# Patient Record
Sex: FEMALE | Race: BLACK OR AFRICAN AMERICAN | NOT HISPANIC OR LATINO | Employment: PART TIME | URBAN - METROPOLITAN AREA
[De-identification: names, ages, dates, MRNs, and addresses within clinical notes are randomized per-mention and may not be internally consistent; named-entity substitution may affect disease eponyms.]

---

## 2017-02-17 ENCOUNTER — OFFICE VISIT (OUTPATIENT)
Dept: INTERNAL MEDICINE | Facility: CLINIC | Age: 62
End: 2017-02-17
Payer: COMMERCIAL

## 2017-02-17 VITALS
TEMPERATURE: 98 F | RESPIRATION RATE: 16 BRPM | WEIGHT: 164.25 LBS | OXYGEN SATURATION: 99 % | DIASTOLIC BLOOD PRESSURE: 74 MMHG | HEIGHT: 66 IN | HEART RATE: 58 BPM | BODY MASS INDEX: 26.4 KG/M2 | SYSTOLIC BLOOD PRESSURE: 102 MMHG

## 2017-02-17 DIAGNOSIS — Z00.00 ROUTINE GENERAL MEDICAL EXAMINATION AT A HEALTH CARE FACILITY: Primary | ICD-10-CM

## 2017-02-17 DIAGNOSIS — Z12.31 OTHER SCREENING MAMMOGRAM: ICD-10-CM

## 2017-02-17 PROCEDURE — 99999 PR PBB SHADOW E&M-NEW PATIENT-LVL III: CPT | Mod: PBBFAC,,, | Performed by: INTERNAL MEDICINE

## 2017-02-17 PROCEDURE — 99204 OFFICE O/P NEW MOD 45 MIN: CPT | Mod: S$GLB,,, | Performed by: INTERNAL MEDICINE

## 2017-02-17 RX ORDER — CLOPIDOGREL BISULFATE 75 MG/1
75 TABLET ORAL DAILY
COMMUNITY
End: 2017-04-28 | Stop reason: ALTCHOICE

## 2017-02-17 RX ORDER — ERGOCALCIFEROL 1.25 MG/1
50000 CAPSULE ORAL
Refills: 3 | COMMUNITY
Start: 2017-01-20 | End: 2017-07-27 | Stop reason: SDUPTHER

## 2017-02-17 RX ORDER — FLUTICASONE PROPIONATE 50 MCG
SPRAY, SUSPENSION (ML) NASAL
Refills: 0 | COMMUNITY
Start: 2016-11-29 | End: 2022-09-29

## 2017-02-17 RX ORDER — CARISOPRODOL 350 MG/1
TABLET ORAL
Refills: 0 | COMMUNITY
Start: 2017-01-31 | End: 2017-02-17

## 2017-02-17 RX ORDER — ROSUVASTATIN CALCIUM 10 MG/1
10 TABLET, COATED ORAL DAILY
COMMUNITY
End: 2017-07-01 | Stop reason: SDUPTHER

## 2017-02-17 RX ORDER — CYCLOBENZAPRINE HCL 10 MG
TABLET ORAL
Refills: 0 | COMMUNITY
Start: 2017-01-01 | End: 2017-02-17

## 2017-02-17 RX ORDER — AMLODIPINE BESYLATE 5 MG/1
5 TABLET ORAL DAILY
COMMUNITY
End: 2017-10-20 | Stop reason: ALTCHOICE

## 2017-02-17 NOTE — MR AVS SNAPSHOT
[facility]  [address] [place] [address] [address]  [place] [address]  Phone: [phone]                  Conchita Rouse   2017 10:40 AM   Office Visit    Description:  Female : 1955   Provider:  Roberto Ron MD   Department:  [facility]           Reason for Visit     Establish Care     Annual Exam           Diagnoses this Visit        Comments    Routine general medical examination at a health care facility    -  Primary            To Do List           Goals (5 Years of Data)     None      Follow-Up and Disposition     Return in about 1 year (around 2018), or if symptoms worsen or fail to improve, for Annual.    Follow-up and Disposition History      [facility] On [facility]     [facility] On Call Nurse Care Line -  Assistance  Registered nurses in the [facility] On Call Center provide clinical advisement, health education, appointment booking, and other advisory services.  Call for this free service at [phone].             Medications           STOP taking these medications     carisoprodol (SOMA) 350 MG tablet TK 1 T PO  Q 8 H PRF MUSCLE SPASMS    cyclobenzaprine (FLEXERIL) 10 MG tablet TAKE 1 TABLET BY MOUTH EVERY 4 HOURS AS NEEDED FOR MUSCLE SPASMS           Verify that the below list of medications is an accurate representation of the medications you are currently taking.  If none reported, the list may be blank. If incorrect, please contact your healthcare provider. Carry this list with you in case of emergency.           Current Medications     amlodipine (NORVASC) 5 MG tablet Take 5 mg by mouth once daily.    clopidogrel (PLAVIX) 75 mg tablet Take 75 mg by mouth once daily.    fluticasone (FLONASE) 50 mcg/actuation nasal spray USE 2 SPRAYS IN EACH NOSTRIL 1 TIME A DAY AS NEEDED    rosuvastatin (CRESTOR) 10 MG tablet Take 10 mg by mouth once daily.    VITAMIN D2 50,000 unit capsule Take 50,000 Units by mouth every 7 days.           Clinical Reference Information  "          Your Vitals Were     BP Pulse Temp Resp    102/74 (BP Location: Left arm, Patient Position: Sitting, BP Method: Manual) 58 97.6 °F (36.4 °C) (Tympanic) 16    Height Weight SpO2 BMI    5' 6" (1.676 m) 74.5 kg (164 lb 3.9 oz) 99% 26.51 kg/m2      Blood Pressure          Most Recent Value    BP  102/74      Allergies as of 2/17/2017     Codeine      Immunizations Administered on Date of Encounter - 2/17/2017     None      Orders Placed During Today's Visit     Future Labs/Procedures Expected by Expires    CBC auto differential  2/17/2017 4/18/2018    Comprehensive metabolic panel  2/17/2017 4/18/2018    Lipid panel  2/17/2017 4/18/2018    Vitamin D  2/17/2017 4/18/2018      [facility] Sign-Up     Activating your [facility] account is as easy as 1-2-3!     1) Visit [email or web address], select Sign Up Now, enter this activation code and your date of birth, then select Next.  [email or web address]  Expires: 4/3/2017 11:53 AM      2) Create a username and password to use when you visit [facility] in the future and select a security question in case you lose your password and select Next.    3) Enter your e-mail address and click Sign Up!    Additional Information  If you have questions, please e-mail [email or web address] or call [phone] to talk to our [facility] staff. Remember, [facility] is NOT to be used for urgent needs. For medical emergencies, dial 911.         Language Assistance Services     ATTENTION: Language assistance services are available, free of charge. Please call [phone].      ATENCIÓN: Si habla español, tiene a wolfe disposición servicios gratuitos de asistencia lingüística. Llame al [phone].     SHIRA Ý: N?u b?n nói Ti?ng Vi?t, có các d?ch v? h? tr? ngôn ng? mi?n phí dành cho b?n. G?i s? [phone].         [facility] complies with applicable Federal civil rights laws and does not discriminate on the basis of race, color, national origin, age, disability, or sex.        "

## 2017-02-17 NOTE — PROGRESS NOTES
"Subjective:      Patient ID: Conchita Rouse is a 62 y.o. female.    Chief Complaint: Establish Care and Annual Exam    HPI  Ms. Rouse who presents to establish primary care due to her previous PCP, Dr. Regalado, retiring.    She reports feeling "good" and endorses being happy.    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 No dizziness or sob or cp.     Past Medical History   Diagnosis Date    Hyperlipidemia     Hypertension     TIA (transient ischemic attack) 2007     She was at "[employer]", where she noted she was feeling slow, took a nap, woke up with a numb/tingling left jaw to arm, which persistned 20 minutes, took 3 baby aspirins, went to ER @ [facility], where facial droop was noted, but completely resolved & head CT revealed old minor abnormality.    Vitamin D deficiency      Past Surgical History   Procedure Laterality Date    Left eye surgery  1955     Due to left eye lid drooping     Social History     Social History    Marital status:      Spouse name: N/A    Number of children: N/A    Years of education: N/A     Occupational History    Not on file.     Social History Main Topics    Smoking status: Never Smoker    Smokeless tobacco: Never Used    Alcohol use 0.0 - 0.6 oz/week     0 - 1 Cans of beer per week      Comment: 1/2 beer 3x/yr    Drug use: No    Sexual activity: Not Currently     Partners: Male     Birth control/ protection: Post-menopausal     Other Topics Concern    Not on file     Social History Narrative    Breakfast: Skips or eggs; 1 cup coffee w/ 1 cream & 3 sugars    Lunch: Skips or turkey or ham sandwich w/ occasional chips; tea, rare soda    Dinner: Chicken wings and french fries; tea or cool aid    Snacks: Chocolates i.e. covered strawberry or Pretzils     Eats out: 2x/wk      Family History   Problem Relation Age of Onset    Heart disease Mother     Diabetes Mother     Heart disease Father     Cancer Sister     " "Cancer Brother        Current Outpatient Prescriptions:     amlodipine (NORVASC) 5 MG tablet, Take 5 mg by mouth once daily., Disp: , Rfl:     clopidogrel (PLAVIX) 75 mg tablet, Take 75 mg by mouth once daily., Disp: , Rfl:     fluticasone (FLONASE) 50 mcg/actuation nasal spray, USE 2 SPRAYS IN EACH NOSTRIL 1 TIME A DAY AS NEEDED, Disp: , Rfl: 0    rosuvastatin (CRESTOR) 10 MG tablet, Take 10 mg by mouth once daily., Disp: , Rfl:     VITAMIN D2 50,000 unit capsule, Take 50,000 Units by mouth every 7 days., Disp: , Rfl: 3    Review of patient's allergies indicates:   Allergen Reactions    Codeine Anaphylaxis     No results found for: WBC, HGB, HCT, PLT, CHOL, TRIG, HDL, LDLDIRECT, ALT, AST, NA, K, CL, CREATININE, BUN, CO2, TSH, PSA, INR, GLUF, HGBA1C, MICROALBUR    Visit Vitals    /74 (BP Location: Left arm, Patient Position: Sitting, BP Method: Manual)    Pulse (!) 58    Temp 97.6 °F (36.4 °C) (Tympanic)    Resp 16    Ht 5' 6" (1.676 m)    Wt 74.5 kg (164 lb 3.9 oz)    SpO2 99%    BMI 26.51 kg/m2     Review of Systems   Constitutional: Negative.   Cardiovascular: Negative.   Respiratory: Negative.   Gastrointestinal: Negative.   Genitourinary: Negative.   Musculoskeletal: Negative.   Derm: Negative.  Allergic/Immunologic: Negative.   Endocrine: Negative.   Hematological: Negative.   Neurological: Negative.   Psychiatric/Behavioral: Negative.     Objective:     Physical Exam  GEN: A&O fully, NAD  PSYC: Normal affect  HEENT: OP: Clear, no LAD, no thyroid masses  CV: RRR, no M/G/R  PULM: CTA bilaterally, no wheezes, rales  GI: S/NT/ND, normal bowel sounds  EXT: No C/C/E  NEURO: CN II-XII intact, except left eye , 5/5 strength globally, no sensory losses, normal tandem gait, normal Romberg, 2+ DTRs globally    No results found for: HGBA1C    Assessment:     1. Routine general medical examination at a health care facility    2. H/o TIA: Stable. No changes in neurological function. Neuro exam " stable.  3. Overweight: Discussed strategies for lifestyle modifications, including systematically increasing water, yogurt, whole fruits, unsalted nuts, non-starchy vegetables, beans, weight logging and physical activity; and avoiding potatoes, red/processed meats, sugar     sweetened beverages, and fast food.   4. HM: Had normal MMG 12/2016: W[facility] per pt ([facility] [place]), last PAP              5/2016: WNL per pt: Dr. Broderick (OB/GYN)    Plan:   Routine general medical examination at a health care facility  -     CBC auto differential; Future; Expected date: 2/17/17  -     Comprehensive metabolic panel; Future; Expected date: 2/17/17  -     Lipid panel; Future; Expected date: 2/17/17  -     Vitamin D; Future; Expected date: 2/17/17    RTC in 12 months for annual or sooner as needed.

## 2017-02-21 ENCOUNTER — LAB VISIT (OUTPATIENT)
Dept: LAB | Facility: HOSPITAL | Age: 62
End: 2017-02-21
Attending: INTERNAL MEDICINE
Payer: COMMERCIAL

## 2017-02-21 DIAGNOSIS — Z00.00 ROUTINE GENERAL MEDICAL EXAMINATION AT A HEALTH CARE FACILITY: ICD-10-CM

## 2017-02-21 DIAGNOSIS — Z11.59 NEED FOR HEPATITIS C SCREENING TEST: ICD-10-CM

## 2017-02-21 LAB
25(OH)D3+25(OH)D2 SERPL-MCNC: 31 NG/ML
ALBUMIN SERPL BCP-MCNC: 3.8 G/DL
ALP SERPL-CCNC: 68 U/L
ALT SERPL W/O P-5'-P-CCNC: 21 U/L
ANION GAP SERPL CALC-SCNC: 7 MMOL/L
ANISOCYTOSIS BLD QL SMEAR: SLIGHT
AST SERPL-CCNC: 19 U/L
BASOPHILS # BLD AUTO: 0 K/UL
BASOPHILS NFR BLD: 0 %
BILIRUB SERPL-MCNC: 0.4 MG/DL
BUN SERPL-MCNC: 15 MG/DL
CALCIUM SERPL-MCNC: 9.5 MG/DL
CHLORIDE SERPL-SCNC: 106 MMOL/L
CHOLEST/HDLC SERPL: 2.7 {RATIO}
CO2 SERPL-SCNC: 28 MMOL/L
CREAT SERPL-MCNC: 0.9 MG/DL
DIFFERENTIAL METHOD: ABNORMAL
EOSINOPHIL # BLD AUTO: 0.1 K/UL
EOSINOPHIL NFR BLD: 1.9 %
ERYTHROCYTE [DISTWIDTH] IN BLOOD BY AUTOMATED COUNT: 14.2 %
EST. GFR  (AFRICAN AMERICAN): >60 ML/MIN/1.73 M^2
EST. GFR  (NON AFRICAN AMERICAN): >60 ML/MIN/1.73 M^2
GLUCOSE SERPL-MCNC: 86 MG/DL
HCT VFR BLD AUTO: 32.7 %
HDL/CHOLESTEROL RATIO: 36.5 %
HDLC SERPL-MCNC: 126 MG/DL
HDLC SERPL-MCNC: 46 MG/DL
HGB BLD-MCNC: 11.1 G/DL
HYPOCHROMIA BLD QL SMEAR: ABNORMAL
LDLC SERPL CALC-MCNC: 67.4 MG/DL
LYMPHOCYTES # BLD AUTO: 1 K/UL
LYMPHOCYTES NFR BLD: 39.2 %
MCH RBC QN AUTO: 34.4 PG
MCHC RBC AUTO-ENTMCNC: 33.9 %
MCV RBC AUTO: 101 FL
MONOCYTES # BLD AUTO: 0.3 K/UL
MONOCYTES NFR BLD: 10.6 %
NEUTROPHILS # BLD AUTO: 1.3 K/UL
NEUTROPHILS NFR BLD: 48.3 %
NONHDLC SERPL-MCNC: 80 MG/DL
OVALOCYTES BLD QL SMEAR: ABNORMAL
PLATELET # BLD AUTO: 40 K/UL
PLATELET BLD QL SMEAR: ABNORMAL
PMV BLD AUTO: 9.8 FL
POIKILOCYTOSIS BLD QL SMEAR: SLIGHT
POTASSIUM SERPL-SCNC: 3.6 MMOL/L
PROT SERPL-MCNC: 7.4 G/DL
RBC # BLD AUTO: 3.23 M/UL
SODIUM SERPL-SCNC: 141 MMOL/L
TRIGL SERPL-MCNC: 63 MG/DL
WBC # BLD AUTO: 2.65 K/UL

## 2017-02-21 PROCEDURE — 86803 HEPATITIS C AB TEST: CPT

## 2017-02-21 PROCEDURE — 80061 LIPID PANEL: CPT

## 2017-02-21 PROCEDURE — 36415 COLL VENOUS BLD VENIPUNCTURE: CPT | Mod: PO

## 2017-02-21 PROCEDURE — 85025 COMPLETE CBC W/AUTO DIFF WBC: CPT

## 2017-02-21 PROCEDURE — 80053 COMPREHEN METABOLIC PANEL: CPT

## 2017-02-21 PROCEDURE — 82306 VITAMIN D 25 HYDROXY: CPT

## 2017-02-22 ENCOUNTER — LAB VISIT (OUTPATIENT)
Dept: LAB | Facility: HOSPITAL | Age: 62
End: 2017-02-22
Attending: INTERNAL MEDICINE
Payer: COMMERCIAL

## 2017-02-22 ENCOUNTER — INITIAL CONSULT (OUTPATIENT)
Dept: HEMATOLOGY/ONCOLOGY | Facility: CLINIC | Age: 62
End: 2017-02-22
Payer: COMMERCIAL

## 2017-02-22 ENCOUNTER — TELEPHONE (OUTPATIENT)
Dept: HEMATOLOGY/ONCOLOGY | Facility: CLINIC | Age: 62
End: 2017-02-22

## 2017-02-22 VITALS
SYSTOLIC BLOOD PRESSURE: 120 MMHG | DIASTOLIC BLOOD PRESSURE: 80 MMHG | OXYGEN SATURATION: 100 % | TEMPERATURE: 98 F | BODY MASS INDEX: 27.63 KG/M2 | HEIGHT: 65 IN | WEIGHT: 165.81 LBS | HEART RATE: 79 BPM | RESPIRATION RATE: 18 BRPM

## 2017-02-22 DIAGNOSIS — D61.818 PANCYTOPENIA: ICD-10-CM

## 2017-02-22 DIAGNOSIS — E78.00 PURE HYPERCHOLESTEROLEMIA: ICD-10-CM

## 2017-02-22 DIAGNOSIS — Z86.73 H/O TIA (TRANSIENT ISCHEMIC ATTACK) AND STROKE: ICD-10-CM

## 2017-02-22 DIAGNOSIS — I10 ESSENTIAL HYPERTENSION: ICD-10-CM

## 2017-02-22 DIAGNOSIS — D69.6 THROMBOCYTOPENIA: Primary | ICD-10-CM

## 2017-02-22 LAB
ALBUMIN SERPL BCP-MCNC: 4 G/DL
ALP SERPL-CCNC: 66 U/L
ALT SERPL W/O P-5'-P-CCNC: 26 U/L
ANION GAP SERPL CALC-SCNC: 11 MMOL/L
ANISOCYTOSIS BLD QL SMEAR: SLIGHT
AST SERPL-CCNC: 18 U/L
BASOPHILS # BLD AUTO: ABNORMAL K/UL
BASOPHILS NFR BLD: 0 %
BILIRUB SERPL-MCNC: 0.2 MG/DL
BUN SERPL-MCNC: 14 MG/DL
CALCIUM SERPL-MCNC: 9.4 MG/DL
CHLORIDE SERPL-SCNC: 105 MMOL/L
CO2 SERPL-SCNC: 25 MMOL/L
CREAT SERPL-MCNC: 0.9 MG/DL
DACRYOCYTES BLD QL SMEAR: ABNORMAL
DIFFERENTIAL METHOD: ABNORMAL
EOSINOPHIL # BLD AUTO: ABNORMAL K/UL
EOSINOPHIL NFR BLD: 0 %
ERYTHROCYTE [DISTWIDTH] IN BLOOD BY AUTOMATED COUNT: 13.9 %
EST. GFR  (AFRICAN AMERICAN): >60 ML/MIN/1.73 M^2
EST. GFR  (NON AFRICAN AMERICAN): >60 ML/MIN/1.73 M^2
FERRITIN SERPL-MCNC: 409 NG/ML
GLUCOSE SERPL-MCNC: 80 MG/DL
HAPTOGLOB SERPL-MCNC: 85 MG/DL
HCT VFR BLD AUTO: 31.7 %
HCV AB SERPL QL IA: NEGATIVE
HGB BLD-MCNC: 10.7 G/DL
IRON SERPL-MCNC: 101 UG/DL
LDH SERPL L TO P-CCNC: 154 U/L
LYMPHOCYTES # BLD AUTO: ABNORMAL K/UL
LYMPHOCYTES NFR BLD: 49 %
MCH RBC QN AUTO: 34.9 PG
MCHC RBC AUTO-ENTMCNC: 33.8 %
MCV RBC AUTO: 103 FL
MONOCYTES # BLD AUTO: ABNORMAL K/UL
MONOCYTES NFR BLD: 13 %
NEUTROPHILS NFR BLD: 38 %
OVALOCYTES BLD QL SMEAR: ABNORMAL
PLATELET # BLD AUTO: 37 K/UL
PLATELET BLD QL SMEAR: ABNORMAL
PMV BLD AUTO: 9.2 FL
POIKILOCYTOSIS BLD QL SMEAR: SLIGHT
POTASSIUM SERPL-SCNC: 3.4 MMOL/L
PROT SERPL-MCNC: 7.5 G/DL
RBC # BLD AUTO: 3.07 M/UL
RHEUMATOID FACT SERPL-ACNC: <10 IU/ML
SATURATED IRON: 34 %
SODIUM SERPL-SCNC: 141 MMOL/L
TOTAL IRON BINDING CAPACITY: 299 UG/DL
TRANSFERRIN SERPL-MCNC: 202 MG/DL
WBC # BLD AUTO: 1.84 K/UL

## 2017-02-22 PROCEDURE — 84165 PROTEIN E-PHORESIS SERUM: CPT | Mod: 26,,, | Performed by: PATHOLOGY

## 2017-02-22 PROCEDURE — 83010 ASSAY OF HAPTOGLOBIN QUANT: CPT

## 2017-02-22 PROCEDURE — 3074F SYST BP LT 130 MM HG: CPT | Mod: S$GLB,,, | Performed by: INTERNAL MEDICINE

## 2017-02-22 PROCEDURE — 80053 COMPREHEN METABOLIC PANEL: CPT

## 2017-02-22 PROCEDURE — 36415 COLL VENOUS BLD VENIPUNCTURE: CPT

## 2017-02-22 PROCEDURE — 80074 ACUTE HEPATITIS PANEL: CPT

## 2017-02-22 PROCEDURE — 99999 PR PBB SHADOW E&M-EST. PATIENT-LVL III: CPT | Mod: PBBFAC,,, | Performed by: INTERNAL MEDICINE

## 2017-02-22 PROCEDURE — 99205 OFFICE O/P NEW HI 60 MIN: CPT | Mod: S$GLB,,, | Performed by: INTERNAL MEDICINE

## 2017-02-22 PROCEDURE — 86703 HIV-1/HIV-2 1 RESULT ANTBDY: CPT

## 2017-02-22 PROCEDURE — 85027 COMPLETE CBC AUTOMATED: CPT

## 2017-02-22 PROCEDURE — 83520 IMMUNOASSAY QUANT NOS NONAB: CPT | Mod: 59

## 2017-02-22 PROCEDURE — 85049 AUTOMATED PLATELET COUNT: CPT

## 2017-02-22 PROCEDURE — 1160F RVW MEDS BY RX/DR IN RCRD: CPT | Mod: S$GLB,,, | Performed by: INTERNAL MEDICINE

## 2017-02-22 PROCEDURE — 86431 RHEUMATOID FACTOR QUANT: CPT

## 2017-02-22 PROCEDURE — 82728 ASSAY OF FERRITIN: CPT

## 2017-02-22 PROCEDURE — 83615 LACTATE (LD) (LDH) ENZYME: CPT

## 2017-02-22 PROCEDURE — 84165 PROTEIN E-PHORESIS SERUM: CPT

## 2017-02-22 PROCEDURE — 83540 ASSAY OF IRON: CPT

## 2017-02-22 PROCEDURE — 3079F DIAST BP 80-89 MM HG: CPT | Mod: S$GLB,,, | Performed by: INTERNAL MEDICINE

## 2017-02-22 PROCEDURE — 86038 ANTINUCLEAR ANTIBODIES: CPT

## 2017-02-22 NOTE — LETTER
February 22, 2017      Roberto Ron MD  [address] [facility]  [place] [address]           [facility]  [address] [place] [address]  Phone: [phone]  Fax: [phone]          Patient: Conchita Rouse   MR Number: [id number]   YOB: 1955   Date of Visit: 2/22/2017       Dear Dr. Roberto Ron:    Thank you for referring Conchita Rouse to me for evaluation. Attached you will find relevant portions of my assessment and plan of care.    If you have questions, please do not hesitate to call me. I look forward to following Conchita Rouse along with you.    Sincerely,    Zain Villagomez MD    Enclosure  CC:  No Recipients    If you would like to receive this communication electronically, please contact [email or web address] or [phone] to request more information on [employer] Link access.    For providers and/or their staff who would like to refer a patient to [facility], please contact us through our one-stop-shop provider referral line, [facility] , at [phone].    If you feel you have received this communication in error or would no longer like to receive these types of communications, please e-mail [email or web address]

## 2017-02-22 NOTE — PROGRESS NOTES
"Subjective:       Patient ID: Conchita Rouse is a 62 y.o. female.    Chief Complaint: Consult (Pancytopenia)    HPI 62-year-old female history of pancytopenia referred by primary care physician for further evaluation.  Patient has a tooth abscess involving her right tooth is currently on Augmentin and ibuprofen.  Patient is referred for pancytopenia evaluation    Past Medical History   Diagnosis Date    Hyperlipidemia     Hypertension     Pancytopenia 02/21/2017     PLT 40 with no s/s bleeding; referred to hem/onc for BM Bx/management    TIA (transient ischemic attack) 2007     She was at "[employer]", where she noted she was feeling slow, took a nap, woke up with a numb/tingling left jaw to arm, which persistned 20 minutes, took 3 baby aspirins, went to ER @ [facility], where facial droop was noted, but completely resolved & head CT revealed old minor abnormality.    Vitamin D deficiency      Family History   Problem Relation Age of Onset    Heart disease Mother     Diabetes Mother     Heart disease Father     Cancer Sister     Cancer Brother      Social History     Social History    Marital status:      Spouse name: N/A    Number of children: N/A    Years of education: N/A     Occupational History    Not on file.     Social History Main Topics    Smoking status: Never Smoker    Smokeless tobacco: Never Used    Alcohol use 0.0 - 0.6 oz/week     0 - 1 Cans of beer per week      Comment: 1/2 beer 3x/yr    Drug use: No    Sexual activity: Not Currently     Partners: Male     Birth control/ protection: Post-menopausal     Other Topics Concern    Not on file     Social History Narrative    Breakfast: Skips or eggs; 1 cup coffee w/ 1 cream & 3 sugars    Lunch: Skips or turkey or ham sandwich w/ occasional chips; tea, rare soda    Dinner: Chicken wings and french fries; tea or cool aid    Snacks: Chocolates i.e. covered strawberry or Pretzils     Eats out: 2x/wk     Past Surgical History   Procedure " Laterality Date    Left eye surgery  1955     Due to left eye lid drooping       Labs:  Lab Results   Component Value Date    WBC 2.65 (L) 02/21/2017    HGB 11.1 (L) 02/21/2017    HCT 32.7 (L) 02/21/2017     (H) 02/21/2017    PLT 40 (L) 02/21/2017     BMP  Lab Results   Component Value Date     02/21/2017    K 3.6 02/21/2017     02/21/2017    CO2 28 02/21/2017    BUN 15 02/21/2017    CREATININE 0.9 02/21/2017    CALCIUM 9.5 02/21/2017    ANIONGAP 7 (L) 02/21/2017    ESTGFRAFRICA >60.0 02/21/2017    EGFRNONAA >60.0 02/21/2017     Lab Results   Component Value Date    ALT 21 02/21/2017    AST 19 02/21/2017    ALKPHOS 68 02/21/2017    BILITOT 0.4 02/21/2017       No results found for: IRON, TIBC, FERRITIN, SATURATEDIRO  No results found for: BFJICOEI14  No results found for: FOLATE  No results found for: TSH      Review of Systems   Constitutional: Negative for activity change, appetite change, chills, diaphoresis, fatigue, fever and unexpected weight change.   HENT: Positive for dental problem. Negative for congestion, drooling, ear discharge, ear pain, facial swelling, hearing loss, mouth sores, nosebleeds, postnasal drip, rhinorrhea, sinus pressure, sneezing, sore throat, tinnitus, trouble swallowing and voice change.    Eyes: Negative for photophobia, pain, discharge, redness, itching and visual disturbance.   Respiratory: Negative for cough, choking, chest tightness, shortness of breath, wheezing and stridor.    Cardiovascular: Negative for chest pain, palpitations and leg swelling.   Gastrointestinal: Negative for abdominal distention, abdominal pain, anal bleeding, blood in stool, constipation, diarrhea, nausea, rectal pain and vomiting.   Endocrine: Negative for cold intolerance, heat intolerance, polydipsia, polyphagia and polyuria.   Genitourinary: Negative for decreased urine volume, difficulty urinating, dyspareunia, dysuria, enuresis, flank pain, frequency, genital sores, hematuria,  menstrual problem, pelvic pain, urgency, vaginal bleeding, vaginal discharge and vaginal pain.   Musculoskeletal: Negative for arthralgias, back pain, gait problem, joint swelling, myalgias, neck pain and neck stiffness.   Skin: Negative for color change, pallor and rash.   Allergic/Immunologic: Negative for environmental allergies, food allergies and immunocompromised state.   Neurological: Negative for dizziness, tremors, seizures, syncope, facial asymmetry, speech difficulty, weakness, light-headedness, numbness and headaches.   Hematological: Negative for adenopathy. Does not bruise/bleed easily.   Psychiatric/Behavioral: Positive for dysphoric mood. Negative for agitation, behavioral problems, confusion, decreased concentration, hallucinations, self-injury, sleep disturbance and suicidal ideas. The patient is nervous/anxious. The patient is not hyperactive.        Objective:      Physical Exam   Constitutional: She is oriented to person, place, and time. She appears well-developed and well-nourished. No distress.   HENT:   Head: Normocephalic and atraumatic.   Right Ear: Tympanic membrane and external ear normal.   Left Ear: External ear normal.   Ears:    Nose: Nose normal. Right sinus exhibits no maxillary sinus tenderness and no frontal sinus tenderness. Left sinus exhibits no maxillary sinus tenderness and no frontal sinus tenderness.   Mouth/Throat: Oropharynx is clear and moist. No oropharyngeal exudate.   Eyes: Conjunctivae, EOM and lids are normal. Pupils are equal, round, and reactive to light. Right eye exhibits no discharge. Left eye exhibits no discharge. Right conjunctiva is not injected. Right conjunctiva has no hemorrhage. Left conjunctiva is not injected. Left conjunctiva has no hemorrhage. No scleral icterus.   Neck: Normal range of motion. Neck supple. No JVD present. No tracheal deviation present. No thyromegaly present.   Cardiovascular: Normal rate, regular rhythm, normal heart sounds and  intact distal pulses.    Pulmonary/Chest: Effort normal and breath sounds normal. No stridor. No respiratory distress. She exhibits no tenderness.   Abdominal: Soft. Bowel sounds are normal. She exhibits no distension and no mass. There is no splenomegaly or hepatomegaly. There is no tenderness. There is no rebound.   Musculoskeletal: Normal range of motion. She exhibits no edema or tenderness.   Lymphadenopathy:     She has no cervical adenopathy.     She has no axillary adenopathy.        Right: No supraclavicular adenopathy present.        Left: No supraclavicular adenopathy present.   Neurological: She is alert and oriented to person, place, and time. No cranial nerve deficit. Coordination normal.   Skin: Skin is dry. No rash noted. She is not diaphoretic. No erythema.   Psychiatric: She has a normal mood and affect. Her behavior is normal. Judgment and thought content normal.   Vitals reviewed.          Assessment:       1. Pancytopenia    2. H/O TIA (transient ischemic attack) and stroke    3. Pure hypercholesterolemia    4. Essential hypertension            Plan:     review of laboratory studies demonstrates pancytopenia recommend repeat CBC with additional laboratory studies for further evaluation also will repeat platelet count a blue top tube for pseud thrombo-cytopenia patient will have results forwarded to her through the electronic patient portal and I will see the patient back in 72-96 hours for review.  Discussed nature pancytopenia with either peripheral destruction or primary problem with bone marrow discussed possibility of underlying blood problem in bone marrow including the possibility of leukemia but told patient will proceed with workup as expeditiously as quickly as possible

## 2017-02-23 LAB
ALBUMIN SERPL ELPH-MCNC: 4 G/DL
ALPHA1 GLOB SERPL ELPH-MCNC: 0.31 G/DL
ALPHA2 GLOB SERPL ELPH-MCNC: 0.74 G/DL
ANA SER QL IF: NORMAL
B-GLOBULIN SERPL ELPH-MCNC: 0.85 G/DL
GAMMA GLOB SERPL ELPH-MCNC: 1.3 G/DL
HAV IGM SERPL QL IA: NEGATIVE
HBV CORE IGM SERPL QL IA: NEGATIVE
HBV SURFACE AG SERPL QL IA: NEGATIVE
HCV AB SERPL QL IA: NEGATIVE
HIV 1+2 AB+HIV1 P24 AG SERPL QL IA: NEGATIVE
KAPPA LC SER QL IA: 2.81 MG/DL
KAPPA LC/LAMBDA SER IA: 1.44
LAMBDA LC SER QL IA: 1.95 MG/DL
MPV, BLUE TOP: 8.9 FL
PATHOLOGIST INTERPRETATION SPE: NORMAL
PLATELET, BLUE TOP: 33 K/UL
PROT SERPL-MCNC: 7.2 G/DL

## 2017-02-24 ENCOUNTER — TELEPHONE (OUTPATIENT)
Dept: INTERNAL MEDICINE | Facility: CLINIC | Age: 62
End: 2017-02-24

## 2017-02-24 NOTE — TELEPHONE ENCOUNTER
----- Message from Fernanda Gurrola sent at 2/24/2017 12:25 PM CST -----  Contact: pt  The address for requesting the pt's prior medical records is:   [address] [address], [place] [address]

## 2017-02-24 NOTE — TELEPHONE ENCOUNTER
Spoke with pt, let her know she will need to come fill out a release of information for us to be able to get previous records. Pt stated she will come by and sign a CHARLES. Pt verbalized understanding.

## 2017-03-01 ENCOUNTER — OFFICE VISIT (OUTPATIENT)
Dept: HEMATOLOGY/ONCOLOGY | Facility: CLINIC | Age: 62
End: 2017-03-01
Payer: COMMERCIAL

## 2017-03-01 VITALS
DIASTOLIC BLOOD PRESSURE: 84 MMHG | OXYGEN SATURATION: 100 % | HEIGHT: 65 IN | BODY MASS INDEX: 27.7 KG/M2 | TEMPERATURE: 99 F | RESPIRATION RATE: 18 BRPM | SYSTOLIC BLOOD PRESSURE: 120 MMHG | WEIGHT: 166.25 LBS | HEART RATE: 70 BPM

## 2017-03-01 DIAGNOSIS — D61.818 PANCYTOPENIA: Primary | ICD-10-CM

## 2017-03-01 PROCEDURE — 3074F SYST BP LT 130 MM HG: CPT | Mod: S$GLB,,, | Performed by: INTERNAL MEDICINE

## 2017-03-01 PROCEDURE — 1160F RVW MEDS BY RX/DR IN RCRD: CPT | Mod: S$GLB,,, | Performed by: INTERNAL MEDICINE

## 2017-03-01 PROCEDURE — 99999 PR PBB SHADOW E&M-EST. PATIENT-LVL III: CPT | Mod: PBBFAC,,, | Performed by: INTERNAL MEDICINE

## 2017-03-01 PROCEDURE — 99214 OFFICE O/P EST MOD 30 MIN: CPT | Mod: S$GLB,,, | Performed by: INTERNAL MEDICINE

## 2017-03-01 PROCEDURE — 3079F DIAST BP 80-89 MM HG: CPT | Mod: S$GLB,,, | Performed by: INTERNAL MEDICINE

## 2017-03-01 NOTE — PROGRESS NOTES
"Subjective:       Patient ID: Conchita Rouse is a 62 y.o. female.    Chief Complaint: Follow-up (Pancytopenia) and Results    HPI 62-year-old female returns for evaluation of pancytopenia after laboratory blood draws    Past Medical History:   Diagnosis Date    Hyperlipidemia     Hypertension     Pancytopenia 02/21/2017    PLT 40 with no s/s bleeding; referred to hem/onc for BM Bx/management    TIA (transient ischemic attack) 2007    She was at "[employer]", where she noted she was feeling slow, took a nap, woke up with a numb/tingling left jaw to arm, which persistned 20 minutes, took 3 baby aspirins, went to ER @ [facility], where facial droop was noted, but completely resolved & head CT revealed old minor abnormality.    Vitamin D deficiency      Family History   Problem Relation Age of Onset    Heart disease Mother     Diabetes Mother     Heart disease Father     Cancer Sister     Cancer Brother      Social History     Social History    Marital status:      Spouse name: N/A    Number of children: N/A    Years of education: N/A     Occupational History    Not on file.     Social History Main Topics    Smoking status: Never Smoker    Smokeless tobacco: Never Used    Alcohol use 0.0 - 0.6 oz/week     0 - 1 Cans of beer per week      Comment: 1/2 beer 3x/yr    Drug use: No    Sexual activity: Not Currently     Partners: Male     Birth control/ protection: Post-menopausal     Other Topics Concern    Not on file     Social History Narrative    Breakfast: Skips or eggs; 1 cup coffee w/ 1 cream & 3 sugars    Lunch: Skips or turkey or ham sandwich w/ occasional chips; tea, rare soda    Dinner: Chicken wings and french fries; tea or cool aid    Snacks: Chocolates i.e. covered strawberry or Pretzils     Eats out: 2x/wk     Past Surgical History:   Procedure Laterality Date    left eye surgery  1955    Due to left eye lid drooping       Labs:  Lab Results   Component Value Date    WBC 1.84 (LL) " 02/22/2017    HGB 10.7 (L) 02/22/2017    HCT 31.7 (L) 02/22/2017     (H) 02/22/2017    PLT 37 (LL) 02/22/2017     BMP  Lab Results   Component Value Date     02/22/2017    K 3.4 (L) 02/22/2017     02/22/2017    CO2 25 02/22/2017    BUN 14 02/22/2017    CREATININE 0.9 02/22/2017    CALCIUM 9.4 02/22/2017    ANIONGAP 11 02/22/2017    ESTGFRAFRICA >60 02/22/2017    EGFRNONAA >60 02/22/2017     Lab Results   Component Value Date    ALT 26 02/22/2017    AST 18 02/22/2017    ALKPHOS 66 02/22/2017    BILITOT 0.2 02/22/2017       Lab Results   Component Value Date    IRON 101 02/22/2017    TIBC 299 02/22/2017    FERRITIN 409 (H) 02/22/2017     No results found for: [id number]  No results found for: FOLATE  No results found for: TSH      Review of Systems   Constitutional: Negative for activity change, appetite change, chills, diaphoresis, fatigue, fever and unexpected weight change.   HENT: Negative for congestion, dental problem, drooling, ear discharge, ear pain, facial swelling, hearing loss, mouth sores, nosebleeds, postnasal drip, rhinorrhea, sinus pressure, sneezing, sore throat, tinnitus, trouble swallowing and voice change.    Eyes: Negative for photophobia, pain, discharge, redness, itching and visual disturbance.   Respiratory: Negative for cough, choking, chest tightness, shortness of breath, wheezing and stridor.    Cardiovascular: Negative for chest pain, palpitations and leg swelling.   Gastrointestinal: Negative for abdominal distention, abdominal pain, anal bleeding, blood in stool, constipation, diarrhea, nausea, rectal pain and vomiting.   Endocrine: Negative for cold intolerance, heat intolerance, polydipsia, polyphagia and polyuria.   Genitourinary: Negative for decreased urine volume, difficulty urinating, dyspareunia, dysuria, enuresis, flank pain, frequency, genital sores, hematuria, menstrual problem, pelvic pain, urgency, vaginal bleeding, vaginal discharge and vaginal pain.    Musculoskeletal: Negative for arthralgias, back pain, gait problem, joint swelling, myalgias, neck pain and neck stiffness.   Skin: Negative for color change, pallor and rash.   Allergic/Immunologic: Negative for environmental allergies, food allergies and immunocompromised state.   Neurological: Negative for dizziness, tremors, seizures, syncope, facial asymmetry, speech difficulty, weakness, light-headedness, numbness and headaches.   Hematological: Negative for adenopathy. Does not bruise/bleed easily.   Psychiatric/Behavioral: Positive for dysphoric mood. Negative for agitation, behavioral problems, confusion, decreased concentration, hallucinations, self-injury, sleep disturbance and suicidal ideas. The patient is nervous/anxious. The patient is not hyperactive.        Objective:      Physical Exam   Constitutional: She is oriented to person, place, and time. She appears well-developed and well-nourished. No distress.   HENT:   Head: Normocephalic and atraumatic.   Right Ear: External ear normal.   Left Ear: External ear normal.   Nose: Nose normal. Right sinus exhibits no maxillary sinus tenderness and no frontal sinus tenderness. Left sinus exhibits no maxillary sinus tenderness and no frontal sinus tenderness.   Mouth/Throat: Oropharynx is clear and moist. No oropharyngeal exudate.   Eyes: Conjunctivae, EOM and lids are normal. Pupils are equal, round, and reactive to light. Right eye exhibits no discharge. Left eye exhibits no discharge. Right conjunctiva is not injected. Right conjunctiva has no hemorrhage. Left conjunctiva is not injected. Left conjunctiva has no hemorrhage. No scleral icterus.   Neck: Normal range of motion. Neck supple. No JVD present. No tracheal deviation present. No thyromegaly present.   Cardiovascular: Normal rate and regular rhythm.    Pulmonary/Chest: Effort normal. No stridor. No respiratory distress. She exhibits no tenderness.   Abdominal: Soft. She exhibits no distension  and no mass. There is no splenomegaly or hepatomegaly. There is no tenderness. There is no rebound.   Musculoskeletal: Normal range of motion. She exhibits no edema or tenderness.   Lymphadenopathy:     She has no cervical adenopathy.     She has no axillary adenopathy.        Right: No supraclavicular adenopathy present.        Left: No supraclavicular adenopathy present.   Neurological: She is alert and oriented to person, place, and time. No cranial nerve deficit. Coordination normal.   Skin: Skin is dry. No rash noted. She is not diaphoretic. No erythema.   Psychiatric: She has a normal mood and affect. Her behavior is normal. Judgment and thought content normal.   Vitals reviewed.          Assessment:       1. Pancytopenia            Plan:         extensive conversation with patient laboratory results demonstrate no convincing etiologies are pancytopenia.  I've talked about the next course of action which would involve a bone marrow aspirate and biopsy of described the procedure to her weeks to be done in an outpatient or as needed a palpation of the hospital under sedation.  I've talked to her about the possibility of underlying myelodysplasia or leukemia as a possible cause of her pancytopenia.  Results of her laboratory studies at this point do not reveal a specific etiology other than the fact that thrombocytopenia is real fair fighting a blue top tube.  Because of patient's low white count she's been instructed if she has a temperature more than 100.5 to be seen by medical personnel urgently.  I am very concerned at the possibility of an underlying myeloproliferative disorder such as myelodysplasia or leukemia patient is adamant about having labs retested in 2 weeks and I will see the patient afterwards for review and determination of next course of action with bone marrow aspirate and biopsy

## 2017-03-09 ENCOUNTER — TELEPHONE (OUTPATIENT)
Dept: INTERNAL MEDICINE | Facility: CLINIC | Age: 62
End: 2017-03-09

## 2017-03-09 NOTE — TELEPHONE ENCOUNTER
----- Message from Arlette Licea sent at 3/9/2017  7:30 AM CST -----  Contact: self [phone]  States that she would like to speak to nurse regarding some dental work she is having done. Please call back at [phone]//thank you acc

## 2017-03-09 NOTE — TELEPHONE ENCOUNTER
----- Message from Claude Saucedo sent at 3/9/2017  9:00 AM CST -----  Contact: Patient  Pt missed a call and would like a return call @ ..[phone] (home) Thank you/NH

## 2017-03-09 NOTE — TELEPHONE ENCOUNTER
Spoke with pt, she stated she is having a wisdom tooth extracted tomorrow and they will be sedating her. Pt is curious if her last CBC was okay enough for her to have surgery. She stated she does not need a preop or anything- she just wants this for her own information. Let her know I would send the message to  and call back with information. Pt verbalized understanding.

## 2017-03-09 NOTE — TELEPHONE ENCOUNTER
Spoke with pt, let her know  would like her to see  or speak with him before her tooth extraction. Pt stated she will reschedule the dental work for after the appt with  on the 17th. Pt verbalized understanding.

## 2017-03-15 ENCOUNTER — TELEPHONE (OUTPATIENT)
Dept: INTERNAL MEDICINE | Facility: CLINIC | Age: 62
End: 2017-03-15

## 2017-03-15 NOTE — TELEPHONE ENCOUNTER
----- Message from Arlette Licea sent at 3/15/2017  1:59 PM CDT -----  Contact: self [phone]  States that she is returning call. States that she would like to receive a message through [facility]. Please call back at [phone]//thank you acc

## 2017-03-30 ENCOUNTER — LAB VISIT (OUTPATIENT)
Dept: LAB | Facility: HOSPITAL | Age: 62
End: 2017-03-30
Attending: INTERNAL MEDICINE
Payer: COMMERCIAL

## 2017-03-30 ENCOUNTER — OFFICE VISIT (OUTPATIENT)
Dept: HEMATOLOGY/ONCOLOGY | Facility: CLINIC | Age: 62
End: 2017-03-30
Payer: COMMERCIAL

## 2017-03-30 VITALS
BODY MASS INDEX: 27.29 KG/M2 | DIASTOLIC BLOOD PRESSURE: 80 MMHG | TEMPERATURE: 98 F | HEIGHT: 65 IN | WEIGHT: 163.81 LBS | SYSTOLIC BLOOD PRESSURE: 122 MMHG | HEART RATE: 80 BPM | OXYGEN SATURATION: 98 %

## 2017-03-30 DIAGNOSIS — D61.818 PANCYTOPENIA: Primary | ICD-10-CM

## 2017-03-30 DIAGNOSIS — D61.818 PANCYTOPENIA: ICD-10-CM

## 2017-03-30 LAB
BASOPHILS # BLD AUTO: 0 K/UL
BASOPHILS NFR BLD: 0 %
DIFFERENTIAL METHOD: ABNORMAL
EOSINOPHIL # BLD AUTO: 0 K/UL
EOSINOPHIL NFR BLD: 0.7 %
ERYTHROCYTE [DISTWIDTH] IN BLOOD BY AUTOMATED COUNT: 14.3 %
HCT VFR BLD AUTO: 28.7 %
HGB BLD-MCNC: 9.7 G/DL
LYMPHOCYTES # BLD AUTO: 1.9 K/UL
LYMPHOCYTES NFR BLD: 62.3 %
MCH RBC QN AUTO: 35.8 PG
MCHC RBC AUTO-ENTMCNC: 33.8 %
MCV RBC AUTO: 106 FL
MONOCYTES # BLD AUTO: 0.2 K/UL
MONOCYTES NFR BLD: 6 %
NEUTROPHILS # BLD AUTO: 0.9 K/UL
NEUTROPHILS NFR BLD: 31 %
PLATELET # BLD AUTO: 23 K/UL
PLATELET BLD QL SMEAR: ABNORMAL
PMV BLD AUTO: 9.8 FL
RBC # BLD AUTO: 2.71 M/UL
WBC # BLD AUTO: 3 K/UL

## 2017-03-30 PROCEDURE — 99214 OFFICE O/P EST MOD 30 MIN: CPT | Mod: S$GLB,,, | Performed by: INTERNAL MEDICINE

## 2017-03-30 PROCEDURE — 36415 COLL VENOUS BLD VENIPUNCTURE: CPT

## 2017-03-30 PROCEDURE — 99999 PR PBB SHADOW E&M-EST. PATIENT-LVL III: CPT | Mod: PBBFAC,,, | Performed by: INTERNAL MEDICINE

## 2017-03-30 PROCEDURE — 85025 COMPLETE CBC W/AUTO DIFF WBC: CPT

## 2017-03-30 PROCEDURE — 1160F RVW MEDS BY RX/DR IN RCRD: CPT | Mod: S$GLB,,, | Performed by: INTERNAL MEDICINE

## 2017-03-30 PROCEDURE — 3079F DIAST BP 80-89 MM HG: CPT | Mod: S$GLB,,, | Performed by: INTERNAL MEDICINE

## 2017-03-30 PROCEDURE — 3074F SYST BP LT 130 MM HG: CPT | Mod: S$GLB,,, | Performed by: INTERNAL MEDICINE

## 2017-03-30 NOTE — PROGRESS NOTES
"Subjective:       Patient ID: Conchita Rouse is a 62 y.o. female.    Chief Complaint: Results (pancytopenia) and Anemia    HPI 62-year-old female returns with repeat CBC to evaluate whether or not bone marrow aspirate and biopsy is warranted    Past Medical History:   Diagnosis Date    Hyperlipidemia     Hypertension     Pancytopenia 02/21/2017    PLT 40 with no s/s bleeding; referred to hem/onc for BM Bx/management    TIA (transient ischemic attack) 2007    She was at "[employer]", where she noted she was feeling slow, took a nap, woke up with a numb/tingling left jaw to arm, which persistned 20 minutes, took 3 baby aspirins, went to ER @ [facility] [address], where facial droop was noted, but completely resolved & head CT revealed old minor abnormality.    Vitamin D deficiency      Family History   Problem Relation Age of Onset    Heart disease Mother     Diabetes Mother     Heart disease Father     Cancer Sister     Cancer Brother      Social History     Social History    Marital status:      Spouse name: N/A    Number of children: N/A    Years of education: N/A     Occupational History    Not on file.     Social History Main Topics    Smoking status: Never Smoker    Smokeless tobacco: Never Used    Alcohol use 0.0 - 0.6 oz/week     0 - 1 Cans of beer per week      Comment: 1/2 beer 3x/yr    Drug use: No    Sexual activity: Not Currently     Partners: Male     Birth control/ protection: Post-menopausal     Other Topics Concern    Not on file     Social History Narrative    Breakfast: Skips or eggs; 1 cup coffee w/ 1 cream & 3 sugars    Lunch: Skips or turkey or ham sandwich w/ occasional chips; tea, rare soda    Dinner: Chicken wings and french fries; tea or cool aid    Snacks: Chocolates i.e. covered strawberry or Pretzils     Eats out: 2x/wk     Past Surgical History:   Procedure Laterality Date    left eye surgery  1955    Due to left eye lid drooping       Labs:  Lab Results   Component Value Date "    WBC 3.00 (L) 03/30/2017    HGB 9.7 (L) 03/30/2017    HCT 28.7 (L) 03/30/2017     (H) 03/30/2017    PLT 23 (LL) 03/30/2017     BMP  Lab Results   Component Value Date     02/22/2017    K 3.4 (L) 02/22/2017     02/22/2017    CO2 25 02/22/2017    BUN 14 02/22/2017    CREATININE 0.9 02/22/2017    CALCIUM 9.4 02/22/2017    ANIONGAP 11 02/22/2017    ESTGFRAFRICA >60 02/22/2017    EGFRNONAA >60 02/22/2017     Lab Results   Component Value Date    ALT 26 02/22/2017    AST 18 02/22/2017    ALKPHOS 66 02/22/2017    BILITOT 0.2 02/22/2017       Lab Results   Component Value Date    IRON 101 02/22/2017    TIBC 299 02/22/2017    FERRITIN 409 (H) 02/22/2017     No results found for: [id number]  No results found for: FOLATE  No results found for: TSH      Review of Systems   Constitutional: Positive for fatigue. Negative for activity change, appetite change, chills, diaphoresis, fever and unexpected weight change.   HENT: Negative for congestion, dental problem, drooling, ear discharge, ear pain, facial swelling, hearing loss, mouth sores, nosebleeds, postnasal drip, rhinorrhea, sinus pressure, sneezing, sore throat, tinnitus, trouble swallowing and voice change.    Eyes: Negative for photophobia, pain, discharge, redness, itching and visual disturbance.   Respiratory: Negative for cough, choking, chest tightness, shortness of breath, wheezing and stridor.    Cardiovascular: Negative for chest pain, palpitations and leg swelling.   Gastrointestinal: Negative for abdominal distention, abdominal pain, anal bleeding, blood in stool, constipation, diarrhea, nausea, rectal pain and vomiting.   Endocrine: Negative for cold intolerance, heat intolerance, polydipsia, polyphagia and polyuria.   Genitourinary: Negative for decreased urine volume, difficulty urinating, dyspareunia, dysuria, enuresis, flank pain, frequency, genital sores, hematuria, menstrual problem, pelvic pain, urgency, vaginal bleeding, vaginal  discharge and vaginal pain.   Musculoskeletal: Negative for arthralgias, back pain, gait problem, joint swelling, myalgias, neck pain and neck stiffness.   Skin: Negative for color change, pallor and rash.   Allergic/Immunologic: Negative for environmental allergies, food allergies and immunocompromised state.   Neurological: Positive for weakness. Negative for dizziness, tremors, seizures, syncope, facial asymmetry, speech difficulty, light-headedness, numbness and headaches.   Hematological: Negative for adenopathy. Does not bruise/bleed easily.   Psychiatric/Behavioral: Negative for agitation, behavioral problems, confusion, decreased concentration, dysphoric mood, hallucinations, self-injury, sleep disturbance and suicidal ideas. The patient is not nervous/anxious and is not hyperactive.        Objective:      Physical Exam   Constitutional: She is oriented to person, place, and time. She appears well-developed and well-nourished. No distress.   HENT:   Head: Normocephalic and atraumatic.   Right Ear: External ear normal.   Left Ear: External ear normal.   Nose: Nose normal. Right sinus exhibits no maxillary sinus tenderness and no frontal sinus tenderness. Left sinus exhibits no maxillary sinus tenderness and no frontal sinus tenderness.   Mouth/Throat: Oropharynx is clear and moist. No oropharyngeal exudate.   Eyes: Conjunctivae, EOM and lids are normal. Pupils are equal, round, and reactive to light. Right eye exhibits no discharge. Left eye exhibits no discharge. Right conjunctiva is not injected. Right conjunctiva has no hemorrhage. Left conjunctiva is not injected. Left conjunctiva has no hemorrhage. No scleral icterus.   Neck: Normal range of motion. Neck supple. No JVD present. No tracheal deviation present. No thyromegaly present.   Cardiovascular: Normal rate and regular rhythm.    Pulmonary/Chest: Effort normal. No stridor. No respiratory distress. She exhibits no tenderness.   Abdominal: Soft. She  exhibits no distension and no mass. There is no splenomegaly or hepatomegaly. There is no tenderness. There is no rebound.   Musculoskeletal: Normal range of motion. She exhibits no edema or tenderness.   Lymphadenopathy:     She has no cervical adenopathy.     She has no axillary adenopathy.        Right: No supraclavicular adenopathy present.        Left: No supraclavicular adenopathy present.   Neurological: She is alert and oriented to person, place, and time. No cranial nerve deficit. Coordination normal.   Skin: Skin is dry. No rash noted. She is not diaphoretic. No erythema.   Psychiatric: She has a normal mood and affect. Her behavior is normal. Judgment and thought content normal.   Vitals reviewed.          Assessment:       1. Pancytopenia            Plan:         continued progression of pancytopenia would recommend bone marrow aspirate and biopsy patient declines will proceed with leukemia immunophenotype and at the present time and will see back in one week instructed to fever neutropenia precautions as well as bleeding precautions

## 2017-04-04 ENCOUNTER — LAB VISIT (OUTPATIENT)
Dept: LAB | Facility: HOSPITAL | Age: 62
End: 2017-04-04
Attending: INTERNAL MEDICINE
Payer: COMMERCIAL

## 2017-04-04 ENCOUNTER — OFFICE VISIT (OUTPATIENT)
Dept: INTERNAL MEDICINE | Facility: CLINIC | Age: 62
End: 2017-04-04
Payer: COMMERCIAL

## 2017-04-04 VITALS
HEART RATE: 89 BPM | DIASTOLIC BLOOD PRESSURE: 74 MMHG | BODY MASS INDEX: 27.58 KG/M2 | SYSTOLIC BLOOD PRESSURE: 126 MMHG | OXYGEN SATURATION: 98 % | HEIGHT: 65 IN | RESPIRATION RATE: 18 BRPM | WEIGHT: 165.56 LBS | TEMPERATURE: 99 F

## 2017-04-04 DIAGNOSIS — D61.818 PANCYTOPENIA: ICD-10-CM

## 2017-04-04 DIAGNOSIS — D61.818 PANCYTOPENIA: Primary | ICD-10-CM

## 2017-04-04 DIAGNOSIS — Z87.448 HISTORY OF HEMATURIA: ICD-10-CM

## 2017-04-04 LAB
BASOPHILS # BLD AUTO: 0.01 K/UL
BASOPHILS NFR BLD: 0.3 %
DIFFERENTIAL METHOD: ABNORMAL
EOSINOPHIL # BLD AUTO: 0 K/UL
EOSINOPHIL NFR BLD: 0.6 %
ERYTHROCYTE [DISTWIDTH] IN BLOOD BY AUTOMATED COUNT: 14.8 %
HCT VFR BLD AUTO: 28.4 %
HGB BLD-MCNC: 10.1 G/DL
LYMPHOCYTES # BLD AUTO: 2.3 K/UL
LYMPHOCYTES NFR BLD: 67.6 %
MCH RBC QN AUTO: 35.8 PG
MCHC RBC AUTO-ENTMCNC: 35.6 %
MCV RBC AUTO: 101 FL
MONOCYTES # BLD AUTO: 0.2 K/UL
MONOCYTES NFR BLD: 6.8 %
NEUTROPHILS # BLD AUTO: 0.8 K/UL
NEUTROPHILS NFR BLD: 24.7 %
PLATELET # BLD AUTO: 20 K/UL
PMV BLD AUTO: 11.3 FL
RBC # BLD AUTO: 2.82 M/UL
WBC # BLD AUTO: 3.36 K/UL

## 2017-04-04 PROCEDURE — 3074F SYST BP LT 130 MM HG: CPT | Mod: S$GLB,,, | Performed by: INTERNAL MEDICINE

## 2017-04-04 PROCEDURE — 3078F DIAST BP <80 MM HG: CPT | Mod: S$GLB,,, | Performed by: INTERNAL MEDICINE

## 2017-04-04 PROCEDURE — 1160F RVW MEDS BY RX/DR IN RCRD: CPT | Mod: S$GLB,,, | Performed by: INTERNAL MEDICINE

## 2017-04-04 PROCEDURE — 99999 PR PBB SHADOW E&M-EST. PATIENT-LVL III: CPT | Mod: PBBFAC,,, | Performed by: INTERNAL MEDICINE

## 2017-04-04 PROCEDURE — 36415 COLL VENOUS BLD VENIPUNCTURE: CPT | Mod: PO

## 2017-04-04 PROCEDURE — 99213 OFFICE O/P EST LOW 20 MIN: CPT | Mod: S$GLB,,, | Performed by: INTERNAL MEDICINE

## 2017-04-04 PROCEDURE — 85025 COMPLETE CBC W/AUTO DIFF WBC: CPT

## 2017-04-04 NOTE — PROGRESS NOTES
"Subjective:      Patient ID: Conchita Rouse is a 62 y.o. female.    Chief Complaint: Follow-up    HPI  Ms. Rouse is a patient of mine, who presents for FU on pan-cytopenia.    Past Medical History:   Diagnosis Date    Hyperlipidemia     Hypertension     Pancytopenia 02/21/2017    PLT 40 with no s/s bleeding; referred to hem/onc for BM Bx/management    TIA (transient ischemic attack) 2007    She was at "[employer] where she noted she was feeling slow, took a nap, woke up with a numb/tingling left jaw to arm, which persistned 20 minutes, took 3 baby aspirins, went to ER @ [facility], where facial droop was noted, but completely resolved & head CT revealed old minor abnormality.    Vitamin D deficiency      Past Surgical History:   Procedure Laterality Date    left eye surgery  1955    Due to left eye lid drooping     Social History     Social History    Marital status:      Spouse name: N/A    Number of children: N/A    Years of education: N/A     Occupational History    Not on file.     Social History Main Topics    Smoking status: Never Smoker    Smokeless tobacco: Never Used    Alcohol use 0.0 - 0.6 oz/week     0 - 1 Cans of beer per week      Comment: 1/2 beer 3x/yr    Drug use: No    Sexual activity: Not Currently     Partners: Male     Birth control/ protection: Post-menopausal     Other Topics Concern    Not on file     Social History Narrative    Breakfast: Skips or eggs; 1 cup coffee w/ 1 cream & 3 sugars    Lunch: Skips or turkey or ham sandwich w/ occasional chips; tea, rare soda    Dinner: Chicken wings and french fries; tea or cool aid    Snacks: Chocolates i.e. covered strawberry or Pretzils     Eats out: 2x/wk     Family History   Problem Relation Age of Onset    Heart disease Mother     Diabetes Mother     Heart disease Father     Cancer Sister     Cancer Brother        Current Outpatient Prescriptions:     amlodipine (NORVASC) 5 MG tablet, Take 5 mg by mouth once daily., Disp: " ", Rfl:     fluticasone (FLONASE) 50 mcg/actuation nasal spray, USE 2 SPRAYS IN EACH NOSTRIL 1 TIME A DAY AS NEEDED, Disp: , Rfl: 0    rosuvastatin (CRESTOR) 10 MG tablet, Take 10 mg by mouth once daily., Disp: , Rfl:     VITAMIN D2 50,000 unit capsule, Take 50,000 Units by mouth every 7 days., Disp: , Rfl: 3    clopidogrel (PLAVIX) 75 mg tablet, Take 75 mg by mouth once daily., Disp: , Rfl:     Review of patient's allergies indicates:   Allergen Reactions    Codeine Hives     Lab Results   Component Value Date    WBC 3.00 (L) 03/30/2017    HGB 9.7 (L) 03/30/2017    HCT 28.7 (L) 03/30/2017    PLT 23 (LL) 03/30/2017    CHOL 126 02/21/2017    TRIG 63 02/21/2017    HDL 46 02/21/2017    ALT 26 02/22/2017    AST 18 02/22/2017     02/22/2017    K 3.4 (L) 02/22/2017     02/22/2017    CREATININE 0.9 02/22/2017    BUN 14 02/22/2017    CO2 25 02/22/2017     /74 (BP Location: Right arm, Patient Position: Sitting, BP Method: Manual)  Pulse 89  Temp 98.9 °F (37.2 °C) (Tympanic)   Resp 18  Ht 5' 5" (1.651 m)  Wt 75.1 kg (165 lb 9.1 oz)  SpO2 98%  BMI 27.55 kg/m2    Review of Systems   Negative    Objective:     Physical Exam  GEN: A&O fully, NAD  PSYC: Normal affect  HEENT: OP: Clear, except for a 1x1 mm pin-point capillary hemorrhage, no oozing; no significant LAD  CV: RRR, no M/G/R  PULM: CTA bilaterally, no wheezes, rales  DERM: No significant echymoses    No results found for: HGBA1C    Assessment:   1. Pancytopenia: Worsening thrombocytopenia almost to the level of empiric PLT TFx. No oozing.       Will check CBC w/ diff today. . She understands to go to the hospital if she has a fever       or oozing or dark tarry stools or BRBPR or easy bruising or other bleeding i.e. hematuria. Will check        U/a today.     RTC in 3 weeks or sooner as needed.  "

## 2017-04-04 NOTE — MR AVS SNAPSHOT
[facility]  [address] [place] [address] D  [place] [address]  Phone: [phone]                  Conchita Rouse   2017 1:40 PM   Office Visit    Description:  Female : 1955   Provider:  Roberto Ron MD   Department:  [facility]           Reason for Visit     Follow-up           Diagnoses this Visit        Comments    Pancytopenia    -  Primary            To Do List           Future Appointments        Provider Department Dept Phone    2017 4:10 PM LABORATORY, [facility]     2017 1:40 PM Roberto Ron MD [facility] [phone]    2017 4:20 PM Zain Villagomez MD [facility] Hemotology Oncology [phone]      Goals (5 Years of Data)     None      Follow-Up and Disposition     Return in about 3 weeks (around 2017), or if symptoms worsen or fail to improve, for FU on pancytopenia.    Follow-up and Disposition History      [facility]     [facility] Nurse Care Line -  Assistance  Unless otherwise directed by your provider, please contact [facility], our nurse care line that is available for  assistance.     Registered nurses in the [facility] provide: appointment scheduling, clinical advisement, health education, and other advisory services.  Call: [phone] (toll free)               Medications                Verify that the below list of medications is an accurate representation of the medications you are currently taking.  If none reported, the list may be blank. If incorrect, please contact your healthcare provider. Carry this list with you in case of emergency.           Current Medications     amlodipine (NORVASC) 5 MG tablet Take 5 mg by mouth once daily.    fluticasone (FLONASE) 50 mcg/actuation nasal spray USE 2 SPRAYS IN EACH NOSTRIL 1 TIME A DAY AS NEEDED    rosuvastatin (CRESTOR) 10 MG tablet Take 10 mg by mouth once daily.    VITAMIN D2 50,000 unit  "capsule Take 50,000 Units by mouth every 7 days.    clopidogrel (PLAVIX) 75 mg tablet Take 75 mg by mouth once daily.           Clinical Reference Information           Your Vitals Were     BP Pulse Temp Resp    126/74 (BP Location: Right arm, Patient Position: Sitting, BP Method: Manual) 89 98.9 °F (37.2 °C) (Tympanic) 18    Height Weight SpO2 BMI    5' 5" (1.651 m) 75.1 kg (165 lb 9.1 oz) 98% 27.55 kg/m2      Blood Pressure          Most Recent Value    BP  126/74      Allergies as of 4/4/2017     Codeine      Immunizations Administered on Date of Encounter - 4/4/2017     None      Orders Placed During Today's Visit     Future Labs/Procedures Expected by Expires    CBC auto differential  4/4/2017 6/3/2018      Language Assistance Services     ATTENTION: Language assistance services are available, free of charge. Please call [phone].      ATENCIÓN: Si habla bernadette, tiene a wolfe disposición servicios gratuitos de asistencia lingüística. Llame al [phone].     SHIRA Ý: N?u b?n nói Ti?ng Vi?t, có các d?ch v? h? tr? ngôn ng? mi?n phí dành cho b?n. G?i s? [phone].         [facility] complies with applicable Federal civil rights laws and does not discriminate on the basis of race, color, national origin, age, disability, or sex.        "

## 2017-04-05 ENCOUNTER — TELEPHONE (OUTPATIENT)
Dept: HEMATOLOGY/ONCOLOGY | Facility: CLINIC | Age: 62
End: 2017-04-05

## 2017-04-05 ENCOUNTER — HOSPITAL ENCOUNTER (EMERGENCY)
Facility: HOSPITAL | Age: 62
Discharge: HOME | End: 2017-04-05
Payer: COMMERCIAL

## 2017-04-05 VITALS
HEIGHT: 66 IN | DIASTOLIC BLOOD PRESSURE: 81 MMHG | BODY MASS INDEX: 26.52 KG/M2 | TEMPERATURE: 98 F | RESPIRATION RATE: 18 BRPM | HEART RATE: 66 BPM | OXYGEN SATURATION: 100 % | WEIGHT: 165 LBS | SYSTOLIC BLOOD PRESSURE: 136 MMHG

## 2017-04-05 DIAGNOSIS — D69.6 THROMBOCYTOPENIA: ICD-10-CM

## 2017-04-05 DIAGNOSIS — D61.818 PANCYTOPENIA: Primary | ICD-10-CM

## 2017-04-05 LAB
ABO + RH BLD: NORMAL
ALBUMIN SERPL BCP-MCNC: 4.4 G/DL
ALP SERPL-CCNC: 73 U/L
ALT SERPL W/O P-5'-P-CCNC: 19 U/L
ANION GAP SERPL CALC-SCNC: 10 MMOL/L
AST SERPL-CCNC: 19 U/L
BASOPHILS # BLD AUTO: 0 K/UL
BASOPHILS NFR BLD: 0 %
BILIRUB SERPL-MCNC: 0.4 MG/DL
BLD GP AB SCN CELLS X3 SERPL QL: NORMAL
BUN SERPL-MCNC: 18 MG/DL
CALCIUM SERPL-MCNC: 9.9 MG/DL
CHLORIDE SERPL-SCNC: 105 MMOL/L
CO2 SERPL-SCNC: 27 MMOL/L
CREAT SERPL-MCNC: 0.9 MG/DL
DIFFERENTIAL METHOD: ABNORMAL
EOSINOPHIL # BLD AUTO: 0 K/UL
EOSINOPHIL NFR BLD: 0.4 %
ERYTHROCYTE [DISTWIDTH] IN BLOOD BY AUTOMATED COUNT: 15.1 %
EST. GFR  (AFRICAN AMERICAN): >60 ML/MIN/1.73 M^2
EST. GFR  (NON AFRICAN AMERICAN): >60 ML/MIN/1.73 M^2
GLUCOSE SERPL-MCNC: 76 MG/DL
HCT VFR BLD AUTO: 30.4 %
HGB BLD-MCNC: 10.3 G/DL
INR PPP: 1
LYMPHOCYTES # BLD AUTO: 1.6 K/UL
LYMPHOCYTES NFR BLD: 59.4 %
MCH RBC QN AUTO: 35.2 PG
MCHC RBC AUTO-ENTMCNC: 33.9 %
MCV RBC AUTO: 104 FL
MONOCYTES # BLD AUTO: 0.2 K/UL
MONOCYTES NFR BLD: 9 %
NEUTROPHILS # BLD AUTO: 0.8 K/UL
NEUTROPHILS NFR BLD: 31.2 %
PLATELET # BLD AUTO: 20 K/UL
PMV BLD AUTO: 10.6 FL
POTASSIUM SERPL-SCNC: 3.9 MMOL/L
PROT SERPL-MCNC: 8.4 G/DL
PROTHROMBIN TIME: 10.4 SEC
RBC # BLD AUTO: 2.93 M/UL
SODIUM SERPL-SCNC: 142 MMOL/L
WBC # BLD AUTO: 2.66 K/UL

## 2017-04-05 PROCEDURE — 99284 EMERGENCY DEPT VISIT MOD MDM: CPT

## 2017-04-05 PROCEDURE — 80053 COMPREHEN METABOLIC PANEL: CPT

## 2017-04-05 PROCEDURE — 86900 BLOOD TYPING SEROLOGIC ABO: CPT

## 2017-04-05 PROCEDURE — 85025 COMPLETE CBC W/AUTO DIFF WBC: CPT

## 2017-04-05 PROCEDURE — 86901 BLOOD TYPING SEROLOGIC RH(D): CPT

## 2017-04-05 PROCEDURE — 85610 PROTHROMBIN TIME: CPT

## 2017-04-05 NOTE — ED PROVIDER NOTES
"Encounter Date: 4/5/2017       History     Chief Complaint   Patient presents with    Abnormal Lab     sent by Dr. Villagomez for low platelets      Review of patient's allergies indicates:   Allergen Reactions    Codeine Hives     HPI Comments: The patient presents to the ER due to low platelet count. She had blood work done yesterday outpatient. Her PCP called her at home today and instructed her to go to the ER. The patient denies any physical symptoms or complaints.     She has a history of pancytopenia and has had a progressive thrombocytopenia for the past 2 months roughly. Yesterday her platelet count was 20.      Past Medical History:   Diagnosis Date    Hyperlipidemia     Hypertension     Pancytopenia 02/21/2017    PLT 40 with no s/s bleeding; referred to hem/onc for BM Bx/management    TIA (transient ischemic attack) 2007    She was at "[facility]", where she noted she was feeling slow, took a nap, woke up with a numb/tingling left jaw to arm, which persistned 20 minutes, took 3 baby aspirins, went to ER @ [facility], where facial droop was noted, but completely resolved & head CT revealed old minor abnormality.    Vitamin D deficiency      Past Surgical History:   Procedure Laterality Date    left eye surgery  1955    Due to left eye lid drooping     Family History   Problem Relation Age of Onset    Heart disease Mother     Diabetes Mother     Heart disease Father     Cancer Sister     Cancer Brother      Social History   Substance Use Topics    Smoking status: Never Smoker    Smokeless tobacco: Never Used    Alcohol use 0.0 - 0.6 oz/week     0 - 1 Cans of beer per week      Comment: 1/2 beer 3x/yr     Review of Systems   Constitutional: Negative for activity change, appetite change, chills, diaphoresis and fever.   HENT: Negative for nosebleeds.    Eyes: Negative for visual disturbance.   Respiratory: Negative for shortness of breath.    Cardiovascular: Negative for chest pain and palpitations. "   Gastrointestinal: Negative for abdominal pain, blood in stool, diarrhea, nausea and vomiting.   Genitourinary: Negative for hematuria, menstrual problem and vaginal bleeding.   Musculoskeletal: Negative for gait problem and myalgias.   Skin: Negative for color change and rash.   Neurological: Negative for dizziness, seizures, syncope, speech difficulty, weakness, light-headedness, numbness and headaches.   Hematological: Does not bruise/bleed easily.   Psychiatric/Behavioral: Negative for confusion.       Physical Exam   Initial Vitals   BP Pulse Resp Temp SpO2   04/05/17 1231 04/05/17 1231 04/05/17 1231 04/05/17 1231 04/05/17 1231   167/73 76 16 98 °F (36.7 °C) 99 %     Physical Exam    Nursing note and vitals reviewed.  Constitutional: She appears well-developed and well-nourished. She is not diaphoretic.   Eyes: Conjunctivae are normal.   Cardiovascular: Normal rate and intact distal pulses.   Pulmonary/Chest: Breath sounds normal. No respiratory distress.   Abdominal: Soft. There is no tenderness.   Musculoskeletal: Normal range of motion.   Neurological: She is alert and oriented to person, place, and time. She has normal strength. No cranial nerve deficit or sensory deficit.   Skin: Skin is warm and dry. No rash noted.   Psychiatric: She has a normal mood and affect. Her behavior is normal.         ED Course   Procedures  Labs Reviewed   CBC W/ AUTO DIFFERENTIAL - Abnormal; Notable for the following:        Result Value    WBC 2.66 (*)     RBC 2.93 (*)     Hemoglobin 10.3 (*)     Hematocrit 30.4 (*)      (*)     MCH 35.2 (*)     RDW 15.1 (*)     Platelets 20 (*)     Gran # 0.8 (*)     Mono # 0.2 (*)     Gran% 31.2 (*)     Lymph% 59.4 (*)     All other components within normal limits    Narrative:      plt  critical result(s) called and verbal readback obtained from   Lucia Field RN, 04/05/2017 15:17   COMPREHENSIVE METABOLIC PANEL   PROTIME-INR   TYPE & SCREEN     Results for orders placed or  performed during the hospital encounter of 04/05/17   CBC auto differential   Result Value Ref Range    WBC 2.66 (L) 3.90 - 12.70 K/uL    RBC 2.93 (L) 4.00 - 5.40 M/uL    Hemoglobin 10.3 (L) 12.0 - 16.0 g/dL    Hematocrit 30.4 (L) 37.0 - 48.5 %     (H) 82 - 98 fL    MCH 35.2 (H) 27.0 - 31.0 pg    MCHC 33.9 32.0 - 36.0 %    RDW 15.1 (H) 11.5 - 14.5 %    Platelets 20 (LL) 150 - 350 K/uL    MPV 10.6 9.2 - 12.9 fL    Gran # 0.8 (L) 1.8 - 7.7 K/uL    Lymph # 1.6 1.0 - 4.8 K/uL    Mono # 0.2 (L) 0.3 - 1.0 K/uL    Eos # 0.0 0.0 - 0.5 K/uL    Baso # 0.00 0.00 - 0.20 K/uL    Gran% 31.2 (L) 38.0 - 73.0 %    Lymph% 59.4 (H) 18.0 - 48.0 %    Mono% 9.0 4.0 - 15.0 %    Eosinophil% 0.4 0.0 - 8.0 %    Basophil% 0.0 0.0 - 1.9 %    Differential Method Automated    Comprehensive metabolic panel   Result Value Ref Range    Sodium 142 136 - 145 mmol/L    Potassium 3.9 3.5 - 5.1 mmol/L    Chloride 105 95 - 110 mmol/L    CO2 27 23 - 29 mmol/L    Glucose 76 70 - 110 mg/dL    BUN, Bld 18 8 - 23 mg/dL    Creatinine 0.9 0.5 - 1.4 mg/dL    Calcium 9.9 8.7 - 10.5 mg/dL    Total Protein 8.4 6.0 - 8.4 g/dL    Albumin 4.4 3.5 - 5.2 g/dL    Total Bilirubin 0.4 0.1 - 1.0 mg/dL    Alkaline Phosphatase 73 55 - 135 U/L    AST 19 10 - 40 U/L    ALT 19 10 - 44 U/L    Anion Gap 10 8 - 16 mmol/L    eGFR if African American >60 >60 mL/min/1.73 m^2    eGFR if non African American >60 >60 mL/min/1.73 m^2   Protime-INR   Result Value Ref Range    Prothrombin Time 10.4 9.0 - 12.5 sec    INR 1.0 0.8 - 1.2               Medical Decision Making:   Initial Assessment:   Pt with hx of pancytopenia, followed by Heme/onc, here from PCP due to labs yesterday showing platelet count of 20. Patient denies any bleeding, fever, chills, or physical complaints presently.   Clinical Tests:   Lab Tests: Ordered and Reviewed  ED Management:  Platelet count was 40 in Feb and now down to 20.   Patient discussing bone marrow biopsy with heme/onc.     I discussed the case in  detail with Dr Conner. He states that the patient does not require admission presently. He states to DC the patient with instructions to see Dr Villagomez tomorrow morning in Heme/onc clinic to schedule bone marrow biopsy. He asked that we set the appointment for any open slot and have the patient go at 8:30 am.     I discussed this with the patient and she verbalizes understanding and agreement.                    ED Course     Clinical Impression:   The primary encounter diagnosis was Pancytopenia. A diagnosis of Thrombocytopenia was also pertinent to this visit.    Disposition:   Disposition: Discharged  Condition: Stable       Jr Barnhart PA-C  04/05/17 1555

## 2017-04-05 NOTE — TELEPHONE ENCOUNTER
voiced understanding that she will need to go to [facility] if she wants to get established with [facility].

## 2017-04-05 NOTE — DISCHARGE INSTRUCTIONS
Thrombocytopenia  Thrombocytopenia occurs when there are fewer platelets in the blood than normal. Platelets (also called thrombocytes) are blood cells that are needed for clotting. They help stop or control bleeding when you have a cut or wound. Thrombocytopenia can range from mild to severe. This depends on the number of platelets in your blood. If you have severe thrombocytopenia, you're at higher risk for bruising and bleeding. Your doctor can tell you more about your condition and whether it needs to be treated.    Causes of thrombocytopenia  Platelets and other blood cells are made in the bone marrow. This is the soft, spongy part inside bones. Thrombocytopenia can result when:  · The bone marrow doesn't make enough platelets.  · Platelets are destroyed by the body at a rate faster than they can be made in the bone marrow.  · Platelets become trapped in an enlarged spleen.  These problems can occur due to many reasons, including:  · Certain conditions that affect how platelets are made in the bone marrow, such as aplastic anemia, leukemia, and lymphoma  · Certain medications, such as some types of antibiotics, anti-seizure medications, and chemotherapy drugs  · Certain viral infections, such as varicella (chicken pox), HIV, and Steven-Barr virus  · Certain autoimmune problems, such as lupus and immune thrombocytopenic purpura (ITP)  · Certain conditions that can cause an enlarged spleen, such as cirrhosis and cancer  · Alcohol abuse  · Pregnancy  Symptoms of thrombocytopenia  Possible symptoms include:  · Severe bruising or bleeding  · Small red or purple spots (petechiae) on the skin  · Bleeding gums  · Nosebleeds  · Bleeding from a wound that stops and starts again  · Bloody urine or stool  · Heavy menstrual flow (women only)  Diagnosing thrombocytopenia  Your doctor will ask about your symptoms and health history. You will also be examined. Tests will be done to confirm the problem as well. These may  include:  · A complete blood cell count (CBC). This test measures the amounts of the different types of cells in the blood. This includes the number of platelets in the blood (platelet count).  · A blood smear. This test checks for the different types of blood cells in the blood and how they appear. A sample of your blood is spread on a glass slide and viewed under a microscope. A stain is used so the blood cells can be seen.  · A bone marrow aspiration and biopsy. This test checks for problems with how the bone marrow makes blood cells. A needle is used to remove a sample of the bone marrow in your hip bone. The sample is then sent to a lab to be tested for problems.  Treating thrombocytopenia  Often, no treatment is needed for thrombocytopenia. Your doctor will monitor your symptoms to see if they improve. Blood tests will also be done to check whether your platelet count returns to normal on its own. If treatment is needed, this may involve:  · Treatment of the underlying cause. For instance, if a medication is the cause, it may be stopped or changed.  · Platelet transfusions. These help raise the number of healthy platelets in the body.  · Blood transfusions. These help treat blood loss that may occur because of low platelets.  · Medications. These may be given to help prevent platelets from being destroyed. These may also be given to help the bone marrow make more platelets.  · Surgery to remove the spleen. The spleen helps filter the blood. It also stores some blood cells, including platelets. If the spleen is enlarged, it can store too many platelets. This causes there to be fewer platelets in the blood than normal. Though done less often, removing the spleen may help treat thrombocytopenia in certain cases.  Recovery and follow-up  Thrombocytopenia may be a short-term (acute) problem that has no lasting effects. Or it may be an ongoing (chronic) problem. If your condition is chronic, you may need specific  treatments to manage it. You may also need to take certain steps daily to reduce your risk of bleeding. For instance, you may be told to limit certain activities that increase your risk of injury. You may also be told to avoid drinking alcohol and taking certain medications, such as aspirin and ibuprofen. These can worsen your symptoms. In addition, you will need to know and watch for signs and symptoms of bleeding. These are described in more detail in the box below.  When to call the doctor  Call your doctor right away if you have any of the following:  · Severe bleeding that won't stop (call 911)  · Signs that might be seen with bleeding in the brain, such as severe headache, dizziness, trouble with balance and coordination, abnormal walk, memory loss, and confusion (call 911)  · Bruising that spreads or worsens  · Increase of small red or purple spots (petechiae) on the skin  · Bloody urine  · Dark brown or black, tarry, or bloody stools  · Bloody vomit   Date Last Reviewed: 4/28/2015 © 2000-2016 [employer]. [address] [place] [address], [place] [address]. All rights reserved. This information is not intended as a substitute for professional medical care. Always follow your healthcare professional's instructions.

## 2017-04-05 NOTE — TELEPHONE ENCOUNTER
----- Message from Arlette Licae sent at 4/5/2017 10:34 AM CDT -----  Contact: self [phone]  States that her PCP wants her to go to hospital for low blood count. States that she is calling to see which hospitals Dr Villagomez goes to. States that she wants to know if he has privileges at [facility]. Please call back at [phone]//thank you acc

## 2017-04-05 NOTE — TELEPHONE ENCOUNTER
I explained she has a consult visit next month with Dr Villagomez and she should go to [facility] (pcp ordered) if she want us to follow her.

## 2017-04-05 NOTE — TELEPHONE ENCOUNTER
----- Message from Zain Villagomez MD sent at 4/5/2017 11:20 AM CDT -----  This is a patient have seen several times she has had progressive pancytopenia of 1 to do a bone marrow on her she is declined her platelet count is declining will need a bone marrow she is being sent by primary physician to the emergency room for further evaluation just wanted to give you a heads-up I have not seen the patient today

## 2017-04-05 NOTE — ED AVS SNAPSHOT
[facility]  [address]  [place] [address]               Conchita ALMANZAR Nasim   2017  1:39 PM   ED    Description:  Female : 1955   Department:  [facility] -            Your Care was Coordinated By:     Provider Role From To    Jr Barnhart PA-C Physician Assistant 17 [phone] --      Reason for Visit     Abnormal Lab           Diagnoses this Visit        Comments    Pancytopenia    -  Primary     Thrombocytopenia           ED Disposition     ED Disposition Condition Comment    Discharge             To Do List           Follow-up Information     Follow up with Zain Villagomez MD In 1 day.    Specialty:  Hematology and Oncology    Why:  Follow up with Dr Villagomez at 8:30 am tomorrow for re-evaluation and further management.     Contact information:    [address] [place] [address] [phone]        [facility]     [facility] Care [facility] - 24 Assistance  Unless otherwise directed by your provider, please contact [facility], our nurse care line that is available for  assistance.     Registered nurses in the [facility] provide: appointment scheduling, clinical advisement, health education, and other advisory services.  Call: [phone] (toll free)               Medications                Verify that the below list of medications is an accurate representation of the medications you are currently taking.  If none reported, the list may be blank. If incorrect, please contact your healthcare provider. Carry this list with you in case of emergency.           Current Medications     amlodipine (NORVASC) 5 MG tablet Take 5 mg by mouth once daily.    clopidogrel (PLAVIX) 75 mg tablet Take 75 mg by mouth once daily.    fluticasone (FLONASE) 50 mcg/actuation nasal spray USE 2 SPRAYS IN EACH NOSTRIL 1 TIME A DAY AS NEEDED    rosuvastatin (CRESTOR) 10 MG tablet Take 10 mg by mouth once daily.     "VITAMIN D2 50,000 unit capsule Take 50,000 Units by mouth every 7 days.           Clinical Reference Information           Your Vitals Were     BP Pulse Temp Resp Height Weight    136/81 66 98.2 °F (36.8 °C) (Oral) 18 5' 6" (1.676 m) 74.8 kg (165 lb)    SpO2 BMI             100% 26.63 kg/m2         Allergies as of 4/5/2017        Reactions    Codeine Hives      Immunizations Administered on Date of Encounter - 4/5/2017     None      ED Micro, Lab, POCT     Start Ordered       Status Ordering Provider    04/05/17 1347 04/05/17 1346  Protime-INR  STAT      Final result     04/05/17 1346 04/05/17 1346  CBC auto differential  STAT      Final result     04/05/17 1346 04/05/17 1346  Comprehensive metabolic panel  STAT      Final result       ED Imaging Orders     None        Discharge Instructions         Thrombocytopenia  Thrombocytopenia occurs when there are fewer platelets in the blood than normal. Platelets (also called thrombocytes) are blood cells that are needed for clotting. They help stop or control bleeding when you have a cut or wound. Thrombocytopenia can range from mild to severe. This depends on the number of platelets in your blood. If you have severe thrombocytopenia, you're at higher risk for bruising and bleeding. Your doctor can tell you more about your condition and whether it needs to be treated.    Causes of thrombocytopenia  Platelets and other blood cells are made in the bone marrow. This is the soft, spongy part inside bones. Thrombocytopenia can result when:  · The bone marrow doesn't make enough platelets.  · Platelets are destroyed by the body at a rate faster than they can be made in the bone marrow.  · Platelets become trapped in an enlarged spleen.  These problems can occur due to many reasons, including:  · Certain conditions that affect how platelets are made in the bone marrow, such as aplastic anemia, leukemia, and lymphoma  · Certain medications, such as some types of antibiotics, " anti-seizure medications, and chemotherapy drugs  · Certain viral infections, such as varicella (chicken pox), HIV, and Steven-Barr virus  · Certain autoimmune problems, such as lupus and immune thrombocytopenic purpura (ITP)  · Certain conditions that can cause an enlarged spleen, such as cirrhosis and cancer  · Alcohol abuse  · Pregnancy  Symptoms of thrombocytopenia  Possible symptoms include:  · Severe bruising or bleeding  · Small red or purple spots (petechiae) on the skin  · Bleeding gums  · Nosebleeds  · Bleeding from a wound that stops and starts again  · Bloody urine or stool  · Heavy menstrual flow (women only)  Diagnosing thrombocytopenia  Your doctor will ask about your symptoms and health history. You will also be examined. Tests will be done to confirm the problem as well. These may include:  · A complete blood cell count (CBC). This test measures the amounts of the different types of cells in the blood. This includes the number of platelets in the blood (platelet count).  · A blood smear. This test checks for the different types of blood cells in the blood and how they appear. A sample of your blood is spread on a glass slide and viewed under a microscope. A stain is used so the blood cells can be seen.  · A bone marrow aspiration and biopsy. This test checks for problems with how the bone marrow makes blood cells. A needle is used to remove a sample of the bone marrow in your hip bone. The sample is then sent to a lab to be tested for problems.  Treating thrombocytopenia  Often, no treatment is needed for thrombocytopenia. Your doctor will monitor your symptoms to see if they improve. Blood tests will also be done to check whether your platelet count returns to normal on its own. If treatment is needed, this may involve:  · Treatment of the underlying cause. For instance, if a medication is the cause, it may be stopped or changed.  · Platelet transfusions. These help raise the number of healthy  platelets in the body.  · Blood transfusions. These help treat blood loss that may occur because of low platelets.  · Medications. These may be given to help prevent platelets from being destroyed. These may also be given to help the bone marrow make more platelets.  · Surgery to remove the spleen. The spleen helps filter the blood. It also stores some blood cells, including platelets. If the spleen is enlarged, it can store too many platelets. This causes there to be fewer platelets in the blood than normal. Though done less often, removing the spleen may help treat thrombocytopenia in certain cases.  Recovery and follow-up  Thrombocytopenia may be a short-term (acute) problem that has no lasting effects. Or it may be an ongoing (chronic) problem. If your condition is chronic, you may need specific treatments to manage it. You may also need to take certain steps daily to reduce your risk of bleeding. For instance, you may be told to limit certain activities that increase your risk of injury. You may also be told to avoid drinking alcohol and taking certain medications, such as aspirin and ibuprofen. These can worsen your symptoms. In addition, you will need to know and watch for signs and symptoms of bleeding. These are described in more detail in the box below.  When to call the doctor  Call your doctor right away if you have any of the following:  · Severe bleeding that won't stop (call 911)  · Signs that might be seen with bleeding in the brain, such as severe headache, dizziness, trouble with balance and coordination, abnormal walk, memory loss, and confusion (call 911)  · Bruising that spreads or worsens  · Increase of small red or purple spots (petechiae) on the skin  · Bloody urine  · Dark brown or black, tarry, or bloody stools  · Bloody vomit   Date Last Reviewed: 4/28/2015  © 2662-5848 [employer]. [address] [place] [address], [place] [address]. All rights reserved. This information is not  intended as a substitute for professional medical care. Always follow your healthcare professional's instructions.          Your Scheduled Appointments     Apr 06, 2017  2:20 PM CDT   Established Patient Visit with Zain Villagomez MD   [facility] - Hemotology Oncology ([facility])    [address] [place] [address]   [phone]            Apr 25, 2017  1:40 PM CDT   Established Patient Visit with Roberto Ron MD   [place] - Internal Medicine ([facility])    [address] [place] [address] [place] [address]   [phone]            May 05, 2017  4:20 PM CDT   Established Patient Visit with Zain Villagomez MD   [facility] - Hemotology Oncology ([facility])    [address] [facility] [address] [place] [address]   [phone]               [facility]  complies with applicable Federal civil rights laws and does not discriminate on the basis of race, color, national origin, age, disability, or sex.        Language Assistance Services     ATTENTION: Language assistance services are available, free of charge. Please call [phone].      ATENCIÓN: Si habla español, tiene a wolfe disposición servicios gratuitos de asistencia lingüística. Llame al [phone].     CHÚ Ý: N?u b?n nói Ti?ng Vi?t, có các d?ch v? h? tr? ngôn ng? mi?n phí dành cho b?n. G?i s? [phone].

## 2017-04-05 NOTE — PROGRESS NOTES
Patient is going to the emergency room [facility] for abnormal labs at this point the patient has had progressive pancytopenia has not wish me to do a bone marrow aspirate and biopsy which is the appropriate course of action I would recommend that the patient be seen and evaluated may need to be admitted to the hospital for further evaluation for bone marrow aspirate and biopsy possibility of myelodysplasia and/or leukemia results of most recent peripheral blood did not reveal any evidence of leukemia will need to receive platelet transfusion prior to bone marrow aspirate and biopsy

## 2017-04-06 ENCOUNTER — PATIENT MESSAGE (OUTPATIENT)
Dept: HEMATOLOGY/ONCOLOGY | Facility: CLINIC | Age: 62
End: 2017-04-06

## 2017-04-06 ENCOUNTER — OFFICE VISIT (OUTPATIENT)
Dept: HEMATOLOGY/ONCOLOGY | Facility: CLINIC | Age: 62
End: 2017-04-06
Payer: COMMERCIAL

## 2017-04-06 ENCOUNTER — TELEPHONE (OUTPATIENT)
Dept: HEMATOLOGY/ONCOLOGY | Facility: CLINIC | Age: 62
End: 2017-04-06

## 2017-04-06 ENCOUNTER — LAB VISIT (OUTPATIENT)
Dept: LAB | Facility: HOSPITAL | Age: 62
End: 2017-04-06
Attending: INTERNAL MEDICINE
Payer: COMMERCIAL

## 2017-04-06 ENCOUNTER — INFUSION (OUTPATIENT)
Dept: INFUSION THERAPY | Facility: HOSPITAL | Age: 62
End: 2017-04-06
Attending: INTERNAL MEDICINE
Payer: COMMERCIAL

## 2017-04-06 VITALS
OXYGEN SATURATION: 99 % | TEMPERATURE: 98 F | RESPIRATION RATE: 18 BRPM | SYSTOLIC BLOOD PRESSURE: 140 MMHG | HEART RATE: 74 BPM | HEIGHT: 65 IN | WEIGHT: 164 LBS | BODY MASS INDEX: 27.32 KG/M2 | DIASTOLIC BLOOD PRESSURE: 100 MMHG

## 2017-04-06 VITALS
HEART RATE: 77 BPM | RESPIRATION RATE: 18 BRPM | TEMPERATURE: 98 F | SYSTOLIC BLOOD PRESSURE: 134 MMHG | DIASTOLIC BLOOD PRESSURE: 74 MMHG

## 2017-04-06 DIAGNOSIS — D61.818 PANCYTOPENIA: ICD-10-CM

## 2017-04-06 DIAGNOSIS — D61.818 PANCYTOPENIA: Primary | ICD-10-CM

## 2017-04-06 LAB
BLD PROD TYP BPU: NORMAL
BLOOD UNIT EXPIRATION DATE: NORMAL
BLOOD UNIT TYPE CODE: 8400
BLOOD UNIT TYPE: NORMAL
CODING SYSTEM: NORMAL
DISPENSE STATUS: NORMAL
TRANS PLATPHERESIS VOL PATIENT: NORMAL ML

## 2017-04-06 PROCEDURE — 99999 PR PBB SHADOW E&M-EST. PATIENT-LVL III: CPT | Mod: PBBFAC,,, | Performed by: INTERNAL MEDICINE

## 2017-04-06 PROCEDURE — 88275 CYTOGENETICS 100-300: CPT | Mod: 91

## 2017-04-06 PROCEDURE — 36430 TRANSFUSION BLD/BLD COMPNT: CPT

## 2017-04-06 PROCEDURE — 88271 CYTOGENETICS DNA PROBE: CPT | Mod: 59

## 2017-04-06 PROCEDURE — P9035 PLATELET PHERES LEUKOREDUCED: HCPCS

## 2017-04-06 PROCEDURE — 88237 TISSUE CULTURE BONE MARROW: CPT

## 2017-04-06 PROCEDURE — 3077F SYST BP >= 140 MM HG: CPT | Mod: S$GLB,,, | Performed by: INTERNAL MEDICINE

## 2017-04-06 PROCEDURE — 88291 CYTO/MOLECULAR REPORT: CPT

## 2017-04-06 PROCEDURE — 88189 FLOWCYTOMETRY/READ 16 & >: CPT | Mod: ,,, | Performed by: PATHOLOGY

## 2017-04-06 PROCEDURE — 88311 DECALCIFY TISSUE: CPT | Mod: 26,,, | Performed by: PATHOLOGY

## 2017-04-06 PROCEDURE — 88364 INSITU HYBRIDIZATION (FISH): CPT | Mod: 26,,, | Performed by: PATHOLOGY

## 2017-04-06 PROCEDURE — 88313 SPECIAL STAINS GROUP 2: CPT | Mod: 26,,, | Performed by: PATHOLOGY

## 2017-04-06 PROCEDURE — 88305 TISSUE EXAM BY PATHOLOGIST: CPT | Mod: 26,,, | Performed by: PATHOLOGY

## 2017-04-06 PROCEDURE — 85097 BONE MARROW INTERPRETATION: CPT | Mod: ,,, | Performed by: PATHOLOGY

## 2017-04-06 PROCEDURE — 88341 IMHCHEM/IMCYTCHM EA ADD ANTB: CPT | Mod: 26,59,, | Performed by: PATHOLOGY

## 2017-04-06 PROCEDURE — 88184 FLOWCYTOMETRY/ TC 1 MARKER: CPT | Performed by: PATHOLOGY

## 2017-04-06 PROCEDURE — 88313 SPECIAL STAINS GROUP 2: CPT

## 2017-04-06 PROCEDURE — 88185 FLOWCYTOMETRY/TC ADD-ON: CPT | Performed by: PATHOLOGY

## 2017-04-06 PROCEDURE — 38221 DX BONE MARROW BIOPSIES: CPT | Mod: LT,S$GLB,, | Performed by: INTERNAL MEDICINE

## 2017-04-06 PROCEDURE — 3080F DIAST BP >= 90 MM HG: CPT | Mod: S$GLB,,, | Performed by: INTERNAL MEDICINE

## 2017-04-06 PROCEDURE — 88264 CHROMOSOME ANALYSIS 20-25: CPT

## 2017-04-06 PROCEDURE — 88342 IMHCHEM/IMCYTCHM 1ST ANTB: CPT | Mod: 26,59,, | Performed by: PATHOLOGY

## 2017-04-06 PROCEDURE — 88365 INSITU HYBRIDIZATION (FISH): CPT | Mod: 26,,, | Performed by: PATHOLOGY

## 2017-04-06 PROCEDURE — 99215 OFFICE O/P EST HI 40 MIN: CPT | Mod: 25,S$GLB,, | Performed by: INTERNAL MEDICINE

## 2017-04-06 PROCEDURE — 88275 CYTOGENETICS 100-300: CPT

## 2017-04-06 PROCEDURE — 1160F RVW MEDS BY RX/DR IN RCRD: CPT | Mod: S$GLB,,, | Performed by: INTERNAL MEDICINE

## 2017-04-06 PROCEDURE — 88271 CYTOGENETICS DNA PROBE: CPT | Mod: 91

## 2017-04-06 PROCEDURE — 88305 TISSUE EXAM BY PATHOLOGIST: CPT | Performed by: PATHOLOGY

## 2017-04-06 RX ORDER — HYDROCODONE BITARTRATE AND ACETAMINOPHEN 500; 5 MG/1; MG/1
TABLET ORAL ONCE
Status: DISCONTINUED | OUTPATIENT
Start: 2017-04-06 | End: 2017-04-06 | Stop reason: HOSPADM

## 2017-04-06 RX ORDER — HYDROCODONE BITARTRATE AND ACETAMINOPHEN 500; 5 MG/1; MG/1
TABLET ORAL ONCE
Status: CANCELLED | OUTPATIENT
Start: 2017-04-06 | End: 2017-04-06

## 2017-04-06 NOTE — PATIENT INSTRUCTIONS
When You Need a Blood Transfusion (Adult)  A blood transfusion is often done when a person has lost blood because of an injury or during surgery. It can also be done because of diseases or conditions that affect the blood. Blood is made up of several different parts (blood products). You may receive some or all of these blood products during a transfusion. Blood for transfusion is usually donated from another person (donor). Strict measures are taken to make sure that donated blood is safe before it's given to you. This sheet helps you understand how a blood transfusion is done. Your health care provider will discuss your condition with you and answer your questions.   The parts of blood  Blood can be broken down into different parts that perform special roles in the body. These parts include:  · Red blood cells, which carry oxygen throughout the body.  · Platelets, which help stop bleeding.  · Plasma (the liquid part of blood), which carries red blood cells and platelets throughout the body. Plasma also helps platelets in stopping bleeding.  Where does donated blood come from?  · Volunteer donors. These are people who donate their blood to help others in need of blood. Blood donation can take place at several places, including a hospital, blood bank, or during a blood drive.  · Directed donation. A person may need a blood transfusion during a planned surgery. Family and friends can have their blood tested for compatibility and donate blood for the patient before the surgery. This needs to be done at least 7 day(s) in advance. This is because the blood must be tested for safety.  · Autologous donation. This is also called self-donation. For planned surgery, a person can donate his or her own blood starting up to 6 weeks before surgery. Doctors often recommend this kind of donation because it is the safest, since the patient's own blood is used for transfusion.  Are blood transfusions safe?  Except for self-donated  blood, all donated blood is tested and processed to make sure that the blood is safe:  · The health and medical history of each donor is carefully screened. If a person is considered high-risk for infection or problems, he or she isn't accepted as a blood donor.  · Donated blood is tested for infections such as hepatitis, syphilis, and HIV (the virus that causes AIDS). If the tested blood is found to be unsafe, it's destroyed.  · Blood is divided into four types: A, B, AB, and O. Blood also has Rh types: positive (+) and negative (-). You can only receive blood products that are compatible with (match) your blood type. A sample of your blood is tested for compatibility with donated blood. This is done before blood products are prepared for a transfusion.  How is a blood transfusion done?  A blood transfusion takes place in a blood center, hospital room, or operating room. It usually lasts 1-2 hours. Your health care provider will discuss the blood transfusion with you before it's done. You'll need to give permission for the blood transfusion by signing a consent form.  · Two health care providers confirm your identity. They also confirm that they have the correct blood product(s) for you.  · An intravenous (IV) line is placed in a vein if you do not already have an IV.  · The blood product comes in a plastic bag that is hung on an IV pole. The blood product flows from the bag into your IV line. The IV line may be connected to a pump, which controls the transfusion rate. You may receive more than one kind of blood product through the IV.  · Your vital signs (blood pressure, heart rate, respiratory rate, and temperature) are checked throughout the transfusion. This is to make sure you are not having a reaction to the blood product.  · The IV line may be removed once the transfusion is complete.  Possible risks and complications of blood transfusions  Most transfusions are problem free. In some cases, reactions occur.  These can happen within seconds to minutes during the transfusion or a week to a few months after the transfusion. Call your doctor or nurse right away if you have any of the signs or symptoms in the table below during or after a transfusion:  Reaction Timing Signs and Symptoms   Allergic reaction (mild) · Within seconds to minutes during the transfusion  · Up to 24 hours after the transfusion Hives or red welts on the skin, mild itching, rash, localized swelling, flushing (red face), wheezing, shortness of breath, or stridor (high-pitched noise or sound)   Anaphylactic reaction · Within seconds to minutes during the transfusion  · Up to 24 hours after the transfusion Shortness of breath, flushing (red face), wheezing, labored (working hard) breathing, low blood pressure, localized swelling, chest tightness, or cramps   Febrile nonhemolytic reaction · Within minutes to hours during the transfusion  · Up to 24 hours after the transfusion Fever (increase of 1° C or higher), chills, flushing (red face), nausea, headache, minor discomfort, or mild shortness of breath   Acute immune hemolytic reaction · Within minutes during the transfusion  · Up to 24 hours after the transfusion Fever, red or brown urine, back pain, fast heart rate (tachycardia), abdominal pain, low blood pressure, feeling anxious, chills, chest pain, nausea, or fainting spells   Transfusion-related acute lung injury (TRALI) · Within 1 to 2 hours during the transfusion  · Up to 6 hours after the transfusion Shortness of breath, trouble breathing, low blood pressure, fever, pulmonary edema   Transfusion-associated circulatory overload · Near the end of the transfusion  · Within 6 hours after the transfusion Shortness of breath, fast heart rate (tachycardia), problems breathing when lying on back, abnormal blood pressure   Post-transfusion purpura (PUP) · Within 1 week  · Up to 48 days after the transfusion Purple spots on skin; nose bleed; bleeding from  "the urinary tract, abdomen, colon, or rectum; fever; or chills   "Delayed" transfusion-related acute lung injury (TRALI) · Within 72 hours (3 days) after the transfusion "Sudden" onset of respiratory distress or trouble breathing   "Delayed" hemolytic reaction · Within 3 to 7 days  · Up to weeks after the transfusion Low-grade fever, mild jaundice (yellowing of the skin and whites of the eyes), decrease in hematocrit, chills, chest pain, back pain, nausea   Date Last Reviewed: 4/25/2015  © 7460-5026 [employer]. [address] [place] [address]. All rights reserved. This information is not intended as a substitute for professional medical care. Always follow your healthcare professional's instructions.        "

## 2017-04-06 NOTE — TELEPHONE ENCOUNTER
Education given on upcoming BMBX, please come at 0920. Confirmed she is not taking aspirin at this time and will not take today or tomorrow

## 2017-04-06 NOTE — PLAN OF CARE
"Problem: Patient Care Overview  Goal: Plan of Care Review  Outcome: Ongoing (interventions implemented as appropriate)  Patients states she is tired, has " been kept busy with doctors the last two days."  Plan of care reviewed, pt verbalizes understanding.      "

## 2017-04-06 NOTE — PROGRESS NOTES
"Subjective:       Patient ID: Conchita Rouse is a 62 y.o. female.    Chief Complaint: Follow-up; Results (Pancytopenia); and Anemia    HPI 62-year-old female presents was seen in the emergency room yesterday because of pancytopenia at this point the patient is asymptomatic no bleeding no fever I spoken to the patient on several occasions about the necessity of doing a bone marrow aspirate and biopsy she's been reluctant to do so she presents himself today for further evaluation accompanied by her     Past Medical History:   Diagnosis Date    Hyperlipidemia     Hypertension     Pancytopenia 02/21/2017    PLT 40 with no s/s bleeding; referred to hem/onc for BM Bx/management    TIA (transient ischemic attack) 2007    She was at "[employer]", where she noted she was feeling slow, took a nap, woke up with a numb/tingling left jaw to arm, which persistned 20 minutes, took 3 baby aspirins, went to ER @ [facility], where facial droop was noted, but completely resolved & head CT revealed old minor abnormality.    Vitamin D deficiency      Family History   Problem Relation Age of Onset    Heart disease Mother     Diabetes Mother     Heart disease Father     Cancer Sister     Cancer Brother      Social History     Social History    Marital status:      Spouse name: N/A    Number of children: N/A    Years of education: N/A     Occupational History    Not on file.     Social History Main Topics    Smoking status: Never Smoker    Smokeless tobacco: Never Used    Alcohol use 0.0 - 0.6 oz/week     0 - 1 Cans of beer per week      Comment: 1/2 beer 3x/yr    Drug use: No    Sexual activity: Not Currently     Partners: Male     Birth control/ protection: Post-menopausal     Other Topics Concern    Not on file     Social History Narrative    Breakfast: Skips or eggs; 1 cup coffee w/ 1 cream & 3 sugars    Lunch: Skips or turkey or ham sandwich w/ occasional chips; tea, rare soda    Dinner: Chicken wings and " french fries; tea or cool aid    Snacks: Chocolates i.e. covered strawberry or Pretzils     Eats out: 2x/wk     Past Surgical History:   Procedure Laterality Date    left eye surgery  1955    Due to left eye lid drooping       Labs:  Lab Results   Component Value Date    WBC 2.66 (L) 04/05/2017    HGB 10.3 (L) 04/05/2017    HCT 30.4 (L) 04/05/2017     (H) 04/05/2017    PLT 20 (LL) 04/05/2017     BMP  Lab Results   Component Value Date     04/05/2017    K 3.9 04/05/2017     04/05/2017    CO2 27 04/05/2017    BUN 18 04/05/2017    CREATININE 0.9 04/05/2017    CALCIUM 9.9 04/05/2017    ANIONGAP 10 04/05/2017    ESTGFRAFRICA >60 04/05/2017    EGFRNONAA >60 04/05/2017     Lab Results   Component Value Date    ALT 19 04/05/2017    AST 19 04/05/2017    ALKPHOS 73 04/05/2017    BILITOT 0.4 04/05/2017       Lab Results   Component Value Date    IRON 101 02/22/2017    TIBC 299 02/22/2017    FERRITIN 409 (H) 02/22/2017     No results found for: NPBXNHZS20  No results found for: FOLATE  No results found for: TSH      Review of Systems   Constitutional: Positive for activity change and fatigue. Negative for appetite change, chills, diaphoresis, fever and unexpected weight change.   HENT: Negative for congestion, dental problem, drooling, ear discharge, ear pain, facial swelling, hearing loss, mouth sores, nosebleeds, postnasal drip, rhinorrhea, sinus pressure, sneezing, sore throat, tinnitus, trouble swallowing and voice change.    Eyes: Negative for photophobia, pain, discharge, redness, itching and visual disturbance.   Respiratory: Negative for cough, choking, chest tightness, shortness of breath, wheezing and stridor.    Cardiovascular: Negative for chest pain, palpitations and leg swelling.   Gastrointestinal: Negative for abdominal distention, abdominal pain, anal bleeding, blood in stool, constipation, diarrhea, nausea, rectal pain and vomiting.   Endocrine: Negative for cold intolerance, heat  intolerance, polydipsia, polyphagia and polyuria.   Genitourinary: Negative for decreased urine volume, difficulty urinating, dyspareunia, dysuria, enuresis, flank pain, frequency, genital sores, hematuria, menstrual problem, pelvic pain, urgency, vaginal bleeding, vaginal discharge and vaginal pain.   Musculoskeletal: Negative for arthralgias, back pain, gait problem, joint swelling, myalgias, neck pain and neck stiffness.   Skin: Negative for color change, pallor and rash.   Allergic/Immunologic: Negative for environmental allergies, food allergies and immunocompromised state.   Neurological: Positive for weakness. Negative for dizziness, tremors, seizures, syncope, facial asymmetry, speech difficulty, light-headedness, numbness and headaches.   Hematological: Negative for adenopathy. Does not bruise/bleed easily.   Psychiatric/Behavioral: Positive for dysphoric mood. Negative for agitation, behavioral problems, confusion, decreased concentration, hallucinations, self-injury, sleep disturbance and suicidal ideas. The patient is nervous/anxious. The patient is not hyperactive.        Objective:      Physical Exam   Constitutional: She is oriented to person, place, and time. She appears well-developed. She appears distressed.   HENT:   Head: Normocephalic and atraumatic.   Right Ear: External ear normal.   Left Ear: External ear normal.   Nose: Nose normal. Right sinus exhibits no maxillary sinus tenderness and no frontal sinus tenderness. Left sinus exhibits no maxillary sinus tenderness and no frontal sinus tenderness.   Mouth/Throat: Oropharynx is clear and moist. No oropharyngeal exudate.   Eyes: Conjunctivae, EOM and lids are normal. Pupils are equal, round, and reactive to light. Right eye exhibits no discharge. Left eye exhibits no discharge. Right conjunctiva is not injected. Right conjunctiva has no hemorrhage. Left conjunctiva is not injected. Left conjunctiva has no hemorrhage. No scleral icterus.   Neck:  Normal range of motion. Neck supple. No JVD present. No tracheal deviation present. No thyromegaly present.   Cardiovascular: Normal rate and regular rhythm.    Pulmonary/Chest: Effort normal. No stridor. No respiratory distress. She exhibits no tenderness.   Abdominal: Soft. She exhibits no distension and no mass. There is no splenomegaly or hepatomegaly. There is no tenderness. There is no rebound.   Musculoskeletal: Normal range of motion. She exhibits no edema or tenderness.   Lymphadenopathy:     She has no cervical adenopathy.     She has no axillary adenopathy.        Right: No supraclavicular adenopathy present.        Left: No supraclavicular adenopathy present.   Neurological: She is alert and oriented to person, place, and time. No cranial nerve deficit. Coordination normal.   Skin: Skin is dry. No rash noted. She is not diaphoretic. No erythema.   Psychiatric: Her behavior is normal. Judgment and thought content normal. Her mood appears anxious. She exhibits a depressed mood.   Vitals reviewed.          Assessment:       1. Pancytopenia            Plan:         extensive conversation with patient copy of her CBCs over the last 2 months of been given to her discussed indications for bone marrow aspirate and biopsy with no other finding at this point with platelet count 20,000 I would like to proceed with transfusion of platelets and posterior fusion platelet count sore for proceeding with bone marrow aspirate and biopsy I've discussed this with her will plan to do in the next 24 hours leukemia immunophenotyping has been recently done which reveals no abnormal hematopoietic findings I assured her that this is good but still the patient could still be in a leukemic phase of leukemia and/or myelodysplasia discussed applications with her [id number]; patient returns after platelet transfusion with platelet, greater than 50,000 after informed consent was obtained left iliac crest was prepped with alcohol 1  present Xylocaine infiltrated bone marrow biopsy bone marrow aspirate performed specimen sent for H&E chromosomal analysis leukemia immunophenotype in and FISH analysis for possible myelodysplasia patient will communicate through electronic patient portal and return early next week for review

## 2017-04-06 NOTE — MR AVS SNAPSHOT
Patient Information     Patient Name Sex Conchita Bashir Female 1955      Visit Information        Provider Department Dept Phone Center    2017 12:30 PM CHAIR [facility] [phone] [place]      Patient Instructions      When You Need a Blood Transfusion (Adult)  A blood transfusion is often done when a person has lost blood because of an injury or during surgery. It can also be done because of diseases or conditions that affect the blood. Blood is made up of several different parts (blood products). You may receive some or all of these blood products during a transfusion. Blood for transfusion is usually donated from another person (donor). Strict measures are taken to make sure that donated blood is safe before it's given to you. This sheet helps you understand how a blood transfusion is done. Your health care provider will discuss your condition with you and answer your questions.   The parts of blood  Blood can be broken down into different parts that perform special roles in the body. These parts include:  · Red blood cells, which carry oxygen throughout the body.  · Platelets, which help stop bleeding.  · Plasma (the liquid part of blood), which carries red blood cells and platelets throughout the body. Plasma also helps platelets in stopping bleeding.  Where does donated blood come from?  · Volunteer donors. These are people who donate their blood to help others in need of blood. Blood donation can take place at several places, including a hospital, blood bank, or during a blood drive.  · Directed donation. A person may need a blood transfusion during a planned surgery. Family and friends can have their blood tested for compatibility and donate blood for the patient before the surgery. This needs to be done at least 7 day(s) in advance. This is because the blood must be tested for safety.  · Autologous donation. This is also called self-donation. For planned  surgery, a person can donate his or her own blood starting up to 6 weeks before surgery. Doctors often recommend this kind of donation because it is the safest, since the patient's own blood is used for transfusion.  Are blood transfusions safe?  Except for self-donated blood, all donated blood is tested and processed to make sure that the blood is safe:  · The health and medical history of each donor is carefully screened. If a person is considered high-risk for infection or problems, he or she isn't accepted as a blood donor.  · Donated blood is tested for infections such as hepatitis, syphilis, and HIV (the virus that causes AIDS). If the tested blood is found to be unsafe, it's destroyed.  · Blood is divided into four types: A, B, AB, and O. Blood also has Rh types: positive (+) and negative (-). You can only receive blood products that are compatible with (match) your blood type. A sample of your blood is tested for compatibility with donated blood. This is done before blood products are prepared for a transfusion.  How is a blood transfusion done?  A blood transfusion takes place in a blood center, hospital room, or operating room. It usually lasts 1-2 hours. Your health care provider will discuss the blood transfusion with you before it's done. You'll need to give permission for the blood transfusion by signing a consent form.  · Two health care providers confirm your identity. They also confirm that they have the correct blood product(s) for you.  · An intravenous (IV) line is placed in a vein if you do not already have an IV.  · The blood product comes in a plastic bag that is hung on an IV pole. The blood product flows from the bag into your IV line. The IV line may be connected to a pump, which controls the transfusion rate. You may receive more than one kind of blood product through the IV.  · Your vital signs (blood pressure, heart rate, respiratory rate, and temperature) are checked throughout the  transfusion. This is to make sure you are not having a reaction to the blood product.  · The IV line may be removed once the transfusion is complete.  Possible risks and complications of blood transfusions  Most transfusions are problem free. In some cases, reactions occur. These can happen within seconds to minutes during the transfusion or a week to a few months after the transfusion. Call your doctor or nurse right away if you have any of the signs or symptoms in the table below during or after a transfusion:  Reaction Timing Signs and Symptoms   Allergic reaction (mild) · Within seconds to minutes during the transfusion  · Up to 24 hours after the transfusion Hives or red welts on the skin, mild itching, rash, localized swelling, flushing (red face), wheezing, shortness of breath, or stridor (high-pitched noise or sound)   Anaphylactic reaction · Within seconds to minutes during the transfusion  · Up to 24 hours after the transfusion Shortness of breath, flushing (red face), wheezing, labored (working hard) breathing, low blood pressure, localized swelling, chest tightness, or cramps   Febrile nonhemolytic reaction · Within minutes to hours during the transfusion  · Up to 24 hours after the transfusion Fever (increase of 1° C or higher), chills, flushing (red face), nausea, headache, minor discomfort, or mild shortness of breath   Acute immune hemolytic reaction · Within minutes during the transfusion  · Up to 24 hours after the transfusion Fever, red or brown urine, back pain, fast heart rate (tachycardia), abdominal pain, low blood pressure, feeling anxious, chills, chest pain, nausea, or fainting spells   Transfusion-related acute lung injury (TRALI) · Within 1 to 2 hours during the transfusion  · Up to 6 hours after the transfusion Shortness of breath, trouble breathing, low blood pressure, fever, pulmonary edema   Transfusion-associated circulatory overload · Near the end of the transfusion  · Within 6  "hours after the transfusion Shortness of breath, fast heart rate (tachycardia), problems breathing when lying on back, abnormal blood pressure   Post-transfusion purpura (PUP) · Within 1 week  · Up to 48 days after the transfusion Purple spots on skin; nose bleed; bleeding from the urinary tract, abdomen, colon, or rectum; fever; or chills   "Delayed" transfusion-related acute lung injury (TRALI) · Within 72 hours (3 days) after the transfusion "Sudden" onset of respiratory distress or trouble breathing   "Delayed" hemolytic reaction · Within 3 to 7 days  · Up to weeks after the transfusion Low-grade fever, mild jaundice (yellowing of the skin and whites of the eyes), decrease in hematocrit, chills, chest pain, back pain, nausea   Date Last Reviewed: 4/25/2015  © 9564-6355 [employer]. [address] [place] [address]. All rights reserved. This information is not intended as a substitute for professional medical care. Always follow your healthcare professional's instructions.             Your Current Medications Are     amlodipine (NORVASC) 5 MG tablet    clopidogrel (PLAVIX) 75 mg tablet    fluticasone (FLONASE) 50 mcg/actuation nasal spray    rosuvastatin (CRESTOR) 10 MG tablet    VITAMIN D2 50,000 unit capsule      Facility-Administered Medications     0.9%  NaCl infusion (for blood administration)      Appointments for Next Year     4/6/2017  2:20 PM ESTABLISHED PATIENT (20 min.) [facility] - Hemotology Oncology Zain Villagomez MD    Arrive at check-in approximately 15 minutes before your scheduled appointment time. Bring all outside medical records and imaging, along with a list of your current medications and insurance card.    (off [facility]) 3rd Floor    4/7/2017 10:20 AM ESTABLISHED PATIENT (20 min.) [facility] - [facility] Zain Villagomez MD    Arrive at check-in approximately 15 minutes before your scheduled appointment time. Bring all outside medical records and imaging, " "along with a list of your current medications and insurance card.    (off [employer]) 3rd Floor    4/25/2017  1:40 PM ESTABLISHED PATIENT (20 min.) [place] - Internal Medicine Roberto Ron MD    Arrive at check-in approximately 15 minutes before your scheduled appointment time. Bring all outside medical records and imaging, along with a list of your current medications and insurance card.    5/5/2017  4:20 PM ESTABLISHED PATIENT (20 min.) [facility] - Hemotology Oncology Zain Villagomez MD    Arrive at check-in approximately 15 minutes before your scheduled appointment time. Bring all outside medical records and imaging, along with a list of your current medications and insurance card.    (off [employer]) 3rd Floor         Default Flowsheet Data (last 24 hours)      Amb Complex Vitals [place]        04/06/17 1220 04/06/17 1205 04/06/17 1145 04/06/17 0916 04/05/17 1542    Measurements    Weight    74.4 kg (164 lb 0.4 oz)     Height    5' 5" (1.651 m)     BSA (Calculated - sq m)    1.85 sq meters     BMI (Calculated)    27.4     /69 (!)  149/71  (!)  140/100 136/81    Temp 98.4 °F (36.9 °C) 98 °F (36.7 °C)  98.2 °F (36.8 °C) 98.2 °F (36.8 °C)    Pulse 71 77  74 66    Resp 18 18  18 18    SpO2    99 % 100 %    Pain Assessment    Pain Score   Zero Zero        04/05/17 1500                Measurements    Pulse 76                Allergies     Codeine Hives      Current Discharge Medication List     Cannot display discharge medications since this is not an admission.      "

## 2017-04-07 LAB
BODY SITE - BONE MARROW: NORMAL
BONE MARROW IRON STAIN COMMENT: NORMAL
BONE MARROW WRIGHT STAIN COMMENT: NORMAL
CLINICAL DIAGNOSIS - BONE MARROW: NORMAL
FLOW CYTOMETRY ANTIBODIES ANALYZED - BONE MARROW: NORMAL
FLOW CYTOMETRY COMMENT - BONE MARROW: NORMAL
FLOW CYTOMETRY INTERPRETATION - BONE MARROW: NORMAL

## 2017-04-10 ENCOUNTER — TELEPHONE (OUTPATIENT)
Dept: HEMATOLOGY/ONCOLOGY | Facility: CLINIC | Age: 62
End: 2017-04-10

## 2017-04-10 NOTE — TELEPHONE ENCOUNTER
----- Message from Delilah Vasquez sent at 4/10/2017  2:01 PM CDT -----  Patient declined to give reason for call. Please adv/call [phone].//thanks. cw

## 2017-04-11 ENCOUNTER — PATIENT OUTREACH (OUTPATIENT)
Dept: ADMINISTRATIVE | Facility: HOSPITAL | Age: 62
End: 2017-04-11

## 2017-04-11 LAB
CLINICAL CYTOGENETICIST REVIEW: NORMAL
FMDS SPECIMEN: NORMAL
MDS FISH ADDITIONAL INFORMATION: NORMAL
MDS FISH DISCLAIMER: NORMAL
MDS FISH REASON FOR REFERRAL (BM): NORMAL
MDS FISH RELEASED BY: NORMAL
MDS FISH RESULT (BM): NORMAL
MDS FISH RESULT SUMMARY: NORMAL
MDS FISH RESULT TABLE: NORMAL
REF LAB TEST METHOD: NORMAL
SPECIMEN SOURCE: NORMAL

## 2017-04-13 ENCOUNTER — TELEPHONE (OUTPATIENT)
Dept: HEMATOLOGY/ONCOLOGY | Facility: CLINIC | Age: 62
End: 2017-04-13

## 2017-04-13 NOTE — TELEPHONE ENCOUNTER
Voiced understanding that we are getting results for BMBX but at this point they are inconclusive and we can discuss more later as we get more results.

## 2017-04-14 LAB
CHROM BANDING METHOD: NORMAL
CHROMOSOME ANALYSIS BM ADDITIONAL INFORMATION: NORMAL
CHROMOSOME ANALYSIS BM RELEASED BY: NORMAL
CHROMOSOME ANALYSIS BM RESULT SUMMARY: NORMAL
CLINICAL CYTOGENETICIST REVIEW: NORMAL
KARYOTYP MAR: NORMAL
REASON FOR REFERRAL (NARRATIVE): NORMAL
REF LAB TEST METHOD: NORMAL
SPECIMEN SOURCE: NORMAL
SPECIMEN: NORMAL

## 2017-04-21 ENCOUNTER — OFFICE VISIT (OUTPATIENT)
Dept: HEMATOLOGY/ONCOLOGY | Facility: CLINIC | Age: 62
End: 2017-04-21
Payer: COMMERCIAL

## 2017-04-21 VITALS
SYSTOLIC BLOOD PRESSURE: 120 MMHG | DIASTOLIC BLOOD PRESSURE: 80 MMHG | TEMPERATURE: 96 F | WEIGHT: 161.19 LBS | HEIGHT: 65 IN | OXYGEN SATURATION: 100 % | RESPIRATION RATE: 18 BRPM | HEART RATE: 70 BPM | BODY MASS INDEX: 26.85 KG/M2

## 2017-04-21 DIAGNOSIS — D61.818 PANCYTOPENIA: Primary | ICD-10-CM

## 2017-04-21 PROCEDURE — 99214 OFFICE O/P EST MOD 30 MIN: CPT | Mod: S$GLB,,, | Performed by: INTERNAL MEDICINE

## 2017-04-21 PROCEDURE — 3074F SYST BP LT 130 MM HG: CPT | Mod: S$GLB,,, | Performed by: INTERNAL MEDICINE

## 2017-04-21 PROCEDURE — 99999 PR PBB SHADOW E&M-EST. PATIENT-LVL III: CPT | Mod: PBBFAC,,, | Performed by: INTERNAL MEDICINE

## 2017-04-21 PROCEDURE — 1160F RVW MEDS BY RX/DR IN RCRD: CPT | Mod: S$GLB,,, | Performed by: INTERNAL MEDICINE

## 2017-04-21 PROCEDURE — 3079F DIAST BP 80-89 MM HG: CPT | Mod: S$GLB,,, | Performed by: INTERNAL MEDICINE

## 2017-04-21 NOTE — PROGRESS NOTES
"Subjective:       Patient ID: Conchita Rouse is a 62 y.o. female.    Chief Complaint: Follow-up    HPI 62-year-old female history of pancytopenia patient underwent recent bone marrow aspirate and biopsy which revealed inadequate material from a biopsy standpoint flow cytometry material did not reveal any primary hematological malignancy but may be limited by the adequate specimen sample    Past Medical History:   Diagnosis Date    Hyperlipidemia     Hypertension     Pancytopenia 02/21/2017    PLT 40 with no s/s bleeding; referred to hem/onc for BM Bx/management    TIA (transient ischemic attack) 2007    She was at "[employer]", where she noted she was feeling slow, took a nap, woke up with a numb/tingling left jaw to arm, which persistned 20 minutes, took 3 baby aspirins, went to ER @ [facility], where facial droop was noted, but completely resolved & head CT revealed old minor abnormality.    Vitamin D deficiency      Family History   Problem Relation Age of Onset    Heart disease Mother     Diabetes Mother     Heart disease Father     Cancer Sister     Cancer Brother      Social History     Social History    Marital status:      Spouse name: N/A    Number of children: N/A    Years of education: N/A     Occupational History    Not on file.     Social History Main Topics    Smoking status: Never Smoker    Smokeless tobacco: Never Used    Alcohol use 0.0 - 0.6 oz/week     0 - 1 Cans of beer per week      Comment: 1/2 beer 3x/yr    Drug use: No    Sexual activity: Not Currently     Partners: Male     Birth control/ protection: Post-menopausal     Other Topics Concern    Not on file     Social History Narrative    Breakfast: Skips or eggs; 1 cup coffee w/ 1 cream & 3 sugars    Lunch: Skips or turkey or ham sandwich w/ occasional chips; tea, rare soda    Dinner: Chicken wings and french fries; tea or cool aid    Snacks: Chocolates i.e. covered strawberry or Pretzils     Eats out: 2x/wk     Past " Surgical History:   Procedure Laterality Date    left eye surgery  1955    Due to left eye lid drooping       Labs:  Lab Results   Component Value Date    WBC 2.78 (L) 04/06/2017    HGB 9.6 (L) 04/06/2017    HCT 28.6 (L) 04/06/2017     (H) 04/06/2017    PLT 68 (L) 04/06/2017     BMP  Lab Results   Component Value Date     04/05/2017    K 3.9 04/05/2017     04/05/2017    CO2 27 04/05/2017    BUN 18 04/05/2017    CREATININE 0.9 04/05/2017    CALCIUM 9.9 04/05/2017    ANIONGAP 10 04/05/2017    ESTGFRAFRICA >60 04/05/2017    EGFRNONAA >60 04/05/2017     Lab Results   Component Value Date    ALT 19 04/05/2017    AST 19 04/05/2017    ALKPHOS 73 04/05/2017    BILITOT 0.4 04/05/2017       Lab Results   Component Value Date    IRON 101 02/22/2017    TIBC 299 02/22/2017    FERRITIN 409 (H) 02/22/2017     No results found for: MRPSPRFI34  No results found for: FOLATE  No results found for: TSH      Review of Systems   Constitutional: Positive for activity change and fatigue. Negative for appetite change, chills, diaphoresis, fever and unexpected weight change.   HENT: Negative for congestion, dental problem, drooling, ear discharge, ear pain, facial swelling, hearing loss, mouth sores, nosebleeds, postnasal drip, rhinorrhea, sinus pressure, sneezing, sore throat, tinnitus, trouble swallowing and voice change.    Eyes: Negative for photophobia, pain, discharge, redness, itching and visual disturbance.   Respiratory: Negative for cough, choking, chest tightness, shortness of breath, wheezing and stridor.    Cardiovascular: Negative for chest pain, palpitations and leg swelling.   Gastrointestinal: Negative for abdominal distention, abdominal pain, anal bleeding, blood in stool, constipation, diarrhea, nausea, rectal pain and vomiting.   Endocrine: Negative for cold intolerance, heat intolerance, polydipsia, polyphagia and polyuria.   Genitourinary: Negative for decreased urine volume, difficulty urinating,  dyspareunia, dysuria, enuresis, flank pain, frequency, genital sores, hematuria, menstrual problem, pelvic pain, urgency, vaginal bleeding, vaginal discharge and vaginal pain.   Musculoskeletal: Negative for arthralgias, back pain, gait problem, joint swelling, myalgias, neck pain and neck stiffness.   Skin: Negative for color change, pallor and rash.   Allergic/Immunologic: Negative for environmental allergies, food allergies and immunocompromised state.   Neurological: Negative for dizziness, tremors, seizures, syncope, facial asymmetry, speech difficulty, weakness, light-headedness, numbness and headaches.   Hematological: Negative for adenopathy. Does not bruise/bleed easily.   Psychiatric/Behavioral: Positive for dysphoric mood. Negative for agitation, behavioral problems, confusion, decreased concentration, hallucinations, self-injury, sleep disturbance and suicidal ideas. The patient is nervous/anxious. The patient is not hyperactive.        Objective:      Physical Exam   Constitutional: She is oriented to person, place, and time. She appears well-developed. She appears distressed.   HENT:   Head: Normocephalic and atraumatic.   Right Ear: External ear normal.   Left Ear: External ear normal.   Nose: Nose normal. Right sinus exhibits no maxillary sinus tenderness and no frontal sinus tenderness. Left sinus exhibits no maxillary sinus tenderness and no frontal sinus tenderness.   Mouth/Throat: Oropharynx is clear and moist. No oropharyngeal exudate.   Eyes: Conjunctivae, EOM and lids are normal. Pupils are equal, round, and reactive to light. Right eye exhibits no discharge. Left eye exhibits no discharge. Right conjunctiva is not injected. Right conjunctiva has no hemorrhage. Left conjunctiva is not injected. Left conjunctiva has no hemorrhage. No scleral icterus.   Neck: Normal range of motion. Neck supple. No JVD present. No tracheal deviation present. No thyromegaly present.   Cardiovascular: Normal rate  and regular rhythm.    Pulmonary/Chest: Effort normal. No stridor. No respiratory distress. She exhibits no tenderness.   Abdominal: Soft. She exhibits no distension and no mass. There is no splenomegaly or hepatomegaly. There is no tenderness. There is no rebound.   Musculoskeletal: Normal range of motion. She exhibits no edema or tenderness.   Lymphadenopathy:     She has no cervical adenopathy.     She has no axillary adenopathy.        Right: No supraclavicular adenopathy present.        Left: No supraclavicular adenopathy present.   Neurological: She is alert and oriented to person, place, and time. No cranial nerve deficit. Coordination normal.   Skin: Skin is dry. No rash noted. She is not diaphoretic. No erythema.   Psychiatric: Her behavior is normal. Judgment and thought content normal. Her mood appears anxious. She exhibits a depressed mood.   Vitals reviewed.          Assessment:       1. Pancytopenia            Plan:         I have given the patient a copy of her bone marrow report at this point the possibility of myelofibrosis was discussed with her sometimes patients with myelofibrosis have difficult bone marrow is to perform.  Of told her I would recommend a repeat bone marrow biopsy and aspirate repeat flow cytometry studies to be completed at this point the patient is distressed over this fact of told her we can certainly do the bone marrow in the office again with platelet support or if she wishes we could do this under sedation during the hospital outpatient setting with pathology performing the bone marrow

## 2017-04-28 ENCOUNTER — OFFICE VISIT (OUTPATIENT)
Dept: HEMATOLOGY/ONCOLOGY | Facility: CLINIC | Age: 62
End: 2017-04-28
Payer: COMMERCIAL

## 2017-04-28 ENCOUNTER — LAB VISIT (OUTPATIENT)
Dept: LAB | Facility: HOSPITAL | Age: 62
End: 2017-04-28
Attending: INTERNAL MEDICINE
Payer: COMMERCIAL

## 2017-04-28 VITALS
WEIGHT: 160.25 LBS | OXYGEN SATURATION: 99 % | HEIGHT: 65 IN | HEART RATE: 93 BPM | DIASTOLIC BLOOD PRESSURE: 72 MMHG | BODY MASS INDEX: 26.7 KG/M2 | TEMPERATURE: 99 F | SYSTOLIC BLOOD PRESSURE: 118 MMHG

## 2017-04-28 DIAGNOSIS — D61.818 PANCYTOPENIA: Primary | ICD-10-CM

## 2017-04-28 DIAGNOSIS — D61.818 PANCYTOPENIA: ICD-10-CM

## 2017-04-28 LAB
ANISOCYTOSIS BLD QL SMEAR: SLIGHT
BASOPHILS # BLD AUTO: ABNORMAL K/UL
BASOPHILS NFR BLD: 0 %
DIFFERENTIAL METHOD: ABNORMAL
EOSINOPHIL # BLD AUTO: ABNORMAL K/UL
EOSINOPHIL NFR BLD: 0 %
ERYTHROCYTE [DISTWIDTH] IN BLOOD BY AUTOMATED COUNT: 14.8 %
HCT VFR BLD AUTO: 26.3 %
HGB BLD-MCNC: 9 G/DL
LYMPHOCYTES # BLD AUTO: ABNORMAL K/UL
LYMPHOCYTES NFR BLD: 66 %
MCH RBC QN AUTO: 37 PG
MCHC RBC AUTO-ENTMCNC: 34.2 %
MCV RBC AUTO: 108 FL
MONOCYTES # BLD AUTO: ABNORMAL K/UL
MONOCYTES NFR BLD: 10 %
NEUTROPHILS NFR BLD: 22 %
NEUTS BAND NFR BLD MANUAL: 2 %
PLATELET # BLD AUTO: 14 K/UL
PLATELET BLD QL SMEAR: ABNORMAL
PMV BLD AUTO: 11.2 FL
RBC # BLD AUTO: 2.43 M/UL
WBC # BLD AUTO: 1.75 K/UL

## 2017-04-28 PROCEDURE — 3074F SYST BP LT 130 MM HG: CPT | Mod: S$GLB,,, | Performed by: INTERNAL MEDICINE

## 2017-04-28 PROCEDURE — 99999 PR PBB SHADOW E&M-EST. PATIENT-LVL III: CPT | Mod: PBBFAC,,, | Performed by: INTERNAL MEDICINE

## 2017-04-28 PROCEDURE — 85027 COMPLETE CBC AUTOMATED: CPT

## 2017-04-28 PROCEDURE — 36415 COLL VENOUS BLD VENIPUNCTURE: CPT

## 2017-04-28 PROCEDURE — 99214 OFFICE O/P EST MOD 30 MIN: CPT | Mod: S$GLB,,, | Performed by: INTERNAL MEDICINE

## 2017-04-28 PROCEDURE — 3078F DIAST BP <80 MM HG: CPT | Mod: S$GLB,,, | Performed by: INTERNAL MEDICINE

## 2017-04-28 PROCEDURE — 1160F RVW MEDS BY RX/DR IN RCRD: CPT | Mod: S$GLB,,, | Performed by: INTERNAL MEDICINE

## 2017-04-28 NOTE — PROGRESS NOTES
"Subjective:       Patient ID: Conchita Rouse is a 62 y.o. female.    Chief Complaint: Results (Pancytopenia) and Anemia    HPI 62-year-old female with progressive pancytopenia reports bruising on arm    Past Medical History:   Diagnosis Date    Hyperlipidemia     Hypertension     Pancytopenia 02/21/2017    PLT 40 with no s/s bleeding; referred to hem/onc for BM Bx/management    TIA (transient ischemic attack) 2007    She was at "[employer]", where she noted she was feeling slow, took a nap, woke up with a numb/tingling left jaw to arm, which persistned 20 minutes, took 3 baby aspirins, went to ER @ [facility], where facial droop was noted, but completely resolved & head CT revealed old minor abnormality.    Vitamin D deficiency      Family History   Problem Relation Age of Onset    Heart disease Mother     Diabetes Mother     Heart disease Father     Cancer Sister     Cancer Brother      Social History     Social History    Marital status:      Spouse name: N/A    Number of children: N/A    Years of education: N/A     Occupational History    Not on file.     Social History Main Topics    Smoking status: Never Smoker    Smokeless tobacco: Never Used    Alcohol use 0.0 - 0.6 oz/week     0 - 1 Cans of beer per week      Comment: 1/2 beer 3x/yr    Drug use: No    Sexual activity: Not Currently     Partners: Male     Birth control/ protection: Post-menopausal     Other Topics Concern    Not on file     Social History Narrative    Breakfast: Skips or eggs; 1 cup coffee w/ 1 cream & 3 sugars    Lunch: Skips or turkey or ham sandwich w/ occasional chips; tea, rare soda    Dinner: Chicken wings and french fries; tea or cool aid    Snacks: Chocolates i.e. covered strawberry or Pretzils     Eats out: 2x/wk     Past Surgical History:   Procedure Laterality Date    left eye surgery  1955    Due to left eye lid drooping       Labs:  Lab Results   Component Value Date    WBC 1.75 (LL) 04/28/2017    HGB 9.0 " (L) 04/28/2017    HCT 26.3 (L) 04/28/2017     (H) 04/28/2017    PLT 14 (LL) 04/28/2017     BMP  Lab Results   Component Value Date     04/05/2017    K 3.9 04/05/2017     04/05/2017    CO2 27 04/05/2017    BUN 18 04/05/2017    CREATININE 0.9 04/05/2017    CALCIUM 9.9 04/05/2017    ANIONGAP 10 04/05/2017    ESTGFRAFRICA >60 04/05/2017    EGFRNONAA >60 04/05/2017     Lab Results   Component Value Date    ALT 19 04/05/2017    AST 19 04/05/2017    ALKPHOS 73 04/05/2017    BILITOT 0.4 04/05/2017       Lab Results   Component Value Date    IRON 101 02/22/2017    TIBC 299 02/22/2017    FERRITIN 409 (H) 02/22/2017     No results found for: BELNFTAY19  No results found for: FOLATE  No results found for: TSH      Review of Systems   Constitutional: Positive for activity change and fatigue. Negative for appetite change, chills, diaphoresis, fever and unexpected weight change.   HENT: Negative for congestion, dental problem, drooling, ear discharge, ear pain, facial swelling, hearing loss, mouth sores, nosebleeds, postnasal drip, rhinorrhea, sinus pressure, sneezing, sore throat, tinnitus, trouble swallowing and voice change.    Eyes: Negative for photophobia, pain, discharge, redness, itching and visual disturbance.   Respiratory: Negative for cough, choking, chest tightness, shortness of breath, wheezing and stridor.    Cardiovascular: Negative for chest pain, palpitations and leg swelling.   Gastrointestinal: Negative for abdominal distention, abdominal pain, anal bleeding, blood in stool, constipation, diarrhea, nausea, rectal pain and vomiting.   Endocrine: Negative for cold intolerance, heat intolerance, polydipsia, polyphagia and polyuria.   Genitourinary: Negative for decreased urine volume, difficulty urinating, dyspareunia, dysuria, enuresis, flank pain, frequency, genital sores, hematuria, menstrual problem, pelvic pain, urgency, vaginal bleeding, vaginal discharge and vaginal pain.    Musculoskeletal: Negative for arthralgias, back pain, gait problem, joint swelling, myalgias, neck pain and neck stiffness.   Skin: Negative for color change, pallor and rash.        Bruising on skin right arm   Allergic/Immunologic: Negative for environmental allergies, food allergies and immunocompromised state.   Neurological: Negative for dizziness, tremors, seizures, syncope, facial asymmetry, speech difficulty, weakness, light-headedness, numbness and headaches.   Hematological: Negative for adenopathy. Does not bruise/bleed easily.   Psychiatric/Behavioral: Positive for dysphoric mood. Negative for agitation, behavioral problems, confusion, decreased concentration, hallucinations, self-injury, sleep disturbance and suicidal ideas. The patient is nervous/anxious. The patient is not hyperactive.        Objective:      Physical Exam   Constitutional: She is oriented to person, place, and time. She appears well-developed and well-nourished. No distress.   HENT:   Head: Normocephalic and atraumatic.   Right Ear: External ear normal.   Left Ear: External ear normal.   Nose: Nose normal. Right sinus exhibits no maxillary sinus tenderness and no frontal sinus tenderness. Left sinus exhibits no maxillary sinus tenderness and no frontal sinus tenderness.   Mouth/Throat: Oropharynx is clear and moist. No oropharyngeal exudate.   Eyes: Conjunctivae, EOM and lids are normal. Pupils are equal, round, and reactive to light. Right eye exhibits no discharge. Left eye exhibits no discharge. Right conjunctiva is not injected. Right conjunctiva has no hemorrhage. Left conjunctiva is not injected. Left conjunctiva has no hemorrhage. No scleral icterus.   Neck: Normal range of motion. Neck supple. No JVD present. No tracheal deviation present. No thyromegaly present.   Cardiovascular: Normal rate and regular rhythm.    Pulmonary/Chest: Effort normal. No stridor. No respiratory distress. She exhibits no tenderness.   Abdominal:  Soft. She exhibits no distension and no mass. There is no splenomegaly or hepatomegaly. There is no tenderness. There is no rebound.   Musculoskeletal: Normal range of motion. She exhibits no edema or tenderness.   Lymphadenopathy:     She has no cervical adenopathy.     She has no axillary adenopathy.        Right: No supraclavicular adenopathy present.        Left: No supraclavicular adenopathy present.   Neurological: She is alert and oriented to person, place, and time. No cranial nerve deficit. Coordination normal.   Skin: Skin is dry. No rash noted. She is not diaphoretic. No erythema.   Bruising in 2 locations upper and lower right arm   Psychiatric: She has a normal mood and affect. Her behavior is normal. Judgment and thought content normal.   Vitals reviewed.          Assessment:       1. Pancytopenia            Plan:         progressive pancytopenia told patient she needs another bone marrow aspirate and biopsy inadequate material obtained on first at this point I've instructed her she has fever more than 100.5 to be seen in the emergency room urgently instructed on bleeding precautions including increasing breathing petechiae hematuria nosebleeds or bleeding from gums will see back next week for review offer to repeat bone marrow in office or patient would be seen in the outpatient setting for conscious sedation bone marrow

## 2017-04-30 ENCOUNTER — HOSPITAL ENCOUNTER (EMERGENCY)
Facility: HOSPITAL | Age: 62
Discharge: HOME | End: 2017-04-30
Attending: INTERNAL MEDICINE
Payer: COMMERCIAL

## 2017-04-30 VITALS
DIASTOLIC BLOOD PRESSURE: 82 MMHG | WEIGHT: 162 LBS | SYSTOLIC BLOOD PRESSURE: 144 MMHG | BODY MASS INDEX: 26.03 KG/M2 | HEIGHT: 66 IN | RESPIRATION RATE: 16 BRPM | OXYGEN SATURATION: 99 % | HEART RATE: 75 BPM | TEMPERATURE: 98 F

## 2017-04-30 DIAGNOSIS — R06.2 WHEEZING: ICD-10-CM

## 2017-04-30 DIAGNOSIS — D61.818 PANCYTOPENIA: Primary | ICD-10-CM

## 2017-04-30 LAB
ABO + RH BLD: NORMAL
BASOPHILS # BLD AUTO: 0 K/UL
BASOPHILS # BLD AUTO: 0.01 K/UL
BASOPHILS NFR BLD: 0 %
BASOPHILS NFR BLD: 0.5 %
BLD GP AB SCN CELLS X3 SERPL QL: NORMAL
BLD PROD TYP BPU: NORMAL
BLOOD UNIT EXPIRATION DATE: NORMAL
BLOOD UNIT TYPE CODE: 6200
BLOOD UNIT TYPE: NORMAL
CODING SYSTEM: NORMAL
DIFFERENTIAL METHOD: ABNORMAL
DIFFERENTIAL METHOD: ABNORMAL
DISPENSE STATUS: NORMAL
EOSINOPHIL # BLD AUTO: 0 K/UL
EOSINOPHIL # BLD AUTO: 0 K/UL
EOSINOPHIL NFR BLD: 0.4 %
EOSINOPHIL NFR BLD: 0.5 %
ERYTHROCYTE [DISTWIDTH] IN BLOOD BY AUTOMATED COUNT: 15.5 %
ERYTHROCYTE [DISTWIDTH] IN BLOOD BY AUTOMATED COUNT: 15.5 %
HCT VFR BLD AUTO: 23.9 %
HCT VFR BLD AUTO: 24.8 %
HGB BLD-MCNC: 8.3 G/DL
HGB BLD-MCNC: 8.4 G/DL
LYMPHOCYTES # BLD AUTO: 1.6 K/UL
LYMPHOCYTES # BLD AUTO: 1.7 K/UL
LYMPHOCYTES NFR BLD: 72.5 %
LYMPHOCYTES NFR BLD: 75.4 %
MCH RBC QN AUTO: 35.6 PG
MCH RBC QN AUTO: 36.4 PG
MCHC RBC AUTO-ENTMCNC: 33.9 %
MCHC RBC AUTO-ENTMCNC: 34.7 %
MCV RBC AUTO: 105 FL
MCV RBC AUTO: 105 FL
MONOCYTES # BLD AUTO: 0.1 K/UL
MONOCYTES # BLD AUTO: 0.2 K/UL
MONOCYTES NFR BLD: 3.9 %
MONOCYTES NFR BLD: 6.4 %
NEUTROPHILS # BLD AUTO: 0.4 K/UL
NEUTROPHILS # BLD AUTO: 0.5 K/UL
NEUTROPHILS NFR BLD: 19.7 %
NEUTROPHILS NFR BLD: 20.7 %
PLATELET # BLD AUTO: 77 K/UL
PLATELET # BLD AUTO: 9 K/UL
PMV BLD AUTO: 10 FL
PMV BLD AUTO: ABNORMAL FL
RBC # BLD AUTO: 2.28 M/UL
RBC # BLD AUTO: 2.36 M/UL
TRANS PLATPHERESIS VOL PATIENT: NORMAL ML
WBC # BLD AUTO: 2.07 K/UL
WBC # BLD AUTO: 2.33 K/UL

## 2017-04-30 PROCEDURE — 86900 BLOOD TYPING SEROLOGIC ABO: CPT

## 2017-04-30 PROCEDURE — 99284 EMERGENCY DEPT VISIT MOD MDM: CPT | Mod: 25

## 2017-04-30 PROCEDURE — 85025 COMPLETE CBC W/AUTO DIFF WBC: CPT | Mod: 91

## 2017-04-30 PROCEDURE — P9035 PLATELET PHERES LEUKOREDUCED: HCPCS

## 2017-04-30 PROCEDURE — 86850 RBC ANTIBODY SCREEN: CPT

## 2017-04-30 PROCEDURE — 36430 TRANSFUSION BLD/BLD COMPNT: CPT

## 2017-04-30 RX ORDER — HYDROCODONE BITARTRATE AND ACETAMINOPHEN 500; 5 MG/1; MG/1
TABLET ORAL ONCE
Status: CANCELLED | OUTPATIENT
Start: 2017-04-30 | End: 2017-04-30

## 2017-04-30 RX ORDER — DIPHENHYDRAMINE HCL 25 MG
25 CAPSULE ORAL
Status: CANCELLED | OUTPATIENT
Start: 2017-04-30

## 2017-04-30 RX ORDER — ACETAMINOPHEN 325 MG/1
650 TABLET ORAL
Status: CANCELLED | OUTPATIENT
Start: 2017-04-30

## 2017-04-30 RX ORDER — HYDROCODONE BITARTRATE AND ACETAMINOPHEN 500; 5 MG/1; MG/1
TABLET ORAL
Status: DISCONTINUED | OUTPATIENT
Start: 2017-04-30 | End: 2017-04-30 | Stop reason: HOSPADM

## 2017-04-30 NOTE — ED AVS SNAPSHOT
[facility]  [address]  [place] [address]               Conchita ALMANZAR Nasim   2017  6:11 PM   ED    Description:  Female : 1955   Department:  [facility]            Your Care was Coordinated By:     Provider Role From To    Cristy Deleon MD Attending Provider [phone] --      Reason for Visit     Abnormal Lab           Diagnoses this Visit        Comments    Pancytopenia    -  Primary     Wheezing           ED Disposition     None           To Do List           Follow-up Information     Schedule an appointment as soon as possible for a visit with Zain Villagomez MD.    Specialty:  Hematology and Oncology    Contact information:    [address]  [facility] [address] [phone]        [facility]     [facility] Care Line -  Assistance  Unless otherwise directed by your provider, please contact [facility], our nurse care line that is available for  assistance.     Registered nurses in the [facility] provide: appointment scheduling, clinical advisement, health education, and other advisory services.  Call: [phone] (toll free)               Medications           These medications were administered today        Dose Freq    0.9%  NaCl infusion (for blood administration)  Every 24 hours as needed    Sig: Inject into the vein every 24 hours as needed for Blood Admin.    Class: Normal    Route: Intravenous           Verify that the below list of medications is an accurate representation of the medications you are currently taking.  If none reported, the list may be blank. If incorrect, please contact your healthcare provider. Carry this list with you in case of emergency.           Current Medications     amlodipine (NORVASC) 5 MG tablet Take 5 mg by mouth once daily.    VITAMIN D2 50,000 unit capsule Take 50,000 Units by mouth every 7 days.    0.9%  NaCl infusion (for blood  "administration) Inject into the vein every 24 hours as needed for Blood Admin.    fluticasone (FLONASE) 50 mcg/actuation nasal spray USE 2 SPRAYS IN EACH NOSTRIL 1 TIME A DAY AS NEEDED    rosuvastatin (CRESTOR) 10 MG tablet Take 10 mg by mouth once daily.           Clinical Reference Information           Your Vitals Were     BP Pulse Temp Resp Height Weight    162/87 78 98.3 °F (36.8 °C) (Oral) 16 5' 6" (1.676 m) 73.5 kg (162 lb)    SpO2 BMI             100% 26.15 kg/m2         Allergies as of 4/30/2017        Reactions    Codeine Hives      Immunizations Administered on Date of Encounter - 4/30/2017     None      ED Micro, Lab, POCT     Start Ordered       Status Ordering Provider    04/30/17 1942 04/30/17 1941  CBC auto differential  STAT      Final result     04/30/17 1909 04/30/17 1908  CBC auto differential  STAT      Final result     04/30/17 1822 04/30/17 1824    STAT,   Status:  Canceled      Canceled       ED Imaging Orders     Start Ordered       Status Ordering Provider    04/30/17 1822 04/30/17 1824  X-Ray Chest 1 View  1 time imaging      Final result       Blood Transfusion Reaction Signs and Symptoms     The blood you have received has been matched for you as carefully as possible. Most patients who receive a blood transfusion do not experience problems. However, there can be a delayed reaction that happens a few weeks after your blood transfusion. Contact your physician immediately if you experience any NEW SYMPTOMS listed below:     Fever greater than 100.4 degrees    Chills   Yellow color to your skin or eyes(Jaundice)   Back pain, chest pain, or pain at the infusion site   Weakness (more than usual)   Discomfort or uneasiness more than usual (Malaise)   Nausea or vomiting   Shortness of breath, wheezing, or coughing   Higher or lower blood pressure than normal   Skin rash, itching, skin redness, or localized swelling (example: hands or feet)   Urinating less than normal   Urine " appears reddish or orange and is darker than normal      Remember that some these signs may already exist for you--such as having chronic back pain or high blood pressure. You only need to look for and report to your doctor any new occurrences since your blood transfusion that are of concern.        Your Scheduled Appointments     May 04, 2017  3:40 PM CDT   Established Patient Visit with MD João Moody - Hematology Oncology ([facility])    [address] [place] [id number] [phone]               [facility]  complies with applicable Federal civil rights laws and does not discriminate on the basis of race, color, national origin, age, disability, or sex.        Language Assistance Services     ATTENTION: Language assistance services are available, free of charge. Please call [phone].      ATENCIÓN: Si habla bernadette, tiene a owlfe disposición servicios gratuitos de asistencia lingüística. Llame al [phone].     CHÚ Ý: N?u b?n nói Ti?ng Vi?t, có các d?ch v? h? tr? ngôn ng? mi?n phí dành cho b?n. G?i s? [phone].

## 2017-04-30 NOTE — ED PROVIDER NOTES
"SCRIBE #1 NOTE: I, Radha Jeffers, am scribing for, and in the presence of, Cristy Deleon MD. I have scribed the entire note.      History      Chief Complaint   Patient presents with    Abnormal Lab     Pt states, "Dr Villagomez told me to come here because my platelet count in extremely low."       Review of patient's allergies indicates:   Allergen Reactions    Codeine Hives        HPI   HPI    4/30/2017, 6:16 PM   History obtained from the patient      History of Present Illness: Conchita Rouse is a 62 y.o. female patient with HTN, panctyopenia who presents to the Emergency Department for an abnormal lab. Pt reports that she was told to present to ED by Dr. Villagomez (Heme/Onc) because her platelet count was low. Pt states that her count was 14. She reports that she noticed "a streak of red blood" in her mucus and in her stool. She has no additional complaints. She denies fever, chills, hematuria, fatigue, dizziness, weakness, N/V. No mitigating or exacerbating factors reported. She states that she was last given platelets 3 weeks ago. No further complaints or concerns at this time.       Arrival mode: Personal vehicle    PCP: Roberto Ron MD       Past Medical History:  Past Medical History:   Diagnosis Date    Hyperlipidemia     Hypertension     Pancytopenia 02/21/2017    PLT 40 with no s/s bleeding; referred to hem/onc for BM Bx/management    TIA (transient ischemic attack) 2007    She was at "[facility] where she noted she was feeling slow, took a nap, woke up with a numb/tingling left jaw to arm, which persistned 20 minutes, took 3 baby aspirins, went to ER @ [facility], where facial droop was noted, but completely resolved & head CT revealed old minor abnormality.    Vitamin D deficiency        Past Surgical History:  Past Surgical History:   Procedure Laterality Date    left eye surgery  1955    Due to left eye lid drooping         Family History:  Family History   Problem Relation Age of Onset    " Heart disease Mother     Diabetes Mother     Heart disease Father     Cancer Sister     Cancer Brother        Social History:  Social History     Social History Main Topics    Smoking status: Never Smoker    Smokeless tobacco: Never Used    Alcohol use 0.0 - 0.6 oz/week     0 - 1 Cans of beer per week      Comment: 1/2 beer 3x/yr    Drug use: No    Sexual activity: Not Currently     Partners: Male     Birth control/ protection: Post-menopausal       ROS   Review of Systems   Constitutional: Negative for chills, diaphoresis, fatigue and fever.        (+) abnormal lab   HENT: Negative for sore throat.    Respiratory: Positive for cough. Negative for shortness of breath.    Cardiovascular: Negative for chest pain.   Gastrointestinal: Negative for abdominal pain, nausea and vomiting.   Genitourinary: Negative for dysuria, hematuria and vaginal bleeding.   Musculoskeletal: Negative for back pain.   Skin: Negative for rash.   Neurological: Negative for dizziness, weakness, light-headedness, numbness and headaches.   Hematological: Does not bruise/bleed easily.   All other systems reviewed and are negative.      Physical Exam    Initial Vitals   BP Pulse Resp Temp SpO2   04/30/17 1810 04/30/17 1810 04/30/17 1810 04/30/17 1810 04/30/17 1810   151/67 77 20 98 °F (36.7 °C) 99 %      Physical Exam  Nursing Notes and Vital Signs Reviewed.  Constitutional: Patient is in no acute distress. Awake and alert. Well-developed and well-nourished.  Head: Atraumatic. Normocephalic.  Eyes: PERRL. EOM intact. Conjunctivae are not pale. No scleral icterus.  ENT: Mucous membranes are moist. Oropharynx is clear and symmetric.    Neck: Supple. Full ROM. No lymphadenopathy.  Cardiovascular: Regular rate. Regular rhythm. No murmurs, rubs, or gallops. Distal pulses are 2+ and symmetric.  Pulmonary/Chest: No respiratory distress. L sided occasional wheezing.   Abdominal: Soft and non-distended.  There is no tenderness.  No rebound,  "guarding, or rigidity.  Good bowel sounds.    Musculoskeletal: Moves all extremities. No obvious deformities. No edema. No calf tenderness.  Skin: Warm and dry.  Neurological:  Alert, awake, and appropriate.  Normal speech.  No acute focal neurological deficits are appreciated.  Psychiatric: Normal affect. Good eye contact. Appropriate in content.    ED Course    Procedures  ED Vital Signs:  Vitals:    04/30/17 1810 04/30/17 1842 04/30/17 1844 04/30/17 1845   BP: (!) 151/67 134/71 (!) 132/46 (!) 142/81   Pulse: 77      Resp: 20      Temp: 98 °F (36.7 °C)      TempSrc: Oral      SpO2: 99% 98% 99% 99%   Weight: 73.5 kg (162 lb)      Height: 5' 6" (1.676 m)       04/30/17 1916 04/30/17 1931 04/1955   BP: (!) 143/76 (!) 162/87 (!) 144/82   Pulse: 73 78 75   Resp: 16 16 16   Temp: 98.2 °F (36.8 °C) 98.3 °F (36.8 °C)    TempSrc: Oral Oral    SpO2: 98% 100% 99%   Weight:      Height:          Abnormal Lab Results:  Labs Reviewed   CBC W/ AUTO DIFFERENTIAL - Abnormal; Notable for the following:        Result Value    WBC 2.33 (*)     RBC 2.36 (*)     Hemoglobin 8.4 (*)     Hematocrit 24.8 (*)      (*)     MCH 35.6 (*)     RDW 15.5 (*)     Platelets 9 (*)     Gran # 0.5 (*)     Mono # 0.2 (*)     Gran% 20.7 (*)     Lymph% 72.5 (*)     All other components within normal limits    Narrative:        critical result(s) called and verbal readback obtained from   Plt 9 soto harris rn , 04/30/2017 19:23   CBC W/ AUTO DIFFERENTIAL - Abnormal; Notable for the following:     WBC 2.07 (*)     RBC 2.28 (*)     Hemoglobin 8.3 (*)     Hematocrit 23.9 (*)      (*)     MCH 36.4 (*)     RDW 15.5 (*)     Platelets 77 (*)     Gran # 0.4 (*)     Mono # 0.1 (*)     Gran% 19.7 (*)     Lymph% 75.4 (*)     Mono% 3.9 (*)     All other components within normal limits   TYPE & SCREEN   PREPARE PLATELETS (DOSE) SOFT        All Lab Results:  Results for orders placed or performed during the hospital encounter of 04/30/17   CBC " auto differential   Result Value Ref Range    WBC 2.33 (L) 3.90 - 12.70 K/uL    RBC 2.36 (L) 4.00 - 5.40 M/uL    Hemoglobin 8.4 (L) 12.0 - 16.0 g/dL    Hematocrit 24.8 (L) 37.0 - 48.5 %     (H) 82 - 98 fL    MCH 35.6 (H) 27.0 - 31.0 pg    MCHC 33.9 32.0 - 36.0 %    RDW 15.5 (H) 11.5 - 14.5 %    Platelets 9 (LL) 150 - 350 K/uL    MPV SEE COMMENT 9.2 - 12.9 fL    Gran # 0.5 (L) 1.8 - 7.7 K/uL    Lymph # 1.7 1.0 - 4.8 K/uL    Mono # 0.2 (L) 0.3 - 1.0 K/uL    Eos # 0.0 0.0 - 0.5 K/uL    Baso # 0.00 0.00 - 0.20 K/uL    Gran% 20.7 (L) 38.0 - 73.0 %    Lymph% 72.5 (H) 18.0 - 48.0 %    Mono% 6.4 4.0 - 15.0 %    Eosinophil% 0.4 0.0 - 8.0 %    Basophil% 0.0 0.0 - 1.9 %    Differential Method Automated    CBC auto differential   Result Value Ref Range    WBC 2.07 (L) 3.90 - 12.70 K/uL    RBC 2.28 (L) 4.00 - 5.40 M/uL    Hemoglobin 8.3 (L) 12.0 - 16.0 g/dL    Hematocrit 23.9 (L) 37.0 - 48.5 %     (H) 82 - 98 fL    MCH 36.4 (H) 27.0 - 31.0 pg    MCHC 34.7 32.0 - 36.0 %    RDW 15.5 (H) 11.5 - 14.5 %    Platelets 77 (L) 150 - 350 K/uL    MPV 10.0 9.2 - 12.9 fL    Gran # 0.4 (L) 1.8 - 7.7 K/uL    Lymph # 1.6 1.0 - 4.8 K/uL    Mono # 0.1 (L) 0.3 - 1.0 K/uL    Eos # 0.0 0.0 - 0.5 K/uL    Baso # 0.01 0.00 - 0.20 K/uL    Gran% 19.7 (L) 38.0 - 73.0 %    Lymph% 75.4 (H) 18.0 - 48.0 %    Mono% 3.9 (L) 4.0 - 15.0 %    Eosinophil% 0.5 0.0 - 8.0 %    Basophil% 0.5 0.0 - 1.9 %    Differential Method Automated    Type & Screen   Result Value Ref Range    Group & Rh A POS     Indirect Jordyn NEG    Prepare Platelets 1 Dose   Result Value Ref Range    UNIT NUMBER [id number]     PRODUCT CODE X7135C23     DISPENSE STATUS ISSUED     CODING SYSTEM BSIR404     Unit Blood Type Code 6200     Unit Blood Type A POS     Unit Expiration [id number]      Imaging Results:  Imaging Results         X-Ray Chest 1 View (Final result) Result time:  04/30/17 18:52:17    Final result by Reed Antoine MD (04/30/17 18:52:17)    Impression:     No  acute cardiopulmonary disease.            Electronically signed by: RAY MENDEZ MD  Date:     04/30/17  Time:    18:52     Narrative:    Exam: XR CHEST 1 VIEW    Clinical History: Wheezing     Findings:     The lungs are clear. The cardiac silhouette is within normal limits.               The Emergency Provider reviewed the vital signs and test results, which are outlined above.    ED Discussion     6:49 PM: Patient is not orthostatic.    7:44 PM: At bedside performing rectal exam. Patient refuses the rectal exam because she has a colonoscopy scheduled.    8:10 PM: Reassessed pt at this time.  Pt states her condition has improved at this time. Discussed with pt all pertinent ED information and results. Discussed pt dx of  and plan of tx. Gave pt all f/u and return to the ED instructions. All questions and concerns were addressed at this time. Pt expresses understanding of information and instructions, and is comfortable with plan to discharge. Pt is stable for discharge.    I discussed with patient and/or family/caretaker that evaluation in the ED does not suggest any emergent or life threatening medical conditions requiring immediate intervention beyond what was provided in the ED, and I believe patient is safe for discharge.  Regardless, an unremarkable evaluation in the ED does not preclude the development or presence of a serious of life threatening condition. As such, patient was instructed to return immediately for any worsening or change in current symptoms.    ED Medication(s):  Medications   0.9%  NaCl infusion (for blood administration) (not administered)       Discharge Medication List as of 4/30/2017  8:11 PM          Follow-up Information     Schedule an appointment as soon as possible for a visit with Zain Villagomez MD.    Specialty:  Hematology and Oncology    Contact information:    [address] [address] [phone]             Medical Decision Making    Medical Decision  Making:   Clinical Tests:   Lab Tests: Ordered and Reviewed  Radiological Study: Ordered and Reviewed           Scribe Attestation:   Scribe #1: I performed the above scribed service and the documentation accurately describes the services I performed. I attest to the accuracy of the note.    Attending:   Physician Attestation Statement for Scribe #1: I, Cristy Deleon MD, personally performed the services described in this documentation, as scribed by Radha Jeffers, in my presence, and it is both accurate and complete.          Clinical Impression       ICD-10-CM ICD-9-CM   1. Pancytopenia D61.818 284.19   2. Wheezing R06.2 786.07       Disposition:   Disposition: Discharged  Condition: Stable         Cristy Deleon MD  04/30/17 [id number]

## 2017-04-30 NOTE — ED NOTES
Received t/c from Cary in blood bank at [phone], asking how many units of platelets were needed for the patient.  Dr. Deleon stated that only one unit is necessary.  I informed Cary that only unit is needed.

## 2017-04-30 NOTE — ED NOTES
Received report from MARQUES valera Pt. In NAD, VSS, RR equal and unlabored. Pt awaiting results and disposition. Pt's bed is low, locked, and call light in reach. SR up X2. Will continue to monitor pt.

## 2017-05-01 ENCOUNTER — OFFICE VISIT (OUTPATIENT)
Dept: INTERNAL MEDICINE | Facility: CLINIC | Age: 62
End: 2017-05-01
Payer: COMMERCIAL

## 2017-05-01 VITALS
SYSTOLIC BLOOD PRESSURE: 169 MMHG | TEMPERATURE: 97 F | OXYGEN SATURATION: 98 % | BODY MASS INDEX: 26.12 KG/M2 | WEIGHT: 162.5 LBS | RESPIRATION RATE: 18 BRPM | DIASTOLIC BLOOD PRESSURE: 72 MMHG | HEIGHT: 66 IN | HEART RATE: 82 BPM

## 2017-05-01 DIAGNOSIS — D61.818 PANCYTOPENIA: ICD-10-CM

## 2017-05-01 DIAGNOSIS — I10 ESSENTIAL HYPERTENSION: Primary | ICD-10-CM

## 2017-05-01 PROCEDURE — 99214 OFFICE O/P EST MOD 30 MIN: CPT | Mod: S$GLB,,, | Performed by: INTERNAL MEDICINE

## 2017-05-01 PROCEDURE — 1160F RVW MEDS BY RX/DR IN RCRD: CPT | Mod: S$GLB,,, | Performed by: INTERNAL MEDICINE

## 2017-05-01 PROCEDURE — 99999 PR PBB SHADOW E&M-EST. PATIENT-LVL III: CPT | Mod: PBBFAC,,, | Performed by: INTERNAL MEDICINE

## 2017-05-01 PROCEDURE — 3077F SYST BP >= 140 MM HG: CPT | Mod: S$GLB,,, | Performed by: INTERNAL MEDICINE

## 2017-05-01 PROCEDURE — 3078F DIAST BP <80 MM HG: CPT | Mod: S$GLB,,, | Performed by: INTERNAL MEDICINE

## 2017-05-01 NOTE — MR AVS SNAPSHOT
[facility]  [address] [place] [address] [address]  [place] [address]  Phone: [phone]                  Conchita Rouse   2017 3:20 PM   Office Visit    Description:  Female : 1955   Provider:  Roberto Ron MD   Department:  [facility]           Reason for Visit     Otalgia           Diagnoses this Visit        Comments    Essential hypertension    -  Primary     Pancytopenia                To Do List           Future Appointments        Provider Department Dept Phone    5/3/2017 11:40 AM Roberto Ron MD [facility] [phone]    2017 3:40 PM Zain Villagomez MD [facility] - Hematology Oncology [phone]      Goals (5 Years of Data)     None      Follow-Up and Disposition     Return in about 2 days (around 5/3/2017), or if symptoms worsen or fail to improve, for Left otalgia.    Follow-up and Disposition History      [facility] On Call     [facility] On Call Nurse Care Line -  Assistance  Unless otherwise directed by your provider, please contact [facility] On-Call, our nurse care line that is available for  assistance.     Registered nurses in the [facility] On Call Center provide: appointment scheduling, clinical advisement, health education, and other advisory services.  Call: [phone] (toll free)               Medications                Verify that the below list of medications is an accurate representation of the medications you are currently taking.  If none reported, the list may be blank. If incorrect, please contact your healthcare provider. Carry this list with you in case of emergency.           Current Medications     amlodipine (NORVASC) 5 MG tablet Take 5 mg by mouth once daily.    C-Zn-K.ginseng-jayshree hips-hrb62 (IMMUNE SUPPORT COMPLEX) 75 mg Tab Take by mouth.    fluticasone (FLONASE) 50 mcg/actuation nasal spray USE 2 SPRAYS IN EACH NOSTRIL 1 TIME A DAY AS NEEDED    rosuvastatin (CRESTOR) 10 MG tablet Take 10 mg by mouth once  "daily.    VITAMIN D2 50,000 unit capsule Take 50,000 Units by mouth every 7 days.           Clinical Reference Information           Your Vitals Were     BP Pulse Temp Resp Height Weight    169/72 (BP Location: Left arm, Patient Position: Sitting, BP Method: Manual) 82 97 °F (36.1 °C) (Tympanic) 18 5' 6" (1.676 m) 73.7 kg (162 lb 7.7 oz)    SpO2 BMI             98% 26.22 kg/m2         Blood Pressure          Most Recent Value    BP  (!)  169/72      Allergies as of 5/1/2017     Codeine      Immunizations Administered on Date of Encounter - 5/1/2017     None      Language Assistance Services     ATTENTION: Language assistance services are available, free of charge. Please call [phone].      ATENCIÓN: Si shima fleming, tiene a wolfe disposición servicios gratuitos de asistencia lingüística. Llame al [phone].     SHIRA Ý: N?u b?n nói Ti?ng Vi?t, có các d?ch v? h? tr? ngôn ng? mi?n phí dành cho b?n. G?i s? [phone].         [facility] complies with applicable Federal civil rights laws and does not discriminate on the basis of race, color, national origin, age, disability, or sex.        "

## 2017-05-01 NOTE — PROGRESS NOTES
"Subjective:      Patient ID: Conchita Rouse is a 62 y.o. female.    Chief Complaint: Otalgia ((L))    HPI  Ms. Rouse is a patient of mine, who presents for left ear pain.     Past Medical History:   Diagnosis Date    Hyperlipidemia     Hypertension     Pancytopenia 02/21/2017    PLT 40 with no s/s bleeding; referred to hem/onc for BM Bx/management    TIA (transient ischemic attack) 2007    She was at "[employer]", where she noted she was feeling slow, took a nap, woke up with a numb/tingling left jaw to arm, which persistned 20 minutes, took 3 baby aspirins, went to ER @ [facility], where facial droop was noted, but completely resolved & head CT revealed old minor abnormality.    Vitamin D deficiency      Past Surgical History:   Procedure Laterality Date    left eye surgery  1955    Due to left eye lid drooping     Social History     Social History    Marital status:      Spouse name: N/A    Number of children: N/A    Years of education: N/A     Occupational History    Not on file.     Social History Main Topics    Smoking status: Never Smoker    Smokeless tobacco: Never Used    Alcohol use 0.0 - 0.6 oz/week     0 - 1 Cans of beer per week      Comment: 1/2 beer 3x/yr    Drug use: No    Sexual activity: Not Currently     Partners: Male     Birth control/ protection: Post-menopausal     Other Topics Concern    Not on file     Social History Narrative    Breakfast: Skips or eggs; 1 cup coffee w/ 1 cream & 3 sugars    Lunch: Skips or turkey or ham sandwich w/ occasional chips; tea, rare soda    Dinner: Chicken wings and french fries; tea or cool aid    Snacks: Chocolates i.e. covered strawberry or Pretzils     Eats out: 2x/wk     Family History   Problem Relation Age of Onset    Heart disease Mother     Diabetes Mother     Heart disease Father     Cancer Sister     Cancer Brother        Current Outpatient Prescriptions:     amlodipine (NORVASC) 5 MG tablet, Take 5 mg by mouth once daily., Disp: " ", Rfl:     C-Zn-K.ginseng-jayshree hips-hrb62 (IMMUNE SUPPORT COMPLEX) 75 mg Tab, Take by mouth., Disp: , Rfl:     fluticasone (FLONASE) 50 mcg/actuation nasal spray, USE 2 SPRAYS IN EACH NOSTRIL 1 TIME A DAY AS NEEDED, Disp: , Rfl: 0    rosuvastatin (CRESTOR) 10 MG tablet, Take 10 mg by mouth once daily., Disp: , Rfl:     VITAMIN D2 50,000 unit capsule, Take 50,000 Units by mouth every 7 days., Disp: , Rfl: 3  No current facility-administered medications for this visit.     Review of patient's allergies indicates:   Allergen Reactions    Codeine Hives     Lab Results   Component Value Date    WBC 2.07 (L) 04/30/2017    HGB 8.3 (L) 04/30/2017    HCT 23.9 (L) 04/30/2017    PLT 77 (L) 04/30/2017    CHOL 126 02/21/2017    TRIG 63 02/21/2017    HDL 46 02/21/2017    ALT 19 04/05/2017    AST 19 04/05/2017     04/05/2017    K 3.9 04/05/2017     04/05/2017    CREATININE 0.9 04/05/2017    BUN 18 04/05/2017    CO2 27 04/05/2017    INR 1.0 04/05/2017     BP (!) 169/72 (BP Location: Left arm, Patient Position: Sitting, BP Method: Manual)  Pulse 82  Temp 97 °F (36.1 °C) (Tympanic)   Resp 18  Ht 5' 6" (1.676 m)  Wt 73.7 kg (162 lb 7.7 oz)  SpO2 98%  BMI 26.22 kg/m2    Review of Systems   Negative    Objective:     Physical Exam  GEN: A&O fully, NAD  PSYC: Normal affect  HEENT: OP: Clear, no significant LAD, bilateral cerumen precluding visualization of TMs      Assessment:   1. Left otalgia: DDx includes OM vs. Cerumen impaction itself. Encouraged her to apply 5 drops of        Debrox into her left ear BID for the next 2 days while sleeping on her left side and to f/u for further        evaluation. She understands to RTC sooner if her pain worsens or if she spikes a temp.   2. Pancytopenia: Last PLTs 77 following TFx (previously 9). No s/s bleeding. Low threshold to tx w/        Abx given immunocompromised state.   3. HTN: Not at goal. She reports not taking her amlodipine over the past 2 weeks due to normal " BPs:       110-120s/70-80s. Encouraged her to take daily or at least 0.5 tablet daily and will recheck in 2 days.    RTC 2 days for f/u on left otalgia.

## 2017-05-03 ENCOUNTER — TELEPHONE (OUTPATIENT)
Dept: HEMATOLOGY/ONCOLOGY | Facility: CLINIC | Age: 62
End: 2017-05-03

## 2017-05-03 DIAGNOSIS — D61.818 PANCYTOPENIA: Primary | ICD-10-CM

## 2017-05-03 NOTE — TELEPHONE ENCOUNTER
----- Message from Kenia Birch sent at 5/3/2017  9:53 AM CDT -----  Patient states she missed a call from office please call [phone] (eusv)

## 2017-05-04 ENCOUNTER — OFFICE VISIT (OUTPATIENT)
Dept: HEMATOLOGY/ONCOLOGY | Facility: CLINIC | Age: 62
End: 2017-05-04
Payer: COMMERCIAL

## 2017-05-04 VITALS
SYSTOLIC BLOOD PRESSURE: 148 MMHG | BODY MASS INDEX: 26.89 KG/M2 | TEMPERATURE: 98 F | HEART RATE: 83 BPM | OXYGEN SATURATION: 99 % | WEIGHT: 161.38 LBS | DIASTOLIC BLOOD PRESSURE: 90 MMHG | HEIGHT: 65 IN

## 2017-05-04 DIAGNOSIS — D61.818 PANCYTOPENIA: Primary | ICD-10-CM

## 2017-05-04 PROCEDURE — 99999 PR PBB SHADOW E&M-EST. PATIENT-LVL V: CPT | Mod: PBBFAC,,, | Performed by: INTERNAL MEDICINE

## 2017-05-04 PROCEDURE — 1160F RVW MEDS BY RX/DR IN RCRD: CPT | Mod: S$GLB,,, | Performed by: INTERNAL MEDICINE

## 2017-05-04 PROCEDURE — 3077F SYST BP >= 140 MM HG: CPT | Mod: S$GLB,,, | Performed by: INTERNAL MEDICINE

## 2017-05-04 PROCEDURE — 3080F DIAST BP >= 90 MM HG: CPT | Mod: S$GLB,,, | Performed by: INTERNAL MEDICINE

## 2017-05-04 PROCEDURE — 99214 OFFICE O/P EST MOD 30 MIN: CPT | Mod: S$GLB,,, | Performed by: INTERNAL MEDICINE

## 2017-05-04 NOTE — PROGRESS NOTES
"Subjective:       Patient ID: Conchita Rouse is a 62 y.o. female.    Chief Complaint: Results; Chemotherapy; and Anemia    HPI 62-year-old female recently seen in emergency room [facility] where she was given transfusion of platelets for throat cytopenia with bleeding patient has undiagnosed pancytopenia is had one bone marrow with no diagnosis inadequate specimen patient has been reluctant to proceed with bone marrow aspirate and biopsy and repeat setting is returns today for discussion of options    Past Medical History:   Diagnosis Date    Hyperlipidemia     Hypertension     Pancytopenia 02/21/2017    PLT 40 with no s/s bleeding; referred to hem/onc for BM Bx/management    TIA (transient ischemic attack) 2007    She was at "[employer]", where she noted she was feeling slow, took a nap, woke up with a numb/tingling left jaw to arm, which persistned 20 minutes, took 3 baby aspirins, went to ER @ [facility], where facial droop was noted, but completely resolved & head CT revealed old minor abnormality.    Vitamin D deficiency      Family History   Problem Relation Age of Onset    Heart disease Mother     Diabetes Mother     Heart disease Father     Cancer Sister     Cancer Brother      Social History     Social History    Marital status:      Spouse name: N/A    Number of children: N/A    Years of education: N/A     Occupational History    Not on file.     Social History Main Topics    Smoking status: Never Smoker    Smokeless tobacco: Never Used    Alcohol use 0.0 - 0.6 oz/week     0 - 1 Cans of beer per week      Comment: 1/2 beer 3x/yr    Drug use: No    Sexual activity: Not Currently     Partners: Male     Birth control/ protection: Post-menopausal     Other Topics Concern    Not on file     Social History Narrative    Breakfast: Skips or eggs; 1 cup coffee w/ 1 cream & 3 sugars    Lunch: Skips or turkey or ham sandwich w/ occasional chips; tea, rare soda    Dinner: Chicken " wings and french fries; tea or cool aid    Snacks: Chocolates i.e. covered strawberry or Pretzils     Eats out: 2x/wk     Past Surgical History:   Procedure Laterality Date    left eye surgery  1955    Due to left eye lid drooping       Labs:  Lab Results   Component Value Date    WBC 2.07 (L) 04/30/2017    HGB 8.3 (L) 04/30/2017    HCT 23.9 (L) 04/30/2017     (H) 04/30/2017    PLT 77 (L) 04/30/2017     BMP  Lab Results   Component Value Date     04/05/2017    K 3.9 04/05/2017     04/05/2017    CO2 27 04/05/2017    BUN 18 04/05/2017    CREATININE 0.9 04/05/2017    CALCIUM 9.9 04/05/2017    ANIONGAP 10 04/05/2017    ESTGFRAFRICA >60 04/05/2017    EGFRNONAA >60 04/05/2017     Lab Results   Component Value Date    ALT 19 04/05/2017    AST 19 04/05/2017    ALKPHOS 73 04/05/2017    BILITOT 0.4 04/05/2017       Lab Results   Component Value Date    IRON 101 02/22/2017    TIBC 299 02/22/2017    FERRITIN 409 (H) 02/22/2017     No results found for: DIGEHLCJ79  No results found for: FOLATE  No results found for: TSH      Review of Systems   Constitutional: Positive for fatigue. Negative for activity change, appetite change, chills, diaphoresis, fever and unexpected weight change.   HENT: Negative for congestion, dental problem, drooling, ear discharge, ear pain, facial swelling, hearing loss, mouth sores, nosebleeds, postnasal drip, rhinorrhea, sinus pressure, sneezing, sore throat, tinnitus, trouble swallowing and voice change.    Eyes: Negative for photophobia, pain, discharge, redness, itching and visual disturbance.   Respiratory: Positive for cough. Negative for choking, chest tightness, shortness of breath, wheezing and stridor.    Cardiovascular: Negative for chest pain, palpitations and leg swelling.   Gastrointestinal: Negative for abdominal distention, abdominal pain, anal bleeding, blood in stool, constipation, diarrhea, nausea, rectal pain and vomiting.   Endocrine: Negative for cold  intolerance, heat intolerance, polydipsia, polyphagia and polyuria.   Genitourinary: Negative for decreased urine volume, difficulty urinating, dyspareunia, dysuria, enuresis, flank pain, frequency, genital sores, hematuria, menstrual problem, pelvic pain, urgency, vaginal bleeding, vaginal discharge and vaginal pain.   Musculoskeletal: Negative for arthralgias, back pain, gait problem, joint swelling, myalgias, neck pain and neck stiffness.   Skin: Negative for color change, pallor and rash.   Allergic/Immunologic: Negative for environmental allergies, food allergies and immunocompromised state.   Neurological: Positive for weakness. Negative for dizziness, tremors, seizures, syncope, facial asymmetry, speech difficulty, light-headedness, numbness and headaches.   Hematological: Negative for adenopathy. Does not bruise/bleed easily.   Psychiatric/Behavioral: Positive for dysphoric mood. Negative for agitation, behavioral problems, confusion, decreased concentration, hallucinations, self-injury, sleep disturbance and suicidal ideas. The patient is nervous/anxious. The patient is not hyperactive.        Objective:      Physical Exam   Constitutional: She is oriented to person, place, and time. She appears well-developed and well-nourished. She appears distressed.   HENT:   Head: Normocephalic and atraumatic.   Right Ear: External ear normal.   Left Ear: External ear normal.   Nose: Nose normal. Right sinus exhibits no maxillary sinus tenderness and no frontal sinus tenderness. Left sinus exhibits no maxillary sinus tenderness and no frontal sinus tenderness.   Mouth/Throat: Oropharynx is clear and moist. No oropharyngeal exudate.   Eyes: Conjunctivae, EOM and lids are normal. Pupils are equal, round, and reactive to light. Right eye exhibits no discharge. Left eye exhibits no discharge. Right conjunctiva is not injected. Right conjunctiva has no hemorrhage. Left conjunctiva is not injected. Left conjunctiva has no  hemorrhage. No scleral icterus.   Neck: Normal range of motion. Neck supple. No JVD present. No tracheal deviation present. No thyromegaly present.   Cardiovascular: Normal rate and regular rhythm.    Pulmonary/Chest: Effort normal. No stridor. No respiratory distress. She exhibits no tenderness.   Abdominal: Soft. She exhibits no distension and no mass. There is no splenomegaly or hepatomegaly. There is no tenderness. There is no rebound.   Musculoskeletal: Normal range of motion. She exhibits no edema or tenderness.   Lymphadenopathy:     She has no cervical adenopathy.     She has no axillary adenopathy.        Right: No supraclavicular adenopathy present.        Left: No supraclavicular adenopathy present.   Neurological: She is alert and oriented to person, place, and time. No cranial nerve deficit. Coordination normal.   Skin: Skin is dry. No rash noted. She is not diaphoretic. No erythema.   Psychiatric: Her behavior is normal. Judgment and thought content normal. Her mood appears anxious. She exhibits a depressed mood.   Vitals reviewed.          Assessment:       1. Pancytopenia            Plan:     extensive conversation with patient recommending repeat bone marrow aspirate and biopsy for pancytopenia undiagnosed at this time will proceed with aspirate and biopsy she requests general sedation orders a been placed for next available slot [facility] for general sedation for bone mass rebiopsy.  Patient is aware, occasional bleeding and fever is present discussed with her

## 2017-05-11 ENCOUNTER — TELEPHONE (OUTPATIENT)
Dept: HEMATOLOGY/ONCOLOGY | Facility: CLINIC | Age: 62
End: 2017-05-11

## 2017-05-11 NOTE — TELEPHONE ENCOUNTER
----- Message from Key Lainez sent at 5/11/2017 10:17 AM CDT -----  Contact: Patient  Patient has a question regarding her appointment next week, please call her back at [phone]. Thank you

## 2017-05-15 ENCOUNTER — LAB VISIT (OUTPATIENT)
Dept: LAB | Facility: HOSPITAL | Age: 62
End: 2017-05-15
Attending: INTERNAL MEDICINE
Payer: COMMERCIAL

## 2017-05-15 ENCOUNTER — ANESTHESIA EVENT (OUTPATIENT)
Dept: SURGERY | Facility: HOSPITAL | Age: 62
End: 2017-05-15
Payer: COMMERCIAL

## 2017-05-15 ENCOUNTER — TELEPHONE (OUTPATIENT)
Dept: HEMATOLOGY/ONCOLOGY | Facility: CLINIC | Age: 62
End: 2017-05-15

## 2017-05-15 DIAGNOSIS — D61.818 PANCYTOPENIA: ICD-10-CM

## 2017-05-15 LAB
ANISOCYTOSIS BLD QL SMEAR: SLIGHT
BASOPHILS # BLD AUTO: 0 K/UL
BASOPHILS NFR BLD: 0 %
DIFFERENTIAL METHOD: ABNORMAL
EOSINOPHIL # BLD AUTO: 0 K/UL
EOSINOPHIL NFR BLD: 0.4 %
ERYTHROCYTE [DISTWIDTH] IN BLOOD BY AUTOMATED COUNT: 15.4 %
HCT VFR BLD AUTO: 24.3 %
HGB BLD-MCNC: 8 G/DL
LYMPHOCYTES # BLD AUTO: 1.4 K/UL
LYMPHOCYTES NFR BLD: 62.3 %
MCH RBC QN AUTO: 35.6 PG
MCHC RBC AUTO-ENTMCNC: 32.9 %
MCV RBC AUTO: 108 FL
MONOCYTES # BLD AUTO: 0.2 K/UL
MONOCYTES NFR BLD: 6.7 %
NEUTROPHILS # BLD AUTO: 0.7 K/UL
NEUTROPHILS NFR BLD: 30.6 %
PLATELET # BLD AUTO: 13 K/UL
PLATELET BLD QL SMEAR: ABNORMAL
PMV BLD AUTO: ABNORMAL FL
POLYCHROMASIA BLD QL SMEAR: ABNORMAL
RBC # BLD AUTO: 2.25 M/UL
WBC # BLD AUTO: 2.23 K/UL

## 2017-05-15 PROCEDURE — 85025 COMPLETE CBC W/AUTO DIFF WBC: CPT | Mod: PO

## 2017-05-15 PROCEDURE — 36415 COLL VENOUS BLD VENIPUNCTURE: CPT | Mod: PO

## 2017-05-15 NOTE — TELEPHONE ENCOUNTER
Please come in at 1 today. You will need platelets before your BMBX tomorrow. We are at [facility] on third floor.

## 2017-05-15 NOTE — PROGRESS NOTES
Pre op instructions reviewed with patient per phone:    To confirm, Your surgeon has instructed you:  Surgery is scheduled 5/16/17at 1300.      Please report to [facility] 1st floor main lobby by 1130.      INSTRUCTIONS IMPORTANT!!!  ¨ Do not eat, drink, or smoke after 12 midnight-including water. OK to brush teeth, no gum, candy or mints!    ¨ Take only these medicines with a small swallow of water-morning of surgery.  None.  ____  Do not wear makeup, including mascara.  ____  No powder, lotions or creams to surgical area.  ____  Please remove all jewelry, including piercings and leave at home.  ____  No money or valuables needed. Please leave at home.  ____  Please bring identification and insurance information to hospital.  ____  If going home the same day, arrange for a ride home. You will not be able to   drive if Anesthesia was used.  ____  Children, under 12 years old, must remain in the waiting room with an adult.  They are not allowed in patient areas.  ____  Wear loose fitting clothing. Allow for dressings, bandages.  ____  Stop Aspirin, Ibuprofen, Motrin and Aleve at least 5-7 days before surgery, unless otherwise instructed by your doctor, or the nurse.   You MAY use Tylenol/acetaminophen until day of surgery.  ____  If you take diabetic medication, do not take am of surgery unless instructed by   Doctor.  ____ Stop taking any Fish Oil supplement or any Vitamins that contain Vitamin E at least 5 days prior to surgery.          Bathing Instructions-- The night before surgery and the morning prior to coming to the hospital:   -Do not shave the surgical area.   -Shower and wash your hair and body as usual with anti-bacterial  soap and shampoo.   -Rinse your hair and body completely.   -Use one packet of hibiclens to wash the surgical site (using your hand) gently for 5 minutes.  Do not scrub you skin too hard.   -Do not use hibiclens on your head, face, or genitals.   -Do not wash  with anti-bacterial soap after you use the hibiclens.   -Rinse your body thoroughly.   -Dry with clean, soft towel.  Do not use lotion, cream, deodorant, or powders on   the surgical site.    Use antibacterial soap in place of hibiclens if your surgery is on the head, face or genitals.         Surgical Site Infection    Prevention of surgical site infections:     -Keep incisions clean and dry.   -Do not soak/submerge incisions in water until completely healed.   -Do not apply lotions, powders, creams, or deodorants to site.   -Always make sure hands are cleaned with antibacterial soap/ alcohol-based   prior to touching the surgical site.  (This includes doctors, nurses, staff, and yourself.)    Signs and symptoms:   -Redness and pain around the area where you had surgery   -Drainage of cloudy fluid from your surgical wound   -Fever over 100.4  I have read or had read and explained to me, and understand the above information.

## 2017-05-16 ENCOUNTER — INFUSION (OUTPATIENT)
Dept: INFUSION THERAPY | Facility: HOSPITAL | Age: 62
End: 2017-05-16
Attending: PATHOLOGY
Payer: COMMERCIAL

## 2017-05-16 ENCOUNTER — OFFICE VISIT (OUTPATIENT)
Dept: HEMATOLOGY/ONCOLOGY | Facility: CLINIC | Age: 62
End: 2017-05-16
Payer: COMMERCIAL

## 2017-05-16 ENCOUNTER — SURGERY (OUTPATIENT)
Age: 62
End: 2017-05-16

## 2017-05-16 ENCOUNTER — ANESTHESIA (OUTPATIENT)
Dept: SURGERY | Facility: HOSPITAL | Age: 62
End: 2017-05-16
Payer: COMMERCIAL

## 2017-05-16 ENCOUNTER — HOSPITAL ENCOUNTER (OUTPATIENT)
Facility: HOSPITAL | Age: 62
Discharge: HOME | End: 2017-05-16
Attending: PATHOLOGY | Admitting: PATHOLOGY
Payer: COMMERCIAL

## 2017-05-16 VITALS
SYSTOLIC BLOOD PRESSURE: 121 MMHG | DIASTOLIC BLOOD PRESSURE: 70 MMHG | RESPIRATION RATE: 18 BRPM | HEART RATE: 66 BPM | TEMPERATURE: 98 F

## 2017-05-16 VITALS
OXYGEN SATURATION: 99 % | SYSTOLIC BLOOD PRESSURE: 117 MMHG | TEMPERATURE: 98 F | RESPIRATION RATE: 17 BRPM | HEART RATE: 64 BPM | DIASTOLIC BLOOD PRESSURE: 66 MMHG

## 2017-05-16 VITALS
BODY MASS INDEX: 26.74 KG/M2 | HEIGHT: 65 IN | TEMPERATURE: 98 F | OXYGEN SATURATION: 99 % | HEART RATE: 79 BPM | DIASTOLIC BLOOD PRESSURE: 75 MMHG | WEIGHT: 160.5 LBS | SYSTOLIC BLOOD PRESSURE: 123 MMHG

## 2017-05-16 VITALS — RESPIRATION RATE: 17 BRPM

## 2017-05-16 DIAGNOSIS — D61.818 PANCYTOPENIA: Primary | ICD-10-CM

## 2017-05-16 DIAGNOSIS — D69.6 THROMBOCYTOPENIA: Primary | ICD-10-CM

## 2017-05-16 DIAGNOSIS — D61.818 PANCYTOPENIA: ICD-10-CM

## 2017-05-16 LAB
BASOPHILS # BLD AUTO: ABNORMAL K/UL
BASOPHILS NFR BLD: 0 %
BLD PROD TYP BPU: NORMAL
BLOOD UNIT EXPIRATION DATE: NORMAL
BLOOD UNIT TYPE CODE: 7300
BLOOD UNIT TYPE: NORMAL
CODING SYSTEM: NORMAL
DIFFERENTIAL METHOD: ABNORMAL
DISPENSE STATUS: NORMAL
EOSINOPHIL # BLD AUTO: ABNORMAL K/UL
EOSINOPHIL NFR BLD: 0 %
ERYTHROCYTE [DISTWIDTH] IN BLOOD BY AUTOMATED COUNT: 15.5 %
HCT VFR BLD AUTO: 21.6 %
HGB BLD-MCNC: 7.4 G/DL
LYMPHOCYTES # BLD AUTO: ABNORMAL K/UL
LYMPHOCYTES NFR BLD: 64 %
MCH RBC QN AUTO: 36.5 PG
MCHC RBC AUTO-ENTMCNC: 34.3 %
MCV RBC AUTO: 106 FL
MONOCYTES # BLD AUTO: ABNORMAL K/UL
MONOCYTES NFR BLD: 7 %
NEUTROPHILS NFR BLD: 29 %
PLATELET # BLD AUTO: 84 K/UL
PMV BLD AUTO: 9.3 FL
RBC # BLD AUTO: 2.03 M/UL
TRANS PLATPHERESIS VOL PATIENT: NORMAL ML
WBC # BLD AUTO: 1.94 K/UL

## 2017-05-16 PROCEDURE — 25000003 PHARM REV CODE 250: Performed by: INTERNAL MEDICINE

## 2017-05-16 PROCEDURE — 63600175 PHARM REV CODE 636 W HCPCS: Performed by: ANESTHESIOLOGY

## 2017-05-16 PROCEDURE — 99214 OFFICE O/P EST MOD 30 MIN: CPT | Mod: S$GLB,,, | Performed by: INTERNAL MEDICINE

## 2017-05-16 PROCEDURE — 88264 CHROMOSOME ANALYSIS 20-25: CPT

## 2017-05-16 PROCEDURE — P9035 PLATELET PHERES LEUKOREDUCED: HCPCS

## 2017-05-16 PROCEDURE — 37000008 HC ANESTHESIA 1ST 15 MINUTES: Performed by: PATHOLOGY

## 2017-05-16 PROCEDURE — 85027 COMPLETE CBC AUTOMATED: CPT

## 2017-05-16 PROCEDURE — 88185 FLOWCYTOMETRY/TC ADD-ON: CPT | Mod: 59 | Performed by: PATHOLOGY

## 2017-05-16 PROCEDURE — 88305 TISSUE EXAM BY PATHOLOGIST: CPT | Mod: 26,,, | Performed by: PATHOLOGY

## 2017-05-16 PROCEDURE — 99999 PR PBB SHADOW E&M-EST. PATIENT-LVL III: CPT | Mod: PBBFAC,,, | Performed by: INTERNAL MEDICINE

## 2017-05-16 PROCEDURE — 36430 TRANSFUSION BLD/BLD COMPNT: CPT

## 2017-05-16 PROCEDURE — 88311 DECALCIFY TISSUE: CPT | Mod: 26,,, | Performed by: PATHOLOGY

## 2017-05-16 PROCEDURE — 36415 COLL VENOUS BLD VENIPUNCTURE: CPT

## 2017-05-16 PROCEDURE — 37000009 HC ANESTHESIA EA ADD 15 MINS: Performed by: PATHOLOGY

## 2017-05-16 PROCEDURE — 63600175 PHARM REV CODE 636 W HCPCS: Performed by: NURSE ANESTHETIST, CERTIFIED REGISTERED

## 2017-05-16 PROCEDURE — 88313 SPECIAL STAINS GROUP 2: CPT | Mod: 26,,, | Performed by: PATHOLOGY

## 2017-05-16 PROCEDURE — 88189 FLOWCYTOMETRY/READ 16 & >: CPT | Mod: ,,, | Performed by: PATHOLOGY

## 2017-05-16 PROCEDURE — 88237 TISSUE CULTURE BONE MARROW: CPT

## 2017-05-16 PROCEDURE — 85007 BL SMEAR W/DIFF WBC COUNT: CPT

## 2017-05-16 PROCEDURE — 3078F DIAST BP <80 MM HG: CPT | Mod: S$GLB,,, | Performed by: INTERNAL MEDICINE

## 2017-05-16 PROCEDURE — 88305 TISSUE EXAM BY PATHOLOGIST: CPT | Performed by: PATHOLOGY

## 2017-05-16 PROCEDURE — 3074F SYST BP LT 130 MM HG: CPT | Mod: S$GLB,,, | Performed by: INTERNAL MEDICINE

## 2017-05-16 PROCEDURE — 25000003 PHARM REV CODE 250: Performed by: NURSE ANESTHETIST, CERTIFIED REGISTERED

## 2017-05-16 PROCEDURE — 38221 DX BONE MARROW BIOPSIES: CPT | Performed by: PATHOLOGY

## 2017-05-16 PROCEDURE — 38221 DX BONE MARROW BIOPSIES: CPT | Mod: LT,,, | Performed by: PATHOLOGY

## 2017-05-16 PROCEDURE — 85097 BONE MARROW INTERPRETATION: CPT | Mod: ,,, | Performed by: PATHOLOGY

## 2017-05-16 PROCEDURE — 88184 FLOWCYTOMETRY/ TC 1 MARKER: CPT | Performed by: PATHOLOGY

## 2017-05-16 PROCEDURE — 88313 SPECIAL STAINS GROUP 2: CPT

## 2017-05-16 PROCEDURE — 1160F RVW MEDS BY RX/DR IN RCRD: CPT | Mod: S$GLB,,, | Performed by: INTERNAL MEDICINE

## 2017-05-16 RX ORDER — ONDANSETRON 2 MG/ML
4 INJECTION INTRAMUSCULAR; INTRAVENOUS ONCE AS NEEDED
Status: DISCONTINUED | OUTPATIENT
Start: 2017-05-16 | End: 2017-05-16 | Stop reason: HOSPADM

## 2017-05-16 RX ORDER — LIDOCAINE HYDROCHLORIDE 10 MG/ML
1 INJECTION, SOLUTION EPIDURAL; INFILTRATION; INTRACAUDAL; PERINEURAL ONCE
Status: DISCONTINUED | OUTPATIENT
Start: 2017-05-16 | End: 2017-05-16 | Stop reason: HOSPADM

## 2017-05-16 RX ORDER — HYDROCODONE BITARTRATE AND ACETAMINOPHEN 500; 5 MG/1; MG/1
TABLET ORAL ONCE
Status: DISCONTINUED | OUTPATIENT
Start: 2017-05-16 | End: 2017-05-16 | Stop reason: HOSPADM

## 2017-05-16 RX ORDER — DIPHENHYDRAMINE HCL 25 MG
25 CAPSULE ORAL
Status: COMPLETED | OUTPATIENT
Start: 2017-05-16 | End: 2017-05-16

## 2017-05-16 RX ORDER — MEPERIDINE HYDROCHLORIDE 50 MG/ML
12.5 INJECTION INTRAMUSCULAR; INTRAVENOUS; SUBCUTANEOUS ONCE AS NEEDED
Status: DISCONTINUED | OUTPATIENT
Start: 2017-05-16 | End: 2017-05-16 | Stop reason: HOSPADM

## 2017-05-16 RX ORDER — SODIUM CHLORIDE, SODIUM LACTATE, POTASSIUM CHLORIDE, CALCIUM CHLORIDE 600; 310; 30; 20 MG/100ML; MG/100ML; MG/100ML; MG/100ML
125 INJECTION, SOLUTION INTRAVENOUS CONTINUOUS
Status: DISCONTINUED | OUTPATIENT
Start: 2017-05-16 | End: 2017-05-16 | Stop reason: HOSPADM

## 2017-05-16 RX ORDER — HYDROCODONE BITARTRATE AND ACETAMINOPHEN 500; 5 MG/1; MG/1
TABLET ORAL ONCE
Status: CANCELLED | OUTPATIENT
Start: 2017-05-16 | End: 2017-05-16

## 2017-05-16 RX ORDER — SODIUM CHLORIDE, SODIUM LACTATE, POTASSIUM CHLORIDE, CALCIUM CHLORIDE 600; 310; 30; 20 MG/100ML; MG/100ML; MG/100ML; MG/100ML
INJECTION, SOLUTION INTRAVENOUS CONTINUOUS PRN
Status: DISCONTINUED | OUTPATIENT
Start: 2017-05-16 | End: 2017-05-16

## 2017-05-16 RX ORDER — ACETAMINOPHEN 325 MG/1
650 TABLET ORAL
Status: COMPLETED | OUTPATIENT
Start: 2017-05-16 | End: 2017-05-16

## 2017-05-16 RX ORDER — HYDROMORPHONE HYDROCHLORIDE 2 MG/ML
0.2 INJECTION, SOLUTION INTRAMUSCULAR; INTRAVENOUS; SUBCUTANEOUS EVERY 5 MIN PRN
Status: DISCONTINUED | OUTPATIENT
Start: 2017-05-16 | End: 2017-05-16 | Stop reason: HOSPADM

## 2017-05-16 RX ORDER — SODIUM CHLORIDE, SODIUM LACTATE, POTASSIUM CHLORIDE, CALCIUM CHLORIDE 600; 310; 30; 20 MG/100ML; MG/100ML; MG/100ML; MG/100ML
INJECTION, SOLUTION INTRAVENOUS CONTINUOUS
Status: DISCONTINUED | OUTPATIENT
Start: 2017-05-16 | End: 2017-05-16 | Stop reason: HOSPADM

## 2017-05-16 RX ORDER — DIPHENHYDRAMINE HCL 25 MG
25 CAPSULE ORAL
Status: CANCELLED | OUTPATIENT
Start: 2017-05-16

## 2017-05-16 RX ORDER — ACETAMINOPHEN 325 MG/1
650 TABLET ORAL
Status: CANCELLED | OUTPATIENT
Start: 2017-05-16

## 2017-05-16 RX ORDER — SODIUM CHLORIDE 9 MG/ML
3 INJECTION, SOLUTION INTRAMUSCULAR; INTRAVENOUS; SUBCUTANEOUS
Status: DISCONTINUED | OUTPATIENT
Start: 2017-05-16 | End: 2017-05-16 | Stop reason: HOSPADM

## 2017-05-16 RX ORDER — MORPHINE SULFATE 10 MG/ML
2 INJECTION INTRAMUSCULAR; INTRAVENOUS; SUBCUTANEOUS EVERY 5 MIN PRN
Status: DISCONTINUED | OUTPATIENT
Start: 2017-05-16 | End: 2017-05-16 | Stop reason: HOSPADM

## 2017-05-16 RX ORDER — OXYCODONE AND ACETAMINOPHEN 5; 325 MG/1; MG/1
1 TABLET ORAL
Status: DISCONTINUED | OUTPATIENT
Start: 2017-05-16 | End: 2017-05-16 | Stop reason: HOSPADM

## 2017-05-16 RX ORDER — PROPOFOL 10 MG/ML
VIAL (ML) INTRAVENOUS
Status: DISCONTINUED | OUTPATIENT
Start: 2017-05-16 | End: 2017-05-16

## 2017-05-16 RX ADMIN — ACETAMINOPHEN 650 MG: 325 TABLET ORAL at 10:05

## 2017-05-16 RX ADMIN — MORPHINE SULFATE 2 MG: 10 INJECTION, SOLUTION INTRAMUSCULAR; INTRAVENOUS at 03:05

## 2017-05-16 RX ADMIN — SODIUM CHLORIDE, SODIUM LACTATE, POTASSIUM CHLORIDE, AND CALCIUM CHLORIDE: 600; 310; 30; 20 INJECTION, SOLUTION INTRAVENOUS at 01:05

## 2017-05-16 RX ADMIN — DIPHENHYDRAMINE HYDROCHLORIDE 25 MG: 25 CAPSULE ORAL at 10:05

## 2017-05-16 RX ADMIN — PROPOFOL 370 MG: 10 INJECTION, EMULSION INTRAVENOUS at 02:05

## 2017-05-16 RX ADMIN — MORPHINE SULFATE 2 MG: 10 INJECTION, SOLUTION INTRAMUSCULAR; INTRAVENOUS at 02:05

## 2017-05-16 NOTE — NURSING
Post Transfusion labs called to Dr Villagomez.  No new orders, pt discharged to preop for bone marrow biopsy.  Heplock to right hand left in for procedure.

## 2017-05-16 NOTE — PROGRESS NOTES
Dr gonzalez office contacted regarding followup visit. Spoke with jozef. Pt states she has graduation at 10am on Friday and would like to reschedule if results are not going to be ready. Per jozef, she will call pt after graduation on Friday to reschedule her appt. Pt and her  verb understanding of instructions. Added to avs.

## 2017-05-16 NOTE — TRANSFER OF CARE
Anesthesia Transfer of Care Note    Patient: Conchita Rouse    Procedure(s) Performed: Procedure(s) (LRB):  BIOPSY-BONE MARROW (Left)    Patient location: Other: Preop    Anesthesia Type: MAC    Transport from OR: Transported from OR on 2-3 L/min O2 by NC with adequate spontaneous ventilation    Post pain: adequate analgesia    Post assessment: no apparent anesthetic complications    Post vital signs: stable    Level of consciousness: sedated and responds to stimulation    Nausea/Vomiting: no nausea/vomiting    Complications: none    Transfer of care protocol was followed      Last vitals:   Visit Vitals    Breastfeeding No

## 2017-05-16 NOTE — BRIEF OP NOTE
Bone Marrow Biopsy  Procedure Note      Date of Service: 5/16/2017      Indication/Diagnosis: pancytopenia    Consent Source: self    Consent Type: elective procedure    Time Out Completed: yes    Aseptic Technique: Betadine    Anesthesia: systemic    Anesthetic Drugs Used: 1% lidocaine    Instrumentation: bone marrow kit    Procedure Site: left posterior iliac crest    Patient Position: prone    Volume Removed (in cc): 8-10    Aspirate Obtained: yes: EDTA - sent to [facility] for analysis    Fluid Characteristics: not examined    Sterile Dressing: yes    Complications: none    Narrative:  Bone marrow aspiration/biopsy performed left posterior iliac crest without complications.  Patient to lie supine x 1 hr.  Follow up with Dr. Villagomez.  May resume normal diet/activity.

## 2017-05-16 NOTE — PROGRESS NOTES
Pt c/o pain. Notified dr ricks and pharmacy pt states codeine allergy did not cause hives. Described reaction as made me jittery, felt hot and had to lay down like i was weak. Pt adamant did not cause hives. Will give morphine as ordered and reassess pt.

## 2017-05-16 NOTE — PROGRESS NOTES
"Subjective:       Patient ID: Conchita Rouse is a 62 y.o. female.    Chief Complaint: Results and Anemia    HPI 62-year-old female presents for platelet transfusion prior to bone marrow biopsy and aspirate today under sedation    Past Medical History:   Diagnosis Date    Hyperlipidemia     Hypertension     Pancytopenia 02/21/2017    PLT 40 with no s/s bleeding; referred to hem/onc for BM Bx/management    TIA (transient ischemic attack) 2007    She was at "[employer]", where she noted she was feeling slow, took a nap, woke up with a numb/tingling left jaw to arm, which persistned 20 minutes, took 3 baby aspirins, went to ER @ [facility], where facial droop was noted, but completely resolved & head CT revealed old minor abnormality.    Vitamin D deficiency      Family History   Problem Relation Age of Onset    Heart disease Mother     Diabetes Mother     Heart disease Father     Cancer Sister     Cancer Brother      Social History     Social History    Marital status:      Spouse name: N/A    Number of children: N/A    Years of education: N/A     Occupational History    Not on file.     Social History Main Topics    Smoking status: Never Smoker    Smokeless tobacco: Never Used    Alcohol use 0.0 - 0.6 oz/week     0 - 1 Cans of beer per week      Comment: 1/2 beer 3x/yr    Drug use: No    Sexual activity: Not Currently     Partners: Male     Birth control/ protection: Post-menopausal     Other Topics Concern    Not on file     Social History Narrative    Breakfast: Skips or eggs; 1 cup coffee w/ 1 cream & 3 sugars    Lunch: Skips or turkey or ham sandwich w/ occasional chips; tea, rare soda    Dinner: Chicken wings and french fries; tea or cool aid    Snacks: Chocolates i.e. covered strawberry or Pretzils     Eats out: 2x/wk     Past Surgical History:   Procedure Laterality Date    COLONOSCOPY      left eye surgery  1955    Due to left eye lid drooping       Labs:  Lab Results   Component Value " Date    WBC 2.23 (L) 05/15/2017    HGB 8.0 (L) 05/15/2017    HCT 24.3 (L) 05/15/2017     (H) 05/15/2017    PLT 13 (LL) 05/15/2017     BMP  Lab Results   Component Value Date     04/05/2017    K 3.9 04/05/2017     04/05/2017    CO2 27 04/05/2017    BUN 18 04/05/2017    CREATININE 0.9 04/05/2017    CALCIUM 9.9 04/05/2017    ANIONGAP 10 04/05/2017    ESTGFRAFRICA >60 04/05/2017    EGFRNONAA >60 04/05/2017     Lab Results   Component Value Date    ALT 19 04/05/2017    AST 19 04/05/2017    ALKPHOS 73 04/05/2017    BILITOT 0.4 04/05/2017       Lab Results   Component Value Date    IRON 101 02/22/2017    TIBC 299 02/22/2017    FERRITIN 409 (H) 02/22/2017     No results found for: TGROGRSX42  No results found for: FOLATE  No results found for: TSH      Review of Systems   Constitutional: Positive for activity change, appetite change and unexpected weight change. Negative for chills, diaphoresis, fatigue and fever.   HENT: Negative for congestion, dental problem, drooling, ear discharge, ear pain, facial swelling, hearing loss, mouth sores, nosebleeds, postnasal drip, rhinorrhea, sinus pressure, sneezing, sore throat, tinnitus, trouble swallowing and voice change.    Eyes: Negative for photophobia, pain, discharge, redness, itching and visual disturbance.   Respiratory: Negative for cough, choking, chest tightness, shortness of breath, wheezing and stridor.    Cardiovascular: Negative for chest pain, palpitations and leg swelling.   Gastrointestinal: Negative for abdominal distention, abdominal pain, anal bleeding, blood in stool, constipation, diarrhea, nausea, rectal pain and vomiting.   Endocrine: Negative for cold intolerance, heat intolerance, polydipsia, polyphagia and polyuria.   Genitourinary: Negative for decreased urine volume, difficulty urinating, dyspareunia, dysuria, enuresis, flank pain, frequency, genital sores, hematuria, menstrual problem, pelvic pain, urgency, vaginal bleeding, vaginal  discharge and vaginal pain.   Musculoskeletal: Negative for arthralgias, back pain, gait problem, joint swelling, myalgias, neck pain and neck stiffness.   Skin: Negative for color change, pallor and rash.   Allergic/Immunologic: Negative for environmental allergies, food allergies and immunocompromised state.   Neurological: Positive for weakness. Negative for dizziness, tremors, seizures, syncope, facial asymmetry, speech difficulty, light-headedness, numbness and headaches.   Hematological: Negative for adenopathy. Does not bruise/bleed easily.   Psychiatric/Behavioral: Positive for dysphoric mood. Negative for agitation, behavioral problems, confusion, decreased concentration, hallucinations, self-injury, sleep disturbance and suicidal ideas. The patient is nervous/anxious. The patient is not hyperactive.        Objective:      Physical Exam   Constitutional: She is oriented to person, place, and time. She appears well-developed and well-nourished. She appears distressed.   HENT:   Head: Normocephalic and atraumatic.   Right Ear: External ear normal.   Left Ear: External ear normal.   Nose: Nose normal. Right sinus exhibits no maxillary sinus tenderness and no frontal sinus tenderness. Left sinus exhibits no maxillary sinus tenderness and no frontal sinus tenderness.   Mouth/Throat: Oropharynx is clear and moist. No oropharyngeal exudate.   Eyes: Conjunctivae, EOM and lids are normal. Pupils are equal, round, and reactive to light. Right eye exhibits no discharge. Left eye exhibits no discharge. Right conjunctiva is not injected. Right conjunctiva has no hemorrhage. Left conjunctiva is not injected. Left conjunctiva has no hemorrhage. No scleral icterus.   Neck: Normal range of motion. Neck supple. No JVD present. No tracheal deviation present. No thyromegaly present.   Cardiovascular: Normal rate and regular rhythm.    Pulmonary/Chest: Effort normal. No stridor. No respiratory distress. She exhibits no  tenderness.   Abdominal: Soft. She exhibits no distension and no mass. There is no splenomegaly or hepatomegaly. There is no tenderness. There is no rebound.   Musculoskeletal: Normal range of motion. She exhibits no edema or tenderness.   Lymphadenopathy:     She has no cervical adenopathy.     She has no axillary adenopathy.        Right: No supraclavicular adenopathy present.        Left: No supraclavicular adenopathy present.   Neurological: She is alert and oriented to person, place, and time. No cranial nerve deficit. Coordination normal.   Skin: Skin is dry. No rash noted. She is not diaphoretic. No erythema.   Psychiatric: Her behavior is normal. Judgment and thought content normal. Her mood appears anxious. She exhibits a depressed mood.   Vitals reviewed.          Assessment:       1. Pancytopenia            Plan:     patient's platelet count is 13,000 will proceed with 1 unit platelet transfusion with immediate post transfusion platelet count in preparation for 1 PM bone marrow biopsy and aspirate for pancytopenia under sedation with pathology performed procedure patient will return in 3-5 business days for review told to contact us prior to returning to make sure we have results of bone marrow biopsy and aspirate

## 2017-05-16 NOTE — ANESTHESIA POSTPROCEDURE EVALUATION
Anesthesia Post Evaluation    Patient: Conchita Rouse    Procedure(s) Performed: Procedure(s) (LRB):  BIOPSY-BONE MARROW (Left)    Final Anesthesia Type: MAC  Patient location during evaluation: PACU  Patient participation: Yes- Able to Participate  Level of consciousness: awake and alert and oriented  Post-procedure vital signs: reviewed and stable  Pain management: adequate  Airway patency: patent  PONV status at discharge: No PONV  Anesthetic complications: no      Cardiovascular status: blood pressure returned to baseline and hemodynamically stable  Respiratory status: unassisted, room air and spontaneous ventilation  Hydration status: euvolemic  Follow-up not needed.        Visit Vitals    Breastfeeding No       Pain/Frandy Score: Pain Assessment Performed: Yes (5/16/2017  2:15 PM)  Presence of Pain: denies (5/16/2017  2:15 PM)  Pain Rating Prior to Med Admin: 0 (5/16/2017 10:35 AM)  Frandy Score: 9 (5/16/2017  2:25 PM)

## 2017-05-16 NOTE — ANESTHESIA PREPROCEDURE EVALUATION
05/16/2017  Conchita Rouse is a 62 y.o., female.    Anesthesia Evaluation    I have reviewed the Patient Summary Reports.    I have reviewed the Nursing Notes.   I have reviewed the Medications.     Review of Systems  Social:  Non-Smoker, Social Alcohol Use    Hematology/Oncology:         -- Anemia: Hematology Comments: pancytopenia   Cardiovascular:   Hypertension hyperlipidemia    Neurological:   TIA,    Endocrine:   Vit D deficiency       Physical Exam  General:  Well nourished    Airway/Jaw/Neck:  Airway Findings: Mouth Opening: Normal Tongue: Normal  General Airway Assessment: Adult  Mallampati: II  TM Distance: Normal, at least 6 cm  Jaw/Neck Findings:  Neck ROM: Normal ROM      Dental:  Dental Findings: In tact   Chest/Lungs:  Chest/Lungs Findings: Normal Respiratory Rate, Clear to auscultation     Heart/Vascular:  Heart Findings: Rate: Normal  Rhythm: Regular Rhythm        Mental Status:  Mental Status Findings:  Cooperative, Alert and Oriented         Anesthesia Plan  Type of Anesthesia, risks & benefits discussed:  Anesthesia Type:  MAC  Patient's Preference:   Intra-op Monitoring Plan: standard ASA monitors  Intra-op Monitoring Plan Comments:   Post Op Pain Control Plan:   Post Op Pain Control Plan Comments:   Induction:   IV  Beta Blocker:  Patient is not currently on a Beta-Blocker (No further documentation required).       Informed Consent: Patient understands risks and agrees with Anesthesia plan.  Questions answered. Anesthesia consent signed with patient.  ASA Score: 3     Day of Surgery Review of History & Physical: I have interviewed and examined the patient. I have reviewed the patient's H&P dated:  There are no significant changes.  H&P update referred to the surgeon.     Anesthesia Plan Notes: WBC 2.23, Hgb 8, Hct 24.3, Plt 13  Platelets ordered for transfusion- now 84        Ready For  Surgery From Anesthesia Perspective.

## 2017-05-16 NOTE — IP AVS SNAPSHOT
[address] [place] [address]           Patient Discharge Instructions   Our goal is to set you up for success. This packet includes information on your condition, medications, and your home care.  It will help you care for yourself to prevent having to return to the hospital.     Please ask your nurse if you have any questions.      There are many details to remember when preparing to leave the hospital. Here is what you will need to do:    1. Take your medicine. If you are prescribed medications, review your Medication List on the following pages. You may have new medications to  at the pharmacy and others that you'll need to stop taking. Review the instructions for how and when to take your medications. Talk with your doctor or nurses if you are unsure of what to do.     2. Go to your follow-up appointments. Specific follow-up information is listed in the following pages. Your may be contacted by a nurse or clinical provider about future appointments. Be sure we have all of the phone numbers to reach you. Please contact your provider's office if you are unable to make an appointment.     3. Watch for warning signs. Your doctor or nurse will give you detailed warning signs to watch for and when to call for assistance. These instructions may also include educational information about your condition. If you experience any of warning signs to your health, call your doctor.               ** Verify the list of medication(s) below is accurate and up to date. Carry this with you in case of emergency. If your medications have changed, please notify your healthcare provider.             Medication List      ASK your doctor about these medications        Additional Info                      amlodipine 5 MG tablet   Commonly known as:  NORVASC   Refills:  0   Dose:  5 mg    Instructions:  Take 5 mg by mouth once daily.     Begin Date    AM    Noon    PM    Bedtime        fluticasone 50 mcg/actuation nasal spray   Commonly known as:  FLONASE   Refills:  0    Instructions:  USE 2 SPRAYS IN EACH NOSTRIL 1 TIME A DAY AS NEEDED     Begin Date    AM    Noon    PM    Bedtime       IMMUNE SUPPORT COMPLEX 75 mg Tab   Refills:  0   Generic drug:  C-[id number]-[id number] [id number]    Instructions:  Take by mouth.     Begin Date    AM    Noon    PM    Bedtime       rosuvastatin 10 MG tablet   Commonly known as:  CRESTOR   Refills:  0   Dose:  10 mg    Instructions:  Take 10 mg by mouth once daily.     Begin Date    AM    Noon    PM    Bedtime       VITAMIN D2 50,000 unit Cap   Refills:  3   Dose:  78871 Units   Generic drug:  ergocalciferol    Instructions:  Take 50,000 Units by mouth every 7 days.     Begin Date    AM    Noon    PM    Bedtime                  Please bring to all follow up appointments:    1. A copy of your discharge instructions.  2. All medicines you are currently taking in their original bottles.  3. Identification and insurance card.    Please arrive 15 minutes ahead of scheduled appointment time.    Please call 24 hours in advance if you must reschedule your appointment and/or time.        Your Scheduled Appointments     May 19, 2017  1:20 PM CDT   Established Patient Visit with Zain Villagomez MD   [facility] - Hemotology [facility]cology ([facility])    [address] [id number] [phone]                  Discharge Instructions       Dr. Villagomez office to call Friday, after graduation to reschedule appointment.    Regular diet and activity as tolerated.    Remove bandaid in 24 hours, then ok to shower.    Call Dr. Villagomez for severe pain, bleeding, swelling, hematoma, signs of infection (swelling, redness, pain, drainage, yellow or foul smelling drainage).      Bone Marrow Aspiration and Biopsy    Bone marrow is the soft, spongy part inside bones. It makes most of the bodys blood cells. Aspiration and biopsy are procedures done to take a sample of bone marrow  out of the body for examination. To perform either procedure, a needle is inserted into one of your bones, usually the back of the hip bone. Then a sample of bone marrow is removed.   If a sample of fluid and cells is taken, it is called bone marrow aspiration. If a solid sample of bone marrow tissue is removed, it is called bone marrow biopsy. In either case, the samples are sent to a lab and studied. The procedures can be done alone, but are most often done together. This sheet tells you more about what to expect.    Recovering at home  Once at home, follow any instructions youre given. Be sure to:  · Take all medicines as directed.  · Care for the procedure site as instructed.  · Check for signs of infection at the procedure site (see below).  · Remove bandaid in 24 hours then ok to shower.  · Avoid heavy lifting and other strenuous activities as directed.     Call the healthcare provider  Call your healthcare provider if you have any of the following:  · Fever of 100.4 ºF (38 ºC) or higher, or as directed by your healthcare provider  · Signs of infection at the procedure site, such as increased redness or swelling, warmth, worsening pain, bleeding, or foul-smelling drainage   Follow-up  Your healthcare provider will discuss the results with you when they are ready. This is usually within a week after the procedure.     Risks and possible complications of these procedures  These include:  · Severe bleeding or bruising at the procedure site  · Infection at the procedure site  · Bone fracture  · Bone infection   Date Last Reviewed: 10/8/2015  © 3666-5232 [employer]. [address] [place] [address], [place] [address]. All rights reserved. This information is not intended as a substitute for professional medical care. Always follow your healthcare professional's instructions.      General Information:    1.  Do not drink alcoholic beverages including beer for 24 hours or as long as you are on pain  medication..  2.  Do not drive a motor vehicle, operate machinery or power tools, or signs legal papers for 24 hours or as long as you are on pain medication.   3.  You may experience light-headedness, dizziness, and sleepiness following surgery. Please do not stay alone. A responsible adult should be with you for this 24 hour period.  4.  Go home and rest.    5. Progress slowly to a normal diet unless instructed.  Otherwise, begin with liquids such as soft drinks, then soup and crackers working up to solid foods. Drink plenty of nonalcoholic fluids.  6.  Certain anesthetics and pain medications produce nausea and vomiting in certain       individuals. If nausea becomes a problem at home, call you doctor.    7. A nurse will be calling you sometime after surgery. Do not be alarmed. This is our way of finding out how you are doing.    8. Several times every hour while you are awake, take 2-3 deep breaths and cough. If you had stomach surgery hold a pillow or rolled towel firmly against your stomach before you cough. This will help with any pain the cough might cause.  9. Several times every hour while you are awake, pump and flex your feet 5-6 times and do foot circles. This will help prevent blood clots.    10.Call your doctor for severe pain, bleeding, fever, or signs or symptoms of infection (pain, swelling, redness, foul odor, drainage).    11.You can contact your doctor anytime by [phone] for the [facility] (at [facility]) or [phone] for the [facility] on [address].   [email or web address] is another way to contact your doctor if you are an active participant online with [facility].          Admission Information     Date & Time Provider Department CSN    2017 10:06 AM Edmar Marin MD [facility] [id number]      Care Providers     Provider Role Specialty Primary office phone    Edmar Marin MD Attending Provider Pathology [phone]    Edmar Marin,  MD Surgeon  Pathology [phone]      Your Vitals Were     BP Pulse Temp Resp SpO2       126/68 (BP Location: Left arm, Patient Position: Lying, BP Method: Automatic) 63 97.4 °F (36.3 °C) (Skin) 14 98%       Recent Lab Values     No lab values to display.      Pending Labs     Order Current Status    Tissue Specimen to Pathology, Bone Marrow Aspiration/Biopsy Procedure Collected (05/16/17 1412)    Tissue Specimen to Pathology, Bone Marrow Aspiration/Biopsy Procedure Collected (05/16/17 1412)    Tissue Specimen to Pathology, Bone Marrow Aspiration/Biopsy Procedure Collected (05/16/17 1412)    Bone Marrow Prep and Stain In process    Chromosome Analysis, Bone Marrow In process    Iron Stain, Bone Marrow In process    Leukemia/Lymphoma Screen - Bone Marrow Left Posterior Iliac Crest In process      Allergies as of 5/16/2017        Reactions    Codeine Hives      [facility]     [facility] Nurse Care Line - 24/7 Assistance  Unless otherwise directed by your provider, please contact [facility], our nurse care line that is available for 24/7 assistance.     Registered nurses in the [facility] provide clinical advisement, health education, appointment booking, and other advisory services.  Call for this free service at [phone].        Advance Directives     An advance directive is a document which, in the event you are no longer able to make decisions for yourself, tells your healthcare team what kind of treatment you do or do not want to receive, or who you would like to make those decisions for you.  If you do not currently have an advance directive, [facility] encourages you to create one.  For more information call:  [phone]), [phone] ([phone]),  or log on to [email or web address].        Language Assistance Services     ATTENTION: Language assistance services are available, free of charge. Please call [phone].      ATENCIÓN: myke Navarrete  a wolfe disposición servicios gratuitos de asistencia lingüística. Llrob al [phone].     SHIRA Ý: N?u b?n nói Ti?ng Vi?t, có các d?ch v? h? tr? ngôn ng? mi?n phí dành cho b?n. G?i s? [phone].         [facility] complies with applicable Federal civil rights laws and does not discriminate on the basis of race, color, national origin, age, disability, or sex.

## 2017-05-16 NOTE — PLAN OF CARE
Problem: Patient Care Overview  Goal: Plan of Care Review  Outcome: Ongoing (interventions implemented as appropriate)  Patient states she has been doing ok,  but has concerns about her recent health issues. Plan of care reviewed , pt verbalizes understanding.

## 2017-05-16 NOTE — PLAN OF CARE
Discharge instructions given to pt and her . Calls made to dr gonzalez office regarding appt and return to work. appt time adjusted to 1:20pm on fri may 19th. appt slip given to pt by rn. Also per jozef in dr gonzalez office ok for pt to return to work tomorrow. Pt and her  verb undertanding of new appt time and return to work.

## 2017-05-16 NOTE — DISCHARGE INSTRUCTIONS
Dr. Villagomez office to call Friday, after graduation to reschedule appointment.    Regular diet and activity as tolerated.    Remove bandaid in 24 hours, then ok to shower.    Call Dr. Villagomez for severe pain, bleeding, swelling, hematoma, signs of infection (swelling, redness, pain, drainage, yellow or foul smelling drainage).      Bone Marrow Aspiration and Biopsy    Bone marrow is the soft, spongy part inside bones. It makes most of the bodys blood cells. Aspiration and biopsy are procedures done to take a sample of bone marrow out of the body for examination. To perform either procedure, a needle is inserted into one of your bones, usually the back of the hip bone. Then a sample of bone marrow is removed.   If a sample of fluid and cells is taken, it is called bone marrow aspiration. If a solid sample of bone marrow tissue is removed, it is called bone marrow biopsy. In either case, the samples are sent to a lab and studied. The procedures can be done alone, but are most often done together. This sheet tells you more about what to expect.    Recovering at home  Once at home, follow any instructions youre given. Be sure to:  · Take all medicines as directed.  · Care for the procedure site as instructed.  · Check for signs of infection at the procedure site (see below).  · Remove bandaid in 24 hours then ok to shower.  · Avoid heavy lifting and other strenuous activities as directed.     Call the healthcare provider  Call your healthcare provider if you have any of the following:  · Fever of 100.4 ºF (38 ºC) or higher, or as directed by your healthcare provider  · Signs of infection at the procedure site, such as increased redness or swelling, warmth, worsening pain, bleeding, or foul-smelling drainage   Follow-up  Your healthcare provider will discuss the results with you when they are ready. This is usually within a week after the procedure.     Risks and possible complications of these procedures  These  include:  · Severe bleeding or bruising at the procedure site  · Infection at the procedure site  · Bone fracture  · Bone infection   Date Last Reviewed: 10/8/2015  © 4617-6191 [employer]. [address] [place] [address], [place] [address]. All rights reserved. This information is not intended as a substitute for professional medical care. Always follow your healthcare professional's instructions.      General Information:    1.  Do not drink alcoholic beverages including beer for 24 hours or as long as you are on pain medication..  2.  Do not drive a motor vehicle, operate machinery or power tools, or signs legal papers for 24 hours or as long as you are on pain medication.   3.  You may experience light-headedness, dizziness, and sleepiness following surgery. Please do not stay alone. A responsible adult should be with you for this 24 hour period.  4.  Go home and rest.    5. Progress slowly to a normal diet unless instructed.  Otherwise, begin with liquids such as soft drinks, then soup and crackers working up to solid foods. Drink plenty of nonalcoholic fluids.  6.  Certain anesthetics and pain medications produce nausea and vomiting in certain       individuals. If nausea becomes a problem at home, call you doctor.    7. A nurse will be calling you sometime after surgery. Do not be alarmed. This is our way of finding out how you are doing.    8. Several times every hour while you are awake, take 2-3 deep breaths and cough. If you had stomach surgery hold a pillow or rolled towel firmly against your stomach before you cough. This will help with any pain the cough might cause.  9. Several times every hour while you are awake, pump and flex your feet 5-6 times and do foot circles. This will help prevent blood clots.    10.Call your doctor for severe pain, bleeding, fever, or signs or symptoms of infection (pain, swelling, redness, foul odor, drainage).    11.You can contact your doctor anytime by calling:  [phone] for the [facility] (at [facility]) or [phone] for the [facility] on [facility].   [email or web address] is another way to contact your doctor if you are an active participant online with [email or web address].

## 2017-05-16 NOTE — PROGRESS NOTES
Contacted dr gonzalez office regarding pt ques about return to work. Pt asked if she could return to work tomorrow. Explained to pt that because of sedation she received it was advisable not to return to work for 24 hours. Also pt wbc after platelet infusion 1.9 today. Pt states she works at a . 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 Pt waiting for call from dr gonzalez office prior to discharge.

## 2017-05-16 NOTE — H&P (VIEW-ONLY)
"Subjective:       Patient ID: Conchita Rouse is a 62 y.o. female.    Chief Complaint: Results and Anemia    HPI 62-year-old female presents for platelet transfusion prior to bone marrow biopsy and aspirate today under sedation    Past Medical History:   Diagnosis Date    Hyperlipidemia     Hypertension     Pancytopenia 02/21/2017    PLT 40 with no s/s bleeding; referred to hem/onc for BM Bx/management    TIA (transient ischemic attack) 2007    She was at "[employer]", where she noted she was feeling slow, took a nap, woke up with a numb/tingling left jaw to arm, which persistned 20 minutes, took 3 baby aspirins, went to ER @ [facility], where facial droop was noted, but completely resolved & head CT revealed old minor abnormality.    Vitamin D deficiency      Family History   Problem Relation Age of Onset    Heart disease Mother     Diabetes Mother     Heart disease Father     Cancer Sister     Cancer Brother      Social History     Social History    Marital status:      Spouse name: N/A    Number of children: N/A    Years of education: N/A     Occupational History    Not on file.     Social History Main Topics    Smoking status: Never Smoker    Smokeless tobacco: Never Used    Alcohol use 0.0 - 0.6 oz/week     0 - 1 Cans of beer per week      Comment: 1/2 beer 3x/yr    Drug use: No    Sexual activity: Not Currently     Partners: Male     Birth control/ protection: Post-menopausal     Other Topics Concern    Not on file     Social History Narrative    Breakfast: Skips or eggs; 1 cup coffee w/ 1 cream & 3 sugars    Lunch: Skips or turkey or ham sandwich w/ occasional chips; tea, rare soda    Dinner: Chicken wings and french fries; tea or cool aid    Snacks: Chocolates i.e. covered strawberry or Pretzils     Eats out: 2x/wk     Past Surgical History:   Procedure Laterality Date    COLONOSCOPY      left eye surgery  1955    Due to left eye lid drooping       Labs:  Lab Results   Component Value " Date    WBC 2.23 (L) 05/15/2017    HGB 8.0 (L) 05/15/2017    HCT 24.3 (L) 05/15/2017     (H) 05/15/2017    PLT 13 (LL) 05/15/2017     BMP  Lab Results   Component Value Date     04/05/2017    K 3.9 04/05/2017     04/05/2017    CO2 27 04/05/2017    BUN 18 04/05/2017    CREATININE 0.9 04/05/2017    CALCIUM 9.9 04/05/2017    ANIONGAP 10 04/05/2017    ESTGFRAFRICA >60 04/05/2017    EGFRNONAA >60 04/05/2017     Lab Results   Component Value Date    ALT 19 04/05/2017    AST 19 04/05/2017    ALKPHOS 73 04/05/2017    BILITOT 0.4 04/05/2017       Lab Results   Component Value Date    IRON 101 02/22/2017    TIBC 299 02/22/2017    FERRITIN 409 (H) 02/22/2017     No results found for: MQDBPSNZ11  No results found for: FOLATE  No results found for: TSH      Review of Systems   Constitutional: Positive for activity change, appetite change and unexpected weight change. Negative for chills, diaphoresis, fatigue and fever.   HENT: Negative for congestion, dental problem, drooling, ear discharge, ear pain, facial swelling, hearing loss, mouth sores, nosebleeds, postnasal drip, rhinorrhea, sinus pressure, sneezing, sore throat, tinnitus, trouble swallowing and voice change.    Eyes: Negative for photophobia, pain, discharge, redness, itching and visual disturbance.   Respiratory: Negative for cough, choking, chest tightness, shortness of breath, wheezing and stridor.    Cardiovascular: Negative for chest pain, palpitations and leg swelling.   Gastrointestinal: Negative for abdominal distention, abdominal pain, anal bleeding, blood in stool, constipation, diarrhea, nausea, rectal pain and vomiting.   Endocrine: Negative for cold intolerance, heat intolerance, polydipsia, polyphagia and polyuria.   Genitourinary: Negative for decreased urine volume, difficulty urinating, dyspareunia, dysuria, enuresis, flank pain, frequency, genital sores, hematuria, menstrual problem, pelvic pain, urgency, vaginal bleeding, vaginal  discharge and vaginal pain.   Musculoskeletal: Negative for arthralgias, back pain, gait problem, joint swelling, myalgias, neck pain and neck stiffness.   Skin: Negative for color change, pallor and rash.   Allergic/Immunologic: Negative for environmental allergies, food allergies and immunocompromised state.   Neurological: Positive for weakness. Negative for dizziness, tremors, seizures, syncope, facial asymmetry, speech difficulty, light-headedness, numbness and headaches.   Hematological: Negative for adenopathy. Does not bruise/bleed easily.   Psychiatric/Behavioral: Positive for dysphoric mood. Negative for agitation, behavioral problems, confusion, decreased concentration, hallucinations, self-injury, sleep disturbance and suicidal ideas. The patient is nervous/anxious. The patient is not hyperactive.        Objective:      Physical Exam   Constitutional: She is oriented to person, place, and time. She appears well-developed and well-nourished. She appears distressed.   HENT:   Head: Normocephalic and atraumatic.   Right Ear: External ear normal.   Left Ear: External ear normal.   Nose: Nose normal. Right sinus exhibits no maxillary sinus tenderness and no frontal sinus tenderness. Left sinus exhibits no maxillary sinus tenderness and no frontal sinus tenderness.   Mouth/Throat: Oropharynx is clear and moist. No oropharyngeal exudate.   Eyes: Conjunctivae, EOM and lids are normal. Pupils are equal, round, and reactive to light. Right eye exhibits no discharge. Left eye exhibits no discharge. Right conjunctiva is not injected. Right conjunctiva has no hemorrhage. Left conjunctiva is not injected. Left conjunctiva has no hemorrhage. No scleral icterus.   Neck: Normal range of motion. Neck supple. No JVD present. No tracheal deviation present. No thyromegaly present.   Cardiovascular: Normal rate and regular rhythm.    Pulmonary/Chest: Effort normal. No stridor. No respiratory distress. She exhibits no  tenderness.   Abdominal: Soft. She exhibits no distension and no mass. There is no splenomegaly or hepatomegaly. There is no tenderness. There is no rebound.   Musculoskeletal: Normal range of motion. She exhibits no edema or tenderness.   Lymphadenopathy:     She has no cervical adenopathy.     She has no axillary adenopathy.        Right: No supraclavicular adenopathy present.        Left: No supraclavicular adenopathy present.   Neurological: She is alert and oriented to person, place, and time. No cranial nerve deficit. Coordination normal.   Skin: Skin is dry. No rash noted. She is not diaphoretic. No erythema.   Psychiatric: Her behavior is normal. Judgment and thought content normal. Her mood appears anxious. She exhibits a depressed mood.   Vitals reviewed.          Assessment:       1. Pancytopenia            Plan:     patient's platelet count is 13,000 will proceed with 1 unit platelet transfusion with immediate post transfusion platelet count in preparation for 1 PM bone marrow biopsy and aspirate for pancytopenia under sedation with pathology performed procedure patient will return in 3-5 business days for review told to contact us prior to returning to make sure we have results of bone marrow biopsy and aspirate

## 2017-05-19 ENCOUNTER — OFFICE VISIT (OUTPATIENT)
Dept: HEMATOLOGY/ONCOLOGY | Facility: CLINIC | Age: 62
End: 2017-05-19
Payer: COMMERCIAL

## 2017-05-19 VITALS
WEIGHT: 161.63 LBS | OXYGEN SATURATION: 98 % | HEART RATE: 96 BPM | SYSTOLIC BLOOD PRESSURE: 140 MMHG | TEMPERATURE: 99 F | HEIGHT: 65 IN | DIASTOLIC BLOOD PRESSURE: 80 MMHG | RESPIRATION RATE: 18 BRPM | BODY MASS INDEX: 26.93 KG/M2

## 2017-05-19 DIAGNOSIS — D61.818 PANCYTOPENIA: Primary | ICD-10-CM

## 2017-05-19 PROCEDURE — 99999 PR PBB SHADOW E&M-EST. PATIENT-LVL III: CPT | Mod: PBBFAC,,, | Performed by: INTERNAL MEDICINE

## 2017-05-19 PROCEDURE — 1160F RVW MEDS BY RX/DR IN RCRD: CPT | Mod: S$GLB,,, | Performed by: INTERNAL MEDICINE

## 2017-05-19 PROCEDURE — 3079F DIAST BP 80-89 MM HG: CPT | Mod: S$GLB,,, | Performed by: INTERNAL MEDICINE

## 2017-05-19 PROCEDURE — 3077F SYST BP >= 140 MM HG: CPT | Mod: S$GLB,,, | Performed by: INTERNAL MEDICINE

## 2017-05-19 PROCEDURE — 99214 OFFICE O/P EST MOD 30 MIN: CPT | Mod: S$GLB,,, | Performed by: INTERNAL MEDICINE

## 2017-05-19 NOTE — PROGRESS NOTES
"Subjective:       Patient ID: Conchita Rouse is a 62 y.o. female.    Chief Complaint: Follow-up; Results; and Anemia    HPI 62-year-old follow-up anemia bone marrow aspirate and biopsy under sedation performed here for review    Past Medical History:   Diagnosis Date    Hyperlipidemia     Hypertension     Pancytopenia 02/21/2017    PLT 40 with no s/s bleeding; referred to hem/onc for BM Bx/management    TIA (transient ischemic attack) 2007    She was at "[employer]", where she noted she was feeling slow, took a nap, woke up with a numb/tingling left jaw to arm, which persistned 20 minutes, took 3 baby aspirins, went to ER @ [facility], where facial droop was noted, but completely resolved & head CT revealed old minor abnormality.    Vitamin D deficiency      Family History   Problem Relation Age of Onset    Heart disease Mother     Diabetes Mother     Heart disease Father     Cancer Sister     Cancer Brother      Social History     Social History    Marital status:      Spouse name: N/A    Number of children: N/A    Years of education: N/A     Occupational History    Not on file.     Social History Main Topics    Smoking status: Never Smoker    Smokeless tobacco: Never Used    Alcohol use 0.0 - 0.6 oz/week     0 - 1 Cans of beer per week      Comment: 1/2 beer 3x/yr    Drug use: No    Sexual activity: Not Currently     Partners: Male     Birth control/ protection: Post-menopausal     Other Topics Concern    Not on file     Social History Narrative    Breakfast: Skips or eggs; 1 cup coffee w/ 1 cream & 3 sugars    Lunch: Skips or turkey or ham sandwich w/ occasional chips; tea, rare soda    Dinner: Chicken wings and french fries; tea or cool aid    Snacks: Chocolates i.e. covered strawberry or Pretzils     Eats out: 2x/wk     Past Surgical History:   Procedure Laterality Date    COLONOSCOPY      left eye surgery  1955    Due to left eye lid drooping       Labs:  Lab Results   Component Value " Date    WBC 1.94 (LL) 05/16/2017    HGB 7.4 (L) 05/16/2017    HCT 21.6 (L) 05/16/2017     (H) 05/16/2017    PLT 84 (L) 05/16/2017     BMP  Lab Results   Component Value Date     04/05/2017    K 3.9 04/05/2017     04/05/2017    CO2 27 04/05/2017    BUN 18 04/05/2017    CREATININE 0.9 04/05/2017    CALCIUM 9.9 04/05/2017    ANIONGAP 10 04/05/2017    ESTGFRAFRICA >60 04/05/2017    EGFRNONAA >60 04/05/2017     Lab Results   Component Value Date    ALT 19 04/05/2017    AST 19 04/05/2017    ALKPHOS 73 04/05/2017    BILITOT 0.4 04/05/2017       Lab Results   Component Value Date    IRON 101 02/22/2017    TIBC 299 02/22/2017    FERRITIN 409 (H) 02/22/2017     No results found for: [id number]  No results found for: FOLATE  No results found for: TSH      Review of Systems   Constitutional: Negative for activity change, appetite change, chills, diaphoresis, fatigue, fever and unexpected weight change.   HENT: Negative for congestion, dental problem, drooling, ear discharge, ear pain, facial swelling, hearing loss, mouth sores, nosebleeds, postnasal drip, rhinorrhea, sinus pressure, sneezing, sore throat, tinnitus, trouble swallowing and voice change.    Eyes: Positive for visual disturbance. Negative for photophobia, pain, discharge, redness and itching.   Respiratory: Negative for cough, choking, chest tightness, shortness of breath, wheezing and stridor.    Cardiovascular: Negative for chest pain, palpitations and leg swelling.   Gastrointestinal: Negative for abdominal distention, abdominal pain, anal bleeding, blood in stool, constipation, diarrhea, nausea, rectal pain and vomiting.   Endocrine: Negative for cold intolerance, heat intolerance, polydipsia, polyphagia and polyuria.   Genitourinary: Negative for decreased urine volume, difficulty urinating, dyspareunia, dysuria, enuresis, flank pain, frequency, genital sores, hematuria, menstrual problem, pelvic pain, urgency, vaginal bleeding, vaginal  discharge and vaginal pain.   Musculoskeletal: Negative for arthralgias, back pain, gait problem, joint swelling, myalgias, neck pain and neck stiffness.   Skin: Negative for color change, pallor and rash.   Allergic/Immunologic: Negative for environmental allergies, food allergies and immunocompromised state.   Neurological: Negative for dizziness, tremors, seizures, syncope, facial asymmetry, speech difficulty, weakness, light-headedness, numbness and headaches.   Hematological: Negative for adenopathy. Does not bruise/bleed easily.   Psychiatric/Behavioral: Positive for dysphoric mood. Negative for agitation, behavioral problems, confusion, decreased concentration, hallucinations, self-injury, sleep disturbance and suicidal ideas. The patient is nervous/anxious. The patient is not hyperactive.        Objective:      Physical Exam   Constitutional: She is oriented to person, place, and time. She appears well-developed and well-nourished. No distress.   HENT:   Head: Normocephalic and atraumatic.   Right Ear: External ear normal.   Left Ear: External ear normal.   Nose: Nose normal. Right sinus exhibits no maxillary sinus tenderness and no frontal sinus tenderness. Left sinus exhibits no maxillary sinus tenderness and no frontal sinus tenderness.   Mouth/Throat: Oropharynx is clear and moist. No oropharyngeal exudate.   Eyes: Conjunctivae, EOM and lids are normal. Pupils are equal, round, and reactive to light. Right eye exhibits no discharge. Left eye exhibits no discharge. Right conjunctiva is not injected. Right conjunctiva has no hemorrhage. Left conjunctiva is not injected. Left conjunctiva has no hemorrhage. No scleral icterus.   Neck: Normal range of motion. Neck supple. No JVD present. No tracheal deviation present. No thyromegaly present.   Cardiovascular: Normal rate and regular rhythm.    Pulmonary/Chest: Effort normal. No stridor. No respiratory distress. She exhibits no tenderness.   Abdominal: Soft.  She exhibits no distension and no mass. There is no splenomegaly or hepatomegaly. There is no tenderness. There is no rebound.   Musculoskeletal: Normal range of motion. She exhibits no edema or tenderness.   Lymphadenopathy:     She has no cervical adenopathy.     She has no axillary adenopathy.        Right: No supraclavicular adenopathy present.        Left: No supraclavicular adenopathy present.   Neurological: She is alert and oriented to person, place, and time. No cranial nerve deficit. Coordination normal.   Skin: Skin is dry. No rash noted. She is not diaphoretic. No erythema.   Psychiatric: She has a normal mood and affect. Her behavior is normal. Judgment and thought content normal.   Vitals reviewed.          Assessment:       1. Pancytopenia            Plan:     verbal report non-adequate specimen obtained additional testing on clot being performed for evaluation there is some dyserythropoiesis spoke with family present explained possible myelodysplasia versus leukemia article sent from up-to-date for their review will schedule bone mass for biopsy under sedation at hospital again will use different pathologists to obtain material possibility of hypercellular marrow with reticular stain make it difficult to obtain aspirate and biopsy material

## 2017-05-23 ENCOUNTER — TELEPHONE (OUTPATIENT)
Dept: INTERNAL MEDICINE | Facility: CLINIC | Age: 62
End: 2017-05-23

## 2017-05-23 DIAGNOSIS — D61.818 PANCYTOPENIA: Primary | ICD-10-CM

## 2017-05-23 NOTE — TELEPHONE ENCOUNTER
----- Message from Radha Quevedo sent at 5/23/2017  8:10 AM CDT -----  Contact: Patient  Patient called to speak with the nurse; she needs a referral to:    Dr. Rogelio Norwood - Hematology/Oncology  [address] [place] [address] [address], [place] [address]  Phone: [phone]  Reason: Low Blood Count    She can be contacted at [phone].    Thanks,  Radha

## 2017-05-25 ENCOUNTER — OFFICE VISIT (OUTPATIENT)
Dept: INTERNAL MEDICINE | Facility: CLINIC | Age: 62
End: 2017-05-25
Payer: COMMERCIAL

## 2017-05-25 VITALS
BODY MASS INDEX: 27.44 KG/M2 | DIASTOLIC BLOOD PRESSURE: 60 MMHG | OXYGEN SATURATION: 98 % | TEMPERATURE: 99 F | WEIGHT: 164.69 LBS | HEIGHT: 65 IN | RESPIRATION RATE: 16 BRPM | HEART RATE: 77 BPM | SYSTOLIC BLOOD PRESSURE: 119 MMHG

## 2017-05-25 DIAGNOSIS — H61.23 BILATERAL IMPACTED CERUMEN: Primary | ICD-10-CM

## 2017-05-25 PROCEDURE — 99999 PR PBB SHADOW E&M-EST. PATIENT-LVL III: CPT | Mod: PBBFAC,,, | Performed by: INTERNAL MEDICINE

## 2017-05-25 PROCEDURE — 99214 OFFICE O/P EST MOD 30 MIN: CPT | Mod: S$GLB,,, | Performed by: INTERNAL MEDICINE

## 2017-05-25 NOTE — PROGRESS NOTES
Carbamide Peroxide 6.5% given to pt. 5 drops in both ears pt tolerated well. Correct NDC not visible on box.  Lot: [id number]  Exp: 12/17

## 2017-05-25 NOTE — PROGRESS NOTES
"Subjective:      Patient ID: Conchita Rouse is a 62 y.o. female.    Chief Complaint: Follow-up and Otalgia      HPI  Ms. Rouse is a patient of mine, who presents for f/u on pancytopenia and otalgia.    She reports having her 3rd PLT tfx 10 days ago and had her 2nd BM bx and requested we check the results, which where initially not available.     She also reports having left ear fullness, but not pain. She has applied Debrox drops 5 in her left ear over the past 3 weeks, but still feels like it is stopped up. No f/c. +decreased hearing on left side.     Past Medical History:   Diagnosis Date    Hyperlipidemia     Hypertension     Pancytopenia 02/21/2017    PLT 40 with no s/s bleeding; referred to hem/onc for BM Bx/management    TIA (transient ischemic attack) 2007    [facility] where she noted she was feeling slow, took a nap, woke up with a numb/tingling left jaw to arm, which persistned 20 minutes, took 3 baby aspirins, went to ER @ [facility], where facial droop was noted, but completely resolved & head CT revealed old minor abnormality.    Vitamin D deficiency      Past Surgical History:   Procedure Laterality Date    COLONOSCOPY      left eye surgery  1955    Due to left eye lid drooping     Social History     Social History    Marital status:      Spouse name: N/A    Number of children: N/A    Years of education: N/A     Occupational History    Not on file.     Social History Main Topics    Smoking status: Never Smoker    Smokeless tobacco: Never Used    Alcohol use 0.0 - 0.6 oz/week      Comment: 1/2 beer 3x/yr    Drug use: No    Sexual activity: Not Currently     Partners: Male     Birth control/ protection: Post-menopausal     Other Topics Concern    Not on file     Social History Narrative    Breakfast: Skips or eggs; 1 cup coffee w/ 1 cream & 3 sugars    Lunch: Skips or turkey or ham sandwich w/ occasional chips; tea, rare soda    Dinner: Chicken wings and french fries; tea " "or cool aid    Snacks: Chocolates i.e. covered strawberry or Pretzils     Eats out: 2x/wk     Family History   Problem Relation Age of Onset    Heart disease Mother     Diabetes Mother     Heart disease Father     Cancer Sister     Cancer Brother        Current Outpatient Prescriptions:     amlodipine (NORVASC) 5 MG tablet, Take 5 mg by mouth once daily., Disp: , Rfl:     C-Zn-K.ginseng-jayshree hips-hrb62 (IMMUNE SUPPORT COMPLEX) 75 mg Tab, Take by mouth., Disp: , Rfl:     fluticasone (FLONASE) 50 mcg/actuation nasal spray, USE 2 SPRAYS IN EACH NOSTRIL 1 TIME A DAY AS NEEDED, Disp: , Rfl: 0    rosuvastatin (CRESTOR) 10 MG tablet, Take 10 mg by mouth once daily., Disp: , Rfl:     VITAMIN D2 50,000 unit capsule, Take 50,000 Units by mouth every 7 days., Disp: , Rfl: 3    Review of patient's allergies indicates:   Allergen Reactions    Codeine Other (See Comments)     Pt states codeine does not cause hives. Had tooth extraction and medication given caused her to be jittery, feel hot and have to lay down like she was weak. morhpine given on 5/16/2017 iv for pain. Pt martine well without any sign or sx of allergy or reaction.       Review of Systems   Constitutional: Negative.   Derm: Negative, except for a bruise on her left arm    Objective:     /60 (BP Location: Left arm, Patient Position: Sitting, BP Method: Manual)   Pulse 77   Temp 98.7 °F (37.1 °C) (Tympanic)   Resp 16   Ht 5' 5" (1.651 m)   Wt 74.7 kg (164 lb 10.9 oz)   SpO2 98%   BMI 27.40 kg/m²     Physical Exam  GEN: A&O fully, NAD  PSYC: Normal affect  HEENT: OP: Clear, no LAD, no petechiae  CV: RRR, no M/G/R  PULM: CTA bilaterally, no wheezes, rales    Lab Results   Component Value Date    WBC 1.94 (LL) 05/16/2017    HGB 7.4 (L) 05/16/2017    HCT 21.6 (L) 05/16/2017    PLT 84 (L) 05/16/2017    CHOL 126 02/21/2017    TRIG 63 02/21/2017    HDL 46 02/21/2017    ALT 19 04/05/2017    AST 19 04/05/2017     04/05/2017    K 3.9 04/05/2017    "  04/05/2017    CREATININE 0.9 04/05/2017    BUN 18 04/05/2017    CO2 27 04/05/2017    INR 1.0 04/05/2017     BM Bx 5/16/17:  Bone Marrow Comment Flow cytometric analysis of  bone marrow shows populations of polyclonal B lymphocytes and T lymphocytes that are immunophenotypically unremarkable.  The blast gate is not increased. Flow differential:  Lymphocytes 30.6%, Monocytes 4.8%, Granulocytes     55.0%, Blast  3.6%, Debris/nRBC 6.2%,  Viability 91.8%.           Chromosome analysis BM Result Summary Normal   Interpretation  No clonal abnormality was apparent.   Results  46,XX[20]   Reason for Referral  pancytopeniaVC   Specimen  Bone Marrow   Source  Test Not Performed   Method  Culture without mitogens   Banding Methods, BM Chromosome  SEE BELOW   Comments: Band Resolution:   450   ----------------------------------------------------------   Stain Name      Cells Analyzed   Cells       Karyograms       Counted     Prepared         GTL             20               0           2               Total           20               0           2               ----------------------------------------------------------   Key to Stain Name: GTL=G-banding; QFQ=Q-banding;   DAPI=DAPI-staining; CBL=C-banding; AGNOR=Silver-staining;   NON=Non-banded   The sum of Cells Analyzed and Cells Counted equals the   total cells examined.    Chromosome analysis BM Additional Information  SEE BELOW   Comments: Previous Studies   DATE        SPECIMEN   RESULT   04/06/2017  [place]     No clonal abnormality was apparent   04/06/2017  [place]     MDS Panel within normal limits   -----------------------------------------------------------   A portion of testing was performed at [facility] -   [facility] (CLIA # [id number]), [address] [place] [id number].    Chromosome analysis BM Released By  Ema La, Ph.D.   Comments: Test Performed by:   [facility]   [address] ,  [place] [address]    Resulting Agency [place]      Specimen Collected: 05/16/17 14:12 Last Resulted: 05/23/17 16:49           Assessment:      1. Bilateral impacted cerumen: Will have her auditory canals flushed today and if not adequate visualization of TMs, will refer to ENT.   2.      Pancytopenia: High RBC precursors, low granulocytes, adequate lymphocytes revealed from last BM.           She is considering changing or having a second opinion due to inconclusive results after 2nd BM bx.            Encouraged her to meet with Dr. Villagomez at least one more time given the report that we reviewed today            from the BM Bx dated 5/16/17.     Plan:   Bilateral impacted cerumen      RTC in 4 weeks or sooner as needed.

## 2017-05-30 ENCOUNTER — TELEPHONE (OUTPATIENT)
Dept: INTERNAL MEDICINE | Facility: CLINIC | Age: 62
End: 2017-05-30

## 2017-05-30 NOTE — TELEPHONE ENCOUNTER
----- Message from Key Lainez sent at 5/30/2017 10:30 AM CDT -----  Contact: Patient  Patient is checking on the status of a referral to Dr Rice, please call her back at [phone]. Thank you

## 2017-05-31 NOTE — TELEPHONE ENCOUNTER
----- Message from La Anne sent at 5/31/2017 12:45 PM CDT -----  Contact: patient  Calling again concerning getting a referral to another provider. Please call patient today ASAP @ [phone]. Thanks, aldair

## 2017-06-01 ENCOUNTER — TELEPHONE (OUTPATIENT)
Dept: INTERNAL MEDICINE | Facility: CLINIC | Age: 62
End: 2017-06-01

## 2017-06-01 NOTE — TELEPHONE ENCOUNTER
----- Message from Ema Martell sent at 6/1/2017 12:51 PM CDT -----  Pt at [phone]//pt is calling regarding her referral//to Dr Norwood with [facility]//His office is needing to speak with your office regarding the referral//Dr Rogelio Norwood's phone number is [phone]//she would like to talk with you after you speak with Dr Norwood's office//please call//justin/lynne

## 2017-07-03 RX ORDER — ROSUVASTATIN CALCIUM 10 MG/1
TABLET, FILM COATED ORAL
Qty: 90 TABLET | Refills: 0 | Status: SHIPPED | OUTPATIENT
Start: 2017-07-03 | End: 2017-10-20 | Stop reason: ALTCHOICE

## 2017-07-05 RX ORDER — ROSUVASTATIN CALCIUM 10 MG/1
TABLET, FILM COATED ORAL
Qty: 90 TABLET | Refills: 0 | OUTPATIENT
Start: 2017-07-05

## 2017-07-08 ENCOUNTER — NURSE TRIAGE (OUTPATIENT)
Dept: ADMINISTRATIVE | Facility: CLINIC | Age: 62
End: 2017-07-08

## 2017-07-08 NOTE — TELEPHONE ENCOUNTER
IAQ-Cont'd  2. Arm/chest/neck/breast  3. Several small sites  4. Various   5. Thursday   6. Itching   7. No, burning sensation following scratching  8. No   9. N/A

## 2017-07-08 NOTE — TELEPHONE ENCOUNTER
"  Reason for Disposition   Mild localized rash (all triage questions negative)    Answer Assessment - Initial Assessment Questions  1. APPEARANCE of RASH: "Describe the rash."       Redness   2. LOCATION: "Where is the rash located?"       *No Answer*  3. NUMBER: "How many spots are there?"       *No Answer*  4. SIZE: "How big are the spots?" (Inches, centimeters or compare to size of a coin)       *No Answer*  5. ONSET: "When did the rash start?"       *No Answer*  6. ITCHING: "Does the rash itch?" If so, ask: "How bad is the itch?"  (Scale 1-10; or mild, moderate, severe)      *No Answer*  7. PAIN: "Does the rash hurt?" If so, ask: "How bad is the pain?"  (Scale 1-10; or mild, moderate, severe)      *No Answer*  8. OTHER SYMPTOMS: "Do you have any other symptoms?" (e.g., fever)      *No Answer*  9. PREGNANCY: "Is there any chance you are pregnant?" "When was your last menstrual period?"      *No Answer*    Protocols used: ST RASH OR REDNESS - [id number]    "

## 2017-07-24 ENCOUNTER — OFFICE VISIT (OUTPATIENT)
Dept: INTERNAL MEDICINE | Facility: CLINIC | Age: 62
End: 2017-07-24
Payer: COMMERCIAL

## 2017-07-24 ENCOUNTER — LAB VISIT (OUTPATIENT)
Dept: LAB | Facility: HOSPITAL | Age: 62
End: 2017-07-24
Attending: INTERNAL MEDICINE
Payer: COMMERCIAL

## 2017-07-24 VITALS
DIASTOLIC BLOOD PRESSURE: 80 MMHG | TEMPERATURE: 98 F | BODY MASS INDEX: 25.37 KG/M2 | HEART RATE: 77 BPM | WEIGHT: 157.88 LBS | HEIGHT: 66 IN | SYSTOLIC BLOOD PRESSURE: 137 MMHG

## 2017-07-24 DIAGNOSIS — D61.818 PANCYTOPENIA: ICD-10-CM

## 2017-07-24 DIAGNOSIS — M54.2 CERVICALGIA: ICD-10-CM

## 2017-07-24 DIAGNOSIS — R11.0 NAUSEA: Primary | ICD-10-CM

## 2017-07-24 DIAGNOSIS — I10 ESSENTIAL HYPERTENSION: ICD-10-CM

## 2017-07-24 LAB
ALBUMIN SERPL BCP-MCNC: 3.4 G/DL
ALP SERPL-CCNC: 61 U/L
ALT SERPL W/O P-5'-P-CCNC: 56 U/L
ANION GAP SERPL CALC-SCNC: 9 MMOL/L
ANISOCYTOSIS BLD QL SMEAR: SLIGHT
AST SERPL-CCNC: 28 U/L
BASOPHILS # BLD AUTO: 0.04 K/UL
BASOPHILS NFR BLD: 1.7 %
BILIRUB SERPL-MCNC: 0.5 MG/DL
BUN SERPL-MCNC: 15 MG/DL
CALCIUM SERPL-MCNC: 8.9 MG/DL
CHLORIDE SERPL-SCNC: 100 MMOL/L
CO2 SERPL-SCNC: 27 MMOL/L
CREAT SERPL-MCNC: 1 MG/DL
DIFFERENTIAL METHOD: ABNORMAL
EOSINOPHIL # BLD AUTO: 0 K/UL
EOSINOPHIL NFR BLD: 0.4 %
ERYTHROCYTE [DISTWIDTH] IN BLOOD BY AUTOMATED COUNT: 20.7 %
EST. GFR  (AFRICAN AMERICAN): >60 ML/MIN/1.73 M^2
EST. GFR  (NON AFRICAN AMERICAN): >60 ML/MIN/1.73 M^2
GLUCOSE SERPL-MCNC: 117 MG/DL
HCT VFR BLD AUTO: 35.1 %
HGB BLD-MCNC: 11.8 G/DL
LYMPHOCYTES # BLD AUTO: 1.1 K/UL
LYMPHOCYTES NFR BLD: 46.5 %
MCH RBC QN AUTO: 31.6 PG
MCHC RBC AUTO-ENTMCNC: 33.6 G/DL
MCV RBC AUTO: 94 FL
MONOCYTES # BLD AUTO: 0.1 K/UL
MONOCYTES NFR BLD: 3.3 %
NEUTROPHILS # BLD AUTO: 1.2 K/UL
NEUTROPHILS NFR BLD: 48.1 %
PLATELET # BLD AUTO: 18 K/UL
PLATELET BLD QL SMEAR: ABNORMAL
PMV BLD AUTO: ABNORMAL FL
POTASSIUM SERPL-SCNC: 3.6 MMOL/L
PROT SERPL-MCNC: 7.6 G/DL
RBC # BLD AUTO: 3.73 M/UL
SODIUM SERPL-SCNC: 136 MMOL/L
WBC # BLD AUTO: 2.41 K/UL

## 2017-07-24 PROCEDURE — 99213 OFFICE O/P EST LOW 20 MIN: CPT | Mod: S$GLB,,, | Performed by: INTERNAL MEDICINE

## 2017-07-24 PROCEDURE — 80053 COMPREHEN METABOLIC PANEL: CPT

## 2017-07-24 PROCEDURE — 85025 COMPLETE CBC W/AUTO DIFF WBC: CPT

## 2017-07-24 PROCEDURE — 36415 COLL VENOUS BLD VENIPUNCTURE: CPT | Mod: PO

## 2017-07-24 PROCEDURE — 99999 PR PBB SHADOW E&M-EST. PATIENT-LVL III: CPT | Mod: PBBFAC,,, | Performed by: INTERNAL MEDICINE

## 2017-07-24 RX ORDER — CYCLOBENZAPRINE HCL 10 MG
10 TABLET ORAL NIGHTLY PRN
Qty: 10 TABLET | Refills: 3 | Status: SHIPPED | OUTPATIENT
Start: 2017-07-24 | End: 2017-08-03

## 2017-07-24 RX ORDER — ONDANSETRON 4 MG/1
4 TABLET, ORALLY DISINTEGRATING ORAL EVERY 8 HOURS PRN
Qty: 30 TABLET | Refills: 11 | Status: SHIPPED | OUTPATIENT
Start: 2017-07-24 | End: 2019-02-04

## 2017-07-24 NOTE — PROGRESS NOTES
"Subjective:      Patient ID: Conchita Rouse is a 62 y.o. female.    Chief Complaint: Follow-up and Neck Pain      HPI  Ms. Rouse is a patient of mine, who presents for f/u on recent hospitalization at [facility] from 7/16/17-7/22/17 for malodorous diarrhea, stomach pain, vomiting, weakness, which was thought to be 2/2 pancreatitis and was subsequently dx'd with C diff. She had an RUQ u/s that was negative for gallstones. Was tx'd with IVFs, IV ABx and DC'd with levofloxacin 500 mg 1 po qd for a 14 day course, metronidazole 500 mg 1 TID for a 10 day course, which she continues to take. She reports feeling nausea, but no vomiting lately.     Of note, she was dx'd with aplastic anemia by her new hematologist, Dr. Rogelio Norwood in [place], who prescribed ATG (antithymocyte globulin) decadron 10 tabs per day just prior to her hospitalization above.     Past Medical History:   Diagnosis Date    Hyperlipidemia     Hypertension     Pancytopenia 02/21/2017    PLT 40 with no s/s bleeding; referred to hem/onc for BM Bx/management    TIA (transient ischemic attack) 2007    She was at "[facility]", where she noted she was feeling slow, took a nap, woke up with a numb/tingling left jaw to arm, which persistned 20 minutes, took 3 baby aspirins, went to ER @ [facility], where facial droop was noted, but completely resolved & head CT revealed old minor abnormality.    Vitamin D deficiency      Past Surgical History:   Procedure Laterality Date    COLONOSCOPY      left eye surgery  1955    Due to left eye lid drooping     Social History     Social History    Marital status:      Spouse name: N/A    Number of children: N/A    Years of education: N/A     Occupational History    Not on file.     Social History Main Topics    Smoking status: Never Smoker    Smokeless tobacco: Never Used    Alcohol use 0.0 - 0.6 oz/week      Comment: 1/2 beer 3x/yr    Drug use: No    Sexual activity: Not Currently     Partners: Male     Birth " "control/ protection: Post-menopausal     Other Topics Concern    Not on file     Social History Narrative    Breakfast: Skips or eggs; 1 cup coffee w/ 1 cream & 3 sugars    Lunch: Skips or turkey or ham sandwich w/ occasional chips; tea, rare soda    Dinner: Chicken wings and french fries; tea or cool aid    Snacks: Chocolates i.e. covered strawberry or Pretzils     Eats out: 2x/wk     Family History   Problem Relation Age of Onset    Heart disease Mother     Diabetes Mother     Heart disease Father     Cancer Sister     Cancer Brother        Current Outpatient Prescriptions:     amlodipine (NORVASC) 5 MG tablet, Take 5 mg by mouth once daily., Disp: , Rfl:     C-Zn-K.ginseng-jayshree hips-hrb62 (IMMUNE SUPPORT COMPLEX) 75 mg Tab, Take by mouth., Disp: , Rfl:     CRESTOR 10 mg tablet, TAKE 1 TABLET BY MOUTH EVERY DAY, Disp: 90 tablet, Rfl: 0    cyclobenzaprine (FLEXERIL) 10 MG tablet, Take 1 tablet (10 mg total) by mouth nightly as needed for Muscle spasms., Disp: 10 tablet, Rfl: 3    fluticasone (FLONASE) 50 mcg/actuation nasal spray, USE 2 SPRAYS IN EACH NOSTRIL 1 TIME A DAY AS NEEDED, Disp: , Rfl: 0    ondansetron (ZOFRAN-ODT) 4 MG TbDL, Take 1 tablet (4 mg total) by mouth every 8 (eight) hours as needed (nausea)., Disp: 30 tablet, Rfl: 11    VITAMIN D2 50,000 unit capsule, Take 50,000 Units by mouth every 7 days., Disp: , Rfl: 3    Review of patient's allergies indicates:   Allergen Reactions    Codeine Other (See Comments)     Pt states codeine does not cause hives. Had tooth extraction and medication given caused her to be jittery, feel hot and have to lay down like she was weak. morhpine given on 5/16/2017 iv for pain. Pt martine well without any sign or sx of allergy or reaction.     Review of Systems   Negative.     Objective:     /80 (BP Location: Right arm, Patient Position: Sitting, BP Method: Manual)   Pulse 77   Temp 97.5 °F (36.4 °C) (Tympanic)   Ht 5' 6" (1.676 m)   Wt 71.6 kg (157 lb " 13.6 oz)   BMI 25.48 kg/m²     Physical Exam  GEN: A&O fully, NAD  PSYC: Normal affect  HEENT: OP: Clear, no LAD, no thyroid masses  CV: RRR, no M/G/R  PULM: CTA bilaterally, no wheezes, rales  GI: S/NT/ND, normal bowel sounds  EXT: No C/C/E  MSK: No TTP of neck    Lab Results   Component Value Date    WBC 1.94 (LL) 05/16/2017    HGB 7.4 (L) 05/16/2017    HCT 21.6 (L) 05/16/2017    PLT 84 (L) 05/16/2017    CHOL 126 02/21/2017    TRIG 63 02/21/2017    HDL 46 02/21/2017    ALT 19 04/05/2017    AST 19 04/05/2017     04/05/2017    K 3.9 04/05/2017     04/05/2017    CREATININE 0.9 04/05/2017    BUN 18 04/05/2017    CO2 27 04/05/2017    INR 1.0 04/05/2017       Assessment:     1. Nausea: No emesis lately. Will tx with ondansetron 4 mg q8h prn.   2. Essential hypertension: Well controlled without medication. Will dc amlodipine.    3. Pancytopenia: s/p transfusion of 2U PRBCs and PLTs. Likely 2/2 aplastic anemia vs. PNH. Continue FU with Dr. Rogelio Norwood in [place].   4. Cervicalgia: Will evaluate with x-ray. Continue heat packs & Tylenol. Will prescribe cyclobenzaprine qhs prn.    5.      C diff./pancreatis: Continue ABx. Encouraged yogurt, water and Powerade Zero 75 oz/day.    Plan:   Nausea  -     ondansetron (ZOFRAN-ODT) 4 MG TbDL; Take 1 tablet (4 mg total) by mouth every 8 (eight) hours as needed (nausea).  Dispense: 30 tablet; Refill: 11    Essential hypertension  -     Comprehensive metabolic panel; Future; Expected date: 07/24/2017    Pancytopenia  -     CBC auto differential; Future; Expected date: 07/24/2017    Cervicalgia  -     X-Ray Cervical Spine AP And Lateral; Future; Expected date: 07/24/2017  -     Comprehensive metabolic panel; Future; Expected date: 07/24/2017  -     cyclobenzaprine (FLEXERIL) 10 MG tablet; Take 1 tablet (10 mg total) by mouth nightly as needed for Muscle spasms.  Dispense: 10 tablet; Refill: 3      RTC in 2 weeks RE C diff, pancreatitis, immune dysfunction or sooner as  needed

## 2017-07-27 RX ORDER — ERGOCALCIFEROL 1.25 MG/1
CAPSULE ORAL
Qty: 12 CAPSULE | Refills: 0 | Status: SHIPPED | OUTPATIENT
Start: 2017-07-27 | End: 2017-10-20 | Stop reason: SDUPTHER

## 2017-08-05 ENCOUNTER — TELEPHONE (OUTPATIENT)
Dept: INTERNAL MEDICINE | Facility: CLINIC | Age: 62
End: 2017-08-05

## 2017-08-05 ENCOUNTER — NURSE TRIAGE (OUTPATIENT)
Dept: ADMINISTRATIVE | Facility: CLINIC | Age: 62
End: 2017-08-05

## 2017-08-05 RX ORDER — HYDROCODONE BITARTRATE AND ACETAMINOPHEN 5; 325 MG/1; MG/1
1 TABLET ORAL
Qty: 14 TABLET | Refills: 0 | Status: SHIPPED | OUTPATIENT
Start: 2017-08-05 | End: 2017-10-20 | Stop reason: ALTCHOICE

## 2017-08-05 NOTE — TELEPHONE ENCOUNTER
I    Reason for Disposition   [1] SEVERE pain (e.g., excruciating, unable to do any normal activities) AND [2] not improved after 2 hours of pain medicine    Protocols used: ST LEG PAIN-A-AH      Was released from [facility] yesterday.  Has leg pain and was related to inflammation around for joint . States was not given anything for pain to go home with was had pain medicine in the hospital.  Requesting pain medications. Advised that she would need to be evaluated. States she has an after hours clinic next to her in [place] and she would go there.

## 2017-08-05 NOTE — TELEPHONE ENCOUNTER
She reports going to [facility] ED on last Saturday for leg pains, which were thought to be due to inflammation per MRI, was treated with prednisone, IVFs and discharged without further pain medications. She reports taking Tylenol 500 mg 2 tabs last night (this am) at 12:30 am and again 4 am. Risks and benefits discussed and patient chose to move forward with Norco 5 mg (hydrocodone) / 325 mg (acetaminopen) daily; she understands not to exceed 1000 mg acetaminophen q4h and no more than 4,000 mg q24h.

## 2017-08-05 NOTE — TELEPHONE ENCOUNTER
Reason for Disposition   Caller has medication question only, adult not sick, and triager answers question    Protocols used: ST MEDICATION QUESTION CALL-A-AH    Conchita was calling about pain medication she thought her provider had sent in for. She thought it was sent to [facility] but advised it was sent to [facility] in [place]. She agrees to go there to pick it up. Please contact caller with any further care advice.

## 2017-10-16 RX ORDER — ERGOCALCIFEROL 1.25 MG/1
CAPSULE ORAL
Qty: 12 CAPSULE | Refills: 0 | OUTPATIENT
Start: 2017-10-16

## 2017-10-20 ENCOUNTER — OFFICE VISIT (OUTPATIENT)
Dept: INTERNAL MEDICINE | Facility: CLINIC | Age: 62
End: 2017-10-20
Payer: COMMERCIAL

## 2017-10-20 VITALS
WEIGHT: 160.94 LBS | BODY MASS INDEX: 26.81 KG/M2 | SYSTOLIC BLOOD PRESSURE: 133 MMHG | OXYGEN SATURATION: 96 % | TEMPERATURE: 98 F | HEART RATE: 91 BPM | DIASTOLIC BLOOD PRESSURE: 63 MMHG | HEIGHT: 65 IN

## 2017-10-20 DIAGNOSIS — D61.818 PANCYTOPENIA: ICD-10-CM

## 2017-10-20 DIAGNOSIS — E55.9 VITAMIN D DEFICIENCY: Primary | ICD-10-CM

## 2017-10-20 DIAGNOSIS — I10 ESSENTIAL HYPERTENSION: ICD-10-CM

## 2017-10-20 PROBLEM — E78.2 MIXED HYPERLIPIDEMIA: Status: ACTIVE | Noted: 2017-06-29

## 2017-10-20 PROBLEM — D61.9 APLASTIC ANEMIA: Status: ACTIVE | Noted: 2017-09-06

## 2017-10-20 PROCEDURE — 99999 PR PBB SHADOW E&M-EST. PATIENT-LVL III: CPT | Mod: PBBFAC,,, | Performed by: INTERNAL MEDICINE

## 2017-10-20 PROCEDURE — 99213 OFFICE O/P EST LOW 20 MIN: CPT | Mod: S$GLB,,, | Performed by: INTERNAL MEDICINE

## 2017-10-20 RX ORDER — GABAPENTIN 300 MG/1
CAPSULE ORAL
COMMUNITY
Start: 2017-10-09 | End: 2019-02-04

## 2017-10-20 RX ORDER — TRAMADOL HYDROCHLORIDE 50 MG/1
50 TABLET ORAL 2 TIMES DAILY PRN
Refills: 0 | COMMUNITY
Start: 2017-07-26 | End: 2020-08-31 | Stop reason: SDUPTHER

## 2017-10-20 RX ORDER — CYCLOSPORINE 100 MG/1
200 CAPSULE, GELATIN COATED ORAL
COMMUNITY
Start: 2017-09-06 | End: 2020-09-28

## 2017-10-20 RX ORDER — ERGOCALCIFEROL 1.25 MG/1
50000 CAPSULE ORAL WEEKLY
Qty: 12 CAPSULE | Refills: 0 | Status: SHIPPED | OUTPATIENT
Start: 2017-10-20 | End: 2018-02-05 | Stop reason: SDUPTHER

## 2017-10-20 RX ORDER — VALACYCLOVIR HYDROCHLORIDE 500 MG/1
500 TABLET, FILM COATED ORAL
Status: ON HOLD | COMMUNITY
Start: 2017-09-06 | End: 2020-11-09 | Stop reason: HOSPADM

## 2017-10-20 NOTE — PROGRESS NOTES
"Subjective:      Patient ID: Conchita Rouse is a 62 y.o. female.    Chief Complaint: Follow-up      HPI  Ms. Rouse is a patient of mine, who presents for f/u. She reports having going to [facility] for her PNH/ITP, s/p ATG (completed) & promacta following decadron pulse for ITP & AA, has had multiple BM bx's, now she is registering herself for possible BM Tpx. She is also on cyclosporin, which was prescribed by her MD at [facility], which has allowed her PLTs to increase from 9 to 30. Her last PLT tfx was 9/9/17.     She reports being hospitalized for C diff, which was thought to be 2/2 steroids, s/p tx with Flagyl. Her diarrhea has completely resolved. She reports having blood work done weekly via Dr. Rogelio Norwood's office (hem/onc).    Past Medical History:   Diagnosis Date    Hyperlipidemia     Hypertension     PNH (paroxysmal nocturnal hemoglobinuria) 02/21/2017    PNH small; Followed by Dr. Rogelio Norwood III last visit was 7/26/17, also notes aplastic anemia (AA psot Horse ATG - week 7/17/17) and ITP, for which ATG & promacta following decadron pulse for ITP & AA; plan for second opinion on BM bx; next flow in 3 mo; intent of rx: palliative    TIA (transient ischemic attack) 2007    She was at "[facility]", where she noted she was feeling slow, took a nap, woke up with a numb/tingling left jaw to arm, which persistned 20 minutes, took 3 baby aspirins, went to ER @ [facility], where facial droop was noted, but completely resolved & head CT revealed old minor abnormality.    Vitamin D deficiency      Past Surgical History:   Procedure Laterality Date    COLONOSCOPY      left eye surgery  1955    Due to left eye lid drooping     Social History     Social History    Marital status:      Spouse name: N/A    Number of children: N/A    Years of education: N/A     Occupational History    Not on file.     Social History Main Topics    Smoking status: Never Smoker    Smokeless tobacco: Never Used "    Alcohol use 0.0 - 0.6 oz/week      Comment: 1/2 beer 3x/yr    Drug use: No    Sexual activity: Not Currently     Partners: Male     Birth control/ protection: Post-menopausal     Other Topics Concern    Not on file     Social History Narrative    Breakfast: Skips or eggs; 1 cup coffee w/ 1 cream & 3 sugars    Lunch: Skips or turkey or ham sandwich w/ occasional chips; tea, rare soda    Dinner: Chicken wings and french fries; tea or cool aid    Snacks: Chocolates i.e. covered strawberry or Pretzils     Eats out: 2x/wk     Family History   Problem Relation Age of Onset    Heart disease Mother     Diabetes Mother     Heart disease Father     Cancer Sister     Cancer Brother        Current Outpatient Prescriptions:     C-Zn-K.ginseng-jayshree hips-hrb62 (IMMUNE SUPPORT COMPLEX) 75 mg Tab, Take by mouth., Disp: , Rfl:     cycloSPORINE (SANDIMMUNE) 100 MG Cap, Take 200 mg by mouth., Disp: , Rfl:     eltrombopag (PROMACTA) 75 mg Tab, 150 mg., Disp: , Rfl:     fluticasone (FLONASE) 50 mcg/actuation nasal spray, USE 2 SPRAYS IN EACH NOSTRIL 1 TIME A DAY AS NEEDED, Disp: , Rfl: 0    gabapentin (NEURONTIN) 300 MG capsule, , Disp: , Rfl:     ondansetron (ZOFRAN-ODT) 4 MG TbDL, Take 1 tablet (4 mg total) by mouth every 8 (eight) hours as needed (nausea)., Disp: 30 tablet, Rfl: 11    tramadol (ULTRAM) 50 mg tablet, Take 50 mg by mouth 2 (two) times daily as needed., Disp: , Rfl: 0    valacyclovir (VALTREX) 500 MG tablet, Take 500 mg by mouth., Disp: , Rfl:     VITAMIN D2 50,000 unit capsule, TAKE ONE CAPSULE BY MOUTH WEEKLY, Disp: 12 capsule, Rfl: 0    amlodipine (NORVASC) 5 MG tablet, Take 5 mg by mouth once daily., Disp: , Rfl:     CRESTOR 10 mg tablet, TAKE 1 TABLET BY MOUTH EVERY DAY, Disp: 90 tablet, Rfl: 0    hydrocodone-acetaminophen 5-325mg (NORCO) 5-325 mg per tablet, Take 1 tablet by mouth every 24 hours as needed for Pain., Disp: 14 tablet, Rfl: 0    Review of patient's allergies indicates:  "  Allergen Reactions    Codeine Other (See Comments)     Pt states codeine does not cause hives. Had tooth extraction and medication given caused her to be jittery, feel hot and have to lay down like she was weak. morhpine given on 5/16/2017 iv for pain. Pt martine well without any sign or sx of allergy or reaction.     Review of Systems   Negative.     Objective:     /63 (BP Location: Left arm, Patient Position: Sitting, BP Method: Medium (Automatic))   Pulse 91   Temp 97.5 °F (36.4 °C) (Tympanic)   Ht 5' 5" (1.651 m)   Wt 73 kg (160 lb 15 oz)   SpO2 96%   BMI 26.78 kg/m²     Physical Exam  GEN: A&O fully, NAD  PSYC: Normal affect    Lab Results   Component Value Date    WBC 2.41 (L) 07/24/2017    HGB 11.8 (L) 07/24/2017    HCT 35.1 (L) 07/24/2017    PLT 18 (LL) 07/24/2017    CHOL 126 02/21/2017    TRIG 63 02/21/2017    HDL 46 02/21/2017    ALT 56 (H) 07/24/2017    AST 28 07/24/2017     07/24/2017    K 3.6 07/24/2017     07/24/2017    CREATININE 1.0 07/24/2017    BUN 15 07/24/2017    CO2 27 07/24/2017    INR 1.0 04/05/2017       Assessment:   1. Aplastic anemia/ITP: Improved. Continue f/u with Dr. Rogelio Norwood in hem/onc in [place].   2. Vitamin D deficiency: Renewed vitamin D2 50,000 IU po qwk.    RTC in 4 months RE annual or sooner as needed  "

## 2018-02-05 DIAGNOSIS — E55.9 VITAMIN D DEFICIENCY: ICD-10-CM

## 2018-02-06 RX ORDER — ERGOCALCIFEROL 1.25 MG/1
CAPSULE ORAL
Qty: 12 CAPSULE | Refills: 0 | Status: SHIPPED | OUTPATIENT
Start: 2018-02-06 | End: 2018-04-22 | Stop reason: SDUPTHER

## 2018-02-16 ENCOUNTER — OFFICE VISIT (OUTPATIENT)
Dept: INTERNAL MEDICINE | Facility: CLINIC | Age: 63
End: 2018-02-16
Payer: COMMERCIAL

## 2018-02-16 VITALS
HEART RATE: 62 BPM | TEMPERATURE: 98 F | DIASTOLIC BLOOD PRESSURE: 72 MMHG | WEIGHT: 161.38 LBS | BODY MASS INDEX: 26.89 KG/M2 | HEIGHT: 65 IN | OXYGEN SATURATION: 97 % | SYSTOLIC BLOOD PRESSURE: 144 MMHG

## 2018-02-16 DIAGNOSIS — E55.9 VITAMIN D DEFICIENCY: ICD-10-CM

## 2018-02-16 DIAGNOSIS — D61.818 PANCYTOPENIA: ICD-10-CM

## 2018-02-16 DIAGNOSIS — E78.2 MIXED HYPERLIPIDEMIA: ICD-10-CM

## 2018-02-16 DIAGNOSIS — I10 ESSENTIAL HYPERTENSION: Primary | ICD-10-CM

## 2018-02-16 DIAGNOSIS — D61.9 APLASTIC ANEMIA: ICD-10-CM

## 2018-02-16 DIAGNOSIS — Z00.00 ROUTINE GENERAL MEDICAL EXAMINATION AT A HEALTH CARE FACILITY: ICD-10-CM

## 2018-02-16 DIAGNOSIS — D69.6 THROMBOCYTOPENIA: ICD-10-CM

## 2018-02-16 PROBLEM — E78.00 PURE HYPERCHOLESTEROLEMIA: Status: RESOLVED | Noted: 2017-02-22 | Resolved: 2018-02-16

## 2018-02-16 PROCEDURE — 99999 PR PBB SHADOW E&M-EST. PATIENT-LVL III: CPT | Mod: PBBFAC,,, | Performed by: INTERNAL MEDICINE

## 2018-02-16 PROCEDURE — 99214 OFFICE O/P EST MOD 30 MIN: CPT | Mod: S$GLB,,, | Performed by: INTERNAL MEDICINE

## 2018-02-16 PROCEDURE — 3008F BODY MASS INDEX DOCD: CPT | Mod: S$GLB,,, | Performed by: INTERNAL MEDICINE

## 2018-02-16 RX ORDER — ATORVASTATIN CALCIUM 40 MG/1
20 TABLET, FILM COATED ORAL DAILY
Qty: 45 TABLET | Refills: 3 | Status: SHIPPED | OUTPATIENT
Start: 2018-02-16 | End: 2019-02-04

## 2018-02-16 RX ORDER — LORATADINE 10 MG/1
10 TABLET ORAL DAILY
Qty: 30 TABLET | Refills: 3 | COMMUNITY
Start: 2018-02-16 | End: 2018-08-10 | Stop reason: SDUPTHER

## 2018-02-16 RX ORDER — AMLODIPINE BESYLATE 5 MG/1
5 TABLET ORAL DAILY
Qty: 90 TABLET | Refills: 3 | Status: SHIPPED | OUTPATIENT
Start: 2018-02-16 | End: 2018-08-10 | Stop reason: ALTCHOICE

## 2018-02-16 NOTE — PROGRESS NOTES
"Subjective:      Patient ID: Conchita Rouse is a 63 y.o. female.    Chief Complaint: Follow-up      HPI     Ms. Conchita Rouse is a patient of Roberto Ron MD, who presents for annual.     No problems.    Past Medical History:   Diagnosis Date    History of shingles 02/14/2018    Per note from Dr. Rogelio Norwood III on 2/14/18; left upper buttocks.    Hyperlipidemia     Hypertension     PNH (paroxysmal nocturnal hemoglobinuria) 02/21/2017    [facility] small; Followed by Dr. Rogelio Norwood III last visit 2/14/18 (tolerating CSA & promacta); also notes aplastic anemia (AA psot Horse ATG - week 7/17/17) and ITP, for which ATG & promacta following decadron pulse for ITP & AA; plan for second opinion on BM bx; next flow in 3 mo; intent of rx: palliative    TIA (transient ischemic attack) 2007    She was at "[facility]", where she noted she was feeling slow, took a nap, woke up with a numb/tingling left jaw to arm, which persistned 20 minutes, took 3 baby aspirins, went to ER @ [facility], where facial droop was noted, but completely resolved & head CT revealed old minor abnormality.    Vitamin D deficiency      Past Surgical History:   Procedure Laterality Date    COLONOSCOPY      left eye surgery  1955    Due to left eye lid drooping     Social History     Social History    Marital status:      Spouse name: N/A    Number of children: N/A    Years of education: N/A     Occupational History    Not on file.     Social History Main Topics    Smoking status: Never Smoker    Smokeless tobacco: Never Used    Alcohol use 0.0 - 0.6 oz/week      Comment: 1/2 beer 3x/yr    Drug use: No    Sexual activity: Not Currently     Partners: Male     Birth control/ protection: Post-menopausal     Other Topics Concern    Not on file     Social History Narrative    Breakfast: Skips or eggs; 1 cup coffee w/ 1 cream & 3 sugars    Lunch: Skips or turkey or ham sandwich w/ occasional chips; tea, rare soda    Dinner: Chicken " wings and french fries; tea or cool aid    Snacks: Chocolates i.e. covered strawberry or Pretzils     Eats out: 2x/wk     Family History   Problem Relation Age of Onset    Heart disease Mother     Diabetes Mother     Heart disease Father     Cancer Sister     Cancer Brother        Current Outpatient Prescriptions:     cycloSPORINE (SANDIMMUNE) 100 MG Cap, Take 200 mg by mouth., Disp: , Rfl:     eltrombopag (PROMACTA) 75 mg Tab, 150 mg., Disp: , Rfl:     gabapentin (NEURONTIN) 300 MG capsule, , Disp: , Rfl:     tramadol (ULTRAM) 50 mg tablet, Take 50 mg by mouth 2 (two) times daily as needed., Disp: , Rfl: 0    valacyclovir (VALTREX) 500 MG tablet, Take 500 mg by mouth., Disp: , Rfl:     VITAMIN D2 50,000 unit capsule, TAKE 1 CAPSULE BY MOUTH ONCE A WEEK., Disp: 12 capsule, Rfl: 0    amLODIPine (NORVASC) 5 MG tablet, Take 1 tablet (5 mg total) by mouth once daily., Disp: 90 tablet, Rfl: 3    atorvastatin (LIPITOR) 40 MG tablet, Take 0.5 tablets (20 mg total) by mouth once daily., Disp: 45 tablet, Rfl: 3    fluticasone (FLONASE) 50 mcg/actuation nasal spray, USE 2 SPRAYS IN EACH NOSTRIL 1 TIME A DAY AS NEEDED, Disp: , Rfl: 0    loratadine (CLARITIN) 10 mg tablet, Take 1 tablet (10 mg total) by mouth once daily., Disp: 30 tablet, Rfl: 3    ondansetron (ZOFRAN-ODT) 4 MG TbDL, Take 1 tablet (4 mg total) by mouth every 8 (eight) hours as needed (nausea)., Disp: 30 tablet, Rfl: 11    Review of patient's allergies indicates:   Allergen Reactions    Codeine Other (See Comments)     Pt states codeine does not cause hives. Had tooth extraction and medication given caused her to be jittery, feel hot and have to lay down like she was weak. morhpine given on 5/16/2017 iv for pain. Pt martine well without any sign or sx of allergy or reaction.        Review of Systems   Negative    Objective:     BP (!) 144/72 (BP Location: Right arm, Patient Position: Sitting, BP Method: Large (Automatic))   Pulse 62   Temp 97.6 °F  "(36.4 °C) (Tympanic)   Ht 5' 5" (1.651 m)   Wt 73.2 kg (161 lb 6 oz)   SpO2 97%   BMI 26.85 kg/m²     Physical Exam  GEN: A&O fully, NAD  PSYC: Normal affect  HEENT: OP: Clear, no LAD, no thyroid masses  CV: RRR, no M/G/R  PULM: CTA bilaterally, no wheezes, rales  GI: S/NT/ND, normal bowel sounds  EXT: No C/C/E  NEURO: CN II-XII intact, 5/5 strength globally, no sensory losses, normal tandem gait, normal Romberg, 2+ DTRs globally      Lab Results   Component Value Date    WBC 2.41 (L) 07/24/2017    HGB 11.8 (L) 07/24/2017    HCT 35.1 (L) 07/24/2017    PLT 18 (LL) 07/24/2017    CHOL 126 02/21/2017    TRIG 63 02/21/2017    HDL 46 02/21/2017    LDLCALC 67.4 02/21/2017    ALT 56 (H) 07/24/2017    AST 28 07/24/2017     07/24/2017    K 3.6 07/24/2017     07/24/2017    CREATININE 1.0 07/24/2017    BUN 15 07/24/2017    CO2 27 07/24/2017    INR 1.0 04/05/2017       Assessment:      1. Essential hypertension: Not at goal on manual repeat. Risks and benefits discussed and patient chose to move forward with restarting her amlodipine 5 mg po qd.   2. Routine general medical examination at a health care facility   3. Vitamin D deficiency: Will check.   4. Thrombocytopenia: Will check.    5.      Aplastic anemia: Will check CBC.  6.      Dyslipidemia: Low HDL. CV risk calculated 7.8%, which can be reduced to 4.1% with high dose statin.           Risks and benefits discussed and patient chose to move forward with atorvastatin 20 mg qd, which            she understands to increase to 40 mg qd if tolerable.     Plan:   Essential hypertension  -     amLODIPine (NORVASC) 5 MG tablet; Take 1 tablet (5 mg total) by mouth once daily.  Dispense: 90 tablet; Refill: 3  -     Comprehensive metabolic panel; Future; Expected date: 02/16/2018    Routine general medical examination at a health care facility  -     CBC auto differential; Future; Expected date: 02/16/2018  -     Comprehensive metabolic panel; Future; Expected date: " 02/16/2018  -     Lipid panel; Future; Expected date: 02/16/2018  -     TSH; Future; Expected date: 02/16/2018    Vitamin D deficiency  -     Vitamin D; Future; Expected date: 02/16/2018    Thrombocytopenia  -     CBC auto differential; Future; Expected date: 02/16/2018    Aplastic anemia    Pancytopenia    Mixed hyperlipidemia    Other orders  -     loratadine (CLARITIN) 10 mg tablet; Take 1 tablet (10 mg total) by mouth once daily.  Dispense: 30 tablet; Refill: 3  -     atorvastatin (LIPITOR) 40 MG tablet; Take 0.5 tablets (20 mg total) by mouth once daily.  Dispense: 45 tablet; Refill: 3        Follow-up in about 2 weeks (around 3/2/2018), or if symptoms worsen or fail to improve, for FU on dyslipidemia & HTN.

## 2018-03-02 ENCOUNTER — LAB VISIT (OUTPATIENT)
Dept: LAB | Facility: HOSPITAL | Age: 63
End: 2018-03-02
Attending: INTERNAL MEDICINE
Payer: COMMERCIAL

## 2018-03-02 DIAGNOSIS — Z00.00 ROUTINE GENERAL MEDICAL EXAMINATION AT A HEALTH CARE FACILITY: Primary | ICD-10-CM

## 2018-03-02 DIAGNOSIS — I10 ESSENTIAL HYPERTENSION: ICD-10-CM

## 2018-03-02 DIAGNOSIS — Z00.00 ROUTINE GENERAL MEDICAL EXAMINATION AT A HEALTH CARE FACILITY: ICD-10-CM

## 2018-03-02 LAB
25(OH)D3+25(OH)D2 SERPL-MCNC: 25 NG/ML
ALBUMIN SERPL BCP-MCNC: 3.7 G/DL
ALP SERPL-CCNC: 100 U/L
ALT SERPL W/O P-5'-P-CCNC: 19 U/L
ANION GAP SERPL CALC-SCNC: 10 MMOL/L
AST SERPL-CCNC: 17 U/L
BASOPHILS # BLD AUTO: 0.01 K/UL
BASOPHILS NFR BLD: 0.2 %
BILIRUB SERPL-MCNC: 0.6 MG/DL
BUN SERPL-MCNC: 12 MG/DL
CALCIUM SERPL-MCNC: 9.9 MG/DL
CHLORIDE SERPL-SCNC: 103 MMOL/L
CHOLEST SERPL-MCNC: 147 MG/DL
CHOLEST/HDLC SERPL: 4.1 {RATIO}
CO2 SERPL-SCNC: 30 MMOL/L
CREAT SERPL-MCNC: 0.8 MG/DL
DIFFERENTIAL METHOD: ABNORMAL
EOSINOPHIL # BLD AUTO: 0 K/UL
EOSINOPHIL NFR BLD: 0.7 %
ERYTHROCYTE [DISTWIDTH] IN BLOOD BY AUTOMATED COUNT: 14.3 %
EST. GFR  (AFRICAN AMERICAN): >60 ML/MIN/1.73 M^2
EST. GFR  (NON AFRICAN AMERICAN): >60 ML/MIN/1.73 M^2
GLUCOSE SERPL-MCNC: 105 MG/DL
HCT VFR BLD AUTO: 39.6 %
HDLC SERPL-MCNC: 36 MG/DL
HDLC SERPL: 24.5 %
HGB BLD-MCNC: 13.3 G/DL
IMM GRANULOCYTES # BLD AUTO: 0.01 K/UL
IMM GRANULOCYTES NFR BLD AUTO: 0.2 %
LDLC SERPL CALC-MCNC: 96.8 MG/DL
LYMPHOCYTES # BLD AUTO: 1.7 K/UL
LYMPHOCYTES NFR BLD: 40.9 %
MCH RBC QN AUTO: 34.6 PG
MCHC RBC AUTO-ENTMCNC: 33.6 G/DL
MCV RBC AUTO: 103 FL
MONOCYTES # BLD AUTO: 0.4 K/UL
MONOCYTES NFR BLD: 8.2 %
NEUTROPHILS # BLD AUTO: 2.1 K/UL
NEUTROPHILS NFR BLD: 49.8 %
NONHDLC SERPL-MCNC: 111 MG/DL
NRBC BLD-RTO: 0 /100 WBC
PLATELET # BLD AUTO: 84 K/UL
PMV BLD AUTO: 11.4 FL
POTASSIUM SERPL-SCNC: 4.7 MMOL/L
PROT SERPL-MCNC: 7.5 G/DL
RBC # BLD AUTO: 3.84 M/UL
SODIUM SERPL-SCNC: 143 MMOL/L
TRIGL SERPL-MCNC: 71 MG/DL
TSH SERPL DL<=0.005 MIU/L-ACNC: 1.45 UIU/ML
WBC # BLD AUTO: 4.25 K/UL

## 2018-03-02 PROCEDURE — 82306 VITAMIN D 25 HYDROXY: CPT

## 2018-03-02 PROCEDURE — 84443 ASSAY THYROID STIM HORMONE: CPT

## 2018-03-02 PROCEDURE — 36415 COLL VENOUS BLD VENIPUNCTURE: CPT | Mod: PO

## 2018-03-02 PROCEDURE — 80061 LIPID PANEL: CPT

## 2018-03-02 PROCEDURE — 80053 COMPREHEN METABOLIC PANEL: CPT

## 2018-03-02 PROCEDURE — 85025 COMPLETE CBC W/AUTO DIFF WBC: CPT

## 2018-03-08 ENCOUNTER — PATIENT OUTREACH (OUTPATIENT)
Dept: ADMINISTRATIVE | Facility: HOSPITAL | Age: 63
End: 2018-03-08

## 2018-04-22 DIAGNOSIS — E55.9 VITAMIN D DEFICIENCY: ICD-10-CM

## 2018-04-23 RX ORDER — ERGOCALCIFEROL 1.25 MG/1
CAPSULE ORAL
Qty: 12 CAPSULE | Refills: 0 | Status: SHIPPED | OUTPATIENT
Start: 2018-04-23 | End: 2018-07-12 | Stop reason: SDUPTHER

## 2018-07-12 DIAGNOSIS — E55.9 VITAMIN D DEFICIENCY: ICD-10-CM

## 2018-07-12 RX ORDER — ERGOCALCIFEROL 1.25 MG/1
CAPSULE ORAL
Qty: 12 CAPSULE | Refills: 0 | Status: SHIPPED | OUTPATIENT
Start: 2018-07-12 | End: 2018-10-20 | Stop reason: SDUPTHER

## 2018-07-23 ENCOUNTER — OFFICE VISIT (OUTPATIENT)
Dept: INTERNAL MEDICINE | Facility: CLINIC | Age: 63
End: 2018-07-23
Payer: COMMERCIAL

## 2018-07-23 ENCOUNTER — TELEPHONE (OUTPATIENT)
Dept: INTERNAL MEDICINE | Facility: CLINIC | Age: 63
End: 2018-07-23

## 2018-07-23 VITALS
BODY MASS INDEX: 29.09 KG/M2 | WEIGHT: 174.63 LBS | HEIGHT: 65 IN | HEART RATE: 82 BPM | RESPIRATION RATE: 18 BRPM | TEMPERATURE: 98 F | DIASTOLIC BLOOD PRESSURE: 80 MMHG | SYSTOLIC BLOOD PRESSURE: 118 MMHG

## 2018-07-23 DIAGNOSIS — D69.6 THROMBOCYTOPENIA: ICD-10-CM

## 2018-07-23 DIAGNOSIS — J98.8 CONGESTION OF RESPIRATORY TRACT: Primary | ICD-10-CM

## 2018-07-23 DIAGNOSIS — J01.40 ACUTE NON-RECURRENT PANSINUSITIS: ICD-10-CM

## 2018-07-23 PROBLEM — D61.818 PANCYTOPENIA: Status: RESOLVED | Noted: 2017-02-22 | Resolved: 2018-07-23

## 2018-07-23 PROCEDURE — 3074F SYST BP LT 130 MM HG: CPT | Mod: CPTII,S$GLB,, | Performed by: INTERNAL MEDICINE

## 2018-07-23 PROCEDURE — 3079F DIAST BP 80-89 MM HG: CPT | Mod: CPTII,S$GLB,, | Performed by: INTERNAL MEDICINE

## 2018-07-23 PROCEDURE — 3008F BODY MASS INDEX DOCD: CPT | Mod: CPTII,S$GLB,, | Performed by: INTERNAL MEDICINE

## 2018-07-23 PROCEDURE — 99214 OFFICE O/P EST MOD 30 MIN: CPT | Mod: 25,S$GLB,, | Performed by: INTERNAL MEDICINE

## 2018-07-23 PROCEDURE — 99999 PR PBB SHADOW E&M-EST. PATIENT-LVL III: CPT | Mod: PBBFAC,,, | Performed by: INTERNAL MEDICINE

## 2018-07-23 PROCEDURE — 96372 THER/PROPH/DIAG INJ SC/IM: CPT | Mod: S$GLB,,, | Performed by: INTERNAL MEDICINE

## 2018-07-23 RX ORDER — METHYLPREDNISOLONE ACETATE 80 MG/ML
40 INJECTION, SUSPENSION INTRA-ARTICULAR; INTRALESIONAL; INTRAMUSCULAR; SOFT TISSUE
Status: COMPLETED | OUTPATIENT
Start: 2018-07-23 | End: 2018-07-23

## 2018-07-23 RX ORDER — AMOXICILLIN AND CLAVULANATE POTASSIUM 875; 125 MG/1; MG/1
1 TABLET, FILM COATED ORAL EVERY 12 HOURS
Qty: 14 TABLET | Refills: 0 | Status: SHIPPED | OUTPATIENT
Start: 2018-07-23 | End: 2018-07-30

## 2018-07-23 RX ADMIN — METHYLPREDNISOLONE ACETATE 40 MG: 80 INJECTION, SUSPENSION INTRA-ARTICULAR; INTRALESIONAL; INTRAMUSCULAR; SOFT TISSUE at 02:07

## 2018-07-23 NOTE — PROGRESS NOTES
"Subjective:      Patient ID: Conchita Rouse is a 63 y.o. female.    Chief Complaint: Cough      HPI     Ms. Conchita Rouse is a patient of Roberto Ron MD, who presents for 2 weeks of post-nasal drip, dry cough, which has broken up by Mucinex, and subjective f/c. No facial or tooth or ear pains, but she reports body aches. No other OTC rx's tried.     She notes having a PLT of 74 and normal WBC/RBC last week, which was ordered by her hematologist, Dr. De Leon.     Labs reviewed and discussed with patient, which she was able to pull up on her phone from 7/18/18, which revealed PLT 72 (63 last month) with normal WBC/RBC counts.      Past Medical History:   Diagnosis Date    History of shingles 02/14/2018    Per note from Dr. Rogelio Norwood III on 2/14/18; left upper buttocks.    Hyperlipidemia     Hypertension     PNH (paroxysmal nocturnal hemoglobinuria) 02/21/2017    [facility] small; Followed by Dr. Rogelio Norwood III last visit 2/14/18 (tolerating CSA & promacta); also notes aplastic anemia (AA psot Horse ATG - week 7/17/17) and ITP, for which ATG & promacta following decadron pulse for ITP & AA; plan for second opinion on BM bx; next flow in 3 mo; intent of rx: palliative    TIA (transient ischemic attack) 2007    She was at "[facility]", where she noted she was feeling slow, took a nap, woke up with a numb/tingling left jaw to arm, which persistned 20 minutes, took 3 baby aspirins, went to ER @ [facility], where facial droop was noted, but completely resolved & head CT revealed old minor abnormality.    Vitamin D deficiency      Past Surgical History:   Procedure Laterality Date    COLONOSCOPY      left eye surgery  1955    Due to left eye lid drooping     Social History     Social History    Marital status:      Spouse name: N/A    Number of children: N/A    Years of education: N/A     Occupational History    Not on file.     Social History Main Topics    Smoking status: Never Smoker    Smokeless " tobacco: Never Used    Alcohol use 0.0 - 0.6 oz/week      Comment: 1/2 beer 3x/yr    Drug use: No    Sexual activity: Not Currently     Partners: Male     Birth control/ protection: Post-menopausal     Other Topics Concern    Not on file     Social History Narrative    Breakfast: Skips or eggs; 1 cup coffee w/ 1 cream & 3 sugars    Lunch: Skips or turkey or ham sandwich w/ occasional chips; tea, rare soda    Dinner: Chicken wings and french fries; tea or cool aid    Snacks: Chocolates i.e. covered strawberry or Pretzils     Eats out: 2x/wk     Family History   Problem Relation Age of Onset    Heart disease Mother     Diabetes Mother     Heart disease Father     Cancer Sister     Cancer Brother        Current Outpatient Prescriptions:     amLODIPine (NORVASC) 5 MG tablet, Take 1 tablet (5 mg total) by mouth once daily., Disp: 90 tablet, Rfl: 3    atorvastatin (LIPITOR) 40 MG tablet, Take 0.5 tablets (20 mg total) by mouth once daily., Disp: 45 tablet, Rfl: 3    cycloSPORINE (SANDIMMUNE) 100 MG Cap, Take 200 mg by mouth., Disp: , Rfl:     eltrombopag (PROMACTA) 75 mg Tab, 150 mg., Disp: , Rfl:     fluticasone (FLONASE) 50 mcg/actuation nasal spray, USE 2 SPRAYS IN EACH NOSTRIL 1 TIME A DAY AS NEEDED, Disp: , Rfl: 0    gabapentin (NEURONTIN) 300 MG capsule, , Disp: , Rfl:     loratadine (CLARITIN) 10 mg tablet, Take 1 tablet (10 mg total) by mouth once daily., Disp: 30 tablet, Rfl: 3    ondansetron (ZOFRAN-ODT) 4 MG TbDL, Take 1 tablet (4 mg total) by mouth every 8 (eight) hours as needed (nausea)., Disp: 30 tablet, Rfl: 11    tramadol (ULTRAM) 50 mg tablet, Take 50 mg by mouth 2 (two) times daily as needed., Disp: , Rfl: 0    valacyclovir (VALTREX) 500 MG tablet, Take 500 mg by mouth., Disp: , Rfl:     VITAMIN D2 50,000 unit capsule, TAKE 1 CAPSULE BY MOUTH ONCE A WEEK., Disp: 12 capsule, Rfl: 0    amoxicillin-clavulanate 875-125mg (AUGMENTIN) 875-125 mg per tablet, Take 1 tablet by mouth every 12  "(twelve) hours. for 7 days, Disp: 14 tablet, Rfl: 0    Current Facility-Administered Medications:     methylPREDNISolone acetate injection 40 mg, 40 mg, Intramuscular, 1 time in Clinic/HOD, Roberto Ron MD    Review of patient's allergies indicates:   Allergen Reactions    Codeine Other (See Comments)     Pt states codeine does not cause hives. Had tooth extraction and medication given caused her to be jittery, feel hot and have to lay down like she was weak. morhpine given on 5/16/2017 iv for pain. Pt martine well without any sign or sx of allergy or reaction.        Review of Systems   All remaining systems negative    Objective:     /80 (BP Location: Left arm, Patient Position: Sitting, BP Method: Large (Manual))   Pulse 82   Temp 97.6 °F (36.4 °C) (Tympanic)   Resp 18   Ht 5' 5" (1.651 m)   Wt 79.2 kg (174 lb 9.7 oz)   BMI 29.06 kg/m²     Physical Exam  GEN: A&O fully, NAD  PSYC: Normal affect  HEENT: OP: Clear, no LAD, no thyroid masses, auditory canals and TMs WNL  CV: RRR, no M/G/R  PULM: CTA bilaterally, no wheezes, rales, crackles       Lab Results   Component Value Date    WBC 4.25 03/02/2018    HGB 13.3 03/02/2018    HCT 39.6 03/02/2018    PLT 84 (L) 03/02/2018    CHOL 147 03/02/2018    TRIG 71 03/02/2018    HDL 36 (L) 03/02/2018    LDLCALC 96.8 03/02/2018    ALT 19 03/02/2018    AST 17 03/02/2018     03/02/2018    K 4.7 03/02/2018     03/02/2018    CREATININE 0.8 03/02/2018    BUN 12 03/02/2018    CO2 30 (H) 03/02/2018    CALCIUM 9.9 03/02/2018    INR 1.0 04/05/2017       Assessment:      1. Congestion of respiratory tract: Risks and benefits discussed and patient chose to move forward with steroid inj today and if her sx are not improved with conservative measures I.e. Ev/lemon/honey herbal juice to sip on prn, then will start Augmentin given immunocompromised state from cyclosporine & eltrombopag.   2. Thrombocytopenia: Persistent, but RBC/WBC counts WNL per pt. Will " obtain records.    3.      Pancytopenia: Resolved, except for PLT.  4.      Vitamin D deficiency: Continues to take vitamin D2 50K IU qWk. Recommended changing to OTC           Vitamin D3 1,000 IU po QD since last vitamin D was 25.    Plan:   Congestion of respiratory tract  -     methylPREDNISolone acetate injection 40 mg; Inject 0.5 mLs (40 mg total) into the muscle one time.  -     amoxicillin-clavulanate 875-125mg (AUGMENTIN) 875-125 mg per tablet; Take 1 tablet by mouth every 12 (twelve) hours. for 7 days  Dispense: 14 tablet; Refill: 0    Thrombocytopenia    Acute non-recurrent pansinusitis  -     amoxicillin-clavulanate 875-125mg (AUGMENTIN) 875-125 mg per tablet; Take 1 tablet by mouth every 12 (twelve) hours. for 7 days  Dispense: 14 tablet; Refill: 0        Follow-up in about 7 months (around 2/23/2019), or if symptoms worsen or fail to improve, for Annual.    I spent 25 minutes of time with patient 50% or more of which was discussing labs and plans of care.

## 2018-07-23 NOTE — TELEPHONE ENCOUNTER
----- Message from Radha Ely sent at 7/23/2018 11:05 AM CDT -----  Contact: pt   States she's rtn nurses call and can be reached at [phone]/ pls leave a message when calling back per pt //thanks/jaspreetw

## 2018-08-08 ENCOUNTER — TELEPHONE (OUTPATIENT)
Dept: INTERNAL MEDICINE | Facility: CLINIC | Age: 63
End: 2018-08-08

## 2018-08-08 NOTE — TELEPHONE ENCOUNTER
----- Message from Benita Castillo sent at 8/8/2018  8:01 AM CDT -----  Contact: pt  Please call pt @ [phone], pt states she need a new script called into [facility], pt have a bladder infection.

## 2018-08-10 ENCOUNTER — TELEPHONE (OUTPATIENT)
Dept: INTERNAL MEDICINE | Facility: CLINIC | Age: 63
End: 2018-08-10

## 2018-08-10 ENCOUNTER — OFFICE VISIT (OUTPATIENT)
Dept: INTERNAL MEDICINE | Facility: CLINIC | Age: 63
End: 2018-08-10
Payer: COMMERCIAL

## 2018-08-10 VITALS
WEIGHT: 171.5 LBS | HEIGHT: 65 IN | RESPIRATION RATE: 18 BRPM | HEART RATE: 73 BPM | OXYGEN SATURATION: 96 % | DIASTOLIC BLOOD PRESSURE: 66 MMHG | SYSTOLIC BLOOD PRESSURE: 100 MMHG | TEMPERATURE: 97 F | BODY MASS INDEX: 28.57 KG/M2

## 2018-08-10 DIAGNOSIS — R09.81 NASAL CONGESTION: ICD-10-CM

## 2018-08-10 DIAGNOSIS — N34.2 INFECTIVE URETHRITIS: Primary | ICD-10-CM

## 2018-08-10 LAB
BILIRUB SERPL-MCNC: ABNORMAL MG/DL
BLOOD, POC UA: 250
GLUCOSE UR QL STRIP: NORMAL
KETONES UR QL STRIP: NEGATIVE
LEUKOCYTE ESTERASE URINE, POC: ABNORMAL
NITRITE, POC UA: POSITIVE
PH, POC UA: 6
PROTEIN, POC: 30
SPECIFIC GRAVITY, POC UA: 1.02
UROBILINOGEN, POC UA: NORMAL

## 2018-08-10 PROCEDURE — 87186 SC STD MICRODIL/AGAR DIL: CPT

## 2018-08-10 PROCEDURE — 3008F BODY MASS INDEX DOCD: CPT | Mod: CPTII,S$GLB,, | Performed by: INTERNAL MEDICINE

## 2018-08-10 PROCEDURE — 3078F DIAST BP <80 MM HG: CPT | Mod: CPTII,S$GLB,, | Performed by: INTERNAL MEDICINE

## 2018-08-10 PROCEDURE — 99999 PR PBB SHADOW E&M-EST. PATIENT-LVL III: CPT | Mod: PBBFAC,,, | Performed by: INTERNAL MEDICINE

## 2018-08-10 PROCEDURE — 3074F SYST BP LT 130 MM HG: CPT | Mod: CPTII,S$GLB,, | Performed by: INTERNAL MEDICINE

## 2018-08-10 PROCEDURE — 87086 URINE CULTURE/COLONY COUNT: CPT

## 2018-08-10 PROCEDURE — 81000 URINALYSIS NONAUTO W/SCOPE: CPT | Mod: S$GLB,,, | Performed by: INTERNAL MEDICINE

## 2018-08-10 PROCEDURE — 87088 URINE BACTERIA CULTURE: CPT

## 2018-08-10 PROCEDURE — 99214 OFFICE O/P EST MOD 30 MIN: CPT | Mod: 25,S$GLB,, | Performed by: INTERNAL MEDICINE

## 2018-08-10 PROCEDURE — 87077 CULTURE AEROBIC IDENTIFY: CPT

## 2018-08-10 RX ORDER — SULFAMETHOXAZOLE AND TRIMETHOPRIM 800; 160 MG/1; MG/1
1 TABLET ORAL 2 TIMES DAILY
Qty: 6 TABLET | Refills: 0 | Status: SHIPPED | OUTPATIENT
Start: 2018-08-10 | End: 2018-08-13

## 2018-08-10 RX ORDER — LORATADINE 10 MG/1
10 TABLET ORAL DAILY
Qty: 30 TABLET | Refills: 3 | Status: SHIPPED | OUTPATIENT
Start: 2018-08-10 | End: 2020-02-17 | Stop reason: SDUPTHER

## 2018-08-10 NOTE — TELEPHONE ENCOUNTER
Pt was called and informed that if she feels that she has an uti then she needs to come in to get a urine specimen collected and to see the provider. Pt voiced understanding./db**

## 2018-08-10 NOTE — PROGRESS NOTES
"Subjective:      Patient ID: Conchita Rouse is a 63 y.o. female.    Chief Complaint: Urinary Tract Infection      Urinary Tract Infection           Ms. Conchita Rouse is a patient of Roberto Ron MD, who presents for UTI and nasal congestion, for which she was taking a 7 day course amoxicillin-clavulunate (Augmentin) for sinusitis, which she recently completed. She reports having nasal congestion. No sore throat.     She notes dysuria over the past 3 days. +urinary frequency. No f/c. No LBP.       Past Medical History:   Diagnosis Date    History of shingles 02/14/2018    Per note from Dr. Rogelio Norwood III on 2/14/18; left upper buttocks.    Hyperlipidemia     Hypertension     PNH (paroxysmal nocturnal hemoglobinuria) 02/21/2017    [facility]; Followed by Dr. Rogelio Norwood III last visit 2/14/18 (tolerating CSA & promacta); also notes aplastic anemia (AA psot Horse ATG - week 7/17/17) and ITP, for which ATG & promacta following decadron pulse for ITP & AA; plan for second opinion on BM bx; next flow in 3 mo; intent of rx: palliative    TIA (transient ischemic attack) 2007    She was at "[facility]", where she noted she was feeling slow, took a nap, woke up with a numb/tingling left jaw to arm, which persistned 20 minutes, took 3 baby aspirins, went to ER @ [facility], where facial droop was noted, but completely resolved & head CT revealed old minor abnormality.    Vitamin D deficiency      Past Surgical History:   Procedure Laterality Date    COLONOSCOPY      left eye surgery  1955    Due to left eye lid drooping     Social History     Social History    Marital status:      Spouse name: N/A    Number of children: N/A    Years of education: N/A     Occupational History    Not on file.     Social History Main Topics    Smoking status: Never Smoker    Smokeless tobacco: Never Used    Alcohol use 0.0 - 0.6 oz/week      Comment: 1/2 beer 3x/yr    Drug use: No    Sexual activity: Not Currently "     Partners: Male     Birth control/ protection: Post-menopausal     Other Topics Concern    Not on file     Social History Narrative    Breakfast: Skips or eggs; 1 cup coffee w/ 1 cream & 3 sugars    Lunch: Skips or turkey or ham sandwich w/ occasional chips; tea, rare soda    Dinner: Chicken wings and french fries; tea or cool aid    Snacks: Chocolates i.e. covered strawberry or Pretzils     Eats out: 2x/wk     Family History   Problem Relation Age of Onset    Heart disease Mother     Diabetes Mother     Heart disease Father     Cancer Sister     Cancer Brother        Current Outpatient Prescriptions:     atorvastatin (LIPITOR) 40 MG tablet, Take 0.5 tablets (20 mg total) by mouth once daily., Disp: 45 tablet, Rfl: 3    eltrombopag (PROMACTA) 75 mg Tab, 150 mg., Disp: , Rfl:     fluticasone (FLONASE) 50 mcg/actuation nasal spray, USE 2 SPRAYS IN EACH NOSTRIL 1 TIME A DAY AS NEEDED, Disp: , Rfl: 0    gabapentin (NEURONTIN) 300 MG capsule, , Disp: , Rfl:     loratadine (CLARITIN) 10 mg tablet, Take 1 tablet (10 mg total) by mouth once daily., Disp: 30 tablet, Rfl: 3    ondansetron (ZOFRAN-ODT) 4 MG TbDL, Take 1 tablet (4 mg total) by mouth every 8 (eight) hours as needed (nausea)., Disp: 30 tablet, Rfl: 11    tramadol (ULTRAM) 50 mg tablet, Take 50 mg by mouth 2 (two) times daily as needed., Disp: , Rfl: 0    valacyclovir (VALTREX) 500 MG tablet, Take 500 mg by mouth., Disp: , Rfl:     VITAMIN D2 50,000 unit capsule, TAKE 1 CAPSULE BY MOUTH ONCE A WEEK., Disp: 12 capsule, Rfl: 0    cycloSPORINE (SANDIMMUNE) 100 MG Cap, Take 200 mg by mouth., Disp: , Rfl:     sulfamethoxazole-trimethoprim 800-160mg (BACTRIM DS) 800-160 mg Tab, Take 1 tablet by mouth 2 (two) times daily. for 3 days, Disp: 6 tablet, Rfl: 0    Review of patient's allergies indicates:   Allergen Reactions    Codeine Other (See Comments)     Pt states codeine does not cause hives. Had tooth extraction and medication given caused her to  "be jittery, feel hot and have to lay down like she was weak. morhpine given on 5/16/2017 iv for pain. Pt martine well without any sign or sx of allergy or reaction.        Review of Systems   All remaining systems negative    Objective:     /66 (BP Location: Left arm, Patient Position: Sitting, BP Method: Large (Manual))   Pulse 73   Temp 97.1 °F (36.2 °C) (Tympanic)   Resp 18   Ht 5' 5" (1.651 m)   Wt 77.8 kg (171 lb 8.3 oz)   SpO2 96%   BMI 28.54 kg/m²     Physical Exam  GEN: A&O fully, NAD  PSYC: Normal affect  : No TTP or percussion at costophrenic angle bilaterally      Lab Results   Component Value Date    WBC 4.25 03/02/2018    HGB 13.3 03/02/2018    HCT 39.6 03/02/2018    PLT 84 (L) 03/02/2018    CHOL 147 03/02/2018    TRIG 71 03/02/2018    HDL 36 (L) 03/02/2018    LDLCALC 96.8 03/02/2018    ALT 19 03/02/2018    AST 17 03/02/2018     03/02/2018    K 4.7 03/02/2018     03/02/2018    CREATININE 0.8 03/02/2018    BUN 12 03/02/2018    CO2 30 (H) 03/02/2018    CALCIUM 9.9 03/02/2018    INR 1.0 04/05/2017       Assessment:      1. Infective urethritis: Risks and benefits discussed and patient chose to move forward with Bacrim DM 1 po BID x3d. Will send for Cx/sn.    2. Nasal congestion: Likely 2/2 allergies. Risks and benefits discussed and patient chose to move forward with renewal of her laratadine 10 mg 1 po qd.        Plan:   Infective urethritis  -     POCT Urinalysis  -     Urine culture    Nasal congestion  -     loratadine (CLARITIN) 10 mg tablet; Take 1 tablet (10 mg total) by mouth once daily.  Dispense: 30 tablet; Refill: 3    Other orders  -     sulfamethoxazole-trimethoprim 800-160mg (BACTRIM DS) 800-160 mg Tab; Take 1 tablet by mouth 2 (two) times daily. for 3 days  Dispense: 6 tablet; Refill: 0  -     Influenza - Quadrivalent (3 years & older); Future; Expected date: 10/01/2018        Follow-up in about 3 months (around 11/10/2018), or if symptoms worsen or fail to improve, " for FU on overweight.    I spent 25 minutes of time with patient 50% or more of which was discussing labs and plans of care.

## 2018-08-10 NOTE — TELEPHONE ENCOUNTER
----- Message from Alanna Lawson sent at 8/9/2018  3:59 PM CDT -----  Pt is requesting a call from nurse to f/ on a prescription.          Please call pt back at [phone]

## 2018-08-13 LAB — BACTERIA UR CULT: NORMAL

## 2018-08-31 ENCOUNTER — PATIENT OUTREACH (OUTPATIENT)
Dept: ADMINISTRATIVE | Facility: HOSPITAL | Age: 63
End: 2018-08-31

## 2018-10-06 DIAGNOSIS — E55.9 VITAMIN D DEFICIENCY: ICD-10-CM

## 2018-10-08 RX ORDER — ERGOCALCIFEROL 1.25 MG/1
CAPSULE ORAL
Qty: 12 CAPSULE | Refills: 0 | OUTPATIENT
Start: 2018-10-08

## 2018-10-20 DIAGNOSIS — E55.9 VITAMIN D DEFICIENCY: ICD-10-CM

## 2018-10-23 RX ORDER — ERGOCALCIFEROL 1.25 MG/1
CAPSULE ORAL
Qty: 12 CAPSULE | Refills: 0 | Status: SHIPPED | OUTPATIENT
Start: 2018-10-23 | End: 2020-02-17

## 2019-02-04 ENCOUNTER — OFFICE VISIT (OUTPATIENT)
Dept: INTERNAL MEDICINE | Facility: CLINIC | Age: 64
End: 2019-02-04
Payer: COMMERCIAL

## 2019-02-04 VITALS
HEIGHT: 65 IN | SYSTOLIC BLOOD PRESSURE: 143 MMHG | DIASTOLIC BLOOD PRESSURE: 74 MMHG | HEART RATE: 72 BPM | RESPIRATION RATE: 16 BRPM | OXYGEN SATURATION: 95 % | WEIGHT: 185.19 LBS | TEMPERATURE: 99 F | BODY MASS INDEX: 30.85 KG/M2

## 2019-02-04 DIAGNOSIS — R30.0 DYSURIA: Primary | ICD-10-CM

## 2019-02-04 LAB
BACTERIA #/AREA URNS AUTO: ABNORMAL /HPF
BILIRUB UR QL STRIP: NEGATIVE
CLARITY UR REFRACT.AUTO: ABNORMAL
COLOR UR AUTO: ABNORMAL
GLUCOSE UR QL STRIP: NEGATIVE
HGB UR QL STRIP: ABNORMAL
HYALINE CASTS UR QL AUTO: 0 /LPF
KETONES UR QL STRIP: NEGATIVE
LEUKOCYTE ESTERASE UR QL STRIP: ABNORMAL
MICROSCOPIC COMMENT: ABNORMAL
NITRITE UR QL STRIP: POSITIVE
PH UR STRIP: 5 [PH] (ref 5–8)
PROT UR QL STRIP: ABNORMAL
RBC #/AREA URNS AUTO: 28 /HPF (ref 0–4)
SP GR UR STRIP: 1.02 (ref 1–1.03)
SQUAMOUS #/AREA URNS AUTO: 13 /HPF
URN SPEC COLLECT METH UR: ABNORMAL
WBC #/AREA URNS AUTO: >100 /HPF (ref 0–5)
WBC CLUMPS UR QL AUTO: ABNORMAL

## 2019-02-04 PROCEDURE — 3008F PR BODY MASS INDEX (BMI) DOCUMENTED: ICD-10-PCS | Mod: CPTII,S$GLB,, | Performed by: FAMILY MEDICINE

## 2019-02-04 PROCEDURE — 3078F DIAST BP <80 MM HG: CPT | Mod: CPTII,S$GLB,, | Performed by: FAMILY MEDICINE

## 2019-02-04 PROCEDURE — 99213 OFFICE O/P EST LOW 20 MIN: CPT | Mod: S$GLB,,, | Performed by: FAMILY MEDICINE

## 2019-02-04 PROCEDURE — 3078F PR MOST RECENT DIASTOLIC BLOOD PRESSURE < 80 MM HG: ICD-10-PCS | Mod: CPTII,S$GLB,, | Performed by: FAMILY MEDICINE

## 2019-02-04 PROCEDURE — 87088 URINE BACTERIA CULTURE: CPT

## 2019-02-04 PROCEDURE — 81001 URINALYSIS AUTO W/SCOPE: CPT

## 2019-02-04 PROCEDURE — 99213 PR OFFICE/OUTPT VISIT, EST, LEVL III, 20-29 MIN: ICD-10-PCS | Mod: S$GLB,,, | Performed by: FAMILY MEDICINE

## 2019-02-04 PROCEDURE — 87086 URINE CULTURE/COLONY COUNT: CPT

## 2019-02-04 PROCEDURE — 99999 PR PBB SHADOW E&M-EST. PATIENT-LVL V: ICD-10-PCS | Mod: PBBFAC,,, | Performed by: FAMILY MEDICINE

## 2019-02-04 PROCEDURE — 99999 PR PBB SHADOW E&M-EST. PATIENT-LVL V: CPT | Mod: PBBFAC,,, | Performed by: FAMILY MEDICINE

## 2019-02-04 PROCEDURE — 3077F PR MOST RECENT SYSTOLIC BLOOD PRESSURE >= 140 MM HG: ICD-10-PCS | Mod: CPTII,S$GLB,, | Performed by: FAMILY MEDICINE

## 2019-02-04 PROCEDURE — 3008F BODY MASS INDEX DOCD: CPT | Mod: CPTII,S$GLB,, | Performed by: FAMILY MEDICINE

## 2019-02-04 PROCEDURE — 87077 CULTURE AEROBIC IDENTIFY: CPT

## 2019-02-04 PROCEDURE — 87186 SC STD MICRODIL/AGAR DIL: CPT

## 2019-02-04 PROCEDURE — 3077F SYST BP >= 140 MM HG: CPT | Mod: CPTII,S$GLB,, | Performed by: FAMILY MEDICINE

## 2019-02-04 RX ORDER — SULFAMETHOXAZOLE AND TRIMETHOPRIM 800; 160 MG/1; MG/1
1 TABLET ORAL 2 TIMES DAILY
Qty: 14 TABLET | Refills: 0 | Status: SHIPPED | OUTPATIENT
Start: 2019-02-04 | End: 2019-02-05 | Stop reason: SINTOL

## 2019-02-04 NOTE — PROGRESS NOTES
"Subjective:      Patient ID: Conchita Rouse is a 63 y.o. female.    Chief Complaint: Urinary Frequency (Burning with urination )      Patient reports dysuria, frequency for about 2 days now, Has had UTIs in the past and felt similar symptoms then, Afebrile. She did use AZO for the past couple of days.      Review of Systems   Constitutional: Negative for activity change, appetite change, fatigue and fever.   Gastrointestinal: Negative for abdominal pain.   Genitourinary: Positive for dysuria, frequency and urgency. Negative for decreased urine volume, difficulty urinating, flank pain and hematuria.   Musculoskeletal: Negative for back pain.     Past Medical History:   Diagnosis Date    History of shingles 02/14/2018    Per note from Dr. Rogelio Norwood III on 2/14/18; left upper buttocks.    Hyperlipidemia     Hypertension     PNH (paroxysmal nocturnal hemoglobinuria) 02/21/2017    [facility] small; Followed by Dr. Rogelio Norwood III last visit 2/14/18 (tolerating CSA & promacta); also notes aplastic anemia (AA psot Horse ATG - week 7/17/17) and ITP, for which ATG & promacta following decadron pulse for ITP & AA; plan for second opinion on BM bx; next flow in 3 mo; intent of rx: palliative    TIA (transient ischemic attack) 2007    She was at "[facility]", where she noted she was feeling slow, took a nap, woke up with a numb/tingling left jaw to arm, which persistned 20 minutes, took 3 baby aspirins, went to ER @ [facility], where facial droop was noted, but completely resolved & head CT revealed old minor abnormality.    Vitamin D deficiency      Past Surgical History:   Procedure Laterality Date    BIOPSY-BONE MARROW Left 5/16/2017    [facility]formed by [facility] at [facility]    COLONOSCOPY      left eye surgery  1955    Due to left eye lid drooping     Family History   Problem Relation Age of Onset    Heart disease Mother     Diabetes Mother     Heart disease Father     Cancer Sister     Cancer Brother  " "    Social History     Socioeconomic History    Marital status:      Spouse name: Not on file    Number of children: Not on file    Years of education: Not on file    Highest education level: Not on file   Social Needs    Financial resource strain: Not on file    Food insecurity - worry: Not on file    Food insecurity - inability: Not on file    Transportation needs - medical: Not on file    Transportation needs - non-medical: Not on file   Occupational History    Not on file   Tobacco Use    Smoking status: Never Smoker    Smokeless tobacco: Never Used   Substance and Sexual Activity    Alcohol use: Yes     Alcohol/week: 0.0 - 0.6 oz     Comment: 1/2 beer 3x/yr    Drug use: No    Sexual activity: Not Currently     Partners: Male     Birth control/protection: Post-menopausal   Other Topics Concern    Not on file   Social History Narrative    Breakfast: Skips or eggs; 1 cup coffee w/ 1 cream & 3 sugars    Lunch: Skips or turkey or ham sandwich w/ occasional chips; tea, rare soda    Dinner: Chicken wings and french fries; tea or cool aid    Snacks: Chocolates i.e. covered strawberry or Pretzils     Eats out: 2x/wk     Review of patient's allergies indicates:  No Active Allergies    Objective:       BP (!) 143/74   Pulse 72   Temp 98.7 °F (37.1 °C)   Resp 16   Ht 5' 5" (1.651 m)   Wt 84 kg (185 lb 3 oz)   SpO2 95%   BMI 30.82 kg/m²   Physical Exam   Constitutional: She appears well-developed and well-nourished. No distress.   Cardiovascular: Normal rate, regular rhythm and normal heart sounds.   Pulmonary/Chest: Effort normal and breath sounds normal. No respiratory distress.   Abdominal: Soft. Bowel sounds are normal. There is no tenderness.   No CVA tenderness   Skin: She is not diaphoretic.   Psychiatric: She has a normal mood and affect. Her behavior is normal.   Nursing note and vitals reviewed.    Assessment:     1. Dysuria      Plan:   Dysuria  -     Urine culture  -     " URINALYSIS    Other orders  -     sulfamethoxazole-trimethoprim 800-160mg (BACTRIM DS) 800-160 mg Tab; Take 1 tablet by mouth 2 (two) times daily.  Dispense: 14 tablet; Refill: 0      Medication List with Changes/Refills   New Medications    SULFAMETHOXAZOLE-TRIMETHOPRIM 800-160MG (BACTRIM DS) 800-160 MG TAB    Take 1 tablet by mouth 2 (two) times daily.   Current Medications    CYCLOSPORINE (SANDIMMUNE) 100 MG CAP    Take 200 mg by mouth.    ELTROMBOPAG (PROMACTA) 75 MG TAB    150 mg.    FLUTICASONE (FLONASE) 50 MCG/ACTUATION NASAL SPRAY    USE 2 SPRAYS IN EACH NOSTRIL 1 TIME A DAY AS NEEDED    LORATADINE (CLARITIN) 10 MG TABLET    Take 1 tablet (10 mg total) by mouth once daily.    TRAMADOL (ULTRAM) 50 MG TABLET    Take 50 mg by mouth 2 (two) times daily as needed.    VALACYCLOVIR (VALTREX) 500 MG TABLET    Take 500 mg by mouth.    VITAMIN D2 50,000 UNIT CAPSULE    TAKE 1 CAPSULE BY MOUTH ONCE A WEEK.   Discontinued Medications    ATORVASTATIN (LIPITOR) 40 MG TABLET    Take 0.5 tablets (20 mg total) by mouth once daily.    GABAPENTIN (NEURONTIN) 300 MG CAPSULE        ONDANSETRON (ZOFRAN-ODT) 4 MG TBDL    Take 1 tablet (4 mg total) by mouth every 8 (eight) hours as needed (nausea).

## 2019-02-05 ENCOUNTER — TELEPHONE (OUTPATIENT)
Dept: INTERNAL MEDICINE | Facility: CLINIC | Age: 64
End: 2019-02-05

## 2019-02-05 RX ORDER — NITROFURANTOIN 25; 75 MG/1; MG/1
100 CAPSULE ORAL 2 TIMES DAILY
Qty: 14 CAPSULE | Refills: 0 | Status: SHIPPED | OUTPATIENT
Start: 2019-02-05 | End: 2019-02-08 | Stop reason: SINTOL

## 2019-02-05 NOTE — TELEPHONE ENCOUNTER
----- Message from Radha Ely sent at 2/5/2019  2:43 PM CST -----  Contact: Pt   States she's rtn nurses call and can be reached at [phone] //thanks/dbw

## 2019-02-05 NOTE — TELEPHONE ENCOUNTER
Tell her to stop the bactrim, it is probably not a interaction but an allergy to the bactrim. I sent rx for macrobid to her pharmacy.

## 2019-02-05 NOTE — TELEPHONE ENCOUNTER
Pt was seen on yesterday for UTi symptoms . Was prescribed Bactrim Ds. Pt woke up this morning to swollen lips that are very red with crust edges pt stated she is taking medication for aplastic anemia and she thinks it might be a reaction with her medication . Please advise .

## 2019-02-07 ENCOUNTER — TELEPHONE (OUTPATIENT)
Dept: INTERNAL MEDICINE | Facility: CLINIC | Age: 64
End: 2019-02-07

## 2019-02-07 LAB — BACTERIA UR CULT: NORMAL

## 2019-02-07 NOTE — TELEPHONE ENCOUNTER
----- Message from Nancy Valles MD sent at 2/7/2019  1:01 PM CST -----  Please notify that her urine did grow out infection, the antibiotic I started her on should clear it up.

## 2019-02-08 ENCOUNTER — TELEPHONE (OUTPATIENT)
Dept: INTERNAL MEDICINE | Facility: CLINIC | Age: 64
End: 2019-02-08

## 2019-02-08 RX ORDER — CIPROFLOXACIN 500 MG/1
500 TABLET ORAL EVERY 12 HOURS
Qty: 10 TABLET | Refills: 0 | Status: SHIPPED | OUTPATIENT
Start: 2019-02-08 | End: 2019-02-21

## 2019-02-08 NOTE — TELEPHONE ENCOUNTER
Called pt left message that cipro has been ordered and sent  to her pharmacy, she can switch to that from the macrobid. To call office if any questions.

## 2019-02-08 NOTE — TELEPHONE ENCOUNTER
Called pt she reports that she is having diarrhea and nausea.  She has taken the Macrobid 100 mg for the past 3 days and feels that the medication is causing her symptoms.  She is requesting another medication for her UTI.  Please advise.

## 2019-02-08 NOTE — TELEPHONE ENCOUNTER
----- Message from Snehal Samuel sent at 2/8/2019 11:19 AM CST -----  Contact: pt   Call pt regarding having a reaction to the med that she was put on. Pt states that she need something else called in and need to discuss it with the nurse.    .[phone] (home)

## 2019-02-21 ENCOUNTER — OFFICE VISIT (OUTPATIENT)
Dept: INTERNAL MEDICINE | Facility: CLINIC | Age: 64
End: 2019-02-21
Payer: COMMERCIAL

## 2019-02-21 VITALS
OXYGEN SATURATION: 97 % | HEIGHT: 65 IN | BODY MASS INDEX: 29.46 KG/M2 | WEIGHT: 176.81 LBS | SYSTOLIC BLOOD PRESSURE: 140 MMHG | TEMPERATURE: 100 F | HEART RATE: 104 BPM | DIASTOLIC BLOOD PRESSURE: 90 MMHG

## 2019-02-21 DIAGNOSIS — R68.89 FLU-LIKE SYMPTOMS: Primary | ICD-10-CM

## 2019-02-21 DIAGNOSIS — Z12.11 SCREENING FOR COLON CANCER: ICD-10-CM

## 2019-02-21 LAB
CTP QC/QA: YES
POC MOLECULAR INFLUENZA A AGN: POSITIVE
POC MOLECULAR INFLUENZA B AGN: NEGATIVE

## 2019-02-21 PROCEDURE — 87502 INFLUENZA DNA AMP PROBE: CPT | Mod: QW,S$GLB,, | Performed by: INTERNAL MEDICINE

## 2019-02-21 PROCEDURE — 3077F PR MOST RECENT SYSTOLIC BLOOD PRESSURE >= 140 MM HG: ICD-10-PCS | Mod: CPTII,S$GLB,, | Performed by: INTERNAL MEDICINE

## 2019-02-21 PROCEDURE — 99213 OFFICE O/P EST LOW 20 MIN: CPT | Mod: S$GLB,,, | Performed by: INTERNAL MEDICINE

## 2019-02-21 PROCEDURE — 3008F BODY MASS INDEX DOCD: CPT | Mod: CPTII,S$GLB,, | Performed by: INTERNAL MEDICINE

## 2019-02-21 PROCEDURE — 87502 POCT INFLUENZA A/B MOLECULAR: ICD-10-PCS | Mod: QW,S$GLB,, | Performed by: INTERNAL MEDICINE

## 2019-02-21 PROCEDURE — 99999 PR PBB SHADOW E&M-EST. PATIENT-LVL III: CPT | Mod: PBBFAC,,, | Performed by: INTERNAL MEDICINE

## 2019-02-21 PROCEDURE — 3077F SYST BP >= 140 MM HG: CPT | Mod: CPTII,S$GLB,, | Performed by: INTERNAL MEDICINE

## 2019-02-21 PROCEDURE — 99213 PR OFFICE/OUTPT VISIT, EST, LEVL III, 20-29 MIN: ICD-10-PCS | Mod: S$GLB,,, | Performed by: INTERNAL MEDICINE

## 2019-02-21 PROCEDURE — 3080F PR MOST RECENT DIASTOLIC BLOOD PRESSURE >= 90 MM HG: ICD-10-PCS | Mod: CPTII,S$GLB,, | Performed by: INTERNAL MEDICINE

## 2019-02-21 PROCEDURE — 99999 PR PBB SHADOW E&M-EST. PATIENT-LVL III: ICD-10-PCS | Mod: PBBFAC,,, | Performed by: INTERNAL MEDICINE

## 2019-02-21 PROCEDURE — 3080F DIAST BP >= 90 MM HG: CPT | Mod: CPTII,S$GLB,, | Performed by: INTERNAL MEDICINE

## 2019-02-21 PROCEDURE — 3008F PR BODY MASS INDEX (BMI) DOCUMENTED: ICD-10-PCS | Mod: CPTII,S$GLB,, | Performed by: INTERNAL MEDICINE

## 2019-02-21 RX ORDER — OSELTAMIVIR PHOSPHATE 75 MG/1
75 CAPSULE ORAL 2 TIMES DAILY
Qty: 10 CAPSULE | Refills: 0 | Status: SHIPPED | OUTPATIENT
Start: 2019-02-21 | End: 2019-02-26

## 2019-02-21 RX ORDER — ONDANSETRON 4 MG/1
4 TABLET, FILM COATED ORAL EVERY 8 HOURS PRN
Qty: 30 TABLET | Refills: 0 | Status: SHIPPED | OUTPATIENT
Start: 2019-02-21 | End: 2020-02-17 | Stop reason: ALTCHOICE

## 2019-02-21 NOTE — PROGRESS NOTES
"Subjective:      Patient ID: Conchita Rouse is a 64 y.o. female.    Chief Complaint: Fever; Chills; and Headache      HPI     Ms. Conchita Rouse is a patient of Roberto Ron MD, who presents for Fever; Chills; and Headache  Rapid onset of fever to 100.4 F, chills, HA, fatigue, severe myalgias to the point of not wanting to get out of bed and nausea w/o emesis.       Past Medical History:   Diagnosis Date    History of shingles 02/14/2018    Per note from Dr. Rogelio Norwood III on 2/14/18; left upper buttocks.    Hyperlipidemia     Hypertension     PNH (paroxysmal nocturnal hemoglobinuria) 02/21/2017    [facility] small; Followed by Dr. Rogelio Norwood III last visit 2/14/18 (tolerating CSA & promacta); also notes aplastic anemia (AA psot Horse ATG - week 7/17/17) and ITP, for which ATG & promacta following decadron pulse for ITP & AA; plan for second opinion on BM bx; next flow in 3 mo; intent of rx: palliative    TIA (transient ischemic attack) 2007    She was at "[facility]", where she noted she was feeling slow, took a nap, woke up with a numb/tingling left jaw to arm, which persistned 20 minutes, took 3 baby aspirins, went to ER @ [facility], where facial droop was noted, but completely resolved & head CT revealed old minor abnormality.    Vitamin D deficiency      Past Surgical History:   Procedure Laterality Date    BIOPSY-BONE MARROW Left 5/16/2017    Performed by [facility] at [facility] OR    COLONOSCOPY      left eye surgery  1955    Due to left eye lid drooping     Social History     Socioeconomic History    Marital status:      Spouse name: Not on file    Number of children: Not on file    Years of education: Not on file    Highest education level: Not on file   Social Needs    Financial resource strain: Not on file    Food insecurity - worry: Not on file    Food insecurity - inability: Not on file    Transportation needs - medical: Not on file    Transportation needs - non-medical: " Not on file   Occupational History    Not on file   Tobacco Use    Smoking status: Never Smoker    Smokeless tobacco: Never Used   Substance and Sexual Activity    Alcohol use: Yes     Alcohol/week: 0.0 - 0.6 oz     Comment: 1/2 beer 3x/yr    Drug use: No    Sexual activity: Not Currently     Partners: Male     Birth control/protection: Post-menopausal   Other Topics Concern    Not on file   Social History Narrative    Breakfast: Skips or eggs; 1 cup coffee w/ 1 cream & 3 sugars    Lunch: Skips or turkey or ham sandwich w/ occasional chips; tea, rare soda    Dinner: Chicken wings and french fries; tea or cool aid    Snacks: Chocolates i.e. covered strawberry or Pretzils     Eats out: 2x/wk     Family History   Problem Relation Age of Onset    Heart disease Mother     Diabetes Mother     Heart disease Father     Cancer Sister     Cancer Brother        Current Outpatient Medications:     cycloSPORINE (SANDIMMUNE) 100 MG Cap, Take 200 mg by mouth., Disp: , Rfl:     eltrombopag (PROMACTA) 75 mg Tab, 150 mg., Disp: , Rfl:     fluticasone (FLONASE) 50 mcg/actuation nasal spray, USE 2 SPRAYS IN EACH NOSTRIL 1 TIME A DAY AS NEEDED, Disp: , Rfl: 0    loratadine (CLARITIN) 10 mg tablet, Take 1 tablet (10 mg total) by mouth once daily., Disp: 30 tablet, Rfl: 3    tramadol (ULTRAM) 50 mg tablet, Take 50 mg by mouth 2 (two) times daily as needed., Disp: , Rfl: 0    valacyclovir (VALTREX) 500 MG tablet, Take 500 mg by mouth., Disp: , Rfl:     VITAMIN D2 50,000 unit capsule, TAKE 1 CAPSULE BY MOUTH ONCE A WEEK., Disp: 12 capsule, Rfl: 0    ondansetron (ZOFRAN) 4 MG tablet, Take 1 tablet (4 mg total) by mouth every 8 (eight) hours as needed for Nausea., Disp: 30 tablet, Rfl: 0    oseltamivir (TAMIFLU) 75 MG capsule, Take 1 capsule (75 mg total) by mouth 2 (two) times daily. for 5 days, Disp: 10 capsule, Rfl: 0    Review of patient's allergies indicates:  No Known Allergies     Review of Systems   All remaining  "systems negative    Objective:     BP (!) 140/90 (BP Location: Left arm, Patient Position: Sitting, BP Method: Large (Manual))   Pulse 104   Temp (!) 100.4 °F (38 °C) (Tympanic)   Ht 5' 5" (1.651 m)   Wt 80.2 kg (176 lb 12.9 oz)   SpO2 97%   BMI 29.42 kg/m²     Physical Exam  GEN: A&O fully, NAD  PSYC: Normal affect  HEENT: OP: Clear, shotty LAD  CV: RRR, no M/G/R  PULM: CTA bilaterally, no wheezes, rales, crackles       Lab Results   Component Value Date    WBC 4.25 03/02/2018    HGB 13.3 03/02/2018    HCT 39.6 03/02/2018    PLT 84 (L) 03/02/2018    CHOL 147 03/02/2018    TRIG 71 03/02/2018    HDL 36 (L) 03/02/2018    LDLCALC 96.8 03/02/2018    ALT 19 03/02/2018    AST 17 03/02/2018     03/02/2018    K 4.7 03/02/2018     03/02/2018    CREATININE 0.8 03/02/2018    BUN 12 03/02/2018    CO2 30 (H) 03/02/2018    CALCIUM 9.9 03/02/2018    INR 1.0 04/05/2017       Assessment:      1. Flu-like symptoms: Risks and benefits discussed and patient chose to move forward with oseltamivir 75 mg 1 po BID x 5d.        Plan:   Flu-like symptoms  -     oseltamivir (TAMIFLU) 75 MG capsule; Take 1 capsule (75 mg total) by mouth 2 (two) times daily. for 5 days  Dispense: 10 capsule; Refill: 0    Other orders  -     ondansetron (ZOFRAN) 4 MG tablet; Take 1 tablet (4 mg total) by mouth every 8 (eight) hours as needed for Nausea.  Dispense: 30 tablet; Refill: 0        Follow-up if symptoms worsen or fail to improve.    I spent 25 minutes of time with patient 50% or more of which was discussing labs and plans of care.  "

## 2019-03-12 ENCOUNTER — TELEPHONE (OUTPATIENT)
Dept: ENDOSCOPY | Facility: HOSPITAL | Age: 64
End: 2019-03-12

## 2019-03-18 ENCOUNTER — PATIENT OUTREACH (OUTPATIENT)
Dept: ADMINISTRATIVE | Facility: HOSPITAL | Age: 64
End: 2019-03-18

## 2019-03-18 NOTE — LETTER
March 18, 2019    Conchita Rouse  [address] [address]              Dear Conchita Rouse      Our system indicates that you may be due for the following:   Health Maintenance Due   Topic    Colonoscopy     Influenza Vaccine     Mammogram     Lipid Panel        Roberto Ron MD would like you to schedule an appointment for an annual exam at your earliest convenience. If you have completed any of these health maintenance requirements at an outside facility, please contact our office so we may update your health record.    If you have any issues or need assistance in scheduling this appointment, please call the number below.     Thank you for choosing [facility] for all your health care needs.     Laurie ZAZUETA LPN Care Coordinator  [facility]  [phone]

## 2019-03-29 ENCOUNTER — OFFICE VISIT (OUTPATIENT)
Dept: INTERNAL MEDICINE | Facility: CLINIC | Age: 64
End: 2019-03-29
Payer: COMMERCIAL

## 2019-03-29 ENCOUNTER — LAB VISIT (OUTPATIENT)
Dept: LAB | Facility: HOSPITAL | Age: 64
End: 2019-03-29
Payer: COMMERCIAL

## 2019-03-29 VITALS
SYSTOLIC BLOOD PRESSURE: 150 MMHG | TEMPERATURE: 97 F | WEIGHT: 180.13 LBS | HEART RATE: 83 BPM | DIASTOLIC BLOOD PRESSURE: 90 MMHG | HEIGHT: 66 IN | BODY MASS INDEX: 28.95 KG/M2

## 2019-03-29 DIAGNOSIS — Z00.00 PHYSICAL EXAM, ANNUAL: ICD-10-CM

## 2019-03-29 DIAGNOSIS — E55.9 VITAMIN D DEFICIENCY: ICD-10-CM

## 2019-03-29 DIAGNOSIS — I10 BENIGN ESSENTIAL HTN: ICD-10-CM

## 2019-03-29 DIAGNOSIS — Z00.00 PHYSICAL EXAM, ANNUAL: Primary | ICD-10-CM

## 2019-03-29 LAB
25(OH)D3+25(OH)D2 SERPL-MCNC: 19 NG/ML (ref 30–96)
ALBUMIN SERPL BCP-MCNC: 3.9 G/DL (ref 3.5–5.2)
ALP SERPL-CCNC: 91 U/L (ref 55–135)
ALT SERPL W/O P-5'-P-CCNC: 21 U/L (ref 10–44)
ANION GAP SERPL CALC-SCNC: 8 MMOL/L (ref 8–16)
AST SERPL-CCNC: 18 U/L (ref 10–40)
BASOPHILS # BLD AUTO: 0.01 K/UL (ref 0–0.2)
BASOPHILS NFR BLD: 0.2 % (ref 0–1.9)
BILIRUB SERPL-MCNC: 0.4 MG/DL (ref 0.1–1)
BUN SERPL-MCNC: 10 MG/DL (ref 8–23)
CALCIUM SERPL-MCNC: 9.8 MG/DL (ref 8.7–10.5)
CHLORIDE SERPL-SCNC: 105 MMOL/L (ref 95–110)
CHOLEST SERPL-MCNC: 209 MG/DL (ref 120–199)
CHOLEST/HDLC SERPL: 5.6 {RATIO} (ref 2–5)
CO2 SERPL-SCNC: 28 MMOL/L (ref 23–29)
CREAT SERPL-MCNC: 0.8 MG/DL (ref 0.5–1.4)
DIFFERENTIAL METHOD: ABNORMAL
EOSINOPHIL # BLD AUTO: 0.1 K/UL (ref 0–0.5)
EOSINOPHIL NFR BLD: 1.7 % (ref 0–8)
ERYTHROCYTE [DISTWIDTH] IN BLOOD BY AUTOMATED COUNT: 14.8 % (ref 11.5–14.5)
EST. GFR  (AFRICAN AMERICAN): >60 ML/MIN/1.73 M^2
EST. GFR  (NON AFRICAN AMERICAN): >60 ML/MIN/1.73 M^2
ESTIMATED AVG GLUCOSE: 126 MG/DL (ref 68–131)
GLUCOSE SERPL-MCNC: 106 MG/DL (ref 70–110)
HBA1C MFR BLD HPLC: 6 % (ref 4–5.6)
HCT VFR BLD AUTO: 38.8 % (ref 37–48.5)
HDLC SERPL-MCNC: 37 MG/DL (ref 40–75)
HDLC SERPL: 17.7 % (ref 20–50)
HGB BLD-MCNC: 12.9 G/DL (ref 12–16)
IMM GRANULOCYTES # BLD AUTO: 0.02 K/UL (ref 0–0.04)
IMM GRANULOCYTES NFR BLD AUTO: 0.5 % (ref 0–0.5)
LDLC SERPL CALC-MCNC: 152.6 MG/DL (ref 63–159)
LYMPHOCYTES # BLD AUTO: 1.4 K/UL (ref 1–4.8)
LYMPHOCYTES NFR BLD: 35.1 % (ref 18–48)
MCH RBC QN AUTO: 36.8 PG (ref 27–31)
MCHC RBC AUTO-ENTMCNC: 33.2 G/DL (ref 32–36)
MCV RBC AUTO: 111 FL (ref 82–98)
MONOCYTES # BLD AUTO: 0.5 K/UL (ref 0.3–1)
MONOCYTES NFR BLD: 11 % (ref 4–15)
NEUTROPHILS # BLD AUTO: 2.1 K/UL (ref 1.8–7.7)
NEUTROPHILS NFR BLD: 51.5 % (ref 38–73)
NONHDLC SERPL-MCNC: 172 MG/DL
NRBC BLD-RTO: 0 /100 WBC
PLATELET # BLD AUTO: 48 K/UL (ref 150–350)
PMV BLD AUTO: 11.4 FL (ref 9.2–12.9)
POTASSIUM SERPL-SCNC: 4.3 MMOL/L (ref 3.5–5.1)
PROT SERPL-MCNC: 7.6 G/DL (ref 6–8.4)
RBC # BLD AUTO: 3.51 M/UL (ref 4–5.4)
SODIUM SERPL-SCNC: 141 MMOL/L (ref 136–145)
T4 FREE SERPL-MCNC: 0.88 NG/DL (ref 0.71–1.51)
TRIGL SERPL-MCNC: 97 MG/DL (ref 30–150)
TSH SERPL DL<=0.005 MIU/L-ACNC: 0.99 UIU/ML (ref 0.4–4)
WBC # BLD AUTO: 4.1 K/UL (ref 3.9–12.7)

## 2019-03-29 PROCEDURE — 99999 PR PBB SHADOW E&M-EST. PATIENT-LVL III: CPT | Mod: PBBFAC,,, | Performed by: INTERNAL MEDICINE

## 2019-03-29 PROCEDURE — 99999 PR PBB SHADOW E&M-EST. PATIENT-LVL III: ICD-10-PCS | Mod: PBBFAC,,, | Performed by: INTERNAL MEDICINE

## 2019-03-29 PROCEDURE — 85025 COMPLETE CBC W/AUTO DIFF WBC: CPT

## 2019-03-29 PROCEDURE — 84439 ASSAY OF FREE THYROXINE: CPT

## 2019-03-29 PROCEDURE — 82306 VITAMIN D 25 HYDROXY: CPT

## 2019-03-29 PROCEDURE — 99396 PREV VISIT EST AGE 40-64: CPT | Mod: S$PBB,,, | Performed by: INTERNAL MEDICINE

## 2019-03-29 PROCEDURE — 80061 LIPID PANEL: CPT

## 2019-03-29 PROCEDURE — 36415 COLL VENOUS BLD VENIPUNCTURE: CPT | Mod: PO

## 2019-03-29 PROCEDURE — 83036 HEMOGLOBIN GLYCOSYLATED A1C: CPT

## 2019-03-29 PROCEDURE — 99396 PR PREVENTIVE VISIT,EST,40-64: ICD-10-PCS | Mod: S$PBB,,, | Performed by: INTERNAL MEDICINE

## 2019-03-29 PROCEDURE — 80053 COMPREHEN METABOLIC PANEL: CPT

## 2019-03-29 PROCEDURE — 84443 ASSAY THYROID STIM HORMONE: CPT

## 2019-03-29 RX ORDER — AMLODIPINE BESYLATE 5 MG/1
5 TABLET ORAL DAILY
Qty: 30 TABLET | Refills: 11
Start: 2019-03-29 | End: 2019-05-14 | Stop reason: SDUPTHER

## 2019-03-29 RX ORDER — ERGOCALCIFEROL 1.25 MG/1
50000 CAPSULE ORAL
Qty: 12 CAPSULE | Refills: 0 | OUTPATIENT
Start: 2019-03-29

## 2019-03-29 NOTE — PROGRESS NOTES
"Subjective:      Patient ID: Conchita Rouse is a 64 y.o. female.    Chief Complaint: Annual Exam and Medication Refill (vitamin D 50,000)      HPI     Ms. Conchita Rouse is a patient of Roberto Ron MD, who presents for Annual Exam and Medication Refill (vitamin D 50,000)    She reports her BP has been high over the past month, which she attributes to a 20 lb weight gain since 2/16/18.    VS, labs & imaging reviewed and discussed with patient.      Past Medical History:   Diagnosis Date    History of shingles 02/14/2018    Per note from Dr. Rogelio Norwood III on 2/14/18; left upper buttocks.    Hyperlipidemia     Hypertension     PNH (paroxysmal nocturnal hemoglobinuria) 02/21/2017    [facility] small; Followed by Dr. Rogelio Norwood III last visit 2/14/18 (tolerating CSA & promacta); also notes aplastic anemia (AA psot Horse ATG - week 7/17/17) and ITP, for which ATG & promacta following decadron pulse for ITP & AA; plan for second opinion on BM bx; next flow in 3 mo; intent of rx: palliative    TIA (transient ischemic attack) 2007    She was at "[facility]", where she noted she was feeling slow, took a nap, woke up with a numb/tingling left jaw to arm, which persistned 20 minutes, took 3 baby aspirins, went to ER @ [facility], where facial droop was noted, but completely resolved & head CT revealed old minor abnormality.    Vitamin D deficiency      Past Surgical History:   Procedure Laterality Date    BIOPSY-BONE MARROW Left 5/16/2017    Performed by Edmar Marin MD at [facility] OR    COLONOSCOPY      left eye surgery  1955    Due to left eye lid drooping     Social History     Socioeconomic History    Marital status:      Spouse name: Not on file    Number of children: Not on file    Years of education: Not on file    Highest education level: Not on file   Occupational History    Not on file   Social Needs    Financial resource strain: Not on file    Food insecurity:     Worry: Not on file     " "Inability: Not on file    Transportation needs:     Medical: Not on file     Non-medical: Not on file   Tobacco Use    Smoking status: Never Smoker    Smokeless tobacco: Never Used   Substance and Sexual Activity    Alcohol use: Yes     Alcohol/week: 0.0 - 0.6 oz     Comment: 1/2 beer 3x/yr    Drug use: No    Sexual activity: Not Currently     Partners: Male     Birth control/protection: Post-menopausal   Lifestyle    Physical activity:     Days per week: Not on file     Minutes per session: Not on file    Stress: Not on file   Relationships    Social connections:     Talks on phone: Not on file     Gets together: Not on file     Attends [employer] service: Not on file     Active member of club or organization: Not on file     Attends meetings of clubs or organizations: Not on file     Relationship status: Not on file    Intimate partner violence:     Fear of current or ex partner: Not on file     Emotionally abused: Not on file     Physically abused: Not on file     Forced sexual activity: Not on file   Other Topics Concern    Not on file   Social History Narrative    Breakfast: 2 TBS grits or french toast sticks or waffles (previously fried eggs); 1/2 cup coffee w/ 1 cream & 3 sugars & lemonade "all day"    Lunch: 50% turkey or ham sandwich w/ occasional chips; tea, rare soda    Dinner: Steak fries and baked hot wings, peppermint or chicken wings and french fries; tea or cool aid    Snacks: mini-[employer] chocolates (1x/wk) or a few unsalted chips (2x/wk) or popcorn (2x/mo) or strawberries or pretzils     Eats out: 2x/wk    Water: 16 oz/d    PA: None     Family History   Problem Relation Age of Onset    Heart disease Mother     Diabetes Mother     Heart disease Father     Cancer Sister     Cancer Brother        Current Outpatient Medications:     cycloSPORINE (SANDIMMUNE) 100 MG Cap, Take 200 mg by mouth., Disp: , Rfl:     eltrombopag (PROMACTA) 75 mg Tab, 150 mg., Disp: , Rfl:     fluticasone " "(FLONASE) 50 mcg/actuation nasal spray, USE 2 SPRAYS IN EACH NOSTRIL 1 TIME A DAY AS NEEDED, Disp: , Rfl: 0    loratadine (CLARITIN) 10 mg tablet, Take 1 tablet (10 mg total) by mouth once daily., Disp: 30 tablet, Rfl: 3    ondansetron (ZOFRAN) 4 MG tablet, Take 1 tablet (4 mg total) by mouth every 8 (eight) hours as needed for Nausea., Disp: 30 tablet, Rfl: 0    tramadol (ULTRAM) 50 mg tablet, Take 50 mg by mouth 2 (two) times daily as needed., Disp: , Rfl: 0    valacyclovir (VALTREX) 500 MG tablet, Take 500 mg by mouth., Disp: , Rfl:     VITAMIN D2 50,000 unit capsule, TAKE 1 CAPSULE BY MOUTH ONCE A WEEK., Disp: 12 capsule, Rfl: 0    amLODIPine (NORVASC) 5 MG tablet, Take 1 tablet (5 mg total) by mouth once daily., Disp: 30 tablet, Rfl: 11    Review of patient's allergies indicates:  No Known Allergies     Review of Systems   All remaining systems negative    Objective:     BP (!) 150/90 (BP Location: Left arm, Patient Position: Sitting, BP Method: Medium (Manual))   Pulse 83   Temp 96.8 °F (36 °C) (Tympanic)   Ht 5' 6" (1.676 m)   Wt 81.7 kg (180 lb 2.2 oz)   BMI 29.08 kg/m²     Physical Exam  GEN: A&O fully, NAD  PSYC: Normal affect  HEENT: OP: Clear, no LAD, no thyroid masses, auditory canals and TMs WNL  CV: RRR, no M/G/R  PULM: CTA bilaterally, no wheezes, rales, crackles   GI: S/NT/ND, normal bowel sounds  EXT: No C/C/E, normal DP pulses bilaterally  NEURO: CN II-XII intact, 5/5 strength globally, no sensory losses, normal tandem gait, normal Romberg, 2+ DTRs globally      Lab Results   Component Value Date    WBC 4.25 03/02/2018    HGB 13.3 03/02/2018    HCT 39.6 03/02/2018    PLT 84 (L) 03/02/2018    CHOL 147 03/02/2018    TRIG 71 03/02/2018    HDL 36 (L) 03/02/2018    LDLCALC 96.8 03/02/2018    ALT 19 03/02/2018    AST 17 03/02/2018     03/02/2018    K 4.7 03/02/2018     03/02/2018    CREATININE 0.8 03/02/2018    BUN 12 03/02/2018    CO2 30 (H) 03/02/2018    CALCIUM 9.9 03/02/2018 "    INR 1.0 04/05/2017       Assessment:      1. Physical exam, annual    2. Benign essential HTN        Plan:   Physical exam, annual  -     CBC auto differential; Future; Expected date: 03/29/2019  -     Comprehensive metabolic panel; Future; Expected date: 03/29/2019  -     Lipid panel; Future; Expected date: 03/29/2019  -     Hemoglobin A1c; Future; Expected date: 03/29/2019  -     Vitamin D; Future; Expected date: 03/29/2019  -     TSH; Future; Expected date: 03/29/2019  -     T4, free; Future; Expected date: 03/29/2019  -     Case request GI: COLONOSCOPY    Benign essential HTN  -     amLODIPine (NORVASC) 5 MG tablet; Take 1 tablet (5 mg total) by mouth once daily.  Dispense: 30 tablet; Refill: 11        Follow up in about 3 months (around 6/29/2019), or if symptoms worsen or fail to improve, for FU on overweight, HTN.    I spent 25 minutes of time with patient 50% or more of which was discussing labs and plans of care.

## 2019-04-01 DIAGNOSIS — E78.5 DYSLIPIDEMIA (HIGH LDL; LOW HDL): ICD-10-CM

## 2019-04-01 DIAGNOSIS — E78.00 PURE HYPERCHOLESTEROLEMIA: Primary | ICD-10-CM

## 2019-04-01 DIAGNOSIS — R73.03 PREDIABETES: ICD-10-CM

## 2019-04-01 RX ORDER — ATORVASTATIN CALCIUM 40 MG/1
40 TABLET, FILM COATED ORAL DAILY
Qty: 90 TABLET | Refills: 3 | Status: SHIPPED | OUTPATIENT
Start: 2019-04-01 | End: 2020-01-13 | Stop reason: SDUPTHER

## 2019-04-09 DIAGNOSIS — E55.9 VITAMIN D DEFICIENCY: ICD-10-CM

## 2019-04-09 RX ORDER — ERGOCALCIFEROL 1.25 MG/1
50000 CAPSULE ORAL
Qty: 12 CAPSULE | Refills: 0 | OUTPATIENT
Start: 2019-04-09

## 2019-04-09 NOTE — TELEPHONE ENCOUNTER
----- Message from Taina Alvarez sent at 4/9/2019  2:33 PM CDT -----  Contact: pt  Type:  RX Refill Request    Who Called: ADDISON FIGUEROA  Refill or New Rx: refill  RX Name and Strength:VITAMIN D2   How is the patient currently taking it? (ex. 1XDay): 1 weekly  Is this a 30 day or 90 day RX: 90 day   Preferred Pharmacy with phone number:     [facility] [place] [facility]   [address]     Local or Mail Order: local   Ordering Provider: Ariadne  Would the patient rather a call back or a response via [facility]? Call   Best Call Back Number: [phone] (home)   Additional Information:

## 2019-04-09 NOTE — TELEPHONE ENCOUNTER
Called # listed, no answer. LMOM requesting a return call.  stated pt only needs OTC vitamin 1000 IU daily.

## 2019-04-09 NOTE — TELEPHONE ENCOUNTER
Pt requesting refill on Vitamin D2. Last visit 03/29/2019. Last filled 10/23/2018. Please review and advise.

## 2019-04-23 ENCOUNTER — TELEPHONE (OUTPATIENT)
Dept: ENDOSCOPY | Facility: HOSPITAL | Age: 64
End: 2019-04-23

## 2019-04-23 NOTE — TELEPHONE ENCOUNTER
Pt returned [employer] call and stated that she will call back to schedule her procedure because she's currently at work.

## 2019-05-01 DIAGNOSIS — E55.9 VITAMIN D DEFICIENCY: ICD-10-CM

## 2019-05-01 RX ORDER — ERGOCALCIFEROL 1.25 MG/1
50000 CAPSULE ORAL
Qty: 12 CAPSULE | Refills: 0 | OUTPATIENT
Start: 2019-05-01

## 2019-05-01 NOTE — TELEPHONE ENCOUNTER
----- Message from Marine Ahmadi sent at 5/1/2019  8:02 AM CDT -----  needs call back regarding status of vitamin d that was supposed to have been called [facility]..[phone] (home)

## 2019-05-02 ENCOUNTER — TELEPHONE (OUTPATIENT)
Dept: INTERNAL MEDICINE | Facility: CLINIC | Age: 64
End: 2019-05-02

## 2019-05-02 NOTE — TELEPHONE ENCOUNTER
"Called # listed, no answer. LMOM to return call. 's recommendation "I recommend taking over-the-counter vitamin D3 2,000 IU (International Units) by mouth daily for 3 months followed by vitamin D3 800 IU by mouth daily until your next annual wellness visit. It is best to purchase a brand that has the "USP" or "NSF" emblem, which ensures an effective and safe supplement."  "

## 2019-05-02 NOTE — TELEPHONE ENCOUNTER
----- Message from Marine Ahmadi sent at 5/2/2019  3:29 PM CDT -----  needs call back regarding status of vit d medication..[phone] (home)

## 2019-05-06 ENCOUNTER — TELEPHONE (OUTPATIENT)
Dept: INTERNAL MEDICINE | Facility: CLINIC | Age: 64
End: 2019-05-06

## 2019-05-06 NOTE — TELEPHONE ENCOUNTER
"Spoke with pt, let her know 's recommendation "I recommend taking over-the-counter vitamin D3 2,000 IU (International Units) by mouth daily for 3 months followed by vitamin D3 800 IU by mouth daily until your next annual wellness visit. It is best to purchase a brand that has the "USP" or "NSF" emblem, which ensures an effective and safe supplement."      "

## 2019-05-06 NOTE — TELEPHONE ENCOUNTER
----- Message from Klarissa Welch sent at 5/6/2019  3:50 PM CDT -----  Contact: self [phone]  .Type:  Patient Returning Call    Who Called:Conchita Rouse  Who Left Message for Patient:Reanna  Does the patient know what this is regarding?:no  Would the patient rather a call back or a response via [facility]? Call back   Best Call Back Number:[phone]  Additional Information:

## 2019-05-13 DIAGNOSIS — I10 ESSENTIAL HYPERTENSION: ICD-10-CM

## 2019-05-14 RX ORDER — AMLODIPINE BESYLATE 5 MG/1
5 TABLET ORAL DAILY
Qty: 90 TABLET | Refills: 3 | Status: SHIPPED | OUTPATIENT
Start: 2019-05-14 | End: 2020-01-13 | Stop reason: SDUPTHER

## 2019-05-14 NOTE — TELEPHONE ENCOUNTER
----- Message from Jo Ann Chan sent at 5/14/2019  1:29 PM CDT -----  Contact: patient  Called for a refill of blood pressure medication to be called into [facility] pharmacy.     Patient can be reached at [phone]    Thanks  KB

## 2019-06-17 ENCOUNTER — PATIENT OUTREACH (OUTPATIENT)
Dept: ADMINISTRATIVE | Facility: HOSPITAL | Age: 64
End: 2019-06-17

## 2019-07-09 ENCOUNTER — PATIENT OUTREACH (OUTPATIENT)
Dept: ADMINISTRATIVE | Facility: HOSPITAL | Age: 64
End: 2019-07-09

## 2019-07-09 NOTE — PROGRESS NOTES
Spoke with pt re scheduling appt with Dr. Ron for HTN. Pt stated has no ins at this time but will call back to schedule appt once has ins.

## 2019-07-12 ENCOUNTER — TELEPHONE (OUTPATIENT)
Dept: INTERNAL MEDICINE | Facility: CLINIC | Age: 64
End: 2019-07-12

## 2019-07-12 NOTE — TELEPHONE ENCOUNTER
Patient came by today. She said you changed her blood pressure medicine and told her to stop by anytime to have it checked. Today it was 130/80. Told her I will let you  Know.

## 2019-08-22 ENCOUNTER — TELEPHONE (OUTPATIENT)
Dept: INTERNAL MEDICINE | Facility: CLINIC | Age: 64
End: 2019-08-22

## 2019-10-25 DIAGNOSIS — Z12.11 COLON CANCER SCREENING: ICD-10-CM

## 2019-11-29 ENCOUNTER — PATIENT OUTREACH (OUTPATIENT)
Dept: ADMINISTRATIVE | Facility: HOSPITAL | Age: 64
End: 2019-11-29

## 2019-12-03 ENCOUNTER — PATIENT OUTREACH (OUTPATIENT)
Dept: ADMINISTRATIVE | Facility: HOSPITAL | Age: 64
End: 2019-12-03

## 2020-01-13 DIAGNOSIS — R73.03 PREDIABETES: ICD-10-CM

## 2020-01-13 DIAGNOSIS — I10 ESSENTIAL HYPERTENSION: ICD-10-CM

## 2020-01-13 DIAGNOSIS — E78.5 DYSLIPIDEMIA (HIGH LDL; LOW HDL): ICD-10-CM

## 2020-01-13 RX ORDER — ATORVASTATIN CALCIUM 40 MG/1
40 TABLET, FILM COATED ORAL DAILY
Qty: 90 TABLET | Refills: 3 | Status: SHIPPED | OUTPATIENT
Start: 2020-01-13 | End: 2021-10-15

## 2020-01-13 RX ORDER — AMLODIPINE BESYLATE 5 MG/1
5 TABLET ORAL DAILY
Qty: 90 TABLET | Refills: 3 | Status: SHIPPED | OUTPATIENT
Start: 2020-01-13 | End: 2020-02-17 | Stop reason: SDUPTHER

## 2020-01-13 NOTE — TELEPHONE ENCOUNTER
----- Message from Jayla Nieves sent at 1/13/2020  2:25 PM CST -----  Patient needs call back rg medication..[phone]

## 2020-01-13 NOTE — TELEPHONE ENCOUNTER
Called pt back regarding her medication. Pt answered, stated needed refills. Pended orders to be filled.

## 2020-01-17 ENCOUNTER — PATIENT OUTREACH (OUTPATIENT)
Dept: ADMINISTRATIVE | Facility: HOSPITAL | Age: 65
End: 2020-01-17

## 2020-02-17 ENCOUNTER — OFFICE VISIT (OUTPATIENT)
Dept: INTERNAL MEDICINE | Facility: CLINIC | Age: 65
End: 2020-02-17
Payer: MEDICARE

## 2020-02-17 ENCOUNTER — LAB VISIT (OUTPATIENT)
Dept: LAB | Facility: HOSPITAL | Age: 65
End: 2020-02-17
Attending: INTERNAL MEDICINE
Payer: MEDICARE

## 2020-02-17 VITALS
WEIGHT: 182.88 LBS | OXYGEN SATURATION: 99 % | TEMPERATURE: 98 F | HEIGHT: 66 IN | DIASTOLIC BLOOD PRESSURE: 95 MMHG | BODY MASS INDEX: 29.39 KG/M2 | HEART RATE: 92 BPM | SYSTOLIC BLOOD PRESSURE: 154 MMHG

## 2020-02-17 DIAGNOSIS — D69.6 THROMBOCYTOPENIA: ICD-10-CM

## 2020-02-17 DIAGNOSIS — D61.9 APLASTIC ANEMIA: ICD-10-CM

## 2020-02-17 DIAGNOSIS — Z76.89 ENCOUNTER TO ESTABLISH CARE: ICD-10-CM

## 2020-02-17 DIAGNOSIS — R31.9 URINARY TRACT INFECTION WITH HEMATURIA, SITE UNSPECIFIED: ICD-10-CM

## 2020-02-17 DIAGNOSIS — E55.9 VITAMIN D DEFICIENCY: ICD-10-CM

## 2020-02-17 DIAGNOSIS — Z12.39 SCREENING FOR BREAST CANCER: ICD-10-CM

## 2020-02-17 DIAGNOSIS — R09.81 NASAL CONGESTION: ICD-10-CM

## 2020-02-17 DIAGNOSIS — Z12.31 ENCOUNTER FOR SCREENING MAMMOGRAM FOR MALIGNANT NEOPLASM OF BREAST: ICD-10-CM

## 2020-02-17 DIAGNOSIS — Z78.0 POSTMENOPAUSAL: ICD-10-CM

## 2020-02-17 DIAGNOSIS — R73.03 PREDIABETES: ICD-10-CM

## 2020-02-17 DIAGNOSIS — E78.2 MIXED HYPERLIPIDEMIA: ICD-10-CM

## 2020-02-17 DIAGNOSIS — I10 ESSENTIAL HYPERTENSION: ICD-10-CM

## 2020-02-17 DIAGNOSIS — Z00.00 ANNUAL PHYSICAL EXAM: Primary | ICD-10-CM

## 2020-02-17 DIAGNOSIS — N39.0 URINARY TRACT INFECTION WITH HEMATURIA, SITE UNSPECIFIED: ICD-10-CM

## 2020-02-17 DIAGNOSIS — E78.5 DYSLIPIDEMIA (HIGH LDL; LOW HDL): ICD-10-CM

## 2020-02-17 DIAGNOSIS — Z00.00 ANNUAL PHYSICAL EXAM: ICD-10-CM

## 2020-02-17 DIAGNOSIS — R30.0 DYSURIA: ICD-10-CM

## 2020-02-17 LAB
BASOPHILS # BLD AUTO: 0.02 K/UL (ref 0–0.2)
BASOPHILS NFR BLD: 0.3 % (ref 0–1.9)
BILIRUB SERPL-MCNC: NORMAL MG/DL
BLOOD, POC UA: 250
DIFFERENTIAL METHOD: ABNORMAL
EOSINOPHIL # BLD AUTO: 0.1 K/UL (ref 0–0.5)
EOSINOPHIL NFR BLD: 0.9 % (ref 0–8)
ERYTHROCYTE [DISTWIDTH] IN BLOOD BY AUTOMATED COUNT: 13.4 % (ref 11.5–14.5)
GLUCOSE UR QL STRIP: NORMAL
HCT VFR BLD AUTO: 37.5 % (ref 37–48.5)
HGB BLD-MCNC: 11.9 G/DL (ref 12–16)
IMM GRANULOCYTES # BLD AUTO: 0.02 K/UL (ref 0–0.04)
IMM GRANULOCYTES NFR BLD AUTO: 0.3 % (ref 0–0.5)
KETONES UR QL STRIP: NORMAL
LEUKOCYTE ESTERASE URINE, POC: NORMAL
LYMPHOCYTES # BLD AUTO: 2 K/UL (ref 1–4.8)
LYMPHOCYTES NFR BLD: 29.9 % (ref 18–48)
MCH RBC QN AUTO: 36.3 PG (ref 27–31)
MCHC RBC AUTO-ENTMCNC: 31.7 G/DL (ref 32–36)
MCV RBC AUTO: 114 FL (ref 82–98)
MONOCYTES # BLD AUTO: 0.6 K/UL (ref 0.3–1)
MONOCYTES NFR BLD: 8.8 % (ref 4–15)
NEUTROPHILS # BLD AUTO: 4 K/UL (ref 1.8–7.7)
NEUTROPHILS NFR BLD: 59.8 % (ref 38–73)
NITRITE, POC UA: NORMAL
NRBC BLD-RTO: 0 /100 WBC
PH, POC UA: 5
PLATELET # BLD AUTO: 53 K/UL (ref 150–350)
PMV BLD AUTO: 12 FL (ref 9.2–12.9)
PROTEIN, POC: 30
RBC # BLD AUTO: 3.28 M/UL (ref 4–5.4)
SPECIFIC GRAVITY, POC UA: 1.01
UROBILINOGEN, POC UA: NORMAL
WBC # BLD AUTO: 6.73 K/UL (ref 3.9–12.7)

## 2020-02-17 PROCEDURE — 83036 HEMOGLOBIN GLYCOSYLATED A1C: CPT | Mod: HCNC

## 2020-02-17 PROCEDURE — 87186 SC STD MICRODIL/AGAR DIL: CPT | Mod: HCNC

## 2020-02-17 PROCEDURE — 99397 PR PREVENTIVE VISIT,EST,65 & OVER: ICD-10-PCS | Mod: 25,HCNC,S$GLB, | Performed by: INTERNAL MEDICINE

## 2020-02-17 PROCEDURE — 81003 URINALYSIS AUTO W/O SCOPE: CPT | Mod: QW,HCNC,S$GLB, | Performed by: INTERNAL MEDICINE

## 2020-02-17 PROCEDURE — 81003 POCT URINALYSIS: ICD-10-PCS | Mod: QW,HCNC,S$GLB, | Performed by: INTERNAL MEDICINE

## 2020-02-17 PROCEDURE — 3078F DIAST BP <80 MM HG: CPT | Mod: HCNC,CPTII,S$GLB, | Performed by: INTERNAL MEDICINE

## 2020-02-17 PROCEDURE — 82306 VITAMIN D 25 HYDROXY: CPT | Mod: HCNC

## 2020-02-17 PROCEDURE — 81001 URINALYSIS AUTO W/SCOPE: CPT | Mod: HCNC

## 2020-02-17 PROCEDURE — 87086 URINE CULTURE/COLONY COUNT: CPT | Mod: HCNC

## 2020-02-17 PROCEDURE — 3077F PR MOST RECENT SYSTOLIC BLOOD PRESSURE >= 140 MM HG: ICD-10-PCS | Mod: HCNC,CPTII,S$GLB, | Performed by: INTERNAL MEDICINE

## 2020-02-17 PROCEDURE — 99999 PR PBB SHADOW E&M-EST. PATIENT-LVL IV: ICD-10-PCS | Mod: PBBFAC,HCNC,, | Performed by: INTERNAL MEDICINE

## 2020-02-17 PROCEDURE — 3077F SYST BP >= 140 MM HG: CPT | Mod: HCNC,CPTII,S$GLB, | Performed by: INTERNAL MEDICINE

## 2020-02-17 PROCEDURE — 85025 COMPLETE CBC W/AUTO DIFF WBC: CPT | Mod: HCNC

## 2020-02-17 PROCEDURE — 36415 COLL VENOUS BLD VENIPUNCTURE: CPT | Mod: HCNC,PO

## 2020-02-17 PROCEDURE — 87077 CULTURE AEROBIC IDENTIFY: CPT | Mod: HCNC

## 2020-02-17 PROCEDURE — 80061 LIPID PANEL: CPT | Mod: HCNC

## 2020-02-17 PROCEDURE — 87088 URINE BACTERIA CULTURE: CPT | Mod: HCNC

## 2020-02-17 PROCEDURE — 3078F PR MOST RECENT DIASTOLIC BLOOD PRESSURE < 80 MM HG: ICD-10-PCS | Mod: HCNC,CPTII,S$GLB, | Performed by: INTERNAL MEDICINE

## 2020-02-17 PROCEDURE — 99999 PR PBB SHADOW E&M-EST. PATIENT-LVL IV: CPT | Mod: PBBFAC,HCNC,, | Performed by: INTERNAL MEDICINE

## 2020-02-17 PROCEDURE — 80053 COMPREHEN METABOLIC PANEL: CPT | Mod: HCNC

## 2020-02-17 PROCEDURE — 99397 PER PM REEVAL EST PAT 65+ YR: CPT | Mod: 25,HCNC,S$GLB, | Performed by: INTERNAL MEDICINE

## 2020-02-17 RX ORDER — SULFAMETHOXAZOLE AND TRIMETHOPRIM 800; 160 MG/1; MG/1
1 TABLET ORAL 2 TIMES DAILY
Qty: 6 TABLET | Refills: 0 | Status: SHIPPED | OUTPATIENT
Start: 2020-02-17 | End: 2020-02-18 | Stop reason: SDUPTHER

## 2020-02-17 RX ORDER — LORATADINE 10 MG/1
10 TABLET ORAL DAILY
Qty: 30 TABLET | Refills: 3 | Status: SHIPPED | OUTPATIENT
Start: 2020-02-17 | End: 2022-11-16

## 2020-02-17 RX ORDER — AMLODIPINE BESYLATE 5 MG/1
5 TABLET ORAL DAILY
Qty: 90 TABLET | Refills: 3 | Status: SHIPPED | OUTPATIENT
Start: 2020-02-17 | End: 2021-09-14 | Stop reason: SDUPTHER

## 2020-02-17 NOTE — PROGRESS NOTES
"Subjective:      Patient ID: Conchita Rouse is a 65 y.o. female.    Chief Complaint: Annual Exam (Sinus problems, painful urination)      HPI     Ms. Conchita Rouse is a patient of Roberto Ron MD, who presents for annual.    She reports having dysuria over the past 2 days, which is painful with urination.     She reports having a sinus infection over the past 2 weeks, which she describes as eyes and nose burning, congestion, body aches, no facial/tooth/ear pains. No f/c. No epistaxis.    VS, labs & imaging reviewed and discussed with patient, including A1C 6%, HDL 37, LDl 152.6, PLT 48, vit D 19, o/w nL CMP on 3/29/19.    Of note, EPIC indicates due preventive measures, including MMG, FLU, PCV13, DEXA (& FitKit ordered 10/25/19).      Past Medical History:   Diagnosis Date    Dyslipidemia (high LDL; low HDL)     10y CV event risk 14.7% (asuming no DM2), which could be reduced to 4.5% with RF optimization, for which I recommended atorvastatin 20 mg 1 po qd x2 weeks and then 40 mg 1 po qd    History of shingles 02/14/2018    Per note from Dr. Rogelio Norwood III on 2/14/18; left upper buttocks.   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 Rogelio Norwood III last visit 2/14/18 (tolerating CSA & promacta); also notes aplastic anemia (AA psot Horse ATG - week 7/17/17) and ITP, for which ATG & promacta following decadron pulse for ITP & AA; plan for second opinion on BM bx; next flow in 3 mo; intent of rx: palliative    Prediabetes     TIA (transient ischemic attack) 2007    She was at "[employer]", where she noted she was feeling slow, took a nap, woke up with a numb/tingling left jaw to arm, which persistned 20 minutes, took 3 baby aspirins, went to ER @ [facility], where facial droop was noted, but completely resolved & head CT revealed old minor abnormality.    Vitamin D deficiency      Past Surgical History:   Procedure Laterality Date    COLONOSCOPY  " "    left eye surgery  1955    Due to left eye lid drooping     Social History     Socioeconomic History    Marital status:      Spouse name: Not on file    Number of children: Not on file    Years of education: Not on file    Highest education level: Not on file   Occupational History    Not on file   Social Needs    Financial resource strain: Not on file    Food insecurity:     Worry: Not on file     Inability: Not on file    Transportation needs:     Medical: Not on file     Non-medical: Not on file   Tobacco Use    Smoking status: Never Smoker    Smokeless tobacco: Never Used   Substance and Sexual Activity    Alcohol use: Yes     Alcohol/week: 0.0 - 1.0 standard drinks     Comment: 1/2 beer 3x/yr    Drug use: No    Sexual activity: Not Currently     Partners: Male     Birth control/protection: Post-menopausal   Lifestyle    Physical activity:     Days per week: Not on file     Minutes per session: Not on file    Stress: Not on file   Relationships    Social connections:     Talks on phone: Not on file     Gets together: Not on file     Attends Gnosticist service: Not on file     Active member of club or organization: Not on file     Attends meetings of clubs or organizations: Not on file     Relationship status: Not on file   Other Topics Concern    Not on file   Social History Narrative    Breakfast: 2 TBS grits or french toast sticks or waffles (previously fried eggs); 1/2 cup coffee w/ 1 cream & 3 sugars & lemonade "all day"    Lunch: 50% turkey or ham sandwich w/ occasional chips; tea, rare soda    Dinner: Steak fries and baked hot wings, peppermint or chicken wings and french fries; tea or cool aid    Snacks: mini-Gianna's chocolates (1x/wk) or a few unsalted chips (2x/wk) or popcorn (2x/mo) or strawberries or pretzils     Eats out: 2x/wk    Water: 16 oz/d    PA: None     Family History   Problem Relation Age of Onset    Heart disease Mother     Diabetes Mother     Heart disease " "Father     Cancer Sister     Cancer Brother        Current Outpatient Medications:     atorvastatin (LIPITOR) 40 MG tablet, Take 1 tablet (40 mg total) by mouth once daily., Disp: 90 tablet, Rfl: 3    eltrombopag (PROMACTA) 75 mg Tab, 150 mg., Disp: , Rfl:     fluticasone (FLONASE) 50 mcg/actuation nasal spray, USE 2 SPRAYS IN EACH NOSTRIL 1 TIME A DAY AS NEEDED, Disp: , Rfl: 0    loratadine (CLARITIN) 10 mg tablet, Take 1 tablet (10 mg total) by mouth once daily., Disp: 30 tablet, Rfl: 3    amLODIPine (NORVASC) 5 MG tablet, Take 1 tablet (5 mg total) by mouth once daily., Disp: 90 tablet, Rfl: 3    cycloSPORINE (SANDIMMUNE) 100 MG Cap, Take 200 mg by mouth., Disp: , Rfl:     tramadol (ULTRAM) 50 mg tablet, Take 50 mg by mouth 2 (two) times daily as needed., Disp: , Rfl: 0    valacyclovir (VALTREX) 500 MG tablet, Take 500 mg by mouth., Disp: , Rfl:     Review of patient's allergies indicates:  No Known Allergies         Review of Systems   All remaining systems negative    Objective:     BP (!) 154/95   Pulse 92   Temp 98.1 °F (36.7 °C) (Temporal)   Ht 5' 6" (1.676 m)   Wt 83 kg (182 lb 14 oz)   SpO2 99%   BMI 29.52 kg/m²     Physical Exam  GEN: A&O fully, NAD  PSYC: Normal affect      Lab Results   Component Value Date    WBC 4.10 03/29/2019    HGB 12.9 03/29/2019    HCT 38.8 03/29/2019    PLT 48 (L) 03/29/2019    CHOL 209 (H) 03/29/2019    TRIG 97 03/29/2019    HDL 37 (L) 03/29/2019    LDLCALC 152.6 03/29/2019    ALT 21 03/29/2019    AST 18 03/29/2019     03/29/2019    K 4.3 03/29/2019     03/29/2019    CREATININE 0.8 03/29/2019    BUN 10 03/29/2019    CO2 28 03/29/2019    CALCIUM 9.8 03/29/2019    INR 1.0 04/05/2017    HGBA1C 6.0 (H) 03/29/2019       Assessment:      1. Benign essential HTN: Not at goal on manual repeat. Risks and benefits discussed and patient chose to move forward with restarting her amlodipine 5 mg 1 po qd.     2. Thrombocytopenia: Will check CBC.    3. Aplastic " anemia: Will check CBC.   4.      DL: Low HDL and high LDL: Risks and benefits discussed and patient chose to move forward with           starting atorvastatin 40 mg 1/2 tab qd x 2 weeks and then to increase to 1 tab qd if tolerable.    Plan:   Annual physical exam  -     Hemoglobin A1c; Future; Expected date: 02/17/2020  -     CBC auto differential; Future; Expected date: 02/17/2020  -     Comprehensive metabolic panel; Future; Expected date: 02/17/2020  -     Lipid panel; Future; Expected date: 02/17/2020  -     Vitamin D; Future; Expected date: 02/17/2020    Thrombocytopenia    Aplastic anemia  -     CBC auto differential; Future; Expected date: 02/17/2020    Essential hypertension  -     amLODIPine (NORVASC) 5 MG tablet; Take 1 tablet (5 mg total) by mouth once daily.  Dispense: 90 tablet; Refill: 3  -     Comprehensive metabolic panel; Future; Expected date: 02/17/2020    Vitamin D deficiency  -     Vitamin D; Future; Expected date: 02/17/2020    Mixed hyperlipidemia    Dyslipidemia (high LDL; low HDL)  -     Lipid panel; Future; Expected date: 02/17/2020    Prediabetes  -     Hemoglobin A1c; Future; Expected date: 02/17/2020    Postmenopausal  -     DXA Bone Density Spine And Hip; Future; Expected date: 02/17/2020    Screening for breast cancer  -     Mammo Digital Screening Bilat; Future; Expected date: 02/17/2020    Encounter to establish care  -     Ambulatory referral/consult to Obstetrics / Gynecology; Future; Expected date: 02/24/2020    Encounter for screening mammogram for malignant neoplasm of breast   -     Mammo Digital Screening Bilat; Future; Expected date: 02/17/2020    Nasal congestion  -     loratadine (CLARITIN) 10 mg tablet; Take 1 tablet (10 mg total) by mouth once daily.  Dispense: 30 tablet; Refill: 3    Dysuria  -     POCT Urinalysis  -     Urine culture  -     Urinalysis Microscopic      Follow up in about 2 weeks (around 3/2/2020), or if symptoms worsen or fail to improve, for FU on  HTN.     ADDENDUM:  U/A c/w UTI, which we will tx with Bactrim

## 2020-02-18 DIAGNOSIS — R31.9 URINARY TRACT INFECTION WITH HEMATURIA, SITE UNSPECIFIED: Primary | ICD-10-CM

## 2020-02-18 DIAGNOSIS — N39.0 URINARY TRACT INFECTION WITH HEMATURIA, SITE UNSPECIFIED: Primary | ICD-10-CM

## 2020-02-18 LAB
25(OH)D3+25(OH)D2 SERPL-MCNC: 15 NG/ML (ref 30–96)
ALBUMIN SERPL BCP-MCNC: 4 G/DL (ref 3.5–5.2)
ALP SERPL-CCNC: 94 U/L (ref 55–135)
ALT SERPL W/O P-5'-P-CCNC: 21 U/L (ref 10–44)
ANION GAP SERPL CALC-SCNC: 9 MMOL/L (ref 8–16)
AST SERPL-CCNC: 16 U/L (ref 10–40)
BACTERIA #/AREA URNS AUTO: ABNORMAL /HPF
BILIRUB SERPL-MCNC: 0.2 MG/DL (ref 0.1–1)
BUN SERPL-MCNC: 13 MG/DL (ref 8–23)
CALCIUM SERPL-MCNC: 9.6 MG/DL (ref 8.7–10.5)
CHLORIDE SERPL-SCNC: 104 MMOL/L (ref 95–110)
CHOLEST SERPL-MCNC: 161 MG/DL (ref 120–199)
CHOLEST/HDLC SERPL: 4.2 {RATIO} (ref 2–5)
CO2 SERPL-SCNC: 29 MMOL/L (ref 23–29)
CREAT SERPL-MCNC: 0.9 MG/DL (ref 0.5–1.4)
EST. GFR  (AFRICAN AMERICAN): >60 ML/MIN/1.73 M^2
EST. GFR  (NON AFRICAN AMERICAN): >60 ML/MIN/1.73 M^2
ESTIMATED AVG GLUCOSE: 131 MG/DL (ref 68–131)
GLUCOSE SERPL-MCNC: 89 MG/DL (ref 70–110)
HBA1C MFR BLD HPLC: 6.2 % (ref 4–5.6)
HDLC SERPL-MCNC: 38 MG/DL (ref 40–75)
HDLC SERPL: 23.6 % (ref 20–50)
LDLC SERPL CALC-MCNC: 107.6 MG/DL (ref 63–159)
MICROSCOPIC COMMENT: ABNORMAL
NONHDLC SERPL-MCNC: 123 MG/DL
POTASSIUM SERPL-SCNC: 3.9 MMOL/L (ref 3.5–5.1)
PROT SERPL-MCNC: 8.1 G/DL (ref 6–8.4)
RBC #/AREA URNS AUTO: >100 /HPF (ref 0–4)
SODIUM SERPL-SCNC: 142 MMOL/L (ref 136–145)
SQUAMOUS #/AREA URNS AUTO: 1 /HPF
TRIGL SERPL-MCNC: 77 MG/DL (ref 30–150)
WBC #/AREA URNS AUTO: >100 /HPF (ref 0–5)
WBC CLUMPS UR QL AUTO: ABNORMAL

## 2020-02-18 RX ORDER — SULFAMETHOXAZOLE AND TRIMETHOPRIM 800; 160 MG/1; MG/1
1 TABLET ORAL 2 TIMES DAILY
Qty: 6 TABLET | Refills: 0 | Status: SHIPPED | OUTPATIENT
Start: 2020-02-18 | End: 2020-02-20 | Stop reason: SINTOL

## 2020-02-18 NOTE — TELEPHONE ENCOUNTER
Called pt on # listed. Discussed test results and medication. Pt states [facility] in [place] is where the medication prescribed on 2/17/20 needs to go. Pt states new to [facility] and not sure how it works.     BACTRIM 160 MG twice daily needs to be sent to [facility] in [place]. Order is pending and ready to be signed.

## 2020-02-19 ENCOUNTER — TELEPHONE (OUTPATIENT)
Dept: INTERNAL MEDICINE | Facility: CLINIC | Age: 65
End: 2020-02-19

## 2020-02-19 NOTE — TELEPHONE ENCOUNTER
----- Message from Nneka Velazquez sent at 2/19/2020  1:53 PM CST -----  Contact: pt   Pt needing a call back regarding a medication that was prescribe to her and she is having an allergic reaction  to the medicine         Pt contact # [phone]

## 2020-02-20 ENCOUNTER — TELEPHONE (OUTPATIENT)
Dept: INTERNAL MEDICINE | Facility: CLINIC | Age: 65
End: 2020-02-20

## 2020-02-20 DIAGNOSIS — N34.2 INFECTIVE URETHRITIS: Primary | ICD-10-CM

## 2020-02-20 RX ORDER — NITROFURANTOIN 25; 75 MG/1; MG/1
100 CAPSULE ORAL 2 TIMES DAILY
Qty: 14 CAPSULE | Refills: 0 | Status: SHIPPED | OUTPATIENT
Start: 2020-02-20 | End: 2020-02-27

## 2020-02-20 NOTE — TELEPHONE ENCOUNTER
Patient called in. Stated allergic reaction to Bactrim. Swollen lips. Patient stopped taking immediately.   Patient requests an alternative antibiotic asap.

## 2020-02-21 ENCOUNTER — TELEPHONE (OUTPATIENT)
Dept: INTERNAL MEDICINE | Facility: CLINIC | Age: 65
End: 2020-02-21

## 2020-02-21 LAB — BACTERIA UR CULT: ABNORMAL

## 2020-02-21 NOTE — TELEPHONE ENCOUNTER
"----- Message from Roberto Ron MD sent at 2/21/2020 11:52 AM CST -----  Please call the patient regarding her abnormal result: E. Coli UTI sensitive to nitrofurantoin ("Macrobid"), which we prescribed for her.     Please let us know if you have any questions or concerns.     Sincerely,  Roberto Ron MD    "

## 2020-02-28 ENCOUNTER — HOSPITAL ENCOUNTER (OUTPATIENT)
Dept: RADIOLOGY | Facility: HOSPITAL | Age: 65
Discharge: HOME | End: 2020-02-28
Attending: INTERNAL MEDICINE
Payer: MEDICARE

## 2020-02-28 VITALS — HEIGHT: 66 IN | BODY MASS INDEX: 29.41 KG/M2 | WEIGHT: 183 LBS

## 2020-02-28 DIAGNOSIS — Z12.39 SCREENING FOR BREAST CANCER: ICD-10-CM

## 2020-02-28 DIAGNOSIS — Z12.31 ENCOUNTER FOR SCREENING MAMMOGRAM FOR MALIGNANT NEOPLASM OF BREAST: ICD-10-CM

## 2020-02-28 PROCEDURE — 77063 MAMMO DIGITAL SCREENING BILAT WITH TOMOSYNTHESIS_CAD: ICD-10-PCS | Mod: 26,HCNC,, | Performed by: RADIOLOGY

## 2020-02-28 PROCEDURE — 77067 SCR MAMMO BI INCL CAD: CPT | Mod: 26,HCNC,, | Performed by: RADIOLOGY

## 2020-02-28 PROCEDURE — 77067 MAMMO DIGITAL SCREENING BILAT WITH TOMOSYNTHESIS_CAD: ICD-10-PCS | Mod: 26,HCNC,, | Performed by: RADIOLOGY

## 2020-02-28 PROCEDURE — 77063 BREAST TOMOSYNTHESIS BI: CPT | Mod: 26,HCNC,, | Performed by: RADIOLOGY

## 2020-02-28 PROCEDURE — 77067 SCR MAMMO BI INCL CAD: CPT | Mod: TC,HCNC

## 2020-03-03 ENCOUNTER — TELEPHONE (OUTPATIENT)
Dept: OBSTETRICS AND GYNECOLOGY | Facility: CLINIC | Age: 65
End: 2020-03-03

## 2020-03-03 NOTE — TELEPHONE ENCOUNTER
Attempted to contact to offer assistance with scheduling an appt from the referral, no answer and unable to LVM.  [facility] msg sent.

## 2020-03-09 NOTE — TELEPHONE ENCOUNTER
----- Message from Radha Ely sent at 3/9/2020  4:15 PM CDT -----  Contact: pt   Type:  RX Refill Request    Who Called: pt  Refill or New Rx:refil   RX Name and Strength:tramadol mg unknown   How is the patient currently taking it? (ex. 1XDay):once daily as needed  Is this a 30 day or 90 day RX: 90 days   Preferred Pharmacy with phone number:[facility] in [place]   Local or Mail Order: local   Ordering Provider:ruben   Would the patient rather a call back or a response via [facility]? Phone   Best Call Back Number: [phone]  Additional Information:

## 2020-03-10 RX ORDER — TRAMADOL HYDROCHLORIDE 50 MG/1
50 TABLET ORAL 2 TIMES DAILY PRN
Refills: 0 | OUTPATIENT
Start: 2020-03-10

## 2020-03-17 ENCOUNTER — TELEPHONE (OUTPATIENT)
Dept: INTERNAL MEDICINE | Facility: CLINIC | Age: 65
End: 2020-03-17

## 2020-03-17 NOTE — TELEPHONE ENCOUNTER
----- Message from Suzy Braswell sent at 3/17/2020  8:41 AM CDT -----  Contact: Patient  Patient would like a call back concerning her recent CBC  Being sent to her oncologist.Please call to advise at Ph .[phone] (home)

## 2020-05-14 DIAGNOSIS — R73.03 PREDIABETES: ICD-10-CM

## 2020-05-14 DIAGNOSIS — E78.5 DYSLIPIDEMIA (HIGH LDL; LOW HDL): ICD-10-CM

## 2020-05-14 RX ORDER — ATORVASTATIN CALCIUM 40 MG/1
40 TABLET, FILM COATED ORAL DAILY
Qty: 90 TABLET | Refills: 3 | Status: CANCELLED | OUTPATIENT
Start: 2020-05-14 | End: 2021-05-14

## 2020-05-14 NOTE — TELEPHONE ENCOUNTER
----- Message from Marisol Betts sent at 2020  4:21 PM CDT -----  Contact: pt   .Type:  RX Refill Request    Who Called: pt  Refill or New Rx:refill  RX Name and Strength: atorvastatin (LIPITOR) 40 MG tablet   How is the patient currently taking it? (ex. 1XDay):daily  Is this a 30 day or 90 day RX:30  Preferred Pharmacy with phone number:.  [id number] - [place], [place]  [facility]  AT [facility] [address]  [address] [address]  Phone: [phone] Fax: [phone]  Local or Mail Order:local  Ordering Provider:Ariadne  Would the patient rather a call back or a response via [facility]? Call back  Best Call Back Number:[phone]  Additional Information: it is , and [facility] is no longer here

## 2020-05-14 NOTE — TELEPHONE ENCOUNTER
----- Message from Marisol Betts sent at 2020  4:21 PM CDT -----  Contact: pt   .Type:  RX Refill Request    Who Called: pt  Refill or New Rx:refill  RX Name and Strength: atorvastatin (LIPITOR) 40 MG tablet   How is the patient currently taking it? (ex. 1XDay):daily  Is this a 30 day or 90 day RX:30  Preferred Pharmacy with phone number:.  [id number] - [place], [place]  [facility]  AT [facility]  [address] [address]  Phone: [phone] Fax: [phone]  Local or Mail Order:local  Ordering Provider:Ariadne  Would the patient rather a call back or a response via [facility]? Call back  Best Call Back Number:[phone]  Additional Information: it is , and Ariadne is no longer here

## 2020-05-14 NOTE — TELEPHONE ENCOUNTER
----- Message from Marisol Betts sent at 2020  4:21 PM CDT -----  Contact: pt   .Type:  RX Refill Request    Who Called: pt  Refill or New Rx:refill  RX Name and Strength: atorvastatin (LIPITOR) 40 MG tablet   How is the patient currently taking it? (ex. 1XDay):daily  Is this a 30 day or 90 day RX:30  Preferred Pharmacy with phone number:.  [id number] - [place], [place]  [facility]  AT [facility] [address]  [address] [address] [address]  Phone: [phone] Fax: [phone]  Local or Mail Order:local  Ordering Provider:Ariadne  Would the patient rather a call back or a response via [facility]? Call back  Best Call Back Number:[phone]  Additional Information: it is , and Ariadne is no longer here

## 2020-05-14 NOTE — TELEPHONE ENCOUNTER
Lv message for pt to call the clinic back regarding medication refill informed pt that she had some refills left and she could call the pharmacy.Pt was last seen on 2/17/2020.//LB

## 2020-05-18 ENCOUNTER — TELEPHONE (OUTPATIENT)
Dept: INTERNAL MEDICINE | Facility: CLINIC | Age: 65
End: 2020-05-18

## 2020-05-18 NOTE — TELEPHONE ENCOUNTER
Spoke with pt, she stated she wants to transfer her medication to [facility] instead of [facility]. Called and transferred the medication as requested.

## 2020-05-18 NOTE — TELEPHONE ENCOUNTER
----- Message from Suhail Martínez sent at 5/18/2020 11:54 AM CDT -----  Contact: pt   ..Type:  Patient Returning Call    Who Called: pt   Who Left Message for Patient: nurse   Does the patient know what this is regarding?: yes   Would the patient rather a call back or a response via [facility]?  Callback   Best Call Back Number .[phone] (home)    Additional Information: atorvastatin (LIPITOR) 40 MG tablet

## 2020-08-31 ENCOUNTER — OFFICE VISIT (OUTPATIENT)
Dept: INTERNAL MEDICINE | Facility: CLINIC | Age: 65
End: 2020-08-31
Payer: MEDICARE

## 2020-08-31 VITALS
WEIGHT: 186.81 LBS | DIASTOLIC BLOOD PRESSURE: 72 MMHG | SYSTOLIC BLOOD PRESSURE: 126 MMHG | BODY MASS INDEX: 30.02 KG/M2 | HEIGHT: 66 IN | TEMPERATURE: 98 F

## 2020-08-31 DIAGNOSIS — I10 ESSENTIAL HYPERTENSION: ICD-10-CM

## 2020-08-31 DIAGNOSIS — E55.9 VITAMIN D DEFICIENCY: ICD-10-CM

## 2020-08-31 DIAGNOSIS — M25.512 CHRONIC PAIN OF BOTH SHOULDERS: ICD-10-CM

## 2020-08-31 DIAGNOSIS — R73.03 PREDIABETES: Primary | ICD-10-CM

## 2020-08-31 DIAGNOSIS — D69.6 THROMBOCYTOPENIA: ICD-10-CM

## 2020-08-31 DIAGNOSIS — E78.2 MIXED HYPERLIPIDEMIA: ICD-10-CM

## 2020-08-31 DIAGNOSIS — M25.511 CHRONIC PAIN OF BOTH SHOULDERS: ICD-10-CM

## 2020-08-31 DIAGNOSIS — J30.89 SEASONAL ALLERGIC RHINITIS DUE TO OTHER ALLERGIC TRIGGER: ICD-10-CM

## 2020-08-31 DIAGNOSIS — G89.29 CHRONIC PAIN OF BOTH SHOULDERS: ICD-10-CM

## 2020-08-31 DIAGNOSIS — Z00.00 ROUTINE ADULT HEALTH MAINTENANCE: ICD-10-CM

## 2020-08-31 PROCEDURE — 3074F PR MOST RECENT SYSTOLIC BLOOD PRESSURE < 130 MM HG: ICD-10-PCS | Mod: HCNC,CPTII,S$GLB, | Performed by: FAMILY MEDICINE

## 2020-08-31 PROCEDURE — 3008F BODY MASS INDEX DOCD: CPT | Mod: HCNC,CPTII,S$GLB, | Performed by: FAMILY MEDICINE

## 2020-08-31 PROCEDURE — 3078F PR MOST RECENT DIASTOLIC BLOOD PRESSURE < 80 MM HG: ICD-10-PCS | Mod: HCNC,CPTII,S$GLB, | Performed by: FAMILY MEDICINE

## 2020-08-31 PROCEDURE — 3008F PR BODY MASS INDEX (BMI) DOCUMENTED: ICD-10-PCS | Mod: HCNC,CPTII,S$GLB, | Performed by: FAMILY MEDICINE

## 2020-08-31 PROCEDURE — 99214 OFFICE O/P EST MOD 30 MIN: CPT | Mod: HCNC,S$GLB,, | Performed by: FAMILY MEDICINE

## 2020-08-31 PROCEDURE — 99999 PR PBB SHADOW E&M-EST. PATIENT-LVL IV: ICD-10-PCS | Mod: PBBFAC,HCNC,, | Performed by: FAMILY MEDICINE

## 2020-08-31 PROCEDURE — 3074F SYST BP LT 130 MM HG: CPT | Mod: HCNC,CPTII,S$GLB, | Performed by: FAMILY MEDICINE

## 2020-08-31 PROCEDURE — 99499 RISK ADDL DX/OHS AUDIT: ICD-10-PCS | Mod: S$PBB,,, | Performed by: FAMILY MEDICINE

## 2020-08-31 PROCEDURE — 3078F DIAST BP <80 MM HG: CPT | Mod: HCNC,CPTII,S$GLB, | Performed by: FAMILY MEDICINE

## 2020-08-31 PROCEDURE — 99214 PR OFFICE/OUTPT VISIT, EST, LEVL IV, 30-39 MIN: ICD-10-PCS | Mod: HCNC,S$GLB,, | Performed by: FAMILY MEDICINE

## 2020-08-31 PROCEDURE — 99499 UNLISTED E&M SERVICE: CPT | Mod: S$PBB,,, | Performed by: FAMILY MEDICINE

## 2020-08-31 PROCEDURE — 99999 PR PBB SHADOW E&M-EST. PATIENT-LVL IV: CPT | Mod: PBBFAC,HCNC,, | Performed by: FAMILY MEDICINE

## 2020-08-31 RX ORDER — CYCLOBENZAPRINE HCL 10 MG
10 TABLET ORAL 3 TIMES DAILY PRN
Qty: 60 TABLET | Refills: 1 | Status: SHIPPED | OUTPATIENT
Start: 2020-08-31 | End: 2020-09-10

## 2020-08-31 RX ORDER — MELOXICAM 15 MG/1
15 TABLET ORAL DAILY PRN
Qty: 30 TABLET | Refills: 2 | Status: ON HOLD | OUTPATIENT
Start: 2020-08-31 | End: 2020-11-09 | Stop reason: HOSPADM

## 2020-08-31 RX ORDER — AZELASTINE 1 MG/ML
1 SPRAY, METERED NASAL 2 TIMES DAILY
Qty: 30 ML | Refills: 1 | Status: SHIPPED | OUTPATIENT
Start: 2020-08-31 | End: 2023-11-21

## 2020-08-31 RX ORDER — TRAMADOL HYDROCHLORIDE 50 MG/1
50 TABLET ORAL 2 TIMES DAILY PRN
Qty: 24 TABLET | Refills: 0 | Status: SHIPPED | OUTPATIENT
Start: 2020-08-31 | End: 2021-02-09 | Stop reason: SDUPTHER

## 2020-09-01 NOTE — PROGRESS NOTES
Subjective:      Patient ID: Conchita Rouse is a 65 y.o. female.    Chief Complaint: Establish Care      Patient here today for routine follow up. She is due for routine labs.  She reports sinus congestion, PND, scratchy throat. She has these symptoms intermittently, has just started back taking claritin with limited relief.   She also reports continued pain in bilateral shoulders radiating up into neck, has this frequently, no injury or trauma to area, has had this for months. Feels tightness in sides of neck.   Her HTN, prediabetes, HDL, thrombocytopenia have been stable and nonsymptomatic.    Review of Systems   Constitutional: Negative for activity change, appetite change, fatigue and fever.   HENT: Positive for congestion, postnasal drip and sinus pressure.    Eyes: Positive for itching. Negative for redness.   Respiratory: Negative for cough and shortness of breath.    Cardiovascular: Negative for palpitations and leg swelling.   Gastrointestinal: Negative for abdominal pain, constipation and diarrhea.   Endocrine: Negative for polydipsia, polyphagia and polyuria.   Musculoskeletal: Positive for myalgias.   Psychiatric/Behavioral: Negative for sleep disturbance. The patient is not nervous/anxious.      Past Medical History:   Diagnosis Date    Dyslipidemia (high LDL; low HDL)     10y CV event risk 14.7% (asuming no DM2), which could be reduced to 4.5% with RF optimization, for which I recommended atorvastatin 20 mg 1 po qd x2 weeks and then 40 mg 1 po qd    History of shingles 02/14/2018    Per note from Dr. Rogelio Norwood III on 2/14/18; left upper buttocks.   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 Rogelio Norwood III last visit 2/14/18 (tolerating CSA & promacta); also notes aplastic anemia (AA psot Horse ATG - week 7/17/17) and ITP, for which ATG & promacta following decadron pulse for ITP & AA; plan for second opinion on BM bx; next flow  "in 3 mo; intent of rx: palliative    Prediabetes     TIA (transient ischemic attack) 2007    She was at "[facility]", where she noted she was feeling slow, took a nap, woke up with a numb/tingling left jaw to arm, which persistned 20 minutes, took 3 baby aspirins, went to ER @ [facility], where facial droop was noted, but completely resolved & head CT revealed old minor abnormality.    Vitamin D deficiency           Past Surgical History:   Procedure Laterality Date    COLONOSCOPY      left eye surgery  1955    Due to left eye lid drooping     Family History   Problem Relation Age of Onset    Heart disease Mother     Diabetes Mother     Heart disease Father     Cancer Sister     Breast cancer Sister     Cancer Brother      Social History     Socioeconomic History    Marital status:      Spouse name: Not on file    Number of children: Not on file    Years of education: Not on file    Highest education level: Not on file   Occupational History    Not on file   Social Needs    Financial resource strain: Not on file    Food insecurity     Worry: Not on file     Inability: Not on file    Transportation needs     Medical: Not on file     Non-medical: Not on file   Tobacco Use    Smoking status: Never Smoker    Smokeless tobacco: Never Used   Substance and Sexual Activity    Alcohol use: Yes     Alcohol/week: 0.0 - 1.0 standard drinks     Comment: 1/2 beer 3x/yr    Drug use: No    Sexual activity: Not Currently     Partners: Male     Birth control/protection: Post-menopausal   Lifestyle    Physical activity     Days per week: Not on file     Minutes per session: Not on file    Stress: Not on file   Relationships    Social connections     Talks on phone: Not on file     Gets together: Not on file     Attends Catholic service: Not on file     Active member of club or organization: Not on file     Attends meetings of clubs or organizations: Not on file     Relationship status: Not on file   Other " "Topics Concern    Not on file   Social History Narrative    Breakfast: 2 TBS grits or french toast sticks or waffles (previously fried eggs); 1/2 cup coffee w/ 1 cream & 3 sugars & lemonade "all day"    Lunch: 50% turkey or ham sandwich w/ occasional chips; tea, rare soda    Dinner: Steak fries and baked hot wings, peppermint or chicken wings and french fries; tea or cool aid    Snacks: mini-Gianna's chocolates (1x/wk) or a few unsalted chips (2x/wk) or popcorn (2x/mo) or strawberries or pretzils     Eats out: 2x/wk    Water: 16 oz/d    PA: None     Review of patient's allergies indicates:   Allergen Reactions    Bactrim [sulfamethoxazole-trimethoprim] Edema     Swollen lips       Objective:       /72 (BP Location: Right arm, Patient Position: Sitting, BP Method: Large (Manual))   Temp 98.1 °F (36.7 °C) (Temporal)   Ht 5' 6" (1.676 m)   Wt 84.8 kg (186 lb 13.4 oz)   BMI 30.16 kg/m²   Physical Exam  Vitals signs and nursing note reviewed.   Constitutional:       General: She is not in acute distress.     Appearance: Normal appearance. She is well-developed. She is not ill-appearing or diaphoretic.   HENT:      Head: Normocephalic and atraumatic.      Right Ear: Hearing, ear canal and external ear normal. Tympanic membrane is bulging.      Left Ear: Hearing, ear canal and external ear normal. Tympanic membrane is bulging.      Nose: Mucosal edema present.      Right Sinus: No maxillary sinus tenderness or frontal sinus tenderness.      Left Sinus: No maxillary sinus tenderness or frontal sinus tenderness.      Mouth/Throat:      Pharynx: Uvula midline. No oropharyngeal exudate.   Eyes:      Conjunctiva/sclera: Conjunctivae normal.      Pupils: Pupils are equal, round, and reactive to light.   Neck:      Musculoskeletal: Normal range of motion and neck supple.      Thyroid: No thyromegaly.      Trachea: No tracheal deviation.   Cardiovascular:      Rate and Rhythm: Normal rate and regular rhythm.      " Heart sounds: Normal heart sounds. No murmur.   Pulmonary:      Effort: Pulmonary effort is normal. No respiratory distress.      Breath sounds: Normal breath sounds.   Abdominal:      General: Bowel sounds are normal.      Palpations: Abdomen is soft.      Tenderness: There is no abdominal tenderness. There is no guarding.      Hernia: No hernia is present.   Musculoskeletal: Normal range of motion.      Right shoulder: She exhibits spasm. She exhibits no bony tenderness.      Left shoulder: She exhibits spasm. She exhibits no bony tenderness.   Lymphadenopathy:      Cervical: No cervical adenopathy.   Skin:     General: Skin is warm and dry.      Capillary Refill: Capillary refill takes less than 2 seconds.   Neurological:      General: No focal deficit present.      Mental Status: She is alert and oriented to person, place, and time.   Psychiatric:         Mood and Affect: Mood normal.         Behavior: Behavior normal.         Thought Content: Thought content normal.         Judgment: Judgment normal.       Assessment:     1. Prediabetes    2. Mixed hyperlipidemia    3. Essential hypertension    4. Vitamin D deficiency    5. Thrombocytopenia    6. Chronic pain of both shoulders    7. Routine adult health maintenance    8. Seasonal allergic rhinitis due to other allergic trigger      Plan:   Prediabetes  -     Vitamin D; Future; Expected date: 08/31/2020  -     Comprehensive metabolic panel; Future; Expected date: 08/31/2020  -     Hemoglobin A1C; Future; Expected date: 08/31/2020  -     CBC auto differential; Future; Expected date: 08/31/2020  -     Lipid Panel; Future; Expected date: 08/31/2020  -     TSH; Future; Expected date: 08/31/2020    Mixed hyperlipidemia  -     Vitamin D; Future; Expected date: 08/31/2020  -     Comprehensive metabolic panel; Future; Expected date: 08/31/2020  -     Hemoglobin A1C; Future; Expected date: 08/31/2020  -     CBC auto differential; Future; Expected date: 08/31/2020  -      Lipid Panel; Future; Expected date: 08/31/2020  -     TSH; Future; Expected date: 08/31/2020    Essential hypertension    Vitamin D deficiency  -     Vitamin D; Future; Expected date: 08/31/2020    Thrombocytopenia    Chronic pain of both shoulders  -     traMADoL (ULTRAM) 50 mg tablet; Take 1 tablet (50 mg total) by mouth 2 (two) times daily as needed.  Dispense: 24 tablet; Refill: 0  -     cyclobenzaprine (FLEXERIL) 10 MG tablet; Take 1 tablet (10 mg total) by mouth 3 (three) times daily as needed for Muscle spasms.  Dispense: 60 tablet; Refill: 1    Routine adult health maintenance  -     Ambulatory referral/consult to Gynecology; Future; Expected date: 09/07/2020    Seasonal allergic rhinitis due to other allergic trigger    Other orders  -     azelastine (ASTELIN) 137 mcg (0.1 %) nasal spray; 1 spray (137 mcg total) by Nasal route 2 (two) times daily.  Dispense: 30 mL; Refill: 1  -     meloxicam (MOBIC) 15 MG tablet; Take 1 tablet (15 mg total) by mouth daily as needed for Pain. Take with meal  Dispense: 30 tablet; Refill: 2    Continue all other current medications.     Medication List with Changes/Refills   New Medications    AZELASTINE (ASTELIN) 137 MCG (0.1 %) NASAL SPRAY    1 spray (137 mcg total) by Nasal route 2 (two) times daily.    CYCLOBENZAPRINE (FLEXERIL) 10 MG TABLET    Take 1 tablet (10 mg total) by mouth 3 (three) times daily as needed for Muscle spasms.    MELOXICAM (MOBIC) 15 MG TABLET    Take 1 tablet (15 mg total) by mouth daily as needed for Pain. Take with meal   Current Medications    AMLODIPINE (NORVASC) 5 MG TABLET    Take 1 tablet (5 mg total) by mouth once daily.    ATORVASTATIN (LIPITOR) 40 MG TABLET    Take 1 tablet (40 mg total) by mouth once daily.    CYCLOSPORINE (SANDIMMUNE) 100 MG CAP    Take 200 mg by mouth.    ELTROMBOPAG (PROMACTA) 75 MG TAB    150 mg.    FLUTICASONE (FLONASE) 50 MCG/ACTUATION NASAL SPRAY    USE 2 SPRAYS IN EACH NOSTRIL 1 TIME A DAY AS NEEDED    LORATADINE  (CLARITIN) 10 MG TABLET    Take 1 tablet (10 mg total) by mouth once daily.    VALACYCLOVIR (VALTREX) 500 MG TABLET    Take 500 mg by mouth.   Changed and/or Refilled Medications    Modified Medication Previous Medication    TRAMADOL (ULTRAM) 50 MG TABLET tramadol (ULTRAM) 50 mg tablet       Take 1 tablet (50 mg total) by mouth 2 (two) times daily as needed.    Take 50 mg by mouth 2 (two) times daily as needed.

## 2020-09-02 ENCOUNTER — LAB VISIT (OUTPATIENT)
Dept: LAB | Facility: HOSPITAL | Age: 65
End: 2020-09-02
Attending: FAMILY MEDICINE
Payer: MEDICARE

## 2020-09-02 DIAGNOSIS — E78.2 MIXED HYPERLIPIDEMIA: ICD-10-CM

## 2020-09-02 DIAGNOSIS — E55.9 VITAMIN D DEFICIENCY: ICD-10-CM

## 2020-09-02 DIAGNOSIS — R73.03 PREDIABETES: ICD-10-CM

## 2020-09-02 LAB
ALBUMIN SERPL BCP-MCNC: 3.8 G/DL (ref 3.5–5.2)
ALP SERPL-CCNC: 84 U/L (ref 55–135)
ALT SERPL W/O P-5'-P-CCNC: 18 U/L (ref 10–44)
ANION GAP SERPL CALC-SCNC: 9 MMOL/L (ref 8–16)
AST SERPL-CCNC: 20 U/L (ref 10–40)
BASOPHILS # BLD AUTO: 0.01 K/UL (ref 0–0.2)
BASOPHILS NFR BLD: 0.3 % (ref 0–1.9)
BILIRUB SERPL-MCNC: 0.3 MG/DL (ref 0.1–1)
BUN SERPL-MCNC: 12 MG/DL (ref 8–23)
CALCIUM SERPL-MCNC: 9.2 MG/DL (ref 8.7–10.5)
CHLORIDE SERPL-SCNC: 106 MMOL/L (ref 95–110)
CHOLEST SERPL-MCNC: 175 MG/DL (ref 120–199)
CHOLEST/HDLC SERPL: 4.2 {RATIO} (ref 2–5)
CO2 SERPL-SCNC: 26 MMOL/L (ref 23–29)
CREAT SERPL-MCNC: 0.8 MG/DL (ref 0.5–1.4)
DIFFERENTIAL METHOD: ABNORMAL
EOSINOPHIL # BLD AUTO: 0.1 K/UL (ref 0–0.5)
EOSINOPHIL NFR BLD: 1.6 % (ref 0–8)
ERYTHROCYTE [DISTWIDTH] IN BLOOD BY AUTOMATED COUNT: 14.8 % (ref 11.5–14.5)
EST. GFR  (AFRICAN AMERICAN): >60 ML/MIN/1.73 M^2
EST. GFR  (NON AFRICAN AMERICAN): >60 ML/MIN/1.73 M^2
GLUCOSE SERPL-MCNC: 118 MG/DL (ref 70–110)
HCT VFR BLD AUTO: 35.4 % (ref 37–48.5)
HDLC SERPL-MCNC: 42 MG/DL (ref 40–75)
HDLC SERPL: 24 % (ref 20–50)
HGB BLD-MCNC: 11.4 G/DL (ref 12–16)
IMM GRANULOCYTES # BLD AUTO: 0.01 K/UL (ref 0–0.04)
IMM GRANULOCYTES NFR BLD AUTO: 0.3 % (ref 0–0.5)
LDLC SERPL CALC-MCNC: 109.6 MG/DL (ref 63–159)
LYMPHOCYTES # BLD AUTO: 1.8 K/UL (ref 1–4.8)
LYMPHOCYTES NFR BLD: 48.3 % (ref 18–48)
MCH RBC QN AUTO: 36.3 PG (ref 27–31)
MCHC RBC AUTO-ENTMCNC: 32.2 G/DL (ref 32–36)
MCV RBC AUTO: 113 FL (ref 82–98)
MONOCYTES # BLD AUTO: 0.4 K/UL (ref 0.3–1)
MONOCYTES NFR BLD: 9.5 % (ref 4–15)
NEUTROPHILS # BLD AUTO: 1.5 K/UL (ref 1.8–7.7)
NEUTROPHILS NFR BLD: 40 % (ref 38–73)
NONHDLC SERPL-MCNC: 133 MG/DL
NRBC BLD-RTO: 1 /100 WBC
PLATELET # BLD AUTO: 50 K/UL (ref 150–350)
PMV BLD AUTO: 12.1 FL (ref 9.2–12.9)
POTASSIUM SERPL-SCNC: 4 MMOL/L (ref 3.5–5.1)
PROT SERPL-MCNC: 7.4 G/DL (ref 6–8.4)
RBC # BLD AUTO: 3.14 M/UL (ref 4–5.4)
SODIUM SERPL-SCNC: 141 MMOL/L (ref 136–145)
TRIGL SERPL-MCNC: 117 MG/DL (ref 30–150)
TSH SERPL DL<=0.005 MIU/L-ACNC: 2.24 UIU/ML (ref 0.4–4)
WBC # BLD AUTO: 3.79 K/UL (ref 3.9–12.7)

## 2020-09-02 PROCEDURE — 85025 COMPLETE CBC W/AUTO DIFF WBC: CPT | Mod: HCNC

## 2020-09-02 PROCEDURE — 80061 LIPID PANEL: CPT | Mod: HCNC

## 2020-09-02 PROCEDURE — 36415 COLL VENOUS BLD VENIPUNCTURE: CPT | Mod: HCNC,PO

## 2020-09-02 PROCEDURE — 83036 HEMOGLOBIN GLYCOSYLATED A1C: CPT | Mod: HCNC

## 2020-09-02 PROCEDURE — 84443 ASSAY THYROID STIM HORMONE: CPT | Mod: HCNC

## 2020-09-02 PROCEDURE — 80053 COMPREHEN METABOLIC PANEL: CPT | Mod: HCNC

## 2020-09-02 PROCEDURE — 82306 VITAMIN D 25 HYDROXY: CPT | Mod: HCNC

## 2020-09-03 LAB
25(OH)D3+25(OH)D2 SERPL-MCNC: 25 NG/ML (ref 30–96)
ESTIMATED AVG GLUCOSE: 134 MG/DL (ref 68–131)
HBA1C MFR BLD HPLC: 6.3 % (ref 4–5.6)

## 2020-09-03 NOTE — PROGRESS NOTES
Please notify she should shoot for about 45-60 carbs per meal and about 200 per day. There is good information on the CDC website about diabetic diet. She should be trying to eat a diabetic diet as much as possible.

## 2020-09-10 ENCOUNTER — PATIENT OUTREACH (OUTPATIENT)
Dept: ADMINISTRATIVE | Facility: OTHER | Age: 65
End: 2020-09-10

## 2020-09-25 ENCOUNTER — OFFICE VISIT (OUTPATIENT)
Dept: OPHTHALMOLOGY | Facility: CLINIC | Age: 65
End: 2020-09-25
Payer: MEDICARE

## 2020-09-25 DIAGNOSIS — H52.11 MYOPIA, RIGHT: ICD-10-CM

## 2020-09-25 DIAGNOSIS — H25.13 NUCLEAR SCLEROSIS, BILATERAL: Primary | ICD-10-CM

## 2020-09-25 DIAGNOSIS — H53.012 DEPRIVATION AMBLYOPIA OF LEFT EYE: ICD-10-CM

## 2020-09-25 DIAGNOSIS — H25.013 CORTICAL AGE-RELATED CATARACT OF BOTH EYES: ICD-10-CM

## 2020-09-25 DIAGNOSIS — Q10.0 CONGENITAL PTOSIS OF LEFT EYELID: ICD-10-CM

## 2020-09-25 DIAGNOSIS — H52.4 PRESBYOPIA: ICD-10-CM

## 2020-09-25 PROCEDURE — 99999 PR PBB SHADOW E&M-EST. PATIENT-LVL II: CPT | Mod: PBBFAC,HCNC,, | Performed by: OPTOMETRIST

## 2020-09-25 PROCEDURE — 92004 PR EYE EXAM, NEW PATIENT,COMPREHESV: ICD-10-PCS | Mod: HCNC,S$GLB,, | Performed by: OPTOMETRIST

## 2020-09-25 PROCEDURE — 92015 PR REFRACTION: ICD-10-PCS | Mod: HCNC,S$GLB,, | Performed by: OPTOMETRIST

## 2020-09-25 PROCEDURE — 92004 COMPRE OPH EXAM NEW PT 1/>: CPT | Mod: HCNC,S$GLB,, | Performed by: OPTOMETRIST

## 2020-09-25 PROCEDURE — 99999 PR PBB SHADOW E&M-EST. PATIENT-LVL II: ICD-10-PCS | Mod: PBBFAC,HCNC,, | Performed by: OPTOMETRIST

## 2020-09-25 PROCEDURE — 92015 DETERMINE REFRACTIVE STATE: CPT | Mod: HCNC,S$GLB,, | Performed by: OPTOMETRIST

## 2020-09-25 NOTE — PROGRESS NOTES
HPI     Eye Exam     Comments: Yearly              Comments     NP to DNL  Congenital ptosis, S/p repair  Patient here today for yearly eye exam  HPI    Any vision changes since last exam: Yes a little   Eye pain: No  Other ocular symptoms: Burning OU    Do you wear currently wear glasses or contacts? None    Interested in contacts today? No    Do you plan on getting new glasses today? If needed                Last edited by Marychuy Caballero, PCT on 9/25/2020  4:02 PM. (History)              Assessment /Plan     For exam results, see Encounter Report.    Nuclear sclerosis, bilateral  Cortical age-related cataract of both eyes  Surgery is not indicated at this time.   Monitor 12 months.    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   Discussed above and answered questions.

## 2020-09-28 ENCOUNTER — OFFICE VISIT (OUTPATIENT)
Dept: INTERNAL MEDICINE | Facility: CLINIC | Age: 65
End: 2020-09-28
Payer: MEDICARE

## 2020-09-28 VITALS
WEIGHT: 187.25 LBS | TEMPERATURE: 98 F | BODY MASS INDEX: 30.09 KG/M2 | SYSTOLIC BLOOD PRESSURE: 122 MMHG | HEIGHT: 66 IN | DIASTOLIC BLOOD PRESSURE: 70 MMHG

## 2020-09-28 DIAGNOSIS — M79.651 RIGHT THIGH PAIN: Primary | ICD-10-CM

## 2020-09-28 PROCEDURE — 3074F PR MOST RECENT SYSTOLIC BLOOD PRESSURE < 130 MM HG: ICD-10-PCS | Mod: HCNC,CPTII,S$GLB, | Performed by: FAMILY MEDICINE

## 2020-09-28 PROCEDURE — 3078F DIAST BP <80 MM HG: CPT | Mod: HCNC,CPTII,S$GLB, | Performed by: FAMILY MEDICINE

## 2020-09-28 PROCEDURE — 3008F BODY MASS INDEX DOCD: CPT | Mod: HCNC,CPTII,S$GLB, | Performed by: FAMILY MEDICINE

## 2020-09-28 PROCEDURE — 99999 PR PBB SHADOW E&M-EST. PATIENT-LVL III: CPT | Mod: PBBFAC,HCNC,, | Performed by: FAMILY MEDICINE

## 2020-09-28 PROCEDURE — 99999 PR PBB SHADOW E&M-EST. PATIENT-LVL III: ICD-10-PCS | Mod: PBBFAC,HCNC,, | Performed by: FAMILY MEDICINE

## 2020-09-28 PROCEDURE — 3078F PR MOST RECENT DIASTOLIC BLOOD PRESSURE < 80 MM HG: ICD-10-PCS | Mod: HCNC,CPTII,S$GLB, | Performed by: FAMILY MEDICINE

## 2020-09-28 PROCEDURE — 96372 THER/PROPH/DIAG INJ SC/IM: CPT | Mod: HCNC,S$GLB,, | Performed by: FAMILY MEDICINE

## 2020-09-28 PROCEDURE — 99213 OFFICE O/P EST LOW 20 MIN: CPT | Mod: HCNC,25,S$GLB, | Performed by: FAMILY MEDICINE

## 2020-09-28 PROCEDURE — 99213 PR OFFICE/OUTPT VISIT, EST, LEVL III, 20-29 MIN: ICD-10-PCS | Mod: HCNC,25,S$GLB, | Performed by: FAMILY MEDICINE

## 2020-09-28 PROCEDURE — 3008F PR BODY MASS INDEX (BMI) DOCUMENTED: ICD-10-PCS | Mod: HCNC,CPTII,S$GLB, | Performed by: FAMILY MEDICINE

## 2020-09-28 PROCEDURE — 3074F SYST BP LT 130 MM HG: CPT | Mod: HCNC,CPTII,S$GLB, | Performed by: FAMILY MEDICINE

## 2020-09-28 PROCEDURE — 96372 PR INJECTION,THERAP/PROPH/DIAG2ST, IM OR SUBCUT: ICD-10-PCS | Mod: HCNC,S$GLB,, | Performed by: FAMILY MEDICINE

## 2020-09-28 RX ORDER — CYCLOBENZAPRINE HCL 10 MG
10 TABLET ORAL 3 TIMES DAILY PRN
Qty: 20 TABLET | Refills: 1 | Status: SHIPPED | OUTPATIENT
Start: 2020-09-28 | End: 2020-10-08

## 2020-09-28 RX ORDER — KETOROLAC TROMETHAMINE 30 MG/ML
60 INJECTION, SOLUTION INTRAMUSCULAR; INTRAVENOUS
Status: COMPLETED | OUTPATIENT
Start: 2020-09-28 | End: 2020-09-28

## 2020-09-28 RX ADMIN — KETOROLAC TROMETHAMINE 60 MG: 30 INJECTION, SOLUTION INTRAMUSCULAR; INTRAVENOUS at 04:09

## 2020-09-28 NOTE — PROGRESS NOTES
administered injection as ordered. Pt tolerated well with no reactions or complaints. Pain currently is 8/10 when walking, pt stated will follow up with us if pain continues.

## 2020-09-28 NOTE — PROGRESS NOTES
"Subjective:      Patient ID: Conchita Rouse is a 65 y.o. female.    Chief Complaint: Groin Pain      Patient reports 2 days ago began to have pain in right inner thigh - no known injury or trauma to the area. Pain seems to be worsening and spreading to larger area in her thigh.     Groin Pain  Pertinent negatives include no back pain or rash.     Review of Systems   Musculoskeletal: Positive for gait problem and myalgias. Negative for arthralgias and back pain.   Skin: Negative for rash.     Past Medical History:   Diagnosis Date    Dyslipidemia (high LDL; low HDL)     10y CV event risk 14.7% (asuming no DM2), which could be reduced to 4.5% with RF optimization, for which I recommended atorvastatin 20 mg 1 po qd x2 weeks and then 40 mg 1 po qd    History of shingles 02/14/2018    Per note from Dr. Rogelio Norwood III on 2/14/18; left upper buttocks.   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 Rogelio Norwood III last visit 2/14/18 (tolerating CSA & promacta); also notes aplastic anemia (AA psot Horse ATG - week 7/17/17) and ITP, for which ATG & promacta following decadron pulse for ITP & AA; plan for second opinion on BM bx; next flow in 3 mo; intent of rx: palliative    Prediabetes     TIA (transient ischemic attack) 2007    [facility] where she noted she was feeling slow, took a nap, woke up with a numb/tingling left jaw to arm, which persistned 20 minutes, took 3 baby aspirins, went to ER @ [facility], where facial droop was noted, but completely resolved & head CT revealed old minor abnormality.    Vitamin D deficiency           Past Surgical History:   Procedure Laterality Date    COLONOSCOPY      left eye surgery  1955    Due to left eye lid drooping     Family History   Problem Relation Age of Onset    Heart disease Mother     Diabetes Mother     Heart disease Father     Cancer Sister     Breast cancer Sister     Cancer Brother " "     Social History     Socioeconomic History    Marital status:      Spouse name: Not on file    Number of children: Not on file    Years of education: Not on file    Highest education level: Not on file   Occupational History    Not on file   Social Needs    Financial resource strain: Not on file    Food insecurity     Worry: Not on file     Inability: Not on file    Transportation needs     Medical: Not on file     Non-medical: Not on file   Tobacco Use    Smoking status: Never Smoker    Smokeless tobacco: Never Used   Substance and Sexual Activity    Alcohol use: Yes     Alcohol/week: 0.0 - 1.0 standard drinks     Comment: 1/2 beer 3x/yr    Drug use: No    Sexual activity: Not Currently     Partners: Male     Birth control/protection: Post-menopausal   Lifestyle    Physical activity     Days per week: Not on file     Minutes per session: Not on file    Stress: Not on file   Relationships    Social connections     Talks on phone: Not on file     Gets together: Not on file     Attends Sabianist service: Not on file     Active member of club or organization: Not on file     Attends meetings of clubs or organizations: Not on file     Relationship status: Not on file   Other Topics Concern    Not on file   Social History Narrative    Breakfast: 2 TBS grits or french toast sticks or waffles (previously fried eggs); 1/2 cup coffee w/ 1 cream & 3 sugars & lemonade "all day"    Lunch: 50% turkey or ham sandwich w/ occasional chips; tea, rare soda    Dinner: Steak fries and baked hot wings, peppermint or chicken wings and french fries; tea or cool aid    Snacks: mini-[employer] chocolates (1x/wk) or a few unsalted chips (2x/wk) or popcorn (2x/mo) or strawberries or pretzils     Eats out: 2x/wk    Water: 16 oz/d    PA: None     Review of patient's allergies indicates:   Allergen Reactions    Bactrim [sulfamethoxazole-trimethoprim] Edema     Swollen lips       Objective:       /70 (BP Location: " "Right arm, Patient Position: Sitting, BP Method: Large (Manual))   Temp 98.2 °F (36.8 °C) (Temporal)   Ht 5' 6" (1.676 m)   Wt 84.9 kg (187 lb 4.5 oz)   BMI 30.23 kg/m²   Physical Exam  Constitutional:       General: She is not in acute distress.     Appearance: Normal appearance. She is well-developed. She is not ill-appearing or diaphoretic.   Cardiovascular:      Rate and Rhythm: Normal rate and regular rhythm.      Heart sounds: Normal heart sounds.   Pulmonary:      Effort: Pulmonary effort is normal.      Breath sounds: Normal breath sounds.   Musculoskeletal:         General: Tenderness present.      Comments: Muscle spasm palpable right medial thigh - no enlarged LN, no hernia, no fluid collection, no skin changes   Neurological:      Mental Status: She is alert and oriented to person, place, and time.   Psychiatric:         Mood and Affect: Mood normal.         Behavior: Behavior normal.         Thought Content: Thought content normal.         Judgment: Judgment normal.       Assessment:     1. Right thigh pain      Plan:   Right thigh pain  -     cyclobenzaprine (FLEXERIL) 10 MG tablet; Take 1 tablet (10 mg total) by mouth 3 (three) times daily as needed for Muscle spasms.  Dispense: 20 tablet; Refill: 1    Other orders  -     ketorolac injection 60 mg    suspect muscle spasm -  patient to call back in 2 days if no improvement.   May consider soft tissue US if pain worsens or does not resolve.   Medication List with Changes/Refills   New Medications    CYCLOBENZAPRINE (FLEXERIL) 10 MG TABLET    Take 1 tablet (10 mg total) by mouth 3 (three) times daily as needed for Muscle spasms.   Current Medications    AMLODIPINE (NORVASC) 5 MG TABLET    Take 1 tablet (5 mg total) by mouth once daily.    ATORVASTATIN (LIPITOR) 40 MG TABLET    Take 1 tablet (40 mg total) by mouth once daily.    AZELASTINE (ASTELIN) 137 MCG (0.1 %) NASAL SPRAY    1 spray (137 mcg total) by Nasal route 2 (two) times daily.    " ELTROMBOPAG (PROMACTA) 75 MG TAB    150 mg.    FLUTICASONE (FLONASE) 50 MCG/ACTUATION NASAL SPRAY    USE 2 SPRAYS IN EACH NOSTRIL 1 TIME A DAY AS NEEDED    LORATADINE (CLARITIN) 10 MG TABLET    Take 1 tablet (10 mg total) by mouth once daily.    MELOXICAM (MOBIC) 15 MG TABLET    Take 1 tablet (15 mg total) by mouth daily as needed for Pain. Take with meal    TRAMADOL (ULTRAM) 50 MG TABLET    Take 1 tablet (50 mg total) by mouth 2 (two) times daily as needed.    VALACYCLOVIR (VALTREX) 500 MG TABLET    Take 500 mg by mouth.   Discontinued Medications    CYCLOSPORINE (SANDIMMUNE) 100 MG CAP    Take 200 mg by mouth.

## 2020-09-29 ENCOUNTER — PATIENT MESSAGE (OUTPATIENT)
Dept: OTHER | Facility: OTHER | Age: 65
End: 2020-09-29

## 2020-10-02 ENCOUNTER — OFFICE VISIT (OUTPATIENT)
Dept: INTERNAL MEDICINE | Facility: CLINIC | Age: 65
End: 2020-10-02
Payer: MEDICARE

## 2020-10-02 ENCOUNTER — TELEPHONE (OUTPATIENT)
Dept: INTERNAL MEDICINE | Facility: CLINIC | Age: 65
End: 2020-10-02

## 2020-10-02 VITALS
OXYGEN SATURATION: 98 % | BODY MASS INDEX: 30.29 KG/M2 | HEART RATE: 98 BPM | HEIGHT: 66 IN | SYSTOLIC BLOOD PRESSURE: 150 MMHG | TEMPERATURE: 99 F | DIASTOLIC BLOOD PRESSURE: 90 MMHG | WEIGHT: 188.5 LBS | RESPIRATION RATE: 18 BRPM

## 2020-10-02 DIAGNOSIS — M25.462 EFFUSION OF LEFT KNEE: Primary | ICD-10-CM

## 2020-10-02 PROCEDURE — 99214 OFFICE O/P EST MOD 30 MIN: CPT | Mod: HCNC,95,, | Performed by: FAMILY MEDICINE

## 2020-10-02 PROCEDURE — 99214 PR OFFICE/OUTPT VISIT, EST, LEVL IV, 30-39 MIN: ICD-10-PCS | Mod: HCNC,95,, | Performed by: FAMILY MEDICINE

## 2020-10-02 PROCEDURE — 1101F PR PT FALLS ASSESS DOC 0-1 FALLS W/OUT INJ PAST YR: ICD-10-PCS | Mod: HCNC,CPTII,95, | Performed by: FAMILY MEDICINE

## 2020-10-02 PROCEDURE — 3080F DIAST BP >= 90 MM HG: CPT | Mod: HCNC,CPTII,95, | Performed by: FAMILY MEDICINE

## 2020-10-02 PROCEDURE — 1101F PT FALLS ASSESS-DOCD LE1/YR: CPT | Mod: HCNC,CPTII,95, | Performed by: FAMILY MEDICINE

## 2020-10-02 PROCEDURE — 3008F BODY MASS INDEX DOCD: CPT | Mod: HCNC,CPTII,95, | Performed by: FAMILY MEDICINE

## 2020-10-02 PROCEDURE — 3077F SYST BP >= 140 MM HG: CPT | Mod: HCNC,CPTII,95, | Performed by: FAMILY MEDICINE

## 2020-10-02 PROCEDURE — 3077F PR MOST RECENT SYSTOLIC BLOOD PRESSURE >= 140 MM HG: ICD-10-PCS | Mod: HCNC,CPTII,95, | Performed by: FAMILY MEDICINE

## 2020-10-02 PROCEDURE — 3008F PR BODY MASS INDEX (BMI) DOCUMENTED: ICD-10-PCS | Mod: HCNC,CPTII,95, | Performed by: FAMILY MEDICINE

## 2020-10-02 PROCEDURE — 3080F PR MOST RECENT DIASTOLIC BLOOD PRESSURE >= 90 MM HG: ICD-10-PCS | Mod: HCNC,CPTII,95, | Performed by: FAMILY MEDICINE

## 2020-10-02 RX ORDER — NAPROXEN 500 MG/1
500 TABLET ORAL 2 TIMES DAILY WITH MEALS
Qty: 14 TABLET | Refills: 1 | Status: SHIPPED | OUTPATIENT
Start: 2020-10-02 | End: 2020-11-03

## 2020-10-02 NOTE — PROGRESS NOTES
Subjective:       Patient ID: Conchita Rouse is a 65 y.o. female.    The patient location is: [place]  The chief complaint leading to consultation is: Knee Pain    Visit type: audiovisual, converted to in person visit after felt that she needed physical exam  Total time spent with patient: 5 minutes on phone  Each patient to whom he or she provides medical services by telemedicine is:  (1) informed of the relationship between the physician and patient and the respective role of any other health care provider with respect to management of the patient; and (2) notified that he or she may decline to receive medical services by telemedicine and may withdraw from such care at any time.    HPI  Left knee having some problems. Has been swelling for the last 3 days. Wondering if she strained it while compensating for right hip pain. Having hard time putting weight on it. No redness but does feel more warm.     No fever or SOB. Pain worse with bending the knee. Has been applying ice without much relief. Also tried biofreeze.     Right thigh was hurting. Took muscle relaxer and pain shot. This is improved    Family History   Problem Relation Age of Onset    Heart disease Mother     Diabetes Mother     Heart disease Father     Cancer Sister     Breast cancer Sister     Cancer Brother        Current Outpatient Medications:     amLODIPine (NORVASC) 5 MG tablet, Take 1 tablet (5 mg total) by mouth once daily., Disp: 90 tablet, Rfl: 3    azelastine (ASTELIN) 137 mcg (0.1 %) nasal spray, 1 spray (137 mcg total) by Nasal route 2 (two) times daily., Disp: 30 mL, Rfl: 1    cyclobenzaprine (FLEXERIL) 10 MG tablet, Take 1 tablet (10 mg total) by mouth 3 (three) times daily as needed for Muscle spasms., Disp: 20 tablet, Rfl: 1    eltrombopag (PROMACTA) 75 mg Tab, 150 mg., Disp: , Rfl:     fluticasone (FLONASE) 50 mcg/actuation nasal spray, USE 2 SPRAYS IN EACH NOSTRIL 1 TIME A DAY AS NEEDED, Disp: , Rfl: 0    loratadine  "(CLARITIN) 10 mg tablet, Take 1 tablet (10 mg total) by mouth once daily., Disp: 30 tablet, Rfl: 3    traMADoL (ULTRAM) 50 mg tablet, Take 1 tablet (50 mg total) by mouth 2 (two) times daily as needed., Disp: 24 tablet, Rfl: 0    valacyclovir (VALTREX) 500 MG tablet, Take 500 mg by mouth., Disp: , Rfl:     atorvastatin (LIPITOR) 40 MG tablet, Take 1 tablet (40 mg total) by mouth once daily. (Patient not taking: Reported on 10/2/2020), Disp: 90 tablet, Rfl: 3    meloxicam (MOBIC) 15 MG tablet, Take 1 tablet (15 mg total) by mouth daily as needed for Pain. Take with meal (Patient not taking: Reported on 9/28/2020), Disp: 30 tablet, Rfl: 2    naproxen (NAPROSYN) 500 MG tablet, Take 1 tablet (500 mg total) by mouth 2 (two) times daily with meals., Disp: 14 tablet, Rfl: 1    Review of Systems   Constitutional: Positive for activity change. Negative for unexpected weight change.   HENT: Negative for hearing loss, rhinorrhea and trouble swallowing.    Eyes: Negative for discharge and visual disturbance.   Respiratory: Negative for chest tightness and wheezing.    Cardiovascular: Negative for chest pain and palpitations.   Gastrointestinal: Negative for blood in stool, constipation, diarrhea and vomiting.   Endocrine: Negative for polydipsia and polyuria.   Genitourinary: Negative for difficulty urinating, dysuria, hematuria and menstrual problem.   Musculoskeletal: Positive for arthralgias. Negative for joint swelling and neck pain.   Neurological: Negative for weakness and headaches.   Psychiatric/Behavioral: Negative for confusion and dysphoric mood.       Objective:   BP (!) 150/90 (BP Location: Right arm, Patient Position: Sitting, BP Method: Medium (Manual))   Pulse 98   Temp 98.8 °F (37.1 °C) (Temporal)   Resp 18   Ht 5' 6" (1.676 m)   Wt 85.5 kg (188 lb 7.9 oz)   SpO2 98%   BMI 30.42 kg/m²      Physical Exam  Constitutional:       General: She is not in acute distress.     Appearance: She is " well-developed. She is not diaphoretic.   HENT:      Head: Normocephalic and atraumatic.      Nose: Nose normal.   Eyes:      General:         Right eye: No discharge.         Left eye: No discharge.      Conjunctiva/sclera: Conjunctivae normal.      Pupils: Pupils are equal, round, and reactive to light.   Neck:      Thyroid: No thyromegaly.   Cardiovascular:      Rate and Rhythm: Normal rate and regular rhythm.      Heart sounds: No murmur.   Pulmonary:      Effort: Pulmonary effort is normal. No respiratory distress.      Breath sounds: Normal breath sounds.   Abdominal:      General: There is no distension.      Palpations: Abdomen is soft.   Musculoskeletal:      Comments: No significant pain with passive ROM of left knee. Effusion present with increased warmth. No redness.    Skin:     Findings: No rash.   Neurological:      Mental Status: She is alert and oriented to person, place, and time.   Psychiatric:         Behavior: Behavior normal.         Assessment & Plan     Problem List Items Addressed This Visit        Orthopedic    Effusion of left knee - Primary    Current Assessment & Plan     Doubt septic arthritis due to limited pain with passive range of motion.  Think that she likely strained the left knee while compensating for right hip pain that she was having last week.  Recommended oral naproxen twice a day as well as rest and icing.  Explained that if symptoms worsen over the weekend or she develops any fever or chills she should go to the ER.  Offered knee aspiration today however she opted for the previous approach.                 Follow up in about 2 weeks (around 10/16/2020) for Nurse visit for blood pressure.    Disclaimer:  This note may have been prepared using voice recognition software, it may have not been extensively proofed, as such there could be errors within the text such as sound alike errors.

## 2020-10-02 NOTE — ASSESSMENT & PLAN NOTE
Doubt septic arthritis due to limited pain with passive range of motion.  Think that she likely strained the left knee while compensating for right hip pain that she was having last week.  Recommended oral naproxen twice a day as well as rest and icing.  Explained that if symptoms worsen over the weekend or she develops any fever or chills she should go to the ER.  Offered knee aspiration today however she opted for the previous approach.

## 2020-10-02 NOTE — TELEPHONE ENCOUNTER
----- Message from King Soni sent at 10/2/2020  2:02 PM CDT -----  Regarding: Reschedule to a virtual visit today  The patient is calling in regards to rescheduling her appointment to a virtual visit today due to not being able to drive the Lt knee swelling, please advise ph#[phone] (home)

## 2020-10-05 ENCOUNTER — OFFICE VISIT (OUTPATIENT)
Dept: ORTHOPEDICS | Facility: CLINIC | Age: 65
End: 2020-10-05
Payer: MEDICARE

## 2020-10-05 ENCOUNTER — PATIENT MESSAGE (OUTPATIENT)
Dept: ADMINISTRATIVE | Facility: HOSPITAL | Age: 65
End: 2020-10-05

## 2020-10-05 ENCOUNTER — HOSPITAL ENCOUNTER (OUTPATIENT)
Dept: RADIOLOGY | Facility: HOSPITAL | Age: 65
Discharge: HOME | End: 2020-10-05
Attending: PHYSICIAN ASSISTANT
Payer: MEDICARE

## 2020-10-05 ENCOUNTER — TELEPHONE (OUTPATIENT)
Dept: ORTHOPEDICS | Facility: CLINIC | Age: 65
End: 2020-10-05

## 2020-10-05 VITALS
HEIGHT: 66 IN | SYSTOLIC BLOOD PRESSURE: 141 MMHG | HEART RATE: 80 BPM | BODY MASS INDEX: 30.22 KG/M2 | WEIGHT: 188 LBS | DIASTOLIC BLOOD PRESSURE: 82 MMHG

## 2020-10-05 DIAGNOSIS — M25.462 EFFUSION, LEFT KNEE: ICD-10-CM

## 2020-10-05 DIAGNOSIS — M25.562 LEFT KNEE PAIN, UNSPECIFIED CHRONICITY: Primary | ICD-10-CM

## 2020-10-05 DIAGNOSIS — M25.562 LEFT KNEE PAIN, UNSPECIFIED CHRONICITY: ICD-10-CM

## 2020-10-05 DIAGNOSIS — M25.562 ACUTE PAIN OF LEFT KNEE: Primary | ICD-10-CM

## 2020-10-05 DIAGNOSIS — M94.262 CHONDROMALACIA OF LEFT KNEE: ICD-10-CM

## 2020-10-05 PROCEDURE — 99999 PR PBB SHADOW E&M-EST. PATIENT-LVL IV: CPT | Mod: PBBFAC,HCNC,, | Performed by: PHYSICIAN ASSISTANT

## 2020-10-05 PROCEDURE — 3079F DIAST BP 80-89 MM HG: CPT | Mod: HCNC,CPTII,S$GLB, | Performed by: PHYSICIAN ASSISTANT

## 2020-10-05 PROCEDURE — 99203 PR OFFICE/OUTPT VISIT, NEW, LEVL III, 30-44 MIN: ICD-10-PCS | Mod: 25,HCNC,S$GLB, | Performed by: PHYSICIAN ASSISTANT

## 2020-10-05 PROCEDURE — 99203 OFFICE O/P NEW LOW 30 MIN: CPT | Mod: 25,HCNC,S$GLB, | Performed by: PHYSICIAN ASSISTANT

## 2020-10-05 PROCEDURE — 3008F PR BODY MASS INDEX (BMI) DOCUMENTED: ICD-10-PCS | Mod: HCNC,CPTII,S$GLB, | Performed by: PHYSICIAN ASSISTANT

## 2020-10-05 PROCEDURE — 3079F PR MOST RECENT DIASTOLIC BLOOD PRESSURE 80-89 MM HG: ICD-10-PCS | Mod: HCNC,CPTII,S$GLB, | Performed by: PHYSICIAN ASSISTANT

## 2020-10-05 PROCEDURE — 73564 X-RAY EXAM KNEE 4 OR MORE: CPT | Mod: 26,HCNC,LT, | Performed by: RADIOLOGY

## 2020-10-05 PROCEDURE — 73562 X-RAY EXAM OF KNEE 3: CPT | Mod: 26,HCNC,RT, | Performed by: RADIOLOGY

## 2020-10-05 PROCEDURE — 3008F BODY MASS INDEX DOCD: CPT | Mod: HCNC,CPTII,S$GLB, | Performed by: PHYSICIAN ASSISTANT

## 2020-10-05 PROCEDURE — 99999 PR PBB SHADOW E&M-EST. PATIENT-LVL IV: ICD-10-PCS | Mod: PBBFAC,HCNC,, | Performed by: PHYSICIAN ASSISTANT

## 2020-10-05 PROCEDURE — 73562 X-RAY EXAM OF KNEE 3: CPT | Mod: TC,HCNC,RT

## 2020-10-05 PROCEDURE — 3077F PR MOST RECENT SYSTOLIC BLOOD PRESSURE >= 140 MM HG: ICD-10-PCS | Mod: HCNC,CPTII,S$GLB, | Performed by: PHYSICIAN ASSISTANT

## 2020-10-05 PROCEDURE — 20610 LARGE JOINT ASPIRATION/INJECTION: L KNEE: ICD-10-PCS | Mod: HCNC,LT,S$GLB, | Performed by: PHYSICIAN ASSISTANT

## 2020-10-05 PROCEDURE — 20610 DRAIN/INJ JOINT/BURSA W/O US: CPT | Mod: HCNC,LT,S$GLB, | Performed by: PHYSICIAN ASSISTANT

## 2020-10-05 PROCEDURE — 1101F PR PT FALLS ASSESS DOC 0-1 FALLS W/OUT INJ PAST YR: ICD-10-PCS | Mod: HCNC,CPTII,S$GLB, | Performed by: PHYSICIAN ASSISTANT

## 2020-10-05 PROCEDURE — 1101F PT FALLS ASSESS-DOCD LE1/YR: CPT | Mod: HCNC,CPTII,S$GLB, | Performed by: PHYSICIAN ASSISTANT

## 2020-10-05 PROCEDURE — 73562 XR KNEE ORTHO LEFT WITH FLEXION: ICD-10-PCS | Mod: 26,HCNC,RT, | Performed by: RADIOLOGY

## 2020-10-05 PROCEDURE — 73564 XR KNEE ORTHO LEFT WITH FLEXION: ICD-10-PCS | Mod: 26,HCNC,LT, | Performed by: RADIOLOGY

## 2020-10-05 PROCEDURE — 3077F SYST BP >= 140 MM HG: CPT | Mod: HCNC,CPTII,S$GLB, | Performed by: PHYSICIAN ASSISTANT

## 2020-10-05 RX ORDER — METHYLPREDNISOLONE ACETATE 80 MG/ML
80 INJECTION, SUSPENSION INTRA-ARTICULAR; INTRALESIONAL; INTRAMUSCULAR; SOFT TISSUE
Status: DISCONTINUED | OUTPATIENT
Start: 2020-10-05 | End: 2020-10-05 | Stop reason: HOSPADM

## 2020-10-05 RX ADMIN — METHYLPREDNISOLONE ACETATE 80 MG: 80 INJECTION, SUSPENSION INTRA-ARTICULAR; INTRALESIONAL; INTRAMUSCULAR; SOFT TISSUE at 09:10

## 2020-10-05 NOTE — TELEPHONE ENCOUNTER
Called the pt to confirm their appt with us today. Called the pt to arrive 30 mins earlier for their appt to get xrays done. Pt understood.

## 2020-10-05 NOTE — PROCEDURES
Large Joint Aspiration/Injection: L knee    Date/Time: 10/5/2020 9:40 AM  Performed by: Estrella Quinones PA-C  Authorized by: Estrella Quinones PA-C     Consent Done?:  Yes (Verbal)  Indications:  Pain  Site marked: the procedure site was marked    Timeout: prior to procedure the correct patient, procedure, and site was verified    Prep: patient was prepped and draped in usual sterile fashion      Local anesthesia used?: Yes    Local anesthetic:  Lidocaine 1% without epinephrine  Anesthetic total (ml):  2      Details:  Needle Size:  22 G and 18 G  Ultrasonic Guidance for needle placement?: No    Approach: superolateral.  Location:  Knee  Site:  L knee  Medications:  80 mg methylPREDNISolone acetate 80 mg/mL  Aspirate:  Blood-tinged  Patient tolerance:  Patient tolerated the procedure well with no immediate complications     Left knee aspiration and injection report  Treatment options were discussed.  After verbal consent was obtained and the sight was identified the left knee was prepped in sterile fashion. Under sterile conditions the left knee was injected with 20 mg of lidocaine plain utilizing the superolateral approach. The left knee was reprepped sterilely and aspirated using the same anterolateral portal with return of 70 cc's of serosanguinous fluid.  The patient was then given an injection of 80 mg depomedrol.  After injection a sterile Band-Aid was applied.  The patient tolerated the procedure well and was sent home in stable condition.  The patient was instructed to apply an ice pack for approximately 10 minutes once arriving at home and not to do anything strenuous for the next 48 hours.  The patient was instructed to call if there were any problems at the aspiration sight.

## 2020-10-05 NOTE — PATIENT INSTRUCTIONS
Take Mobic (meloxicam) 15 mg daily   **OR**  Take Naprosyn 500 mg twice daily    Ok to take Tramadol 50mg every 6 hours as needed  May also add Tylenol 650mg every 6 hours as needed

## 2020-10-05 NOTE — PROGRESS NOTES
"Subjective:      Patient ID: Conchita Rouse is a 65 y.o. female.    Chief Complaint: Pain of the Left Knee      HPI: Conchita Rouse  is a 65 y.o. female who c/o Pain of the Left Knee   for duration of about a week.  She denies any inciting injury.  However she tells me she had injury to the right thigh couple of weeks ago.  She thinks she may have over compensated for it with the left leg.  She says the left knee got very swollen on Thursday.  She has been trying to tough it out and finally needed to be seen for it.  She was seen by primary care last week who put her on naproxen 500 mg b.i.d..  She does not find that particularly helpful.  She also has a prescription for Mobic but has not been taking it.  She has a prescription for tramadol but has not been taking that either.  Pain level is 10/10.  Quality is aching, sharp, burning.  She complains of associated stiffness.  She complains of associated swelling.  Alleviating factors include rest.  Aggravating factors include flexion, full extension, and weight-bearing.    Past Medical History:   Diagnosis Date    Dyslipidemia (high LDL; low HDL)     10y CV event risk 14.7% (asuming no DM2), which could be reduced to 4.5% with RF optimization, for which I recommended atorvastatin 20 mg 1 po qd x2 weeks and then 40 mg 1 po qd    History of shingles 02/14/2018    Per note from Dr. Rogelio Norwood III on 2/14/18; left upper buttocks.   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 Rogelio Norwood III last visit 2/14/18 (tolerating CSA & promacta); also notes aplastic anemia (AA psot Horse ATG - week 7/17/17) and ITP, for which ATG & promacta following decadron pulse for ITP & AA; plan for second opinion on BM bx; next flow in 3 mo; intent of rx: palliative    Prediabetes     TIA (transient ischemic attack) 2007    She was at "[employer]", where she noted she was feeling slow, took a nap, woke up with a " numb/tingling left jaw to arm, which persistned 20 minutes, took 3 baby aspirins, went to ER @ [facility], where facial droop was noted, but completely resolved & head CT revealed old minor abnormality.    Vitamin D deficiency      Past Surgical History:   Procedure Laterality Date    COLONOSCOPY      left eye surgery  1955    Due to left eye lid drooping     Family History   Problem Relation Age of Onset    Heart disease Mother     Diabetes Mother     Heart disease Father     Cancer Sister     Breast cancer Sister     Cancer Brother      Social History     Socioeconomic History    Marital status:      Spouse name: Not on file    Number of children: Not on file    Years of education: Not on file    Highest education level: Not on file   Occupational History    Not on file   Social Needs    Financial resource strain: Not on file    Food insecurity     Worry: Not on file     Inability: Not on file    Transportation needs     Medical: Not on file     Non-medical: Not on file   Tobacco Use    Smoking status: Never Smoker    Smokeless tobacco: Never Used   Substance and Sexual Activity    Alcohol use: Yes     Alcohol/week: 0.0 - 1.0 standard drinks     Comment: 1/2 beer 3x/yr    Drug use: No    Sexual activity: Not Currently     Partners: Male     Birth control/protection: Post-menopausal   Lifestyle    Physical activity     Days per week: Not on file     Minutes per session: Not on file    Stress: Not on file   Relationships    Social connections     Talks on phone: Not on file     Gets together: Not on file     Attends Mu-ism service: Not on file     Active member of club or organization: Not on file     Attends meetings of clubs or organizations: Not on file     Relationship status: Not on file   Other Topics Concern    Not on file   Social History Narrative    Breakfast: 2 TBS grits or [place] toast sticks or waffles (previously fried eggs); 1/2 cup coffee w/ 1 cream & 3 sugars & lemonade  ""all day"    Lunch: 50% turkey or ham sandwich w/ occasional chips; tea, rare soda    Dinner: Steak fries and baked hot wings, peppermint or chicken wings and french fries; tea or cool aid    Snacks: mini-[place] chocolates (1x/wk) or a few unsalted chips (2x/wk) or popcorn (2x/mo) or strawberries or pretzils     Eats out: 2x/wk    Water: 16 oz/d    PA: None     Medication List with Changes/Refills   Current Medications    AMLODIPINE (NORVASC) 5 MG TABLET    Take 1 tablet (5 mg total) by mouth once daily.    ATORVASTATIN (LIPITOR) 40 MG TABLET    Take 1 tablet (40 mg total) by mouth once daily.    AZELASTINE (ASTELIN) 137 MCG (0.1 %) NASAL SPRAY    1 spray (137 mcg total) by Nasal route 2 (two) times daily.    CYCLOBENZAPRINE (FLEXERIL) 10 MG TABLET    Take 1 tablet (10 mg total) by mouth 3 (three) times daily as needed for Muscle spasms.    ELTROMBOPAG (PROMACTA) 75 MG TAB    150 mg.    FLUTICASONE (FLONASE) 50 MCG/ACTUATION NASAL SPRAY    USE 2 SPRAYS IN EACH NOSTRIL 1 TIME A DAY AS NEEDED    LORATADINE (CLARITIN) 10 MG TABLET    Take 1 tablet (10 mg total) by mouth once daily.    MELOXICAM (MOBIC) 15 MG TABLET    Take 1 tablet (15 mg total) by mouth daily as needed for Pain. Take with meal    NAPROXEN (NAPROSYN) 500 MG TABLET    Take 1 tablet (500 mg total) by mouth 2 (two) times daily with meals.    TRAMADOL (ULTRAM) 50 MG TABLET    Take 1 tablet (50 mg total) by mouth 2 (two) times daily as needed.    VALACYCLOVIR (VALTREX) 500 MG TABLET    Take 500 mg by mouth.     Review of patient's allergies indicates:   Allergen Reactions    Bactrim [sulfamethoxazole-trimethoprim] Edema     Swollen lips       ROS      Objective:        General    Nursing note and vitals reviewed.  Constitutional: She is oriented to person, place, and time. She appears well-developed and well-nourished.   HENT:   Head: Normocephalic and atraumatic.   Eyes: EOM are normal.   Cardiovascular: Normal rate and regular rhythm.  "   Pulmonary/Chest: Effort normal.   Abdominal: Soft.   Neurological: She is alert and oriented to person, place, and time.   Psychiatric: She has a normal mood and affect. Her behavior is normal.           Right Knee Exam     Range of Motion   Extension: normal   Flexion: normal     Other   Sensation: normal    Left Knee Exam     Inspection   Erythema: absent  Swelling: absent  Effusion: present (3+)  Deformity: absent  Bruising: absent    Tenderness   The patient tender to palpation of the condyle and medial joint line.    Range of Motion   Extension:  5 abnormal   Flexion:  80 normal     Tests   Meniscus   Em:  Medial - positive Lateral - negative  Patella   Patellar apprehension: negative  Patellar Tracking: normal  Patellar Grind: positive    Other   Meniscal Cyst: absent  Popliteal (Baker's) Cyst: absent  Sensation: normal    Comments:  Comp soft, cap refill < 2 sec.    Post aspiration   ROM 0-110    Muscle Strength   Right Lower Extremity   Quadriceps:  5/5   Hamstrin/5   Left Lower Extremity   Quadriceps:  4/5   Hamstrin/5     Vascular Exam       Edema  Right Lower Leg: absent  Left Lower Leg: absent              Xray images and report were reviewed today.  I agree with the radiologist's interpretation.    X-ray Knee Ortho Left with Flexion  Narrative: EXAMINATION:  XR KNEE ORTHO LEFT WITH FLEXION    CLINICAL HISTORY:  . Pain in left knee    TECHNIQUE:  AP standing view of both knees, PA flexion standing views of both knees, and Merchant views of both knees were performed. A lateral view of the left knee was also performed.    COMPARISON:  None    FINDINGS:  There is no left knee fracture.  There is mild patellofemoral compartment osteoarthritis.  Remaining joint spaces of the left knee are unremarkable.  No suspicious osseous lesion.    Limited evaluation of the right knee demonstrates mild patellofemoral compartment osteoarthritis.  Impression: 1.  Small left knee effusion.  No fracture  identified.    2.  Mild bilateral patellofemoral compartment osteoarthritis.    Electronically signed by: Kiko Pacheco  Date:    10/05/2020  Time:    10:51        Assessment:       Encounter Diagnoses   Name Primary?    Acute pain of left knee Yes    Effusion of bursa of left knee     Chondromalacia of left knee           Plan:       Conchita was seen today for pain.    Diagnoses and all orders for this visit:    Acute pain of left knee    Effusion of bursa of left knee    Chondromalacia of left knee        Conchita Rouse is a new pt who comes in today for the above problems.  She has a large effusion with left-sided knee pain.  She has minimal arthritis on exam.  We have discussed risks and benefits of aspiration and injection.  She wishes to proceed.  I will put her on a home exercise program for knee strengthening.  I have also put her into a neoprene hinged knee brace.  She has both naproxen and Mobic.  She may take 1 or the other.  She may also take Tylenol and tramadol concurrently with the anti-inflammatory.  I would like to see her back in the office in about 1 month.  If she is not significantly improved, we will consider further diagnostic workup.  If she develops signs or symptoms of infection, she will notify the office immediately.  She has a pair of crutches at home.  If she needs to, I recommend getting on 1 crutch on the right side to help with ambulation.  She may be weight-bearing as tolerated.  She verbalizes understanding and agrees.    Follow up in about 1 month (around 11/5/2020).          The patient understands, chooses and consents to this plan and accepts all   the risks which include but are not limited to the risks mentioned above.     Disclaimer: This note was prepared using a voice recognition system and is likely to have sound alike errors within the text.

## 2020-10-06 DIAGNOSIS — Z12.11 SPECIAL SCREENING FOR MALIGNANT NEOPLASM OF COLON: Primary | ICD-10-CM

## 2020-10-12 ENCOUNTER — TELEPHONE (OUTPATIENT)
Dept: ENDOSCOPY | Facility: HOSPITAL | Age: 65
End: 2020-10-12

## 2020-10-12 NOTE — TELEPHONE ENCOUNTER
Called patient in efforts to schedule endoscopy procedure.No answer, unable to leave voice message, mailbox full. My chart message sent with call back number provided .

## 2020-10-26 ENCOUNTER — OFFICE VISIT (OUTPATIENT)
Dept: INTERNAL MEDICINE | Facility: CLINIC | Age: 65
End: 2020-10-26
Payer: MEDICARE

## 2020-10-26 VITALS
OXYGEN SATURATION: 100 % | BODY MASS INDEX: 30.18 KG/M2 | DIASTOLIC BLOOD PRESSURE: 82 MMHG | WEIGHT: 187.81 LBS | HEART RATE: 107 BPM | TEMPERATURE: 99 F | SYSTOLIC BLOOD PRESSURE: 118 MMHG | HEIGHT: 66 IN

## 2020-10-26 DIAGNOSIS — M79.651 RIGHT THIGH PAIN: Primary | ICD-10-CM

## 2020-10-26 PROCEDURE — 3008F BODY MASS INDEX DOCD: CPT | Mod: HCNC,CPTII,S$GLB, | Performed by: NURSE PRACTITIONER

## 2020-10-26 PROCEDURE — 99999 PR PBB SHADOW E&M-EST. PATIENT-LVL IV: ICD-10-PCS | Mod: PBBFAC,HCNC,, | Performed by: NURSE PRACTITIONER

## 2020-10-26 PROCEDURE — 3074F PR MOST RECENT SYSTOLIC BLOOD PRESSURE < 130 MM HG: ICD-10-PCS | Mod: HCNC,CPTII,S$GLB, | Performed by: NURSE PRACTITIONER

## 2020-10-26 PROCEDURE — 3079F PR MOST RECENT DIASTOLIC BLOOD PRESSURE 80-89 MM HG: ICD-10-PCS | Mod: HCNC,CPTII,S$GLB, | Performed by: NURSE PRACTITIONER

## 2020-10-26 PROCEDURE — 3074F SYST BP LT 130 MM HG: CPT | Mod: HCNC,CPTII,S$GLB, | Performed by: NURSE PRACTITIONER

## 2020-10-26 PROCEDURE — 99213 OFFICE O/P EST LOW 20 MIN: CPT | Mod: HCNC,S$GLB,, | Performed by: NURSE PRACTITIONER

## 2020-10-26 PROCEDURE — 3008F PR BODY MASS INDEX (BMI) DOCUMENTED: ICD-10-PCS | Mod: HCNC,CPTII,S$GLB, | Performed by: NURSE PRACTITIONER

## 2020-10-26 PROCEDURE — 1101F PT FALLS ASSESS-DOCD LE1/YR: CPT | Mod: HCNC,CPTII,S$GLB, | Performed by: NURSE PRACTITIONER

## 2020-10-26 PROCEDURE — 1101F PR PT FALLS ASSESS DOC 0-1 FALLS W/OUT INJ PAST YR: ICD-10-PCS | Mod: HCNC,CPTII,S$GLB, | Performed by: NURSE PRACTITIONER

## 2020-10-26 PROCEDURE — 3079F DIAST BP 80-89 MM HG: CPT | Mod: HCNC,CPTII,S$GLB, | Performed by: NURSE PRACTITIONER

## 2020-10-26 PROCEDURE — 99999 PR PBB SHADOW E&M-EST. PATIENT-LVL IV: CPT | Mod: PBBFAC,HCNC,, | Performed by: NURSE PRACTITIONER

## 2020-10-26 PROCEDURE — 99213 PR OFFICE/OUTPT VISIT, EST, LEVL III, 20-29 MIN: ICD-10-PCS | Mod: HCNC,S$GLB,, | Performed by: NURSE PRACTITIONER

## 2020-10-26 RX ORDER — BETAMETHASONE SODIUM PHOSPHATE AND BETAMETHASONE ACETATE 3; 3 MG/ML; MG/ML
6 INJECTION, SUSPENSION INTRA-ARTICULAR; INTRALESIONAL; INTRAMUSCULAR; SOFT TISSUE ONCE
Qty: 1 ML | Refills: 0 | Status: SHIPPED | OUTPATIENT
Start: 2020-10-26 | End: 2020-10-26

## 2020-10-26 NOTE — PROGRESS NOTES
Subjective:       Patient ID: Conchita Rouse is a 65 y.o. female.    Chief Complaint: Knee Pain (right 2 weeks ) and Hip Pain (right 2 weeks )    Patient presents with right thigh and right knee pain that started about 2 month ago.  Currently taking meloxicam and Flexeril.  Not taking meloxicam as prescribed due to reading the side effects.  Had some relief but now has started back again.        Review of Systems   Constitutional: Negative for chills and fatigue.   Gastrointestinal: Negative for abdominal pain, constipation and diarrhea.   Musculoskeletal: Positive for arthralgias and myalgias. Negative for gait problem and joint deformity.   Psychiatric/Behavioral: Negative for agitation.         Objective:      Physical Exam  Vitals signs reviewed.   Constitutional:       Appearance: Normal appearance.   HENT:      Head: Normocephalic.   Cardiovascular:      Rate and Rhythm: Normal rate.   Pulmonary:      Effort: Pulmonary effort is normal. No respiratory distress.   Musculoskeletal:         General: Tenderness present. No swelling.      Right upper leg: She exhibits tenderness. She exhibits no swelling.        Legs:    Neurological:      General: No focal deficit present.      Mental Status: She is alert.   Psychiatric:         Mood and Affect: Mood normal.         Behavior: Behavior normal. Behavior is cooperative.         Assessment:       1. Right thigh pain        Plan:         Right thigh pain  -     betamethasone acetate-betamethasone sodium phosphate (CELESTONE) 6 mg/mL injection; Inject 1 mL (6 mg total) into the muscle once. for 1 dose  Dispense: 1 mL; Refill: 0          Advised to continue muscle relaxer and add Tylenol.      [facility] today.     Follow up as needed

## 2020-10-30 ENCOUNTER — PATIENT MESSAGE (OUTPATIENT)
Dept: ADMINISTRATIVE | Facility: HOSPITAL | Age: 65
End: 2020-10-30

## 2020-11-02 ENCOUNTER — TELEPHONE (OUTPATIENT)
Dept: ORTHOPEDICS | Facility: CLINIC | Age: 65
End: 2020-11-02

## 2020-11-02 NOTE — TELEPHONE ENCOUNTER
----- Message from Medina Parsons sent at 11/2/2020 10:41 AM CST -----  Contact: Pt  .Type:  Sooner Apoointment Request    Caller is requesting a sooner appointment.  Caller declined first available appointment listed below.  Caller will not accept being placed on the waitlist and is requesting a message be sent to doctor.  Name of Caller: Conchita  When is the first available appointment? 11/06/2020  Symptoms: pain rt knee up leg  Would the patient rather a call back or a response via [facility]? Call back  Best Call Back Number: [phone] (home)     Additional Information:  pt would like to come today

## 2020-11-03 ENCOUNTER — OFFICE VISIT (OUTPATIENT)
Dept: ORTHOPEDICS | Facility: CLINIC | Age: 65
DRG: 486 | End: 2020-11-03
Payer: MEDICARE

## 2020-11-03 ENCOUNTER — HOSPITAL ENCOUNTER (OUTPATIENT)
Dept: RADIOLOGY | Facility: HOSPITAL | Age: 65
Discharge: HOME | DRG: 486 | End: 2020-11-03
Attending: PHYSICIAN ASSISTANT
Payer: MEDICARE

## 2020-11-03 ENCOUNTER — TELEPHONE (OUTPATIENT)
Dept: ORTHOPEDICS | Facility: CLINIC | Age: 65
End: 2020-11-03

## 2020-11-03 VITALS
HEART RATE: 96 BPM | DIASTOLIC BLOOD PRESSURE: 89 MMHG | HEIGHT: 66 IN | BODY MASS INDEX: 30.18 KG/M2 | WEIGHT: 187.81 LBS | SYSTOLIC BLOOD PRESSURE: 141 MMHG

## 2020-11-03 DIAGNOSIS — M25.561 RIGHT KNEE PAIN, UNSPECIFIED CHRONICITY: ICD-10-CM

## 2020-11-03 DIAGNOSIS — M25.461 EFFUSION OF RIGHT KNEE: Primary | ICD-10-CM

## 2020-11-03 DIAGNOSIS — M25.561 RIGHT KNEE PAIN, UNSPECIFIED CHRONICITY: Primary | ICD-10-CM

## 2020-11-03 DIAGNOSIS — M25.561 ACUTE PAIN OF RIGHT KNEE: ICD-10-CM

## 2020-11-03 LAB
APPEARANCE FLD: NORMAL
BODY FLD TYPE: NORMAL
COLOR FLD: NORMAL
LYMPHOCYTES NFR FLD MANUAL: 8 %
MONOS+MACROS NFR FLD MANUAL: 10 %
NEUTROPHILS NFR FLD MANUAL: 82 %
WBC # FLD: NORMAL /CU MM

## 2020-11-03 PROCEDURE — 89060 EXAM SYNOVIAL FLUID CRYSTALS: CPT | Mod: HCNC

## 2020-11-03 PROCEDURE — 87205 SMEAR GRAM STAIN: CPT | Mod: HCNC

## 2020-11-03 PROCEDURE — 1101F PT FALLS ASSESS-DOCD LE1/YR: CPT | Mod: HCNC,CPTII,S$GLB, | Performed by: PHYSICIAN ASSISTANT

## 2020-11-03 PROCEDURE — 73560 X-RAY EXAM OF KNEE 1 OR 2: CPT | Mod: 26,HCNC,RT, | Performed by: RADIOLOGY

## 2020-11-03 PROCEDURE — 73560 XR KNEE 1 OR 2 VIEW RIGHT: ICD-10-PCS | Mod: 26,HCNC,RT, | Performed by: RADIOLOGY

## 2020-11-03 PROCEDURE — 99999 PR PBB SHADOW E&M-EST. PATIENT-LVL III: CPT | Mod: PBBFAC,HCNC,, | Performed by: PHYSICIAN ASSISTANT

## 2020-11-03 PROCEDURE — 99999 PR PBB SHADOW E&M-EST. PATIENT-LVL III: ICD-10-PCS | Mod: PBBFAC,HCNC,, | Performed by: PHYSICIAN ASSISTANT

## 2020-11-03 PROCEDURE — 3077F SYST BP >= 140 MM HG: CPT | Mod: HCNC,CPTII,S$GLB, | Performed by: PHYSICIAN ASSISTANT

## 2020-11-03 PROCEDURE — 1101F PR PT FALLS ASSESS DOC 0-1 FALLS W/OUT INJ PAST YR: ICD-10-PCS | Mod: HCNC,CPTII,S$GLB, | Performed by: PHYSICIAN ASSISTANT

## 2020-11-03 PROCEDURE — 89051 BODY FLUID CELL COUNT: CPT | Mod: HCNC

## 2020-11-03 PROCEDURE — 3008F BODY MASS INDEX DOCD: CPT | Mod: HCNC,CPTII,S$GLB, | Performed by: PHYSICIAN ASSISTANT

## 2020-11-03 PROCEDURE — 99214 OFFICE O/P EST MOD 30 MIN: CPT | Mod: 25,HCNC,S$GLB, | Performed by: PHYSICIAN ASSISTANT

## 2020-11-03 PROCEDURE — 87075 CULTR BACTERIA EXCEPT BLOOD: CPT | Mod: HCNC

## 2020-11-03 PROCEDURE — 87070 CULTURE OTHR SPECIMN AEROBIC: CPT | Mod: HCNC

## 2020-11-03 PROCEDURE — 3079F DIAST BP 80-89 MM HG: CPT | Mod: HCNC,CPTII,S$GLB, | Performed by: PHYSICIAN ASSISTANT

## 2020-11-03 PROCEDURE — 3079F PR MOST RECENT DIASTOLIC BLOOD PRESSURE 80-89 MM HG: ICD-10-PCS | Mod: HCNC,CPTII,S$GLB, | Performed by: PHYSICIAN ASSISTANT

## 2020-11-03 PROCEDURE — 3077F PR MOST RECENT SYSTOLIC BLOOD PRESSURE >= 140 MM HG: ICD-10-PCS | Mod: HCNC,CPTII,S$GLB, | Performed by: PHYSICIAN ASSISTANT

## 2020-11-03 PROCEDURE — 99214 PR OFFICE/OUTPT VISIT, EST, LEVL IV, 30-39 MIN: ICD-10-PCS | Mod: 25,HCNC,S$GLB, | Performed by: PHYSICIAN ASSISTANT

## 2020-11-03 PROCEDURE — 3008F PR BODY MASS INDEX (BMI) DOCUMENTED: ICD-10-PCS | Mod: HCNC,CPTII,S$GLB, | Performed by: PHYSICIAN ASSISTANT

## 2020-11-03 PROCEDURE — 73560 X-RAY EXAM OF KNEE 1 OR 2: CPT | Mod: TC,HCNC,RT

## 2020-11-03 NOTE — TELEPHONE ENCOUNTER
----- Message from Francesca Cheng sent at 11/3/2020  4:31 PM CST -----  Regarding: missed call  ,Type:  Patient Returning Call    Who Called:pt  Who Left Message for Patient:staff  Does the patient know what this is regarding?:n/a  Would the patient rather a call back or a response via [facility]? Call back   Best Call Back Number:[phone]  Additional Information: Please call back.Thanks

## 2020-11-03 NOTE — PROGRESS NOTES
" Patient ID: Conchita Rouse is a 65 y.o. female.    Chief Complaint: Pain of the Right Knee      HPI: Conchita Rouse  is a 65 y.o. female who c/o Pain of the Right Knee   for duration of a couple of days.  She denies inciting injury.  She says that it started swelling up order got out of control.  She is having intense pain and difficulty even walking.  Pain is improved with Tylenol and Aleve.  Aggravating factors include weight-bearing and range of motion past 90°.  Severity is 8/10.  Quality is aching and constant.  She complains of associated swelling.  On further questioning, she tells me she has a family history of gout.  Her father had gout.  She has never had a gout attack.  To her knowledge she does not have a positive family history for rheumatoid arthritis.    Past Medical History:   Diagnosis Date    Dyslipidemia (high LDL; low HDL)     10y CV event risk 14.7% (asuming no DM2), which could be reduced to 4.5% with RF optimization, for which I recommended atorvastatin 20 mg 1 po qd x2 weeks and then 40 mg 1 po qd    History of shingles 02/14/2018    Per note from Dr. Rogelio Norwood III on 2/14/18; left upper buttocks.   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 Rogelio Norwood III last visit 2/14/18 (tolerating CSA & promacta); also notes aplastic anemia (AA psot [facility] ATG - week 7/17/17) and ITP, for which ATG & promacta following decadron pulse for ITP & AA; plan for second opinion on BM bx; next flow in 3 mo; intent of rx: palliative    Prediabetes     TIA (transient ischemic attack) 2007    She was at "[employer]", where she noted she was feeling slow, took a nap, woke up with a numb/tingling left jaw to arm, which persistned 20 minutes, took 3 baby aspirins, went to ER @ [facility], where facial droop was noted, but completely resolved & head CT revealed old minor abnormality.    Vitamin D deficiency      Past Surgical History:   Procedure " "Laterality Date    COLONOSCOPY      left eye surgery  1955    Due to left eye lid drooping     Family History   Problem Relation Age of Onset    Heart disease Mother     Diabetes Mother     Heart disease Father     Cancer Sister     Breast cancer Sister     Cancer Brother      Social History     Socioeconomic History    Marital status:      Spouse name: Not on file    Number of children: Not on file    Years of education: Not on file    Highest education level: Not on file   Occupational History    Not on file   Social Needs    Financial resource strain: Not on file    Food insecurity     Worry: Not on file     Inability: Not on file    Transportation needs     Medical: Not on file     Non-medical: Not on file   Tobacco Use    Smoking status: Never Smoker    Smokeless tobacco: Never Used   Substance and Sexual Activity    Alcohol use: Yes     Alcohol/week: 0.0 - 1.0 standard drinks     Comment: rarely    Drug use: No    Sexual activity: Not Currently     Partners: Male     Birth control/protection: Post-menopausal   Lifestyle    Physical activity     Days per week: Not on file     Minutes per session: Not on file    Stress: Not on file   Relationships    Social connections     Talks on phone: Not on file     Gets together: Not on file     Attends Quaker service: Not on file     Active member of club or organization: Not on file     Attends meetings of clubs or organizations: Not on file     Relationship status: Not on file   Other Topics Concern    Not on file   Social History Narrative    Breakfast: 2 TBS grits or french toast sticks or waffles (previously fried eggs); 1/2 cup coffee w/ 1 cream & 3 sugars & lemonade "all day"    Lunch: 50% turkey or ham sandwich w/ occasional chips; tea, rare soda    Dinner: Steak fries and baked hot wings, peppermint or chicken wings and french fries; tea or cool aid    Snacks: mini-[employer] chocolates (1x/wk) or a few unsalted chips (2x/wk) or " popcorn (2x/mo) or strawberries or pretzils     Eats out: 2x/wk    Water: 16 oz/d    PA: None     Medication List with Changes/Refills   Current Medications    AMLODIPINE (NORVASC) 5 MG TABLET    Take 1 tablet (5 mg total) by mouth once daily.    ATORVASTATIN (LIPITOR) 40 MG TABLET    Take 1 tablet (40 mg total) by mouth once daily.    AZELASTINE (ASTELIN) 137 MCG (0.1 %) NASAL SPRAY    1 spray (137 mcg total) by Nasal route 2 (two) times daily.    ELTROMBOPAG (PROMACTA) 75 MG TAB    Take 75 mg by mouth once daily.     FLUTICASONE (FLONASE) 50 MCG/ACTUATION NASAL SPRAY    USE 2 SPRAYS IN EACH NOSTRIL 1 TIME A DAY AS NEEDED    LORATADINE (CLARITIN) 10 MG TABLET    Take 1 tablet (10 mg total) by mouth once daily.    MELOXICAM (MOBIC) 15 MG TABLET    Take 1 tablet (15 mg total) by mouth daily as needed for Pain. Take with meal    TRAMADOL (ULTRAM) 50 MG TABLET    Take 1 tablet (50 mg total) by mouth 2 (two) times daily as needed.    VALACYCLOVIR (VALTREX) 500 MG TABLET    Take 500 mg by mouth.   Discontinued Medications    NAPROXEN (NAPROSYN) 500 MG TABLET    Take 1 tablet (500 mg total) by mouth 2 (two) times daily with meals.     Review of patient's allergies indicates:   Allergen Reactions    Bactrim [sulfamethoxazole-trimethoprim] Edema     Swollen lips    Codeine Other (See Comments)     Pt states codeine does not cause hives. Had tooth extraction and medication given caused her to be jittery, feel hot and have to lay down like she was weak. morhpine given on 5/16/2017 iv for pain. Pt martine well without any sign or sx of allergy or reaction.       Objective:        General    Nursing note and vitals reviewed.  Constitutional: She is oriented to person, place, and time. She appears well-developed and well-nourished.   HENT:   Head: Normocephalic and atraumatic.   Eyes: EOM are normal.   Cardiovascular: Normal rate and regular rhythm.    Pulmonary/Chest: Effort normal.   Neurological: She is alert and oriented to  person, place, and time.   Psychiatric: She has a normal mood and affect. Her behavior is normal.           Right Knee Exam     Range of Motion   Extension:  10 abnormal   Flexion:  90 abnormal     Comments:  TIM  Diffuse TTP   2+effusion  No erythema        Xray Images and report were reviewed today.  I agree with the radiologist's interpretation.    X-Ray Knee 1 or 2 View Right  Narrative: EXAMINATION:  XR KNEE 1 OR 2 VIEW RIGHT    CLINICAL HISTORY:  Pain in right knee    TECHNIQUE:  Single lateral view of the right knee was obtained.    COMPARISON:  10/05/2020    FINDINGS:  No acute fractures or dislocations visualized.  No definite joint effusion demonstrated.  Prominent patellar marginal osteophytes are present suggesting moderate osteoarthritis.  Impression: Degenerative findings as above    Electronically signed by: Imer Rodríguez MD  Date:    11/03/2020  Time:    15:12    X-ray Knee Ortho Left with Flexion  Order: [id number]  Status:  Final result   Visible to patient:  Yes (Patient Portal) Next appt:  11/10/2020 at 08:40 AM in Orthopedics (Estrella Quinones PA-C) Dx:  Left knee pain, unspecified chronicity  Details    Reading Physician Reading Date Result Priority   Kiko Oh MD  [phone] 10/5/2020 Routine      Narrative & Impression     EXAMINATION:  XR KNEE ORTHO LEFT WITH FLEXION     CLINICAL HISTORY:  . Pain in left knee     TECHNIQUE:  AP standing view of both knees, PA flexion standing views of both knees, and Merchant views of both knees were performed. A lateral view of the left knee was also performed.     COMPARISON:  None     FINDINGS:  There is no left knee fracture.  There is mild patellofemoral compartment osteoarthritis.  Remaining joint spaces of the left knee are unremarkable.  No suspicious osseous lesion.     Limited evaluation of the right knee demonstrates mild patellofemoral compartment osteoarthritis.     Impression:     1.  Small left knee effusion.  No fracture  identified.     2.  Mild bilateral patellofemoral compartment osteoarthritis.        Electronically signed by: Kiko Pacheco  Date:                                            10/05/2020  Time:                                           10:51               Assessment:       Encounter Diagnoses   Name Primary?    Effusion of right knee Yes    Acute pain of right knee           Plan:       Conchita was seen today for pain.    Diagnoses and all orders for this visit:    Effusion of right knee  -     URIC ACID; Future  -     C-Reactive Protein; Future  -     CBC Auto Differential; Future  -     Sedimentation rate; Future  -     Body fluid crystal Joint Fluid, Right Knee  -     Culture, Anaerobic  -     WBC & Diff,Body Fluid Joint Fluid, Right Knee  -     Culture, Body Fluid (Aerobic) w/ GS    Acute pain of right knee  -     C-Reactive Protein; Future  -     CBC Auto Differential; Future  -     Sedimentation rate; Future  -     Body fluid crystal Joint Fluid, Right Knee  -     Culture, Anaerobic  -     WBC & Diff,Body Fluid Joint Fluid, Right Knee  -     Culture, Body Fluid (Aerobic) w/ GS        Conchita Rouse is an established pt who comes in today for she has an effusion today of the right knee.  We have discussed risks and benefits of aspirating as well as potential injection.  I have advised against doing an injection if she has cloudy fluid upon aspiration.  She wishes to proceed.  She has a family history of gout.  I would recommend getting a uric acid level as well.  Post aspiration, the fluid was cloudy.  She had a precipitous it in there that gives the appearance of gout.  I would like to run it for cultures and sensitivities in addition to a cell count and pathology review for crystals.  Additionally I will also add CRP, ESR, CBC to blood work today.  I will plan to see her back next week.  If this is not infectious and appears to just be gout, I will be happy to do an injection for her next week.  In the  meantime, she is pretty well controlled pain wise on Tylenol and Aleve.  She may continue doing that.  She verbalized understanding and agree.    Follow up in about 1 week (around 11/10/2020).    The patient understands, chooses and consents to this plan and accepts all   the risks which include but are not limited to the risks mentioned above.     Disclaimer: This note was prepared using a voice recognition system and is likely to have sound alike errors within the text.

## 2020-11-03 NOTE — TELEPHONE ENCOUNTER
Returned call to patient and she states that she has spoken with Estrella Quinones PA-C. Understanding verbalized.

## 2020-11-04 ENCOUNTER — ANESTHESIA EVENT (OUTPATIENT)
Dept: SURGERY | Facility: HOSPITAL | Age: 65
DRG: 486 | End: 2020-11-04
Payer: MEDICARE

## 2020-11-04 ENCOUNTER — OFFICE VISIT (OUTPATIENT)
Dept: ORTHOPEDICS | Facility: CLINIC | Age: 65
DRG: 486 | End: 2020-11-04
Payer: MEDICARE

## 2020-11-04 ENCOUNTER — HOSPITAL ENCOUNTER (INPATIENT)
Facility: HOSPITAL | Age: 65
LOS: 5 days | Discharge: HOME HEALTH | DRG: 486 | End: 2020-11-09
Attending: EMERGENCY MEDICINE | Admitting: INTERNAL MEDICINE
Payer: MEDICARE

## 2020-11-04 ENCOUNTER — ANESTHESIA (OUTPATIENT)
Dept: SURGERY | Facility: HOSPITAL | Age: 65
DRG: 486 | End: 2020-11-04
Payer: MEDICARE

## 2020-11-04 VITALS
HEART RATE: 83 BPM | WEIGHT: 187 LBS | SYSTOLIC BLOOD PRESSURE: 138 MMHG | BODY MASS INDEX: 30.05 KG/M2 | DIASTOLIC BLOOD PRESSURE: 83 MMHG | HEIGHT: 66 IN

## 2020-11-04 DIAGNOSIS — M00.9 PYOGENIC ARTHRITIS OF RIGHT KNEE JOINT: ICD-10-CM

## 2020-11-04 DIAGNOSIS — M25.461 EFFUSION OF RIGHT KNEE: Primary | ICD-10-CM

## 2020-11-04 DIAGNOSIS — M25.561 RIGHT KNEE PAIN, UNSPECIFIED CHRONICITY: ICD-10-CM

## 2020-11-04 DIAGNOSIS — M00.9 PYOGENIC ARTHRITIS OF RIGHT KNEE JOINT, DUE TO UNSPECIFIED ORGANISM: Primary | ICD-10-CM

## 2020-11-04 DIAGNOSIS — M25.561 ACUTE PAIN OF RIGHT KNEE: ICD-10-CM

## 2020-11-04 DIAGNOSIS — Z01.810 PREOP CARDIOVASCULAR EXAM: ICD-10-CM

## 2020-11-04 DIAGNOSIS — M00.9 PYOGENIC ARTHRITIS OF RIGHT KNEE JOINT, DUE TO UNSPECIFIED ORGANISM: ICD-10-CM

## 2020-11-04 PROBLEM — E87.5 HYPERKALEMIA: Status: ACTIVE | Noted: 2020-11-04

## 2020-11-04 LAB
ALBUMIN SERPL BCP-MCNC: 3.3 G/DL (ref 3.5–5.2)
ALP SERPL-CCNC: 88 U/L (ref 55–135)
ALT SERPL W/O P-5'-P-CCNC: 14 U/L (ref 10–44)
ANION GAP SERPL CALC-SCNC: 8 MMOL/L (ref 8–16)
AST SERPL-CCNC: 30 U/L (ref 10–40)
BASOPHILS # BLD AUTO: 0.01 K/UL (ref 0–0.2)
BASOPHILS NFR BLD: 0.2 % (ref 0–1.9)
BILIRUB SERPL-MCNC: 0.5 MG/DL (ref 0.1–1)
BODY FLD TYPE: NORMAL
BUN SERPL-MCNC: 16 MG/DL (ref 8–23)
CALCIUM SERPL-MCNC: 9.2 MG/DL (ref 8.7–10.5)
CHLORIDE SERPL-SCNC: 102 MMOL/L (ref 95–110)
CO2 SERPL-SCNC: 24 MMOL/L (ref 23–29)
CREAT SERPL-MCNC: 0.9 MG/DL (ref 0.5–1.4)
CRYSTALS FLD MICRO: NEGATIVE
DACRYOCYTES BLD QL SMEAR: ABNORMAL
DIFFERENTIAL METHOD: ABNORMAL
EOSINOPHIL # BLD AUTO: 0 K/UL (ref 0–0.5)
EOSINOPHIL NFR BLD: 0.7 % (ref 0–8)
ERYTHROCYTE [DISTWIDTH] IN BLOOD BY AUTOMATED COUNT: 13.6 % (ref 11.5–14.5)
EST. GFR  (AFRICAN AMERICAN): >60 ML/MIN/1.73 M^2
EST. GFR  (NON AFRICAN AMERICAN): >60 ML/MIN/1.73 M^2
GLUCOSE SERPL-MCNC: 102 MG/DL (ref 70–110)
HCT VFR BLD AUTO: 33.5 % (ref 37–48.5)
HGB BLD-MCNC: 10.7 G/DL (ref 12–16)
IMM GRANULOCYTES # BLD AUTO: 0.02 K/UL (ref 0–0.04)
IMM GRANULOCYTES NFR BLD AUTO: 0.3 % (ref 0–0.5)
LYMPHOCYTES # BLD AUTO: 1.3 K/UL (ref 1–4.8)
LYMPHOCYTES NFR BLD: 21 % (ref 18–48)
MCH RBC QN AUTO: 35.3 PG (ref 27–31)
MCHC RBC AUTO-ENTMCNC: 31.9 G/DL (ref 32–36)
MCV RBC AUTO: 111 FL (ref 82–98)
MONOCYTES # BLD AUTO: 0.7 K/UL (ref 0.3–1)
MONOCYTES NFR BLD: 12.2 % (ref 4–15)
NEUTROPHILS # BLD AUTO: 4 K/UL (ref 1.8–7.7)
NEUTROPHILS NFR BLD: 65.6 % (ref 38–73)
NRBC BLD-RTO: 0 /100 WBC
PATH INTERP FLD-IMP: NORMAL
PLATELET # BLD AUTO: 57 K/UL (ref 150–350)
PMV BLD AUTO: 11.2 FL (ref 9.2–12.9)
POIKILOCYTOSIS BLD QL SMEAR: SLIGHT
POTASSIUM SERPL-SCNC: 5.4 MMOL/L (ref 3.5–5.1)
PROT SERPL-MCNC: 8.4 G/DL (ref 6–8.4)
RBC # BLD AUTO: 3.03 M/UL (ref 4–5.4)
SARS-COV-2 RDRP RESP QL NAA+PROBE: NEGATIVE
SODIUM SERPL-SCNC: 134 MMOL/L (ref 136–145)
TARGETS BLD QL SMEAR: ABNORMAL
WBC # BLD AUTO: 6.05 K/UL (ref 3.9–12.7)

## 2020-11-04 PROCEDURE — 63600175 PHARM REV CODE 636 W HCPCS: Mod: HCNC | Performed by: EMERGENCY MEDICINE

## 2020-11-04 PROCEDURE — 85025 COMPLETE CBC W/AUTO DIFF WBC: CPT | Mod: HCNC

## 2020-11-04 PROCEDURE — 1101F PT FALLS ASSESS-DOCD LE1/YR: CPT | Mod: HCNC,CPTII,S$GLB, | Performed by: PHYSICIAN ASSISTANT

## 2020-11-04 PROCEDURE — 99499 UNLISTED E&M SERVICE: CPT | Mod: S$PBB,,, | Performed by: PHYSICIAN ASSISTANT

## 2020-11-04 PROCEDURE — 96375 TX/PRO/DX INJ NEW DRUG ADDON: CPT | Mod: HCNC

## 2020-11-04 PROCEDURE — 3008F PR BODY MASS INDEX (BMI) DOCUMENTED: ICD-10-PCS | Mod: HCNC,CPTII,S$GLB, | Performed by: PHYSICIAN ASSISTANT

## 2020-11-04 PROCEDURE — 25000003 PHARM REV CODE 250: Mod: HCNC | Performed by: ORTHOPAEDIC SURGERY

## 2020-11-04 PROCEDURE — 93010 EKG 12-LEAD: ICD-10-PCS | Mod: HCNC,,, | Performed by: INTERNAL MEDICINE

## 2020-11-04 PROCEDURE — 63600175 PHARM REV CODE 636 W HCPCS: Mod: HCNC | Performed by: ANESTHESIOLOGY

## 2020-11-04 PROCEDURE — 63600175 PHARM REV CODE 636 W HCPCS: Mod: HCNC | Performed by: NURSE ANESTHETIST, CERTIFIED REGISTERED

## 2020-11-04 PROCEDURE — 99999 PR PBB SHADOW E&M-EST. PATIENT-LVL III: CPT | Mod: PBBFAC,HCNC,, | Performed by: PHYSICIAN ASSISTANT

## 2020-11-04 PROCEDURE — 36000711: Mod: HCNC | Performed by: ORTHOPAEDIC SURGERY

## 2020-11-04 PROCEDURE — 87070 CULTURE OTHR SPECIMN AEROBIC: CPT | Mod: HCNC

## 2020-11-04 PROCEDURE — 99214 PR OFFICE/OUTPT VISIT, EST, LEVL IV, 30-39 MIN: ICD-10-PCS | Mod: HCNC,57,S$GLB, | Performed by: PHYSICIAN ASSISTANT

## 2020-11-04 PROCEDURE — 99214 OFFICE O/P EST MOD 30 MIN: CPT | Mod: HCNC,57,S$GLB, | Performed by: PHYSICIAN ASSISTANT

## 2020-11-04 PROCEDURE — 99499 RISK ADDL DX/OHS AUDIT: ICD-10-PCS | Mod: S$PBB,,, | Performed by: PHYSICIAN ASSISTANT

## 2020-11-04 PROCEDURE — 3079F DIAST BP 80-89 MM HG: CPT | Mod: HCNC,CPTII,S$GLB, | Performed by: PHYSICIAN ASSISTANT

## 2020-11-04 PROCEDURE — 93005 ELECTROCARDIOGRAM TRACING: CPT | Mod: HCNC

## 2020-11-04 PROCEDURE — 96374 THER/PROPH/DIAG INJ IV PUSH: CPT | Mod: HCNC

## 2020-11-04 PROCEDURE — 99999 PR PBB SHADOW E&M-EST. PATIENT-LVL III: ICD-10-PCS | Mod: PBBFAC,HCNC,, | Performed by: PHYSICIAN ASSISTANT

## 2020-11-04 PROCEDURE — 36000710: Mod: HCNC | Performed by: ORTHOPAEDIC SURGERY

## 2020-11-04 PROCEDURE — 37000009 HC ANESTHESIA EA ADD 15 MINS: Mod: HCNC | Performed by: ORTHOPAEDIC SURGERY

## 2020-11-04 PROCEDURE — 88305 TISSUE EXAM BY PATHOLOGIST: CPT | Mod: HCNC | Performed by: PATHOLOGY

## 2020-11-04 PROCEDURE — 71000033 HC RECOVERY, INTIAL HOUR: Mod: HCNC | Performed by: ORTHOPAEDIC SURGERY

## 2020-11-04 PROCEDURE — 3079F PR MOST RECENT DIASTOLIC BLOOD PRESSURE 80-89 MM HG: ICD-10-PCS | Mod: HCNC,CPTII,S$GLB, | Performed by: PHYSICIAN ASSISTANT

## 2020-11-04 PROCEDURE — 93010 ELECTROCARDIOGRAM REPORT: CPT | Mod: HCNC,,, | Performed by: INTERNAL MEDICINE

## 2020-11-04 PROCEDURE — 25000003 PHARM REV CODE 250: Mod: HCNC | Performed by: NURSE PRACTITIONER

## 2020-11-04 PROCEDURE — 88305 TISSUE EXAM BY PATHOLOGIST: ICD-10-PCS | Mod: 26,HCNC,, | Performed by: PATHOLOGY

## 2020-11-04 PROCEDURE — 87102 FUNGUS ISOLATION CULTURE: CPT | Mod: HCNC

## 2020-11-04 PROCEDURE — 87205 SMEAR GRAM STAIN: CPT | Mod: HCNC

## 2020-11-04 PROCEDURE — 99285 EMERGENCY DEPT VISIT HI MDM: CPT | Mod: 25,HCNC

## 2020-11-04 PROCEDURE — 3075F PR MOST RECENT SYSTOLIC BLOOD PRESS GE 130-139MM HG: ICD-10-PCS | Mod: HCNC,CPTII,S$GLB, | Performed by: PHYSICIAN ASSISTANT

## 2020-11-04 PROCEDURE — 1101F PR PT FALLS ASSESS DOC 0-1 FALLS W/OUT INJ PAST YR: ICD-10-PCS | Mod: HCNC,CPTII,S$GLB, | Performed by: PHYSICIAN ASSISTANT

## 2020-11-04 PROCEDURE — 3008F BODY MASS INDEX DOCD: CPT | Mod: HCNC,CPTII,S$GLB, | Performed by: PHYSICIAN ASSISTANT

## 2020-11-04 PROCEDURE — 11000001 HC ACUTE MED/SURG PRIVATE ROOM: Mod: HCNC

## 2020-11-04 PROCEDURE — 3075F SYST BP GE 130 - 139MM HG: CPT | Mod: HCNC,CPTII,S$GLB, | Performed by: PHYSICIAN ASSISTANT

## 2020-11-04 PROCEDURE — 63600175 PHARM REV CODE 636 W HCPCS: Mod: HCNC | Performed by: ORTHOPAEDIC SURGERY

## 2020-11-04 PROCEDURE — 88305 TISSUE EXAM BY PATHOLOGIST: CPT | Mod: 26,HCNC,, | Performed by: PATHOLOGY

## 2020-11-04 PROCEDURE — 25000003 PHARM REV CODE 250: Mod: HCNC | Performed by: NURSE ANESTHETIST, CERTIFIED REGISTERED

## 2020-11-04 PROCEDURE — 29871 PR KNEE SCOPE,CLEAN/DRAIN: ICD-10-PCS | Mod: RT,,, | Performed by: ORTHOPAEDIC SURGERY

## 2020-11-04 PROCEDURE — U0002 COVID-19 LAB TEST NON-CDC: HCPCS | Mod: HCNC

## 2020-11-04 PROCEDURE — 71000039 HC RECOVERY, EACH ADD'L HOUR: Mod: HCNC | Performed by: ORTHOPAEDIC SURGERY

## 2020-11-04 PROCEDURE — 87075 CULTR BACTERIA EXCEPT BLOOD: CPT | Mod: HCNC

## 2020-11-04 PROCEDURE — 37000008 HC ANESTHESIA 1ST 15 MINUTES: Mod: HCNC | Performed by: ORTHOPAEDIC SURGERY

## 2020-11-04 PROCEDURE — 80053 COMPREHEN METABOLIC PANEL: CPT | Mod: HCNC

## 2020-11-04 PROCEDURE — 25000003 PHARM REV CODE 250: Mod: HCNC | Performed by: EMERGENCY MEDICINE

## 2020-11-04 PROCEDURE — 27201423 OPTIME MED/SURG SUP & DEVICES STERILE SUPPLY: Mod: HCNC | Performed by: ORTHOPAEDIC SURGERY

## 2020-11-04 PROCEDURE — 29871 ARTHRS KNEE SURG FOR INFCTJ: CPT | Mod: RT,,, | Performed by: ORTHOPAEDIC SURGERY

## 2020-11-04 RX ORDER — HYDROCODONE BITARTRATE AND ACETAMINOPHEN 5; 325 MG/1; MG/1
1 TABLET ORAL EVERY 4 HOURS PRN
Status: DISCONTINUED | OUTPATIENT
Start: 2020-11-04 | End: 2020-11-05

## 2020-11-04 RX ORDER — ONDANSETRON 2 MG/ML
4 INJECTION INTRAMUSCULAR; INTRAVENOUS
Status: COMPLETED | OUTPATIENT
Start: 2020-11-04 | End: 2020-11-04

## 2020-11-04 RX ORDER — SODIUM CHLORIDE 0.9 % (FLUSH) 0.9 %
3 SYRINGE (ML) INJECTION EVERY 8 HOURS
Status: DISCONTINUED | OUTPATIENT
Start: 2020-11-04 | End: 2020-11-04 | Stop reason: HOSPADM

## 2020-11-04 RX ORDER — PROPOFOL 10 MG/ML
VIAL (ML) INTRAVENOUS
Status: DISCONTINUED | OUTPATIENT
Start: 2020-11-04 | End: 2020-11-04

## 2020-11-04 RX ORDER — MORPHINE SULFATE 2 MG/ML
3 INJECTION, SOLUTION INTRAMUSCULAR; INTRAVENOUS
Status: DISCONTINUED | OUTPATIENT
Start: 2020-11-04 | End: 2020-11-09 | Stop reason: HOSPADM

## 2020-11-04 RX ORDER — MEPERIDINE HYDROCHLORIDE 25 MG/ML
12.5 INJECTION INTRAMUSCULAR; INTRAVENOUS; SUBCUTANEOUS ONCE AS NEEDED
Status: COMPLETED | OUTPATIENT
Start: 2020-11-04 | End: 2020-11-04

## 2020-11-04 RX ORDER — MORPHINE SULFATE 4 MG/ML
4 INJECTION, SOLUTION INTRAMUSCULAR; INTRAVENOUS
Status: COMPLETED | OUTPATIENT
Start: 2020-11-04 | End: 2020-11-04

## 2020-11-04 RX ORDER — HYDROMORPHONE HYDROCHLORIDE 2 MG/ML
0.2 INJECTION, SOLUTION INTRAMUSCULAR; INTRAVENOUS; SUBCUTANEOUS EVERY 5 MIN PRN
Status: DISCONTINUED | OUTPATIENT
Start: 2020-11-04 | End: 2020-11-04 | Stop reason: HOSPADM

## 2020-11-04 RX ORDER — SODIUM CHLORIDE, SODIUM LACTATE, POTASSIUM CHLORIDE, CALCIUM CHLORIDE 600; 310; 30; 20 MG/100ML; MG/100ML; MG/100ML; MG/100ML
INJECTION, SOLUTION INTRAVENOUS CONTINUOUS PRN
Status: DISCONTINUED | OUTPATIENT
Start: 2020-11-04 | End: 2020-11-04

## 2020-11-04 RX ORDER — LIDOCAINE HYDROCHLORIDE 10 MG/ML
INJECTION, SOLUTION EPIDURAL; INFILTRATION; INTRACAUDAL; PERINEURAL
Status: DISCONTINUED | OUTPATIENT
Start: 2020-11-04 | End: 2020-11-04

## 2020-11-04 RX ORDER — ROCURONIUM BROMIDE 10 MG/ML
INJECTION, SOLUTION INTRAVENOUS
Status: DISCONTINUED | OUTPATIENT
Start: 2020-11-04 | End: 2020-11-04

## 2020-11-04 RX ORDER — SODIUM CHLORIDE 9 MG/ML
INJECTION, SOLUTION INTRAVENOUS CONTINUOUS
Status: DISCONTINUED | OUTPATIENT
Start: 2020-11-04 | End: 2020-11-06

## 2020-11-04 RX ORDER — DIPHENHYDRAMINE HYDROCHLORIDE 50 MG/ML
25 INJECTION INTRAMUSCULAR; INTRAVENOUS EVERY 6 HOURS PRN
Status: DISCONTINUED | OUTPATIENT
Start: 2020-11-04 | End: 2020-11-04 | Stop reason: HOSPADM

## 2020-11-04 RX ORDER — METOCLOPRAMIDE HYDROCHLORIDE 5 MG/ML
10 INJECTION INTRAMUSCULAR; INTRAVENOUS EVERY 10 MIN PRN
Status: DISCONTINUED | OUTPATIENT
Start: 2020-11-04 | End: 2020-11-04 | Stop reason: HOSPADM

## 2020-11-04 RX ORDER — SODIUM CHLORIDE, SODIUM LACTATE, POTASSIUM CHLORIDE, CALCIUM CHLORIDE 600; 310; 30; 20 MG/100ML; MG/100ML; MG/100ML; MG/100ML
INJECTION, SOLUTION INTRAVENOUS CONTINUOUS
Status: DISCONTINUED | OUTPATIENT
Start: 2020-11-04 | End: 2020-11-06

## 2020-11-04 RX ORDER — SODIUM CHLORIDE 0.9 % (FLUSH) 0.9 %
10 SYRINGE (ML) INJECTION
Status: DISCONTINUED | OUTPATIENT
Start: 2020-11-04 | End: 2020-11-09 | Stop reason: HOSPADM

## 2020-11-04 RX ORDER — ONDANSETRON 2 MG/ML
INJECTION INTRAMUSCULAR; INTRAVENOUS
Status: DISCONTINUED | OUTPATIENT
Start: 2020-11-04 | End: 2020-11-04

## 2020-11-04 RX ORDER — FENTANYL CITRATE 50 UG/ML
INJECTION, SOLUTION INTRAMUSCULAR; INTRAVENOUS
Status: DISCONTINUED | OUTPATIENT
Start: 2020-11-04 | End: 2020-11-04

## 2020-11-04 RX ORDER — SUCCINYLCHOLINE CHLORIDE 20 MG/ML
INJECTION INTRAMUSCULAR; INTRAVENOUS
Status: DISCONTINUED | OUTPATIENT
Start: 2020-11-04 | End: 2020-11-04

## 2020-11-04 RX ORDER — MIDAZOLAM HYDROCHLORIDE 1 MG/ML
INJECTION, SOLUTION INTRAMUSCULAR; INTRAVENOUS
Status: DISCONTINUED | OUTPATIENT
Start: 2020-11-04 | End: 2020-11-04

## 2020-11-04 RX ORDER — ONDANSETRON 2 MG/ML
4 INJECTION INTRAMUSCULAR; INTRAVENOUS EVERY 12 HOURS PRN
Status: DISCONTINUED | OUTPATIENT
Start: 2020-11-04 | End: 2020-11-05

## 2020-11-04 RX ORDER — CHLORHEXIDINE GLUCONATE ORAL RINSE 1.2 MG/ML
10 SOLUTION DENTAL 2 TIMES DAILY
Status: COMPLETED | OUTPATIENT
Start: 2020-11-04 | End: 2020-11-09

## 2020-11-04 RX ADMIN — SUCCINYLCHOLINE CHLORIDE 120 MG: 20 INJECTION, SOLUTION INTRAMUSCULAR; INTRAVENOUS at 03:11

## 2020-11-04 RX ADMIN — ROCURONIUM BROMIDE 5 MG: 10 INJECTION, SOLUTION INTRAVENOUS at 03:11

## 2020-11-04 RX ADMIN — ONDANSETRON 4 MG: 2 INJECTION INTRAMUSCULAR; INTRAVENOUS at 11:11

## 2020-11-04 RX ADMIN — FENTANYL CITRATE 100 MCG: 50 INJECTION, SOLUTION INTRAMUSCULAR; INTRAVENOUS at 03:11

## 2020-11-04 RX ADMIN — SODIUM CHLORIDE, SODIUM LACTATE, POTASSIUM CHLORIDE, AND CALCIUM CHLORIDE: 600; 310; 30; 20 INJECTION, SOLUTION INTRAVENOUS at 03:11

## 2020-11-04 RX ADMIN — SODIUM CHLORIDE, SODIUM LACTATE, POTASSIUM CHLORIDE, AND CALCIUM CHLORIDE: .6; .31; .03; .02 INJECTION, SOLUTION INTRAVENOUS at 08:11

## 2020-11-04 RX ADMIN — HYDROMORPHONE HYDROCHLORIDE 0.2 MG: 2 INJECTION INTRAMUSCULAR; INTRAVENOUS; SUBCUTANEOUS at 05:11

## 2020-11-04 RX ADMIN — SODIUM CHLORIDE: 0.9 INJECTION, SOLUTION INTRAVENOUS at 06:11

## 2020-11-04 RX ADMIN — MORPHINE SULFATE 4 MG: 4 INJECTION, SOLUTION INTRAMUSCULAR; INTRAVENOUS at 01:11

## 2020-11-04 RX ADMIN — LIDOCAINE HYDROCHLORIDE 50 MG: 10 INJECTION, SOLUTION EPIDURAL; INFILTRATION; INTRACAUDAL; PERINEURAL at 03:11

## 2020-11-04 RX ADMIN — HYPROMELLOSE 2910 1 DROP: 5 SOLUTION OPHTHALMIC at 10:11

## 2020-11-04 RX ADMIN — ONDANSETRON 4 MG: 2 INJECTION INTRAMUSCULAR; INTRAVENOUS at 01:11

## 2020-11-04 RX ADMIN — HYDROCODONE BITARTRATE AND ACETAMINOPHEN 1 TABLET: 5; 325 TABLET ORAL at 08:11

## 2020-11-04 RX ADMIN — MEPERIDINE HYDROCHLORIDE 12.5 MG: 25 INJECTION INTRAMUSCULAR; INTRAVENOUS; SUBCUTANEOUS at 05:11

## 2020-11-04 RX ADMIN — PROPOFOL 100 MG: 10 INJECTION, EMULSION INTRAVENOUS at 03:11

## 2020-11-04 RX ADMIN — MIDAZOLAM 2 MG: 1 INJECTION INTRAMUSCULAR; INTRAVENOUS at 03:11

## 2020-11-04 RX ADMIN — CHLORHEXIDINE GLUCONATE 0.12% ORAL RINSE 10 ML: 1.2 LIQUID ORAL at 08:11

## 2020-11-04 RX ADMIN — PROPOFOL 50 MG: 10 INJECTION, EMULSION INTRAVENOUS at 03:11

## 2020-11-04 RX ADMIN — ONDANSETRON 4 MG: 2 INJECTION, SOLUTION INTRAMUSCULAR; INTRAVENOUS at 03:11

## 2020-11-04 RX ADMIN — VANCOMYCIN HYDROCHLORIDE 2000 MG: 10 INJECTION, POWDER, LYOPHILIZED, FOR SOLUTION INTRAVENOUS at 01:11

## 2020-11-04 NOTE — PROGRESS NOTES
" Patient ID: Conchita Rouse is a 65 y.o. female.    Chief Complaint: Pain of the Right Knee      HPI: Conchita Rouse  is a 65 y.o. female who c/o Pain of the Right Knee   for duration of more than a week now.  No inciting injury.  I saw her yesterday and did an aspiration of the right knee.  She had cloudy fluid so I sent it to the lab for full work up.  I also had her get ESR, CRP, CBC and UA.  Those results were available yesterday.  I spoke to Dr. Pompa (on call for [facility]) who wanted to see her in clinic today.  She comes today to re-eval her sxs and go over further labs.  10/10 pain.  C/o associated swelling.  No fevers.  No recent infection per pt report.  Throbbing and aching quality.  Improved with nothing.  Worsened with ROM and WB.  She has aplastic anemia and on immunosuppressant medication per her report.      Past Medical History:   Diagnosis Date    Dyslipidemia (high LDL; low HDL)     10y CV event risk 14.7% (asuming no DM2), which could be reduced to 4.5% with RF optimization, for which I recommended atorvastatin 20 mg 1 po qd x2 weeks and then 40 mg 1 po qd    History of shingles 02/14/2018    Per note from Dr. Rogelio Norwood III on 2/14/18; left upper buttocks.   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 Rogelio Norwood III last visit 2/14/18 (tolerating CSA & promacta); also notes aplastic anemia (AA psot Horse ATG - week 7/17/17) and ITP, for which ATG & promacta following decadron pulse for ITP & AA; plan for second opinion on BM bx; next flow in 3 mo; intent of rx: palliative    Prediabetes     TIA (transient ischemic attack) 2007    She was at "[employer]", where she noted she was feeling slow, took a nap, woke up with a numb/tingling left jaw to arm, which persistned 20 minutes, took 3 baby aspirins, went to ER @ [facility], where facial droop was noted, but completely resolved & head CT revealed old minor abnormality.    " "Vitamin D deficiency      Past Surgical History:   Procedure Laterality Date    COLONOSCOPY      left eye surgery  1955    Due to left eye lid drooping     Family History   Problem Relation Age of Onset    Heart disease Mother     Diabetes Mother     Heart disease Father     Cancer Sister     Breast cancer Sister     Cancer Brother      Social History     Socioeconomic History    Marital status:      Spouse name: Not on file    Number of children: Not on file    Years of education: Not on file    Highest education level: Not on file   Occupational History    Not on file   Social Needs    Financial resource strain: Not on file    Food insecurity     Worry: Not on file     Inability: Not on file    Transportation needs     Medical: Not on file     Non-medical: Not on file   Tobacco Use    Smoking status: Never Smoker    Smokeless tobacco: Never Used   Substance and Sexual Activity    Alcohol use: Yes     Alcohol/week: 0.0 - 1.0 standard drinks     Comment: rarely    Drug use: No    Sexual activity: Not Currently     Partners: Male     Birth control/protection: Post-menopausal   Lifestyle    Physical activity     Days per week: Not on file     Minutes per session: Not on file    Stress: Not on file   Relationships    Social connections     Talks on phone: Not on file     Gets together: Not on file     Attends Yazidism service: Not on file     Active member of club or organization: Not on file     Attends meetings of clubs or organizations: Not on file     Relationship status: Not on file   Other Topics Concern    Not on file   Social History Narrative    Breakfast: 2 TBS grits or french toast sticks or waffles (previously fried eggs); 1/2 cup coffee w/ 1 cream & 3 sugars & lemonade "all day"    Lunch: 50% turkey or ham sandwich w/ occasional chips; tea, rare soda    Dinner: Steak fries and baked hot wings, peppermint or chicken wings and french fries; tea or cool aid    Snacks: " mini-[employer] chocolates (1x/wk) or a few unsalted chips (2x/wk) or popcorn (2x/mo) or strawberries or pretzils     Eats out: 2x/wk    Water: 16 oz/d    PA: None     Medication List with Changes/Refills   Current Medications    AMLODIPINE (NORVASC) 5 MG TABLET    Take 1 tablet (5 mg total) by mouth once daily.    ATORVASTATIN (LIPITOR) 40 MG TABLET    Take 1 tablet (40 mg total) by mouth once daily.    AZELASTINE (ASTELIN) 137 MCG (0.1 %) NASAL SPRAY    1 spray (137 mcg total) by Nasal route 2 (two) times daily.    ELTROMBOPAG (PROMACTA) 75 MG TAB    Take 75 mg by mouth once daily.     FLUTICASONE (FLONASE) 50 MCG/ACTUATION NASAL SPRAY    USE 2 SPRAYS IN EACH NOSTRIL 1 TIME A DAY AS NEEDED    LORATADINE (CLARITIN) 10 MG TABLET    Take 1 tablet (10 mg total) by mouth once daily.    MELOXICAM (MOBIC) 15 MG TABLET    Take 1 tablet (15 mg total) by mouth daily as needed for Pain. Take with meal    TRAMADOL (ULTRAM) 50 MG TABLET    Take 1 tablet (50 mg total) by mouth 2 (two) times daily as needed.    VALACYCLOVIR (VALTREX) 500 MG TABLET    Take 500 mg by mouth.     Review of patient's allergies indicates:   Allergen Reactions    Bactrim [sulfamethoxazole-trimethoprim] Edema     Swollen lips    Codeine Other (See Comments)     Pt states codeine does not cause hives. Had tooth extraction and medication given caused her to be jittery, feel hot and have to lay down like she was weak. morhpine given on 5/16/2017 iv for pain. Pt martine well without any sign or sx of allergy or reaction.       Objective:        General    Nursing note and vitals reviewed.  Constitutional: She is oriented to person, place, and time. She appears well-developed and well-nourished.   HENT:   Head: Normocephalic and atraumatic.   Eyes: EOM are normal.   Cardiovascular: Normal rate and regular rhythm.    Pulmonary/Chest: Effort normal.   Neurological: She is alert and oriented to person, place, and time.   Psychiatric: She has a normal mood and  affect. Her behavior is normal.           Right Knee Exam     Range of Motion   Extension:  20 abnormal   Flexion:  90 abnormal     Other   Sensation: normal    Comments:  TIM  Diffuse TTP   2+effusion  No erythema  Increased calor        X-Ray Knee 1 or 2 View Right  Narrative: EXAMINATION:  XR KNEE 1 OR 2 VIEW RIGHT    CLINICAL HISTORY:  Pain in right knee    TECHNIQUE:  Single lateral view of the right knee was obtained.    COMPARISON:  10/05/2020    FINDINGS:  No acute fractures or dislocations visualized.  No definite joint effusion demonstrated.  Prominent patellar marginal osteophytes are present suggesting moderate osteoarthritis.  Impression: Degenerative findings as above    Electronically signed by: Imer Rodríguez MD  Date:    11/03/2020  Time:    15:12        Labs - 11/3/20  ESR (83) CRP (80.4) and aspirate cell count (33,000) - All significantly elevated  Gram stain Negative  Cultures pending  UA 5.4  WNL  WBC 5.99 WNL  Path review negative for crystals      Assessment:       Encounter Diagnoses   Name Primary?    Effusion of right knee Yes    Acute pain of right knee     Pyogenic arthritis of right knee joint, due to unspecified organism           Plan:       Conchita was seen today for pain.    Diagnoses and all orders for this visit:    Effusion of right knee    Acute pain of right knee    Pyogenic arthritis of right knee joint, due to unspecified organism        Conchita Rouse is an established pt here for recheck s/p aspiration yesterday.  Effusion has worsened overnight again.  She has painful ROM with Increased cell count and inflammatory markers.  Dr. Pompa has also seen the patient.  He recommends doing scope I&D right knee to prevent sepsis and osteomyelitis.  I've sent her to the ER.  He is requesting [facility] to admit and asking them to consult ortho.  She had coffee with 2 creamers this am at 830.  She has been instructed to be NPO with plan to go to the OR at 430 this afternoon.  She  verbalized understanding and agreed.      The patient understands, chooses and consents to this plan and accepts all   the risks which include but are not limited to the risks mentioned above.     Disclaimer: This note was prepared using a voice recognition system and is likely to have sound alike errors within the text.

## 2020-11-04 NOTE — TRANSFER OF CARE
"Anesthesia Transfer of Care Note    Patient: Conchita Rouse    Procedure(s) Performed: Procedure(s) (LRB):  ARTHROSCOPY, KNEE (Right)  INCISION AND DRAINAGE, KNEE (Right)    Patient location: PACU    Anesthesia Type: general    Transport from OR: Transported from OR on room air with adequate spontaneous ventilation    Post pain: adequate analgesia    Post assessment: no apparent anesthetic complications and tolerated procedure well    Post vital signs: stable    Level of consciousness: responds to stimulation    Nausea/Vomiting: no nausea/vomiting    Complications: none    Transfer of care protocol was followed      Last vitals:   Visit Vitals  BP (!) 147/72   Pulse 87   Temp 36.8 °C (98.3 °F) (Oral)   Resp 16   Ht 5' 6" (1.676 m)   Wt 84.8 kg (187 lb)   SpO2 99%   Breastfeeding No   BMI 30.18 kg/m²     "

## 2020-11-04 NOTE — ASSESSMENT & PLAN NOTE
Thought to be 2/2 anaplastic anemia and pancytopenia; tx'd w/ Dr. Norwood in [facility] with Promacto 150 mg daily

## 2020-11-04 NOTE — ED PROVIDER NOTES
SCRIBE #1 NOTE: I, Ann Mehta, am scribing for, and in the presence of, Wanda Mclain MD. I have scribed the entire note.       History     Chief Complaint   Patient presents with    Knee Pain     right knee septic. sent by MD     Review of patient's allergies indicates:   Allergen Reactions    Bactrim [sulfamethoxazole-trimethoprim] Edema     Swollen lips    Codeine Other (See Comments)     Pt states codeine does not cause hives. Had tooth extraction and medication given caused her to be jittery, feel hot and have to lay down like she was weak. morhpine given on 5/16/2017 iv for pain. Pt martine well without any sign or sx of allergy or reaction.         History of Present Illness     HPI    11/4/2020, 12:03 PM  History obtained from the patient      History of Present Illness: Conchita Rouse is a 65 y.o. female patient who presents to the Emergency Department for evaluation of R knee pain and swelling which onset gradually 2 weeks ago. Symptoms are constant and moderate in severity. No mitigating or exacerbating factors reported. No associated sxs. Patient denies any fever, chills, fatigue, CP, SOB, leg swelling, extremity weakness/numbness, and all other sxs at this time. Patient was seen by Estrella Quinones PA-C yesterday in ortho clinic. She had arthrocentesis done and was scheduled for surgery today with Dr. Pompa. Patient was told to come to ED for pre-operative medical clearance. Last PO intake was coffee at 8:30 AM today. No further complaints or concerns at this time.       Arrival mode: Personal vehicle    PCP: Nancy Valles MD        Past Medical History:  Past Medical History:   Diagnosis Date    Dyslipidemia (high LDL; low HDL)     10y CV event risk 14.7% (asuming no DM2), which could be reduced to 4.5% with RF optimization, for which I recommended atorvastatin 20 mg 1 po qd x2 weeks and then 40 mg 1 po qd    History of shingles 02/14/2018    Per note from Dr. Rogelio Norwood  "III on 2/14/18; left upper buttocks.   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 Rogelio Norwood III last visit 2/14/18 (tolerating CSA & promacta); also notes aplastic anemia (AA psot Horse ATG - week 7/17/17) and ITP, for which ATG & promacta following decadron pulse for ITP & AA; plan for second opinion on BM bx; next flow in 3 mo; intent of rx: palliative    Prediabetes     TIA (transient ischemic attack) 2007    She was at "[employer]", where she noted she was feeling slow, took a nap, woke up with a numb/tingling left jaw to arm, which persistned 20 minutes, took 3 baby aspirins, went to ER @ [facility], where facial droop was noted, but completely resolved & head CT revealed old minor abnormality.    Vitamin D deficiency        Past Surgical History:  Past Surgical History:   Procedure Laterality Date    COLONOSCOPY      left eye surgery  1955    Due to left eye lid drooping         Family History:  Family History   Problem Relation Age of Onset    Heart disease Mother     Diabetes Mother     Heart disease Father     Cancer Sister     Breast cancer Sister     Cancer Brother        Social History:  Social History     Tobacco Use    Smoking status: Never Smoker    Smokeless tobacco: Never Used   Substance and Sexual Activity    Alcohol use: Yes     Alcohol/week: 0.0 - 1.0 standard drinks     Comment: rarely    Drug use: No    Sexual activity: Not Currently     Partners: Male     Birth control/protection: Post-menopausal        Review of Systems     Review of Systems   Constitutional: Negative for activity change, appetite change, chills, diaphoresis, fatigue and fever.   HENT: Negative for congestion, ear pain, nosebleeds, rhinorrhea, sinus pain, sore throat and trouble swallowing.    Eyes: Negative for pain and discharge.   Respiratory: Negative for cough, chest tightness, shortness of breath, wheezing and stridor.    Cardiovascular: " Negative for chest pain, palpitations and leg swelling.   Gastrointestinal: Negative for abdominal distention, abdominal pain, blood in stool, constipation, diarrhea, nausea and vomiting.   Genitourinary: Negative for difficulty urinating, dysuria, flank pain, frequency, hematuria and urgency.   Musculoskeletal: Positive for arthralgias (R knee) and joint swelling (R knee). Negative for back pain, myalgias and neck pain.   Skin: Negative for pallor, rash and wound.   Neurological: Negative for dizziness, syncope, weakness, light-headedness, numbness and headaches.   Hematological: Does not bruise/bleed easily.   Psychiatric/Behavioral: Negative for confusion and self-injury.   All other systems reviewed and are negative.     Physical Exam     Initial Vitals [11/04/20 1140]   BP Pulse Resp Temp SpO2   123/75 82 18 98.3 °F (36.8 °C) 99 %      MAP       --          Physical Exam  Nursing Notes and Vital Signs Reviewed.  Constitutional: Patient is in no acute distress. Well-developed and well-nourished.  Head: Atraumatic. Normocephalic.  Eyes: PERRL. EOM intact. Conjunctivae are not pale. No scleral icterus.  ENT: Mucous membranes are moist. Oropharynx is clear and symmetric.    Neck: Supple. Full ROM. No lymphadenopathy.  Cardiovascular: Regular rate. Regular rhythm. No murmurs, rubs, or gallops. Distal pulses are 2+ and symmetric.  Pulmonary/Chest: No respiratory distress. Clear to auscultation bilaterally. No wheezing or rales.  Abdominal: Soft and non-distended.  There is no tenderness.  No rebound, guarding, or rigidity. Good bowel sounds.  Genitourinary: No CVA tenderness  Musculoskeletal: Moves all extremities. No obvious deformities. No edema. No calf tenderness.  Right Knee: Mild soft tissue swelling to R knee. No significant erythema or warmth. No deformity. Able to move it without problems.  Skin: Warm and dry.  Neurological:  Alert, awake, and appropriate.  Normal speech.  No acute focal neurological  "deficits are appreciated.  Psychiatric: Normal affect. Good eye contact. Appropriate in content.     ED Course   Procedures  ED Vital Signs:  Vitals:    11/04/20 1140 11/04/20 1314 11/04/20 1332 11/04/20 1643   BP: 123/75  (!) 147/72 139/75   Pulse: 82  87 80   Resp: 18 16 16 14   Temp: 98.3 °F (36.8 °C)   97.6 °F (36.4 °C)   TempSrc: Oral   Skin   SpO2: 99%  99% (!) 94%   Weight: 84.8 kg (187 lb)      Height: 5' 6" (1.676 m)       11/04/20 1645 11/04/20 1650 11/04/20 1700 11/04/20 1702   BP: 138/70 (!) 145/67 (!) 162/70    Pulse: 76 83 83    Resp: 16 10 14 14   Temp:       TempSrc:       SpO2: 98% 100% 100%    Weight:       Height:        11/04/20 1710 11/04/20 1715 11/04/20 1730 11/04/20 1738   BP:  126/69 134/62 133/66   Pulse:  74 68 71   Resp: 12 11 (!) 8 10   Temp:    97.5 °F (36.4 °C)   TempSrc:    Temporal   SpO2:  99% 100% 100%   Weight:       Height:        11/04/20 1810   BP: 125/75   Pulse: 75   Resp: 14   Temp: 97.5 °F (36.4 °C)   TempSrc: Oral   SpO2: 96%   Weight:    Height:        Abnormal Lab Results:  Labs Reviewed   CBC W/ AUTO DIFFERENTIAL - Abnormal; Notable for the following components:       Result Value    RBC 3.03 (*)     Hemoglobin 10.7 (*)     Hematocrit 33.5 (*)      (*)     MCH 35.3 (*)     MCHC 31.9 (*)     Platelets 57 (*)     All other components within normal limits   COMPREHENSIVE METABOLIC PANEL - Abnormal; Notable for the following components:    Sodium 134 (*)     Potassium 5.4 (*)     Albumin 3.3 (*)     All other components within normal limits   SARS-COV-2 RNA AMPLIFICATION, QUAL    Narrative:     OR planning        All Lab Results:  Results for orders placed or performed during the hospital encounter of 11/04/20   COVID-19 Rapid Screening   Result Value Ref Range    SARS-CoV-2 RNA, Amplification, Qual Negative Negative   CBC auto differential   Result Value Ref Range    WBC 6.05 3.90 - 12.70 K/uL    RBC 3.03 (L) 4.00 - 5.40 M/uL    Hemoglobin 10.7 (L) 12.0 - 16.0 g/dL "    Hematocrit 33.5 (L) 37.0 - 48.5 %     (H) 82 - 98 fL    MCH 35.3 (H) 27.0 - 31.0 pg    MCHC 31.9 (L) 32.0 - 36.0 g/dL    RDW 13.6 11.5 - 14.5 %    Platelets 57 (L) 150 - 350 K/uL    MPV 11.2 9.2 - 12.9 fL    Immature Granulocytes 0.3 0.0 - 0.5 %    Gran # (ANC) 4.0 1.8 - 7.7 K/uL    Immature Grans (Abs) 0.02 0.00 - 0.04 K/uL    Lymph # 1.3 1.0 - 4.8 K/uL    Mono # 0.7 0.3 - 1.0 K/uL    Eos # 0.0 0.0 - 0.5 K/uL    Baso # 0.01 0.00 - 0.20 K/uL    nRBC 0 0 /100 WBC    Gran % 65.6 38.0 - 73.0 %    Lymph % 21.0 18.0 - 48.0 %    Mono % 12.2 4.0 - 15.0 %    Eosinophil % 0.7 0.0 - 8.0 %    Basophil % 0.2 0.0 - 1.9 %    Poik Slight     Target Cells Occasional     Tear Drop Cells Occasional     Differential Method Automated    Comprehensive metabolic panel   Result Value Ref Range    Sodium 134 (L) 136 - 145 mmol/L    Potassium 5.4 (H) 3.5 - 5.1 mmol/L    Chloride 102 95 - 110 mmol/L    CO2 24 23 - 29 mmol/L    Glucose 102 70 - 110 mg/dL    BUN 16 8 - 23 mg/dL    Creatinine 0.9 0.5 - 1.4 mg/dL    Calcium 9.2 8.7 - 10.5 mg/dL    Total Protein 8.4 6.0 - 8.4 g/dL    Albumin 3.3 (L) 3.5 - 5.2 g/dL    Total Bilirubin 0.5 0.1 - 1.0 mg/dL    Alkaline Phosphatase 88 55 - 135 U/L    AST 30 10 - 40 U/L    ALT 14 10 - 44 U/L    Anion Gap 8 8 - 16 mmol/L    eGFR if African American >60 >60 mL/min/1.73 m^2    eGFR if non African American >60 >60 mL/min/1.73 m^2         Body fluid crystal Joint Fluid, Right Knee  Order: [id number]  Status:  Final result   Visible to patient:  No (not released) Next appt:  11/10/2020 at 08:40 AM in Orthopedics (Estrella Quinones PA-C) Dx:  Effusion of right knee; Acute pain of...   Ref Range & Units 1d ago   Body Fluid Source, Crystal Exam  Joint Fluid, Right Knee    Body Fluid Crystal Negative Negative    Comment: No urate or pyrophosphate crystals identified   Resulting Agency  [facility]         Specimen Collected: 11/03/20 14:00 Last Resulted: 11/04/20 04:18 Lab Flowsheet Order Details View  Encounter Lab and Collection Details Routing Result History               Culture, Body Fluid (Aerobic) w/ GS  Order: [id number]  Status:  Preliminary result   Visible to patient:  No (not released) Next appt:  11/10/2020 at 08:40 AM in Orthopedics (Estrella Quinones PA-C) Dx:  Effusion of right knee; Acute pain of...  Specimen Information: Knee, Right; Joint Fluid        Component 1d ago   Gram Stain Result Few WBC's    Gram Stain Result No organisms seen    Resulting Agency [facility]         Specimen Collected: 11/03/20 14:00 Last Resulted: 11/04/20 00:00 Lab Flowsheet Order Details View Encounter Lab and Collection Details Routing Result History           WBC & Diff,Body Fluid Joint Fluid, Right Knee  Order: [id number]  Status:  Final result   Visible to patient:  Yes (Patient Portal) Next appt:  11/10/2020 at 08:40 AM in Orthopedics (Estrella Quinones PA-C) Dx:  Effusion of right knee; Acute pain of...   Ref Range & Units 1d ago   Body Fluid Type  Joint Fluid, Right Knee    Fluid Appearance  Turbid    Fluid Color  Samantha    WBC, Body Fluid /cu mm 08245    Comment: Reference ranges for body fluids not established.   Correlate clinically.    Segs, Fluid % 82    Lymphs, Fluid % 8    Monocytes/Macrophages, Fluid % 10    Resulting Agency  [facility]         Specimen Collected: 11/03/20 14:00 Last Resulted: 11/03/20 17:19 Lab Flowsheet Order Details View Encounter Lab and Collection Details Routing Result History           URIC ACID  Order: [id number]  Status:  Final result   Visible to patient:  Yes (Patient Portal) Next appt:  11/10/2020 at 08:40 AM in Orthopedics (Estrella Quinones PA-C) Dx:  Effusion of right knee   Ref Range & Units 1d ago   Uric Acid 2.4 - 5.7 mg/dL 5.4    Resulting Agency  [facility]         Specimen Collected: 11/03/20 14:42 Last Resulted: 11/03/20 15:42 Lab Flowsheet Order Details View Encounter Lab and Collection Details Routing Result History           C-Reactive Protein  Order: [id number]  Status:   Final result   Visible to patient:  Yes (Patient Portal) Next appt:  11/10/2020 at 08:40 AM in Orthopedics (Estrella Quinones PA-C) Dx:  Effusion of right knee; Acute pain of...   Ref Range & Units 1d ago   CRP 0.0 - 8.2 mg/L 80.4High     Resulting Agency  [facility]         Specimen Collected: 11/03/20 14:42 Last Resulted: 11/03/20 15:42 Lab Flowsheet Order Details View Encounter Lab and Collection Details Routing Result History           CBC Auto Differential  Order: [id number]  Status:  Final result   Visible to patient:  Yes (Patient Portal) Next appt:  11/10/2020 at 08:40 AM in Orthopedics (Estrella Quinones PA-C) Dx:  Effusion of right knee; Acute pain of...   Ref Range & Units 1d ago 2mo ago 8mo ago   WBC 3.90 - 12.70 K/uL 5.99  3.79Low   6.73    RBC 4.00 - 5.40 M/uL 3.19Low   3.14Low   3.28Low     Hemoglobin 12.0 - 16.0 g/dL 11.4Low   11.4Low   11.9Low     Hematocrit 37.0 - 48.5 % 34.9Low   35.4Low   37.5    MCV 82 - 98 fL 109High   113High   114High     MCH 27.0 - 31.0 pg 35.7High   36.3High   36.3High     MCHC 32.0 - 36.0 g/dL 32.7  32.2  31.7Low     RDW 11.5 - 14.5 % 13.6  14.8High   13.4    Platelets 150 - 350 K/uL 65Low   50Low   53Low     MPV 9.2 - 12.9 fL 10.1  12.1  12.0    Immature Granulocytes 0.0 - 0.5 % 0.7High   0.3  0.3    Gran # (ANC) 1.8 - 7.7 K/uL 3.6  1.5Low   4.0    Immature Grans (Abs) 0.00 - 0.04 K/uL 0.04  0.01 CM  0.02 CM    Comment: Mild elevation in immature granulocytes is non specific and   can be seen in a variety of conditions including stress response,   acute inflammation, trauma and pregnancy. Correlation with other   laboratory and clinical findings is essential.    Lymph # 1.0 - 4.8 K/uL 1.6  1.8  2.0    Mono # 0.3 - 1.0 K/uL 0.7  0.4  0.6    Eos # 0.0 - 0.5 K/uL 0.1  0.1  0.1    Baso # 0.00 - 0.20 K/uL 0.01  0.01  0.02    nRBC 0 /100 WBC 0  1Abnormal   0    Gran % 38.0 - 73.0 % 60.4  40.0  59.8    Lymph % 18.0 - 48.0 % 26.0  48.3High   29.9    Mono % 4.0 - 15.0 % 11.7  9.5   8.8    Eosinophil % 0.0 - 8.0 % 1.0  1.6  0.9    Basophil % 0.0 - 1.9 % 0.2  0.3  0.3    Differential Method  Automated  Automated  Automated    Resulting Agency  [facility]         Specimen Collected: 11/03/20 14:42 Last Resulted: 11/03/20 14:52 Lab Flowsheet Order Details View Encounter Lab and Collection Details Routing Result History      CM=Additional comments             Sedimentation rate  Order: [id number]  Status:  Final result   Visible to patient:  Yes (Patient Portal) Next appt:  11/10/2020 at 08:40 AM in Orthopedics (Estrella Quinones PA-C) Dx:  Effusion of right knee; Acute pain of...   Ref Range & Units 1d ago   Sed Rate 0 - 20 mm/Hr 83High     Resulting Agency  [facility]         Specimen Collected: 11/03/20 14:42 Last Resulted: 11/03/20 15:51 Lab Flowsheet Order Details View Encounter Lab and Collection Details Routing Result History               Imaging Results:    Xray Images and report were reviewed today.  I agree with the radiologist's interpretation.    X-Ray Knee 1 or 2 View Right  Narrative: EXAMINATION:  XR KNEE 1 OR 2 VIEW RIGHT     CLINICAL HISTORY:  Pain in right knee     TECHNIQUE:  Single lateral view of the right knee was obtained.     COMPARISON:  10/05/2020     FINDINGS:  No acute fractures or dislocations visualized.  No definite joint effusion demonstrated.  Prominent patellar marginal osteophytes are present suggesting moderate osteoarthritis.  Impression: Degenerative findings as above     Electronically signed by:         Imer Rodríguez MD  Date:                                        11/03/2020  Time:                                       15:12     X-ray Knee Ortho Left with Flexion  Order: [id number]  Status:  Final result   Visible to patient:  Yes (Patient Portal) Next appt:  11/10/2020 at 08:40 AM in Orthopedics (Estrella Quinones PA-C) Dx:  Left knee pain, unspecified chronicity  Details           Reading Physician Reading Date Result Priority   Kiko Oh  MD  [phone] 10/5/2020 Routine       Narrative & Impression       EXAMINATION:  XR KNEE ORTHO LEFT WITH FLEXION     CLINICAL HISTORY:  . Pain in left knee     TECHNIQUE:  AP standing view of both knees, PA flexion standing views of both knees, and Merchant views of both knees were performed. A lateral view of the left knee was also performed.     COMPARISON:  None     FINDINGS:  There is no left knee fracture.  There is mild patellofemoral compartment osteoarthritis.  Remaining joint spaces of the left knee are unremarkable.  No suspicious osseous lesion.     Limited evaluation of the right knee demonstrates mild patellofemoral compartment osteoarthritis.     Impression:     1.  Small left knee effusion.  No fracture identified.     2.  Mild bilateral patellofemoral compartment osteoarthritis.        Electronically signed by: Kiko Pacheco  Date:                                            10/05/2020  Time:                                           10:51               The EKG was ordered, reviewed, and independently interpreted by the ED provider.  Interpretation time: 12:01  Rate: 84 BPM  Rhythm: normal sinus rhythm  Interpretation: No acute ST changes. No STEMI.           The Emergency Provider reviewed the vital signs and test results, which are outlined above.     ED Discussion     12:18 PM: Discussed pt's case with Dr. Pompa (ortho) who recommends admission to hospital medicine, EKG, and start on Abx. He recommends Vancomycin. Patient had labs done as outpatient yesterday.    12:30 PM: Dr. Pompa (ortho) at bedside.     2:02 PM: Discussed case with Zara Larson NP (Hospital Medicine). Dr. Melvin agrees with current care and management of pt and accepts admission.   Admitting Service: hospital medicine  Admitting Physician: Dr. Melvin  Admit to: inpatient standard    2:02 PM: Re-evaluated pt. I have discussed test results, shared treatment plan, and the need for admission with patient at bedside.  Pt expresses understanding at this time and agrees with all information. All questions answered. Pt has no further questions or concerns at this time. Pt is ready for admit.       Medical Decision Making:   Clinical Tests:   Lab Tests: Ordered and Reviewed  Radiological Study: Reviewed  Medical Tests: Ordered and Reviewed           ED Medication(s):  Medications   vancomycin (VANCOCIN) 2,250 mg in dextrose 5 % 500 mL IVPB (2,250 mg Intravenous Trough Due As Scheduled Before Dose 11/6/20 1200)   0.9%  NaCl infusion ( Intravenous New Bag 11/4/20 1814)   sodium chloride 0.9% flush 10 mL (has no administration in time range)   chlorhexidine 0.12 % solution 10 mL (has no administration in time range)   nozaseptin (NOZIN) nasal  (has no administration in time range)   HYDROcodone-acetaminophen 5-325 mg per tablet 1 tablet (has no administration in time range)   morphine injection 3 mg (has no administration in time range)   ondansetron injection 4 mg (has no administration in time range)   lactated ringers infusion (has no administration in time range)   vancomycin 2 g in dextrose 5 % 500 mL IVPB (2,000 mg Intravenous New Bag 11/4/20 1301)   morphine injection 4 mg (4 mg Intravenous Given 11/4/20 1314)   ondansetron injection 4 mg (4 mg Intravenous Given 11/4/20 1314)   meperidine (PF) injection 12.5 mg (12.5 mg Intravenous Given 11/4/20 1700)       Scribe Attestation:   Scribe #1: I performed the above scribed service and the documentation accurately describes the services I performed. I attest to the accuracy of the note.     Attending:   Physician Attestation Statement for Scribe #1: I, Wanda Mclain MD, personally performed the services described in this documentation, as scribed by Ann Mehta, in my presence, and it is both accurate and complete.           Clinical Impression       ICD-10-CM ICD-9-CM   1. Pyogenic arthritis of right knee joint, due to unspecified organism  M00.9 711.06   2. Preop  cardiovascular exam  Z01.810 V72.81       Disposition:   Disposition: Admitted  Condition: Serious         Wanda Mclain MD  11/04/20 1925

## 2020-11-04 NOTE — ASSESSMENT & PLAN NOTE
The patient has septic arthritis of the right knee.  Unknown organism.  Had a long discussion with her.  Recommend arthroscopy for joint irrigation.  She understands the degree that we proceed.  She is going to be evaluated medically 1st.  We will start vancomycin.  We got her a walker while she was in the clinic.  Expect overnight stay and then determination of further antibiotic needs tomorrow

## 2020-11-04 NOTE — HPI
65-year-old female with progressive right knee pain and swelling for the last few days.  Seen in the Orthopedic Clinic on November 3rd.  Knee was aspirated of cloudy fluid.  Negative for crystals.  Cultures not yet reported back.  White cell count was 33,000. Has elevated CRP and sed rate.  X-rays were normal.  Patient with a history of aplastic anemia and drug therapy for that.  Says she has had 2 blood infections in the last year.  Denies any other current ongoing infectious sources for her right knee pain and swelling.  She has been limping, did start clindamycin last night but has had only 2 doses.

## 2020-11-04 NOTE — HPI
Conchita Rouse is a 65 y.o. female patient  with PMHx of dyslipidemia, HTN, asplastic anemia, prediabetes, TIA  for evaluation of R knee pain and swelling which onset gradually 2 weeks ago. Patient was seen by Estrella Quinones PA-C yesterday in ortho clinic.  Patient was told to come to ED for pre-operative medical clearance. Last PO intake was coffee at 8:30 AM today. There is concerns for pyogenic joint of the right knee.  Empiric Vancomycin given in the ED. Uric acid is normal. COVID is negative. Pt denies any fever, chills, night sweats. Initially, she had pain and swelling to the left knee which is now improved. Pt now has pain and swelling to the right knee and unable to obtain arthrocentesis.   Vital signs Temp 98.3, pulse 82, resp 18, B/P 123/75  CXR -  No acute cardiopulmonary findings  Labs find WBC 6.05, H/H 10.7/33.5, platelets 57, Na 134, K+ 5.4. Pt is admitted for further evaluation and management of septic arthritis of the knee.

## 2020-11-04 NOTE — SUBJECTIVE & OBJECTIVE
"Past Medical History:   Diagnosis Date    Dyslipidemia (high LDL; low HDL)     10y CV event risk 14.7% (asuming no DM2), which could be reduced to 4.5% with RF optimization, for which I recommended atorvastatin 20 mg 1 po qd x2 weeks and then 40 mg 1 po qd    History of shingles 02/14/2018    Per note from Dr. Rogelio Norwood III on 2/14/18; left upper buttocks.   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 Rogelio Norwood III last visit 2/14/18 (tolerating CSA & promacta); also notes aplastic anemia (AA psot Horse ATG - week 7/17/17) and ITP, for which ATG & promacta following decadron pulse for ITP & AA; plan for second opinion on BM bx; next flow in 3 mo; intent of rx: palliative    Prediabetes     TIA (transient ischemic attack) 2007    [facility] where she noted she was feeling slow, took a nap, woke up with a numb/tingling left jaw to arm, which persistned 20 minutes, took 3 baby aspirins, went to ER @ [facility], where facial droop was noted, but completely resolved & head CT revealed old minor abnormality.    Vitamin D deficiency        Past Surgical History:   Procedure Laterality Date    COLONOSCOPY      left eye surgery  1955    Due to left eye lid drooping       Review of patient's allergies indicates:   Allergen Reactions    Bactrim [sulfamethoxazole-trimethoprim] Edema     Swollen lips    Codeine Other (See Comments)     Pt states codeine does not cause hives. Had tooth extraction and medication given caused her to be jittery, feel hot and have to lay down like she was weak. morhpine given on 5/16/2017 iv for pain. Pt martine well without any sign or sx of allergy or reaction.       Current Facility-Administered Medications   Medication    [START ON 11/5/2020] vancomycin (VANCOCIN) 2,250 mg in dextrose 5 % 500 mL IVPB    vancomycin 2 g in dextrose 5 % 500 mL IVPB     Current Outpatient Medications   Medication Sig    amLODIPine " "(NORVASC) 5 MG tablet Take 1 tablet (5 mg total) by mouth once daily.    eltrombopag (PROMACTA) 75 mg Tab Take 75 mg by mouth once daily.     loratadine (CLARITIN) 10 mg tablet Take 1 tablet (10 mg total) by mouth once daily.    traMADoL (ULTRAM) 50 mg tablet Take 1 tablet (50 mg total) by mouth 2 (two) times daily as needed.    atorvastatin (LIPITOR) 40 MG tablet Take 1 tablet (40 mg total) by mouth once daily.    azelastine (ASTELIN) 137 mcg (0.1 %) nasal spray 1 spray (137 mcg total) by Nasal route 2 (two) times daily.    fluticasone (FLONASE) 50 mcg/actuation nasal spray USE 2 SPRAYS IN EACH NOSTRIL 1 TIME A DAY AS NEEDED    meloxicam (MOBIC) 15 MG tablet Take 1 tablet (15 mg total) by mouth daily as needed for Pain. Take with meal    valacyclovir (VALTREX) 500 MG tablet Take 500 mg by mouth.     Family History     Problem Relation (Age of Onset)    Breast cancer Sister    Cancer Sister, Brother    Diabetes Mother    Heart disease Mother, Father        Tobacco Use    Smoking status: Never Smoker    Smokeless tobacco: Never Used   Substance and Sexual Activity    Alcohol use: Yes     Alcohol/week: 0.0 - 1.0 standard drinks     Comment: rarely    Drug use: No    Sexual activity: Not Currently     Partners: Male     Birth control/protection: Post-menopausal     Review of Systems   Hematologic/Lymphatic:        Aplastic anemia     Musculoskeletal: Positive for joint pain and joint swelling.   All other systems reviewed and are negative.    Objective:     Vital Signs (Most Recent):  Temp: 98.3 °F (36.8 °C) (11/04/20 1140)  Pulse: 87 (11/04/20 1332)  Resp: 16 (11/04/20 1332)  BP: (!) 147/72 (11/04/20 1332)  SpO2: 99 % (11/04/20 1332) Vital Signs (24h Range):  Temp:  [98.3 °F (36.8 °C)] 98.3 °F (36.8 °C)  Pulse:  [82-87] 87  Resp:  [16-18] 16  SpO2:  [99 %] 99 %  BP: (123-147)/(72-83) 147/72     Weight: 84.8 kg (187 lb)  Height: 5' 6" (167.6 cm)  Body mass index is 30.18 kg/m².    No intake or output data " in the 24 hours ending 11/04/20 1427    Ortho/SPM Exam     Patient comes in limping.    Examination of the right lower extremity reveals that a tense effusion of the knee.  Diffuse tenderness.  No erythema.  Slight warmth.  30° to 80° range of motion limited by pain.  No distal swelling or edema    Significant Labs:   CBC:   Recent Labs   Lab 11/03/20  1442 11/04/20  1253   WBC 5.99 6.05   HGB 11.4* 10.7*   HCT 34.9* 33.5*   PLT 65* 57*     CMP:   Recent Labs   Lab 11/04/20  1253   *   K 5.4*      CO2 24      BUN 16   CREATININE 0.9   CALCIUM 9.2   PROT 8.4   ALBUMIN 3.3*   BILITOT 0.5   ALKPHOS 88   AST 30   ALT 14   ANIONGAP 8   EGFRNONAA >60     CRP:   Recent Labs   Lab 11/03/20  1442   CRP 80.4*     Wound Culture: No results for input(s): LABAERO in the last 48 hours.  All pertinent labs within the past 24 hours have been reviewed.    Significant Imaging: X-Ray:  I reviewed x-rays of the right knee which were obtained on November 3, 2020.  No abnormalities.

## 2020-11-04 NOTE — OP NOTE
[facility] -   General Surgery  Operative Note    SUMMARY     Date of Procedure: 11/4/2020     Procedure: Procedure(s) (LRB):  ARTHROSCOPY, KNEE (Right)  INCISION AND DRAINAGE, KNEE (Right)       Surgeon(s) and Role:     * Sal Pompa MD - Primary    Assisting Surgeon: None    Pre-Operative Diagnosis: Effusion of right knee [M25.461]  Right knee pain, unspecified chronicity [M25.561]    Post-Operative Diagnosis: Post-Op Diagnosis Codes:  Septic arthritis right knee     * Effusion of right knee [M25.461]     * Right knee pain, unspecified chronicity [M25.561]    Anesthesia: General    Technical Procedures Used:  Right knee arthroscopy    Description of the Findings of the Procedure:  Evidence of acute synovitis of the right knee    The patient was brought to the operating room after administration of adequate general endotracheal anesthetic the right lower extremity was prepped and draped in usual fashion for knee arthroscopy.  The limb was exsanguinated with an Ace wrap the tourniquet inflated to 300 mm of mercury about the upper right thigh.  Arthroscope was introduced through the anterolateral portal and the joint distended with normal saline solution.  There was evidence of significant synovitis with multiple joint areas of loculation.  Through an anteromedial portal the intra-articular shaver was introduced into the knee and the joint was copiously irrigated and debrided of thickened fibrotic tissue and small amount of synovium.  A total of 9 L of normal saline were used for joint irrigation.  Synovial biopsy was also performed in an area where there is a whitish plaques suggestive of crystalline arthropathy.  Incisions were closed with 3 O nylon sterile dressings applied and tourniquet released after 43 min    Significant Surgical Tasks Conducted by the Assistant(s), if Applicable:  None    Complications: No    Estimated Blood Loss (EBL):  None         Implants: * No implants in log  *    Specimens:  Synovium and joint fluid  Specimen (12h ago, onward)    None                  Condition: Good    Disposition: PACU - hemodynamically stable.    Attestation: I performed the procedure.

## 2020-11-04 NOTE — SUBJECTIVE & OBJECTIVE
"Past Medical History:   Diagnosis Date    Dyslipidemia (high LDL; low HDL)     10y CV event risk 14.7% (asuming no DM2), which could be reduced to 4.5% with RF optimization, for which I recommended atorvastatin 20 mg 1 po qd x2 weeks and then 40 mg 1 po qd    History of shingles 02/14/2018    Per note from Dr. Rogelio Norwood III on 2/14/18; left upper buttocks.   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 Rogelio Norwood III last visit 2/14/18 (tolerating CSA & promacta); also notes aplastic anemia (AA psot Horse ATG - week 7/17/17) and ITP, for which ATG & promacta following decadron pulse for ITP & AA; plan for second opinion on BM bx; next flow in 3 mo; intent of rx: palliative    Prediabetes     TIA (transient ischemic attack) 2007    [facility] where she noted she was feeling slow, took a nap, woke up with a numb/tingling left jaw to arm, which persistned 20 minutes, took 3 baby aspirins, went to ER @ [facility], where facial droop was noted, but completely resolved & head CT revealed old minor abnormality.    Vitamin D deficiency        Past Surgical History:   Procedure Laterality Date    COLONOSCOPY      left eye surgery  1955    Due to left eye lid drooping       Review of patient's allergies indicates:   Allergen Reactions    Bactrim [sulfamethoxazole-trimethoprim] Edema     Swollen lips    Codeine Other (See Comments)     Pt states codeine does not cause hives. Had tooth extraction and medication given caused her to be jittery, feel hot and have to lay down like she was weak. morhpine given on 5/16/2017 iv for pain. Pt martine well without any sign or sx of allergy or reaction.       No current facility-administered medications on file prior to encounter.      Current Outpatient Medications on File Prior to Encounter   Medication Sig    amLODIPine (NORVASC) 5 MG tablet Take 1 tablet (5 mg total) by mouth once daily.    eltrombopag " (PROMACTA) 75 mg Tab Take 75 mg by mouth once daily.     loratadine (CLARITIN) 10 mg tablet Take 1 tablet (10 mg total) by mouth once daily.    traMADoL (ULTRAM) 50 mg tablet Take 1 tablet (50 mg total) by mouth 2 (two) times daily as needed.    atorvastatin (LIPITOR) 40 MG tablet Take 1 tablet (40 mg total) by mouth once daily.    azelastine (ASTELIN) 137 mcg (0.1 %) nasal spray 1 spray (137 mcg total) by Nasal route 2 (two) times daily.    fluticasone (FLONASE) 50 mcg/actuation nasal spray USE 2 SPRAYS IN EACH NOSTRIL 1 TIME A DAY AS NEEDED    meloxicam (MOBIC) 15 MG tablet Take 1 tablet (15 mg total) by mouth daily as needed for Pain. Take with meal    valacyclovir (VALTREX) 500 MG tablet Take 500 mg by mouth.     Family History     Problem Relation (Age of Onset)    Breast cancer Sister    Cancer Sister, Brother    Diabetes Mother    Heart disease Mother, Father        Tobacco Use    Smoking status: Never Smoker    Smokeless tobacco: Never Used   Substance and Sexual Activity    Alcohol use: Yes     Alcohol/week: 0.0 - 1.0 standard drinks     Comment: rarely    Drug use: No    Sexual activity: Not Currently     Partners: Male     Birth control/protection: Post-menopausal     Review of Systems   Constitutional: Positive for activity change.   HENT: Negative.    Respiratory: Negative.    Cardiovascular: Positive for leg swelling.   Gastrointestinal: Negative.    Genitourinary: Negative.    Musculoskeletal: Positive for gait problem and joint swelling.   Skin: Negative.    Hematological: Negative.    Psychiatric/Behavioral: Negative.      Objective:     Vital Signs (Most Recent):  Temp: 98.3 °F (36.8 °C) (11/04/20 1140)  Pulse: 87 (11/04/20 1332)  Resp: 16 (11/04/20 1332)  BP: (!) 147/72 (11/04/20 1332)  SpO2: 99 % (11/04/20 1332) Vital Signs (24h Range):  Temp:  [98.3 °F (36.8 °C)] 98.3 °F (36.8 °C)  Pulse:  [82-87] 87  Resp:  [16-18] 16  SpO2:  [99 %] 99 %  BP: (123-147)/(72-83) 147/72     Weight:  84.8 kg (187 lb)  Body mass index is 30.18 kg/m².    Physical Exam  Constitutional:       Appearance: She is well-developed.   HENT:      Head: Normocephalic and atraumatic.      Nose: Nose normal.   Eyes:      General: No scleral icterus.     Conjunctiva/sclera: Conjunctivae normal.      Pupils: Pupils are equal, round, and reactive to light.   Neck:      Musculoskeletal: Normal range of motion and neck supple.   Cardiovascular:      Rate and Rhythm: Normal rate and regular rhythm.      Heart sounds: Normal heart sounds. No murmur. No friction rub. No gallop.    Pulmonary:      Effort: Pulmonary effort is normal.      Breath sounds: Normal breath sounds.   Abdominal:      General: Bowel sounds are normal.      Palpations: Abdomen is soft.   Musculoskeletal:         General: No tenderness.      Right knee: She exhibits decreased range of motion and swelling. She exhibits no ecchymosis and no deformity.   Skin:     General: Skin is warm and dry.   Neurological:      Mental Status: She is alert and oriented to person, place, and time.   Psychiatric:         Behavior: Behavior normal.           CRANIAL NERVES     CN III, IV, VI   Pupils are equal, round, and reactive to light.       Significant Labs:   CBC:   Recent Labs   Lab 11/03/20  1442 11/04/20  1253   WBC 5.99 6.05   HGB 11.4* 10.7*   HCT 34.9* 33.5*   PLT 65* 57*     CMP:   Recent Labs   Lab 11/04/20  1253   *   K 5.4*      CO2 24      BUN 16   CREATININE 0.9   CALCIUM 9.2   PROT 8.4   ALBUMIN 3.3*   BILITOT 0.5   ALKPHOS 88   AST 30   ALT 14   ANIONGAP 8   EGFRNONAA >60     All pertinent labs within the past 24 hours have been reviewed.    Significant Imaging: I have reviewed all pertinent imaging results/findings within the past 24 hours.

## 2020-11-04 NOTE — ASSESSMENT & PLAN NOTE
Empiric ABX of Vancomycin given  Presently NPO for surgical I & D and wash out per Orthopedics afternoon of 11/4/2020    Cardiac Risk Assessment - Pt with hx of TIA approx 20 years ago. She denies any chest pain, palpitations, presyncopal symptoms and SOB. According to Revised Cardiac Risk Assessment (1999) - pt has a 0.9 % risk for major cardiac event surrounding this low risk orthopedic surgical procedure.  Cardiac event includes MI, pulmonary edema, V fib, cardiac arrest and complete heart block.

## 2020-11-04 NOTE — PROGRESS NOTES
Pharmacokinetic Initial Assessment: IV Vancomycin    Assessment/Plan:    Initiate intravenous vancomycin with initial dose of 2000 mg once followed by a maintenance dose of vancomycin 2250 mg IV every 24 hours  Desired empiric serum trough concentration is 15 to 20 mcg/mL  Draw vancomycin trough level 60 min prior to third dose on 11/6/2020 at approximately 1200.  Pharmacy will continue to follow and monitor vancomycin.      Please contact pharmacy at extension [phone] with any questions regarding this assessment.     Thank you for the consult,   Nancy Mendosa       Patient brief summary:  Conchita Rouse is a 65 y.o. female initiated on antimicrobial therapy with IV Vancomycin for treatment of suspected  Pyogenic arthritis of right knee joint, due to unspecified organism.     Drug Allergies:   Review of patient's allergies indicates:   Allergen Reactions    Bactrim [sulfamethoxazole-trimethoprim] Edema     Swollen lips    Codeine Other (See Comments)     Pt states codeine does not cause hives. Had tooth extraction and medication given caused her to be jittery, feel hot and have to lay down like she was weak. morhpine given on 5/16/2017 iv for pain. Pt martnie well without any sign or sx of allergy or reaction.       Actual Body Weight:   84.8 Kg     Renal Function:   Estimated Creatinine Clearance: 68.4 mL/min (based on SCr of 0.9 mg/dL).,     Dialysis Method (if applicable):  N/A    CBC (last 72 hours):  Recent Labs   Lab Result Units 11/03/20  1442 11/04/20  1253   WBC K/uL 5.99 6.05   Hemoglobin g/dL 11.4* 10.7*   Hematocrit % 34.9* 33.5*   Platelets K/uL 65* 57*   Gran % % 60.4  --    Lymph % % 26.0  --    Mono % % 11.7  --    Eosinophil % % 1.0  --    Basophil % % 0.2  --    Differential Method  Automated  --        Metabolic Panel (last 72 hours):  Recent Labs   Lab Result Units 11/04/20  1253   Sodium mmol/L 134*   Potassium mmol/L 5.4*   Chloride mmol/L 102   CO2 mmol/L 24   Glucose mg/dL 102   BUN mg/dL 16    Creatinine mg/dL 0.9   Albumin g/dL 3.3*   Total Bilirubin mg/dL 0.5   Alkaline Phosphatase U/L 88   AST U/L 30   ALT U/L 14       Drug levels (last 3 results):  No results for input(s): VANCOMYCINRA, VANCOMYCINPE, VANCOMYCINTR in the last 72 hours.    Microbiologic Results:  Microbiology Results (last 7 days)       ** No results found for the last 168 hours. **

## 2020-11-04 NOTE — ASSESSMENT & PLAN NOTE
Platelet count of 57 K  Avoid anticoagulation  Possible need for platelet transfusion and FFP prior to surgery

## 2020-11-04 NOTE — ANESTHESIA PREPROCEDURE EVALUATION
11/04/2020  Conchita Rouse is a 65 y.o., female.  Patient Active Problem List   Diagnosis    H/O TIA (transient ischemic attack) and stroke    Essential hypertension    Aplastic anemia    Thrombocytopenia    Vitamin D deficiency    Prediabetes    Dyslipidemia (high LDL; low HDL)    Effusion of left knee     No current facility-administered medications on file prior to encounter.      Current Outpatient Medications on File Prior to Encounter   Medication Sig Dispense Refill    amLODIPine (NORVASC) 5 MG tablet Take 1 tablet (5 mg total) by mouth once daily. 90 tablet 3    atorvastatin (LIPITOR) 40 MG tablet Take 1 tablet (40 mg total) by mouth once daily. 90 tablet 3    azelastine (ASTELIN) 137 mcg (0.1 %) nasal spray 1 spray (137 mcg total) by Nasal route 2 (two) times daily. 30 mL 1    eltrombopag (PROMACTA) 75 mg Tab Take 75 mg by mouth once daily.       fluticasone (FLONASE) 50 mcg/actuation nasal spray USE 2 SPRAYS IN EACH NOSTRIL 1 TIME A DAY AS NEEDED  0    loratadine (CLARITIN) 10 mg tablet Take 1 tablet (10 mg total) by mouth once daily. 30 tablet 3    meloxicam (MOBIC) 15 MG tablet Take 1 tablet (15 mg total) by mouth daily as needed for Pain. Take with meal 30 tablet 2    traMADoL (ULTRAM) 50 mg tablet Take 1 tablet (50 mg total) by mouth 2 (two) times daily as needed. 24 tablet 0    valacyclovir (VALTREX) 500 MG tablet Take 500 mg by mouth.       Past Surgical History:   Procedure Laterality Date    COLONOSCOPY      left eye surgery  1955    Due to left eye lid drooping       Anesthesia Evaluation    I have reviewed the Patient Summary Reports.    I have reviewed the Nursing Notes.    I have reviewed the Medications.     Review of Systems  Anesthesia Hx:  No problems with previous Anesthesia  History of prior surgery of interest to airway management or planning: Previous  anesthesia: General  Denies Personal Hx of Anesthesia complications.   Social:  Non-Smoker    Hematology/Oncology:  Hematology Normal   Oncology Normal    -- Thrombocytopenia:    EENT/Dental:EENT/Dental Normal   Cardiovascular:   Hypertension    Pulmonary:  Pulmonary Normal    Renal/:  Renal/ Normal     Hepatic/GI:  Hepatic/GI Normal    Musculoskeletal:  Musculoskeletal Normal    Neurological:   TIA,    Endocrine:  Endocrine Normal    Dermatological:  Skin Normal    Psych:  Psychiatric Normal           Physical Exam  General:  Well nourished, Obesity    Airway/Jaw/Neck:  Airway Findings: Mouth Opening: Normal Tongue: Normal  Mallampati: III      Dental:  Dental Findings: In tact   Chest/Lungs:  Chest/Lungs Clear    Heart/Vascular:  Heart Findings: Normal Heart murmur: negative       Mental Status:  Mental Status Findings:  Cooperative, Alert and Oriented         Chemistry        Component Value Date/Time     09/02/2020 0900    K 4.0 09/02/2020 0900     09/02/2020 0900    CO2 26 09/02/2020 0900    BUN 12 09/02/2020 0900    CREATININE 0.8 09/02/2020 0900     (H) 09/02/2020 0900        Component Value Date/Time    CALCIUM 9.2 09/02/2020 0900    ALKPHOS 84 09/02/2020 0900    AST 20 09/02/2020 0900    ALT 18 09/02/2020 0900    BILITOT 0.3 09/02/2020 0900    ESTGFRAFRICA >60.0 09/02/2020 0900    EGFRNONAA >60.0 09/02/2020 0900        Lab Results   Component Value Date    WBC 5.99 11/03/2020    HGB 11.4 (L) 11/03/2020    HCT 34.9 (L) 11/03/2020     (H) 11/03/2020    PLT 65 (L) 11/03/2020           Anesthesia Plan  Type of Anesthesia, risks & benefits discussed:  Anesthesia Type:  general  Patient's Preference:   Intra-op Monitoring Plan: standard ASA monitors  Intra-op Monitoring Plan Comments:   Post Op Pain Control Plan: multimodal analgesia  Post Op Pain Control Plan Comments:   Induction:   IV  Beta Blocker:  Patient is not currently on a Beta-Blocker (No further documentation required).        Informed Consent: Patient understands risks and agrees with Anesthesia plan.  Questions answered. Anesthesia consent signed with patient.  ASA Score: 3  emergent   Day of Surgery Review of History & Physical: I have interviewed and examined the patient. I have reviewed the patient's H&P dated:    H&P update referred to the surgeon.     Anesthesia Plan Notes: Consider Stress Dose Steroids        Ready For Surgery From Anesthesia Perspective.

## 2020-11-04 NOTE — ANESTHESIA PROCEDURE NOTES
Intubation  Performed by: Linus Mckeon CRNA  Authorized by: Daysi Vasquez MD     Intubation:     Induction:  Rapid sequence induction    Intubated:  Postinduction    Mask Ventilation:  N/a    Attempts:  1    Attempted By:  CRNA    Blade:  Carlos 3    Laryngeal View Grade: Grade IIA - cords partially seen      Difficult Airway Encountered?: No      Complications:  None    Airway Device:  Oral endotracheal tube    Airway Device Size:  7.0    Style/Cuff Inflation:  Cuffed (inflated to minimal occlusive pressure)    Inflation Amount (mL):  5    Tube secured:  22    Secured at:  The lips    Placement Verified By:  Capnometry    Complicating Factors:  None    Findings Post-Intubation:  BS equal bilateral

## 2020-11-05 PROBLEM — E87.5 HYPERKALEMIA: Status: RESOLVED | Noted: 2020-11-04 | Resolved: 2020-11-05

## 2020-11-05 LAB
ANION GAP SERPL CALC-SCNC: 9 MMOL/L (ref 8–16)
BASOPHILS # BLD AUTO: 0 K/UL (ref 0–0.2)
BASOPHILS NFR BLD: 0 % (ref 0–1.9)
BUN SERPL-MCNC: 11 MG/DL (ref 8–23)
CALCIUM SERPL-MCNC: 9.4 MG/DL (ref 8.7–10.5)
CHLORIDE SERPL-SCNC: 102 MMOL/L (ref 95–110)
CO2 SERPL-SCNC: 27 MMOL/L (ref 23–29)
CREAT SERPL-MCNC: 0.9 MG/DL (ref 0.5–1.4)
DIFFERENTIAL METHOD: ABNORMAL
EOSINOPHIL # BLD AUTO: 0 K/UL (ref 0–0.5)
EOSINOPHIL NFR BLD: 0.5 % (ref 0–8)
ERYTHROCYTE [DISTWIDTH] IN BLOOD BY AUTOMATED COUNT: 13.5 % (ref 11.5–14.5)
EST. GFR  (AFRICAN AMERICAN): >60 ML/MIN/1.73 M^2
EST. GFR  (NON AFRICAN AMERICAN): >60 ML/MIN/1.73 M^2
GLUCOSE SERPL-MCNC: 111 MG/DL (ref 70–110)
GRAM STN SPEC: NORMAL
GRAM STN SPEC: NORMAL
HCT VFR BLD AUTO: 31.5 % (ref 37–48.5)
HGB BLD-MCNC: 9.9 G/DL (ref 12–16)
IMM GRANULOCYTES # BLD AUTO: 0.04 K/UL (ref 0–0.04)
IMM GRANULOCYTES NFR BLD AUTO: 0.6 % (ref 0–0.5)
LYMPHOCYTES # BLD AUTO: 1.2 K/UL (ref 1–4.8)
LYMPHOCYTES NFR BLD: 17.7 % (ref 18–48)
MCH RBC QN AUTO: 35.4 PG (ref 27–31)
MCHC RBC AUTO-ENTMCNC: 31.4 G/DL (ref 32–36)
MCV RBC AUTO: 113 FL (ref 82–98)
MONOCYTES # BLD AUTO: 0.8 K/UL (ref 0.3–1)
MONOCYTES NFR BLD: 11.3 % (ref 4–15)
NEUTROPHILS # BLD AUTO: 4.7 K/UL (ref 1.8–7.7)
NEUTROPHILS NFR BLD: 69.9 % (ref 38–73)
NRBC BLD-RTO: 0 /100 WBC
PLATELET # BLD AUTO: 64 K/UL (ref 150–350)
PMV BLD AUTO: 11.2 FL (ref 9.2–12.9)
POTASSIUM SERPL-SCNC: 3.8 MMOL/L (ref 3.5–5.1)
RBC # BLD AUTO: 2.8 M/UL (ref 4–5.4)
SODIUM SERPL-SCNC: 138 MMOL/L (ref 136–145)
WBC # BLD AUTO: 6.66 K/UL (ref 3.9–12.7)

## 2020-11-05 PROCEDURE — 94761 N-INVAS EAR/PLS OXIMETRY MLT: CPT | Mod: HCNC

## 2020-11-05 PROCEDURE — 63600175 PHARM REV CODE 636 W HCPCS: Mod: HCNC | Performed by: EMERGENCY MEDICINE

## 2020-11-05 PROCEDURE — 99024 POSTOP FOLLOW-UP VISIT: CPT | Mod: ,,, | Performed by: ORTHOPAEDIC SURGERY

## 2020-11-05 PROCEDURE — 85025 COMPLETE CBC W/AUTO DIFF WBC: CPT | Mod: HCNC

## 2020-11-05 PROCEDURE — 25000003 PHARM REV CODE 250: Mod: HCNC | Performed by: ORTHOPAEDIC SURGERY

## 2020-11-05 PROCEDURE — 25000003 PHARM REV CODE 250: Mod: HCNC | Performed by: EMERGENCY MEDICINE

## 2020-11-05 PROCEDURE — 63600175 PHARM REV CODE 636 W HCPCS: Mod: HCNC | Performed by: ORTHOPAEDIC SURGERY

## 2020-11-05 PROCEDURE — 80048 BASIC METABOLIC PNL TOTAL CA: CPT | Mod: HCNC

## 2020-11-05 PROCEDURE — 99024 PR POST-OP FOLLOW-UP VISIT: ICD-10-PCS | Mod: ,,, | Performed by: ORTHOPAEDIC SURGERY

## 2020-11-05 PROCEDURE — 11000001 HC ACUTE MED/SURG PRIVATE ROOM: Mod: HCNC

## 2020-11-05 PROCEDURE — 36415 COLL VENOUS BLD VENIPUNCTURE: CPT | Mod: HCNC

## 2020-11-05 RX ORDER — ONDANSETRON 2 MG/ML
4 INJECTION INTRAMUSCULAR; INTRAVENOUS EVERY 6 HOURS PRN
Status: DISCONTINUED | OUTPATIENT
Start: 2020-11-05 | End: 2020-11-09 | Stop reason: HOSPADM

## 2020-11-05 RX ORDER — TRAMADOL HYDROCHLORIDE 50 MG/1
100 TABLET ORAL EVERY 6 HOURS PRN
Status: DISCONTINUED | OUTPATIENT
Start: 2020-11-05 | End: 2020-11-09 | Stop reason: HOSPADM

## 2020-11-05 RX ADMIN — ONDANSETRON 4 MG: 2 INJECTION INTRAMUSCULAR; INTRAVENOUS at 10:11

## 2020-11-05 RX ADMIN — HYDROCODONE BITARTRATE AND ACETAMINOPHEN 1 TABLET: 5; 325 TABLET ORAL at 12:11

## 2020-11-05 RX ADMIN — CHLORHEXIDINE GLUCONATE 0.12% ORAL RINSE 10 ML: 1.2 LIQUID ORAL at 08:11

## 2020-11-05 RX ADMIN — DEXTROSE 2250 MG: 5 SOLUTION INTRAVENOUS at 12:11

## 2020-11-05 RX ADMIN — TRAMADOL HYDROCHLORIDE 100 MG: 50 TABLET, FILM COATED ORAL at 05:11

## 2020-11-05 RX ADMIN — TRAMADOL HYDROCHLORIDE 100 MG: 50 TABLET, FILM COATED ORAL at 10:11

## 2020-11-05 RX ADMIN — CHLORHEXIDINE GLUCONATE 0.12% ORAL RINSE 15 ML: 1.2 LIQUID ORAL at 09:11

## 2020-11-05 RX ADMIN — HYDROCODONE BITARTRATE AND ACETAMINOPHEN 1 TABLET: 5; 325 TABLET ORAL at 05:11

## 2020-11-05 NOTE — ASSESSMENT & PLAN NOTE
- Blood pressure well controlled  - Continue Amlodipine  - monitor and adjust medications as needed

## 2020-11-05 NOTE — HOSPITAL COURSE
64 y/o female admitted right knee septic arthritis. Orthopedic surgery consulted. Pt underwent right knee arthroscopy for joint irrigation and debridement on 11/4/20 by Dr. Pompa. Cultures pending. Will continue IV vancomycin. Infectious Disease consult for abx recommendation.

## 2020-11-05 NOTE — PLAN OF CARE
Problem: Adult Inpatient Plan of Care  Goal: Plan of Care Review  Outcome: Ongoing, Progressing    POC reviewed, including indications and possible side effects of administered medications. Patient verbalized understanding and teach back. No adverse reactions noted. Patient c/o eye and knee pain. Administered medications per order. Dressing remains CDI. WBAT. VSS. NADN. Patient remains free of falls and injuries during shift. Will continue to monitor.    Chart check complete.

## 2020-11-05 NOTE — PLAN OF CARE
CM met with patient at the bedside to assess for discharge needs.  Patient lives at home with her , Efrain.  She does not use any medical equipment or have home health services.  She is independent with needs but her  can assist her if necessary.  She manages her own healthcare.  She does not anticipate any discharge needs at this time.  CM provided a transitional care folder, information on advanced directives, information on pharmacy bedside delivery, and discharge planning begins on admission with contact information for any needs/questions.     D/C Plan: home, no needs  PCP: Dr Valles  Preferred [facility]acy: [facility]  Discharge transportation:   [facility]: Active  Pharmacy Bedside Delivery: No     11/05/20 0923   Discharge Assessment   Assessment Type Discharge Planning Assessment   Confirmed/corrected address and phone number on facesheet? Yes   Assessment information obtained from? Patient;Medical Record   Expected Length of Stay (days)   (1-2)   Communicated expected length of stay with patient/caregiver yes   Prior to hospitilization cognitive status: Alert/Oriented   Prior to hospitalization functional status: Independent   Current cognitive status: Alert/Oriented   Current Functional Status: Independent   Facility Arrived From: Home   Lives With spouse   Able to Return to Prior Arrangements yes   Is patient able to care for self after discharge? Yes   Who are your caregiver(s) and their phone number(s)? Efrain Rouse, spouse   Patient's perception of discharge disposition home or selfcare   Readmission Within the Last 30 Days no previous admission in last 30 days   Patient currently being followed by outpatient case management? No   Patient currently receives any other outside agency services? No   Equipment Currently Used at Home none   Do you have any problems affording any of your prescribed medications? No   Is the patient taking medications as prescribed? yes   Does the  patient have transportation home? Yes   Transportation Anticipated family or friend will provide   Dialysis Name and Scheduled days NA   Does the patient receive services at the [facility]? No   Discharge Plan A Home with family   Discharge Plan B Home with family   DME Needed Upon Discharge  none   Patient/Family in Agreement with Plan yes

## 2020-11-05 NOTE — ANESTHESIA POSTPROCEDURE EVALUATION
Anesthesia Post Evaluation    Patient: Conchita Rouse    Procedure(s) Performed: Procedure(s) (LRB):  ARTHROSCOPY, KNEE (Right)  INCISION AND DRAINAGE, KNEE (Right)    Final Anesthesia Type: general    Patient location during evaluation: PACU  Patient participation: Yes- Able to Participate  Level of consciousness: awake and alert, oriented and awake  Post-procedure vital signs: reviewed and stable  Pain management: adequate  Airway patency: patent    PONV status at discharge: No PONV  Anesthetic complications: no      Cardiovascular status: blood pressure returned to baseline  Respiratory status: unassisted and spontaneous ventilation  Hydration status: euvolemic  Follow-up not needed.          Vitals Value Taken Time   /57 11/05/20 0750   Temp 37.1 °C (98.7 °F) 11/05/20 0750   Pulse 95 11/05/20 0750   Resp 18 11/05/20 0750   SpO2 95 % 11/05/20 0750         Event Time   Out of Recovery 17:46:20         Pain/Frandy Score: Pain Rating Prior to Med Admin: 6 (11/5/2020  5:15 AM)  Pain Rating Post Med Admin: 3 (11/5/2020  1:08 AM)  Frandy Score: 8 (11/4/2020  5:38 PM)

## 2020-11-05 NOTE — ASSESSMENT & PLAN NOTE
- Platelet count of 64k  - No active s/s of bleeding  - Avoid anticoagulation  - Daily CBC  - Outpatient f/u with Dr. Norwood

## 2020-11-05 NOTE — ASSESSMENT & PLAN NOTE
- Thought to be 2/2 anaplastic anemia and pancytopenia; tx'd w/ Dr. Norwood in [place] [facility]  - Currently on Promacto 150 mg daily at home, will resume at DC  - H/H stable, continue to monitor with daily CBC

## 2020-11-05 NOTE — NURSING
Patient currently c/o L eye pain. Applied heat/cold compress to site along with flushing with NS with little relief. Also administered norco for pain. Upon assessment, clear drainage along with redness noted but unable to do a proper assessment d/t patient unable to open eye. Informed Audrey Juares NP. New orders noted. Gauze and tape applied to eye per patient request. Will continue to monitor.

## 2020-11-05 NOTE — ASSESSMENT & PLAN NOTE
- Ortho consulted, s/p  right knee arthroscopy for joint irrigation and debridement on 11/4/20 by Dr. Pompa with cultures pending  - ID consult pending for ABX recommendations  - Currently on Vancomycin; PharmD following to dose  - Continue PRN analgesia  - PT/OT consulted and recommended outpatient therapy upon DC  - Monitor

## 2020-11-05 NOTE — PROGRESS NOTES
[facility]  Orthopedics  Progress Note    Patient Name: Conchita Rouse  MRN: [id number]  Admission Date: 11/4/2020  Hospital Length of Stay: 1 days  Attending Provider: Hernesto Jimenez, *  Primary Care Provider: Nancy Valles MD  Follow-up For: Procedure(s) (LRB):  ARTHROSCOPY, KNEE (Right)  INCISION AND DRAINAGE, KNEE (Right)    Post-Operative Day: 1 Day Post-Op  Subjective:     Principal Problem:Pyogenic arthritis of right knee joint    Principal Orthopedic Problem:  Right knee septic arthritis    Interval History:  The patient is 1st postop day for right knee arthroscopy for joint irrigation and debridement for septic arthritis.  Pain is controlled but she has complained that the hydrocodone causes nausea.  Has been up out of bed with a walker.    Review of patient's allergies indicates:   Allergen Reactions    Bactrim [sulfamethoxazole-trimethoprim] Edema     Swollen lips    Codeine Other (See Comments)     Pt states codeine does not cause hives. Had tooth extraction and medication given caused her to be jittery, feel hot and have to lay down like she was weak. morhpine given on 5/16/2017 iv for pain. Pt martine well without any sign or sx of allergy or reaction.       Current Facility-Administered Medications   Medication    0.9%  NaCl infusion    chlorhexidine 0.12 % solution 10 mL    HYDROcodone-acetaminophen 5-325 mg per tablet 1 tablet    lactated ringers infusion    morphine injection 3 mg    nozaseptin (NOZIN) nasal     ondansetron injection 4 mg    sodium chloride 0.9% flush 10 mL    vancomycin (VANCOCIN) 2,250 mg in dextrose 5 % 500 mL IVPB     Objective:     Vital Signs (Most Recent):  Temp: 98.7 °F (37.1 °C) (11/05/20 0750)  Pulse: 95 (11/05/20 0750)  Resp: 18 (11/05/20 0750)  BP: (!) 114/57 (11/05/20 0750)  SpO2: 95 % (11/05/20 0750) Vital Signs (24h Range):  Temp:  [96.1 °F (35.6 °C)-98.7 °F (37.1 °C)] 98.7 °F (37.1 °C)  Pulse:  [68-95] 95  Resp:  [8-18]  "18  SpO2:  [91 %-100 %] 95 %  BP: (108-162)/(56-75) 114/57     Weight: 84.8 kg (187 lb)  Height: 5' 6" (167.6 cm)  Body mass index is 30.18 kg/m².      Intake/Output Summary (Last 24 hours) at 11/5/2020 0936  Last data filed at 11/5/2020 0515  Gross per 24 hour   Intake 1057.5 ml   Output --   Net 1057.5 ml       Ortho/SPM Exam examination of the right lower extremity reveals intact Ace dressing at the knee.  She has about 20 to 60° range of motion limited by pain.  Distally no swelling or edema.    Significant Labs:   CBC:   Recent Labs   Lab 11/03/20  1442 11/04/20  1253   WBC 5.99 6.05   HGB 11.4* 10.7*   HCT 34.9* 33.5*   PLT 65* 57*     Wound Culture: No results for input(s): LABAERO in the last 48 hours.    Significant Imaging:  No new imaging    Assessment/Plan:  The patient is 1st postop day from right knee arthroscopy for joint irrigation.  Cultures are all negative so far.  Discussed with the medical team.  Plan is to do a PICC line, continue outpatient vancomycin.  Infectious Disease will be consulted regarding follow-up of antibiotic therapy.  She can be seen in the orthopedic clinic in 1 week for follow-up.  She can change the dressing daily as needed.  Walker ambulation, weight-bearing as tolerated.  Will get her tramadol for pain and Zofran for nausea.     Active Diagnoses:    Diagnosis Date Noted POA    PRINCIPAL PROBLEM:  Pyogenic arthritis of right knee joint [M00.9] 11/04/2020 Yes    Hyperkalemia [E87.5] 11/04/2020 Yes    Dyslipidemia (high LDL; low HDL) [E78.5]  Yes    Thrombocytopenia [D69.6] 07/23/2018 Yes    Aplastic anemia [D61.9] 09/06/2017 Yes    H/O TIA (transient ischemic attack) and stroke [Z86.73] 02/22/2017 Not Applicable    Essential hypertension [I10] 02/22/2017 Yes      Problems Resolved During this Admission:         Sal Pompa MD  Orthopedics  [facility]  "

## 2020-11-05 NOTE — SUBJECTIVE & OBJECTIVE
Interval History:  No acute events overnight.  Patient currently resting comfortably in bed in no acute distress with no new complaints.  Cultures from surgery are still pending.  ID consult pending for ABX needs.  PT/OT have evaluated and recommended outpatient therapy upon DC.      Review of Systems   Constitutional: Positive for activity change. Negative for chills, diaphoresis, fatigue and fever.   HENT: Negative.  Negative for facial swelling, hearing loss, mouth sores, sneezing, sore throat, tinnitus and trouble swallowing.    Eyes: Negative for photophobia, pain, discharge, redness and visual disturbance.   Respiratory: Negative.  Negative for apnea, cough, choking, chest tightness, shortness of breath, wheezing and stridor.    Cardiovascular: Positive for leg swelling. Negative for chest pain and palpitations.   Gastrointestinal: Negative.  Negative for abdominal distention, abdominal pain, anal bleeding, blood in stool, constipation, diarrhea, nausea, rectal pain and vomiting.   Endocrine: Negative for cold intolerance, heat intolerance, polydipsia, polyphagia and polyuria.   Genitourinary: Negative.  Negative for difficulty urinating, dysuria, flank pain, frequency, hematuria, pelvic pain, urgency, vaginal bleeding, vaginal discharge and vaginal pain.   Musculoskeletal: Positive for arthralgias, gait problem and joint swelling. Negative for back pain, myalgias and neck stiffness.        C/o to be expected right knee pain.   Skin: Negative.  Negative for pallor, rash and wound.   Allergic/Immunologic: Negative for food allergies.   Neurological: Negative for dizziness, tremors, seizures, syncope, speech difficulty, weakness and headaches.   Hematological: Negative.  Negative for adenopathy. Does not bruise/bleed easily.   Psychiatric/Behavioral: Negative.  Negative for behavioral problems and confusion. The patient is not nervous/anxious.    All other systems reviewed and are negative.    Objective:      Vital Signs (Most Recent):  Temp: 97.5 °F (36.4 °C) (11/06/20 0741)  Pulse: 77 (11/06/20 0741)  Resp: 16 (11/06/20 1550)  BP: 137/65 (11/06/20 0741)  SpO2: 96 % (11/06/20 0741) Vital Signs (24h Range):  Temp:  [97.5 °F (36.4 °C)-98.7 °F (37.1 °C)] 97.5 °F (36.4 °C)  Pulse:  [77-87] 77  Resp:  [16-18] 16  SpO2:  [94 %-96 %] 96 %  BP: (106-137)/(55-69) 137/65     Weight: 84.8 kg (187 lb)  Body mass index is 30.18 kg/m².    Intake/Output Summary (Last 24 hours) at 11/6/2020 1649  Last data filed at 11/6/2020 0900  Gross per 24 hour   Intake 1400 ml   Output --   Net 1400 ml      Physical Exam  Vitals signs and nursing note reviewed.   Constitutional:       Appearance: She is well-developed.   HENT:      Head: Normocephalic and atraumatic.      Nose: Nose normal.   Eyes:      General: No scleral icterus.     Conjunctiva/sclera: Conjunctivae normal.      Pupils: Pupils are equal, round, and reactive to light.   Neck:      Musculoskeletal: Normal range of motion and neck supple.   Cardiovascular:      Rate and Rhythm: Normal rate and regular rhythm.      Heart sounds: Normal heart sounds. No murmur. No friction rub. No gallop.    Pulmonary:      Effort: Pulmonary effort is normal. No respiratory distress.      Breath sounds: Normal breath sounds. No wheezing or rales.   Chest:      Chest wall: No tenderness.   Abdominal:      General: Bowel sounds are normal. There is no distension.      Palpations: Abdomen is soft. There is no mass.      Tenderness: There is no abdominal tenderness.   Musculoskeletal:         General: No tenderness.      Right knee: She exhibits decreased range of motion and swelling. She exhibits no ecchymosis and no deformity.      Comments: Limited ROM to right knee.   Skin:     General: Skin is warm and dry.      Findings: No erythema or rash.      Comments: Surgical dressing to right knee CDI.   Neurological:      Mental Status: She is alert and oriented to person, place, and time.    Psychiatric:         Behavior: Behavior normal.         Thought Content: Thought content normal.         Judgment: Judgment normal.         Significant Labs:   Blood Culture: No results for input(s): LABBLOO in the last 48 hours.  BMP:   Recent Labs   Lab 11/06/20  0617         K 3.6      CO2 28   BUN 12   CREATININE 0.9   CALCIUM 9.2     CBC:   Recent Labs   Lab 11/05/20  0939 11/06/20 0617   WBC 6.66 6.53   HGB 9.9* 9.8*   HCT 31.5* 30.5*   PLT 64* 62*     CMP:   Recent Labs   Lab 11/05/20  0939 11/06/20  0617    140   K 3.8 3.6    101   CO2 27 28   * 108   BUN 11 12   CREATININE 0.9 0.9   CALCIUM 9.4 9.2   ANIONGAP 9 11   EGFRNONAA >60 >60     All pertinent labs within the past 24 hours have been reviewed.    Significant Imaging:   Imaging Results          X-Ray Chest AP Portable (Final result)  Result time 11/04/20 13:59:09    Final result by Raffy Keating MD (11/04/20 13:59:09)                 Impression:      No acute process seen.      Electronically signed by: Raffy Keating MD  Date:    11/04/2020  Time:    13:59             Narrative:    EXAMINATION:  XR CHEST AP PORTABLE    CLINICAL HISTORY:  Encounter for preprocedural cardiovascular examination    FINDINGS:  Single view of the chest.  Comparison 04/30/2017    Cardiac silhouette is normal.  Aorta demonstrates atherosclerotic disease.  The lungs demonstrate no evidence of active disease.  No evidence of pleural effusion or pneumothorax.  Bones demonstrate degenerative changes.  Low lung volumes limits evaluation.

## 2020-11-06 LAB
ANION GAP SERPL CALC-SCNC: 11 MMOL/L (ref 8–16)
BASOPHILS # BLD AUTO: 0.01 K/UL (ref 0–0.2)
BASOPHILS NFR BLD: 0.2 % (ref 0–1.9)
BUN SERPL-MCNC: 12 MG/DL (ref 8–23)
CALCIUM SERPL-MCNC: 9.2 MG/DL (ref 8.7–10.5)
CHLORIDE SERPL-SCNC: 101 MMOL/L (ref 95–110)
CO2 SERPL-SCNC: 28 MMOL/L (ref 23–29)
CREAT SERPL-MCNC: 0.9 MG/DL (ref 0.5–1.4)
DIFFERENTIAL METHOD: ABNORMAL
EOSINOPHIL # BLD AUTO: 0.1 K/UL (ref 0–0.5)
EOSINOPHIL NFR BLD: 0.8 % (ref 0–8)
ERYTHROCYTE [DISTWIDTH] IN BLOOD BY AUTOMATED COUNT: 13.6 % (ref 11.5–14.5)
EST. GFR  (AFRICAN AMERICAN): >60 ML/MIN/1.73 M^2
EST. GFR  (NON AFRICAN AMERICAN): >60 ML/MIN/1.73 M^2
GLUCOSE SERPL-MCNC: 108 MG/DL (ref 70–110)
HCT VFR BLD AUTO: 30.5 % (ref 37–48.5)
HGB BLD-MCNC: 9.8 G/DL (ref 12–16)
IMM GRANULOCYTES # BLD AUTO: 0.03 K/UL (ref 0–0.04)
IMM GRANULOCYTES NFR BLD AUTO: 0.5 % (ref 0–0.5)
LYMPHOCYTES # BLD AUTO: 2.2 K/UL (ref 1–4.8)
LYMPHOCYTES NFR BLD: 33.5 % (ref 18–48)
MCH RBC QN AUTO: 35.8 PG (ref 27–31)
MCHC RBC AUTO-ENTMCNC: 32.1 G/DL (ref 32–36)
MCV RBC AUTO: 111 FL (ref 82–98)
MONOCYTES # BLD AUTO: 0.6 K/UL (ref 0.3–1)
MONOCYTES NFR BLD: 9.8 % (ref 4–15)
NEUTROPHILS # BLD AUTO: 3.6 K/UL (ref 1.8–7.7)
NEUTROPHILS NFR BLD: 55.2 % (ref 38–73)
NRBC BLD-RTO: 0 /100 WBC
PLATELET # BLD AUTO: 62 K/UL (ref 150–350)
PMV BLD AUTO: 11.3 FL (ref 9.2–12.9)
POTASSIUM SERPL-SCNC: 3.6 MMOL/L (ref 3.5–5.1)
RBC # BLD AUTO: 2.74 M/UL (ref 4–5.4)
SODIUM SERPL-SCNC: 140 MMOL/L (ref 136–145)
VANCOMYCIN TROUGH SERPL-MCNC: 8.1 UG/ML (ref 10–22)
WBC # BLD AUTO: 6.53 K/UL (ref 3.9–12.7)

## 2020-11-06 PROCEDURE — 99223 1ST HOSP IP/OBS HIGH 75: CPT | Mod: ,,, | Performed by: INTERNAL MEDICINE

## 2020-11-06 PROCEDURE — 11000001 HC ACUTE MED/SURG PRIVATE ROOM: Mod: HCNC

## 2020-11-06 PROCEDURE — 99024 PR POST-OP FOLLOW-UP VISIT: ICD-10-PCS | Mod: ,,, | Performed by: ORTHOPAEDIC SURGERY

## 2020-11-06 PROCEDURE — 99223 PR INITIAL HOSPITAL CARE,LEVL III: ICD-10-PCS | Mod: ,,, | Performed by: INTERNAL MEDICINE

## 2020-11-06 PROCEDURE — 99024 POSTOP FOLLOW-UP VISIT: CPT | Mod: ,,, | Performed by: ORTHOPAEDIC SURGERY

## 2020-11-06 PROCEDURE — 97161 PT EVAL LOW COMPLEX 20 MIN: CPT | Mod: HCNC

## 2020-11-06 PROCEDURE — 36415 COLL VENOUS BLD VENIPUNCTURE: CPT | Mod: HCNC

## 2020-11-06 PROCEDURE — 85025 COMPLETE CBC W/AUTO DIFF WBC: CPT | Mod: HCNC

## 2020-11-06 PROCEDURE — 94760 N-INVAS EAR/PLS OXIMETRY 1: CPT | Mod: HCNC

## 2020-11-06 PROCEDURE — 80048 BASIC METABOLIC PNL TOTAL CA: CPT | Mod: HCNC

## 2020-11-06 PROCEDURE — 80202 ASSAY OF VANCOMYCIN: CPT | Mod: HCNC

## 2020-11-06 PROCEDURE — 25000003 PHARM REV CODE 250: Mod: HCNC | Performed by: ORTHOPAEDIC SURGERY

## 2020-11-06 PROCEDURE — 97116 GAIT TRAINING THERAPY: CPT | Mod: HCNC

## 2020-11-06 RX ORDER — POLYETHYLENE GLYCOL 3350 17 G/17G
17 POWDER, FOR SOLUTION ORAL DAILY
Status: DISCONTINUED | OUTPATIENT
Start: 2020-11-06 | End: 2020-11-09 | Stop reason: HOSPADM

## 2020-11-06 RX ADMIN — TRAMADOL HYDROCHLORIDE 100 MG: 50 TABLET, FILM COATED ORAL at 12:11

## 2020-11-06 RX ADMIN — TRAMADOL HYDROCHLORIDE 100 MG: 50 TABLET, FILM COATED ORAL at 03:11

## 2020-11-06 RX ADMIN — TRAMADOL HYDROCHLORIDE 100 MG: 50 TABLET, FILM COATED ORAL at 06:11

## 2020-11-06 RX ADMIN — CHLORHEXIDINE GLUCONATE 0.12% ORAL RINSE 10 ML: 1.2 LIQUID ORAL at 08:11

## 2020-11-06 RX ADMIN — TRAMADOL HYDROCHLORIDE 100 MG: 50 TABLET, FILM COATED ORAL at 09:11

## 2020-11-06 RX ADMIN — CHLORHEXIDINE GLUCONATE 0.12% ORAL RINSE 10 ML: 1.2 LIQUID ORAL at 09:11

## 2020-11-06 NOTE — PROGRESS NOTES
"Conchita Rouse is a 65 y.o. female patient.   1. Pyogenic arthritis of right knee joint, due to unspecified organism    2. Preop cardiovascular exam      Past Medical History:   Diagnosis Date    Dyslipidemia (high LDL; low HDL)     10y CV event risk 14.7% (asuming no DM2), which could be reduced to 4.5% with RF optimization, for which I recommended atorvastatin 20 mg 1 po qd x2 weeks and then 40 mg 1 po qd    History of shingles 02/14/2018    Per note from Dr. Rogelio Norwood III on 2/14/18; left upper buttocks.   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 Rogelio Norwood III last visit 2/14/18 (tolerating CSA & promacta); also notes aplastic anemia (AA psot Horse ATG - week 7/17/17) and ITP, for which ATG & promacta following decadron pulse for ITP & AA; plan for second opinion on BM bx; next flow in 3 mo; intent of rx: palliative    Prediabetes     TIA (transient ischemic attack) 2007    She was at "[employer]", where she noted she was feeling slow, took a nap, woke up with a numb/tingling left jaw to arm, which persistned 20 minutes, took 3 baby aspirins, went to ER @ [facility], where facial droop was noted, but completely resolved & head CT revealed old minor abnormality.    Vitamin D deficiency      No past surgical history pertinent negatives on file.  Scheduled Meds:   chlorhexidine  10 mL Mouth/Throat BID    nozaseptin   Each Nostril BID    polyethylene glycol  17 g Oral Daily    vancomycin (VANCOCIN) IVPB  2,250 mg Intravenous Q24H     Continuous Infusions:   sodium chloride 0.9% Stopped (11/04/20 2009)    lactated ringers 50 mL/hr at 11/04/20 2006     PRN Meds:morphine, ondansetron, sodium chloride 0.9%, traMADoL    Review of patient's allergies indicates:   Allergen Reactions    Bactrim [sulfamethoxazole-trimethoprim] Edema     Swollen lips    Codeine Other (See Comments)     Pt states codeine does not cause hives. Had tooth extraction " "and medication given caused her to be jittery, feel hot and have to lay down like she was weak. morhpine given on 5/16/2017 iv for pain. Pt martine well without any sign or sx of allergy or reaction.     Active Hospital Problems    Diagnosis  POA    *Pyogenic arthritis of right knee joint [M00.9]  Yes    Dyslipidemia (high LDL; low HDL) [E78.5]  Yes     10y CV event risk 14.7% (asuming no DM2), which could be reduced to 4.5% with RF optimization, for which I recommended atorvastatin 20 mg 1 po qd x2 weeks and then 40 mg 1 po qd      Thrombocytopenia [D69.6]  Yes     Thought to be 2/2 anaplastic anemia and pancytopenia; tx'd w/ Dr. De Leon in [place] hematology with cyclosporine & eltrombopag.       Aplastic anemia [D61.9]  Yes    H/O TIA (transient ischemic attack) and stroke [Z86.73]  Not Applicable    Essential hypertension [I10]  Yes     Last Assessment & Plan:   Patient did not require Norvasc during hospital stay as patient is borderline hypotensive.  Likely due to anemia.  Recommend the patient to hold Norvasc going forward until seen by her primary care physician in follow-up.        Resolved Hospital Problems    Diagnosis Date Resolved POA    Hyperkalemia [E87.5] 11/05/2020 Yes     Blood pressure 137/65, pulse 77, temperature 97.5 °F (36.4 °C), temperature source Oral, resp. rate 16, height 5' 6" (1.676 m), weight 84.8 kg (187 lb), SpO2 96 %, not currently breastfeeding.    Subjective the patient is 2nd postop day from right knee arthroscopy for septic arthritis.  Pain is currently controlled with tramadol.  She says it feels about as sore as it was prior to surgery.  She has been up walking to the bathroom without the walker and I reinstructed her in that.    Objective examination of the right knee reveals the incisions to be without evidence of drainage.  The knee has mild-to-moderate thickening.  Minimal tenderness.  She does a poor straight leg raise.     Assessment & Plan the patient is 2 days postop " right knee arthroscopy for irrigation for septic arthritis.  She is on vancomycin.  She is stable.  Plan is to have Infectious Disease consult regarding continued antibiotic therapy.  going to get PICC line, go home on IV vancomycin.  She will need a prescription for tramadol for pain.  She is to follow up in the orthopedic clinic on November 10th with Leigh Quinones NP.  She can weight bear as tolerated.  Instructed in quad strengthening and hamstring stretching.  Plan to leave her sutures in for 10-14 days.       Sal Pompa MD  11/6/2020

## 2020-11-06 NOTE — PLAN OF CARE
Pt , AOX4, VSS. No signs of respiratory distress on RA. Pt  has ace bandage to R Knee. Tramadol and Morphine added to order. Pt is WBAT to the R leg. Pt remained injury fee through shift. Will continue to monitor and POC.

## 2020-11-06 NOTE — PT/OT/SLP EVAL
Physical Therapy Evaluation    Patient Name:  Conchita Rouse   MRN:  [id number]    Recommendations:     Discharge Recommendations:  outpatient PT   Discharge Equipment Recommendations: walker, rolling   Barriers to discharge: None    Assessment:     Conchita Rouse is a 65 y.o. female admitted with a medical diagnosis of Pyogenic arthritis of right knee joint.  She presents with the following impairments/functional limitations:  weakness, impaired endurance, impaired self care skills, decreased ROM, impaired balance, gait instability, impaired functional mobilty, decreased lower extremity function, pain, orthopedic precautions .    Rehab Prognosis: Good; patient would benefit from acute skilled PT services to address these deficits and reach maximum level of function.    Recent Surgery: Procedure(s) (LRB):  ARTHROSCOPY, KNEE (Right)  INCISION AND DRAINAGE, KNEE (Right) 2 Days Post-Op    Plan:     During this hospitalization, patient to be seen   to address the identified rehab impairments via gait training, therapeutic activities, therapeutic exercises and progress toward the following goals:    · Plan of Care Expires:  11/13/20    Subjective     Chief Complaint: PAIN R LE   Patient/Family Comments/goals: IMPROVE GT   Pain/Comfort:  · Pain Rating 1: 6/10  · Location - Side 1: Right  · Location 1: knee  · Pain Rating Post-Intervention 1: 6/10    Patients cultural, spiritual, Adventism conflicts given the current situation:      Living Environment:  PT LIVES AT HOME WITH  AND HAS NO STEPS TO ENTER ONE STORY  HOME   Prior to admission, patients level of function was IND AND DRIVES.  Equipment used at home: none.  DME owned (not currently used): rolling walker.  Upon discharge, patient will have assistance from  .    Objective:     Communicated with NURSE PRITCHARD AND Epic CHART REVIEW  prior to session.  Patient found supine with peripheral IV  upon PT entry to room.    General Precautions: Standard, fall    Orthopedic Precautions:LLE weight bearing as tolerated   Braces: N/A     Exams:  · RLE ROM: LIMITED  · RLE Strength: NA D/T INC PAIN   · LLE ROM: WFL  · LLE Strength: WFL    Functional Mobility:  PT MET IN RM SUP IN BED. PT HISTORY TAKEN AND PT SEATED EOB WITH SBA. PT SCOOTED TO EOB AND ROM ASSESSED. PT STOOD WITH RW CGA AND CUES FOR SAFETY WITH RW USE. PT GT TRAINED X 180' WITH STEP TO GT PROGRESSING TO STEP THRU GT WITH VERBAL AND VISUAL CUES GIVEN. PT RETURNED TO RM T/F TO CHAIR WITH SBA. PT LEFT SEATED AND EDUCATED ON ROLE OF P.T. PT LEFT WITH ALL NEEDS MET AND CALL BELL IN REACH.     AM-PAC 6 CLICK MOBILITY  Total Score:20     Patient left up in chair with call button in reach and AND ALL NEEDS MET. .    GOALS:   Multidisciplinary Problems     Physical Therapy Goals        Problem: Physical Therapy Goal    Goal Priority Disciplines Outcome Goal Variances Interventions   Physical Therapy Goal     PT, PT/OT      Description: PT WILL BE SEEN FOR P.T. FOR A MIN OF 5 OUT OF 7 DAYS A WEEK  LT20  1. PT WILL COMPLETED BED MOBILITY IND  2. PT WILL T/F TO CHAIR WITH RW AND MOD I  3. PT WILL GT TRAIN X 500' WITH RW MOD I  4. PT WILL COMPLETE B LE TE X 20 REPS FOR STRENGTHENING                   History:     Past Medical History:   Diagnosis Date    Dyslipidemia (high LDL; low HDL)     10y CV event risk 14.7% (asuming no DM2), which could be reduced to 4.5% with RF optimization, for which I recommended atorvastatin 20 mg 1 po qd x2 weeks and then 40 mg 1 po qd    History of shingles 2018    Per note from Dr. Rogelio Norwood III on 18; left upper buttocks.    Hypertension     PNH (paroxysmal nocturnal hemoglobinuria) 2017    PNH small; Followed by Dr. Rogelio Norwood III last visit 18 (tolerating CSA & promacta); also notes aplastic anemia (AA psot Horse ATG - week 17) and ITP, for which ATG & promacta following decadron pulse for ITP & AA; plan for second opinion on BM bx; next  "flow in 3 mo; intent of rx: palliative    Prediabetes     TIA (transient ischemic attack) 2007    She was at "[facility]", where she noted she was feeling slow, took a nap, woke up with a numb/tingling left jaw to arm, which persistned 20 minutes, took 3 baby aspirins, went to ER @ [facility], where facial droop was noted, but completely resolved & head CT revealed old minor abnormality.    Vitamin D deficiency        Past Surgical History:   Procedure Laterality Date    ARTHROSCOPY OF KNEE Right 11/4/2020    Procedure: ARTHROSCOPY, KNEE;  Surgeon: Sal Pompa MD;  Location: [facility] OR;  Service: Orthopedics;  Laterality: Right;    COLONOSCOPY      INCISION AND DRAINAGE OF KNEE Right 11/4/2020    Procedure: INCISION AND DRAINAGE, KNEE;  Surgeon: Sal Pompa MD;  Location: [facility] OR;  Service: Orthopedics;  Laterality: Right;    left eye surgery  1955    Due to left eye lid drooping       Time Tracking:     PT Received On: 11/06/20  PT Start Time: 0940     PT Stop Time: 1005  PT Total Time (min): 25 min     Billable Minutes: Evaluation 15 and Gait Training 10      Felicia Ram, PT  11/06/2020  "

## 2020-11-06 NOTE — PLAN OF CARE
Problem: Adult Inpatient Plan of Care  Goal: Plan of Care Review  Outcome: Ongoing, Progressing     POC reviewed, including indications and possible side effects of administered medications. Patient verbalized understanding and teach back. No adverse reactions noted. Patient c/o knee pain. Administered medications per order. Dressing remains CDI. WBAT. VSS. NADN. Patient remains free of falls and injuries during shift. Will continue to monitor.     Chart check complete.

## 2020-11-06 NOTE — PROGRESS NOTES
[facility] Hospital Medicine  Progress Note    Patient Name: Conchita Rouse  MRN: [id number]  Patient Class: IP- Inpatient   Admission Date: 11/4/2020  Length of Stay: 2 days  Attending Physician: Heath Huston MD  Primary Care Provider: Nancy Valles MD        Subjective:     Principal Problem:Pyogenic arthritis of right knee joint        HPI:  Conchita Rouse is a 65 y.o. female patient  with PMHx of dyslipidemia, HTN, asplastic anemia, prediabetes, TIA  for evaluation of R knee pain and swelling which onset gradually 2 weeks ago. Patient was seen by Estrella Quinones PA-C yesterday in ortho clinic.  Patient was told to come to ED for pre-operative medical clearance. Last PO intake was coffee at 8:30 AM today. There is concerns for pyogenic joint of the right knee.  Empiric Vancomycin given in the ED. Uric acid is normal. COVID is negative. Pt denies any fever, chills, night sweats. Initially, she had pain and swelling to the left knee which is now improved. Pt now has pain and swelling to the right knee and unable to obtain arthrocentesis.   Vital signs Temp 98.3, pulse 82, resp 18, B/P 123/75  CXR -  No acute cardiopulmonary findings  Labs find WBC 6.05, H/H 10.7/33.5, platelets 57, Na 134, K+ 5.4. Pt is admitted for further evaluation and management of septic arthritis of the knee.       Overview/Hospital Course:  66 y/o female admitted right knee septic arthritis. Orthopedic surgery consulted. Pt underwent right knee arthroscopy for joint irrigation and debridement on 11/4/20 by Dr. Pompa. Cultures pending. Will continue IV vancomycin. Infectious Disease consult for abx recommendation.     As of 11/6 Cultures from surgery are still pending.  ID consult pending for ABX needs.  PT/OT have evaluated and recommended outpatient therapy upon DC.      Interval History:  No acute events overnight.  Patient currently resting comfortably in bed in no acute distress with no new complaints.  Cultures  from surgery are still pending.  ID consult pending for ABX needs.  PT/OT have evaluated and recommended outpatient therapy upon DC.      Review of Systems   Constitutional: Positive for activity change. Negative for chills, diaphoresis, fatigue and fever.   HENT: Negative.  Negative for facial swelling, hearing loss, mouth sores, sneezing, sore throat, tinnitus and trouble swallowing.    Eyes: Negative for photophobia, pain, discharge, redness and visual disturbance.   Respiratory: Negative.  Negative for apnea, cough, choking, chest tightness, shortness of breath, wheezing and stridor.    Cardiovascular: Positive for leg swelling. Negative for chest pain and palpitations.   Gastrointestinal: Negative.  Negative for abdominal distention, abdominal pain, anal bleeding, blood in stool, constipation, diarrhea, nausea, rectal pain and vomiting.   Endocrine: Negative for cold intolerance, heat intolerance, polydipsia, polyphagia and polyuria.   Genitourinary: Negative.  Negative for difficulty urinating, dysuria, flank pain, frequency, hematuria, pelvic pain, urgency, vaginal bleeding, vaginal discharge and vaginal pain.   Musculoskeletal: Positive for arthralgias, gait problem and joint swelling. Negative for back pain, myalgias and neck stiffness.        C/o to be expected right knee pain.   Skin: Negative.  Negative for pallor, rash and wound.   Allergic/Immunologic: Negative for food allergies.   Neurological: Negative for dizziness, tremors, seizures, syncope, speech difficulty, weakness and headaches.   Hematological: Negative.  Negative for adenopathy. Does not bruise/bleed easily.   Psychiatric/Behavioral: Negative.  Negative for behavioral problems and confusion. The patient is not nervous/anxious.    All other systems reviewed and are negative.    Objective:     Vital Signs (Most Recent):  Temp: 97.5 °F (36.4 °C) (11/06/20 0741)  Pulse: 77 (11/06/20 0741)  Resp: 16 (11/06/20 1550)  BP: 137/65 (11/06/20  0741)  SpO2: 96 % (11/06/20 0741) Vital Signs (24h Range):  Temp:  [97.5 °F (36.4 °C)-98.7 °F (37.1 °C)] 97.5 °F (36.4 °C)  Pulse:  [77-87] 77  Resp:  [16-18] 16  SpO2:  [94 %-96 %] 96 %  BP: (106-137)/(55-69) 137/65     Weight: 84.8 kg (187 lb)  Body mass index is 30.18 kg/m².    Intake/Output Summary (Last 24 hours) at 11/6/2020 1649  Last data filed at 11/6/2020 0900  Gross per 24 hour   Intake 1400 ml   Output --   Net 1400 ml      Physical Exam  Vitals signs and nursing note reviewed.   Constitutional:       Appearance: She is well-developed.   HENT:      Head: Normocephalic and atraumatic.      Nose: Nose normal.   Eyes:      General: No scleral icterus.     Conjunctiva/sclera: Conjunctivae normal.      Pupils: Pupils are equal, round, and reactive to light.   Neck:      Musculoskeletal: Normal range of motion and neck supple.   Cardiovascular:      Rate and Rhythm: Normal rate and regular rhythm.      Heart sounds: Normal heart sounds. No murmur. No friction rub. No gallop.    Pulmonary:      Effort: Pulmonary effort is normal. No respiratory distress.      Breath sounds: Normal breath sounds. No wheezing or rales.   Chest:      Chest wall: No tenderness.   Abdominal:      General: Bowel sounds are normal. There is no distension.      Palpations: Abdomen is soft. There is no mass.      Tenderness: There is no abdominal tenderness.   Musculoskeletal:         General: No tenderness.      Right knee: She exhibits decreased range of motion and swelling. She exhibits no ecchymosis and no deformity.      Comments: Limited ROM to right knee.   Skin:     General: Skin is warm and dry.      Findings: No erythema or rash.      Comments: Surgical dressing to right knee CDI.   Neurological:      Mental Status: She is alert and oriented to person, place, and time.   Psychiatric:         Behavior: Behavior normal.         Thought Content: Thought content normal.         Judgment: Judgment normal.         Significant Labs:    Blood Culture: No results for input(s): LABBLOO in the last 48 hours.  BMP:   Recent Labs   Lab 11/06/20  0617         K 3.6      CO2 28   BUN 12   CREATININE 0.9   CALCIUM 9.2     CBC:   Recent Labs   Lab 11/05/20  0939 11/06/20 0617   WBC 6.66 6.53   HGB 9.9* 9.8*   HCT 31.5* 30.5*   PLT 64* 62*     CMP:   Recent Labs   Lab 11/05/20  0939 11/06/20 0617    140   K 3.8 3.6    101   CO2 27 28   * 108   BUN 11 12   CREATININE 0.9 0.9   CALCIUM 9.4 9.2   ANIONGAP 9 11   EGFRNONAA >60 >60     All pertinent labs within the past 24 hours have been reviewed.    Significant Imaging:   Imaging Results          X-Ray Chest AP Portable (Final result)  Result time 11/04/20 13:59:09    Final result by Raffy Keating MD (11/04/20 13:59:09)                 Impression:      No acute process seen.      Electronically signed by: Raffy Keating MD  Date:    11/04/2020  Time:    13:59             Narrative:    EXAMINATION:  XR CHEST AP PORTABLE    CLINICAL HISTORY:  Encounter for preprocedural cardiovascular examination    FINDINGS:  Single view of the chest.  Comparison 04/30/2017    Cardiac silhouette is normal.  Aorta demonstrates atherosclerotic disease.  The lungs demonstrate no evidence of active disease.  No evidence of pleural effusion or pneumothorax.  Bones demonstrate degenerative changes.  Low lung volumes limits evaluation.                                Assessment/Plan:      * Pyogenic arthritis of right knee joint  - Ortho consulted, s/p  right knee arthroscopy for joint irrigation and debridement on 11/4/20 by Dr. Pompa with cultures pending  - ID consult pending for ABX recommendations  - Currently on Vancomycin; PharmD following to dose  - Continue PRN analgesia  - PT/OT consulted and recommended outpatient therapy upon DC  - Monitor         Aplastic anemia  - Thought to be 2/2 anaplastic anemia and pancytopenia; tx'd w/ Dr. Norwood in [place] [facility]  - Currently on  Promacto 150 mg daily at home, will resume at DC  - H/H stable, continue to monitor with daily CBC      Thrombocytopenia  - Platelet count of 64k  - No active s/s of bleeding  - Avoid anticoagulation  - Daily CBC  - Outpatient f/u with Dr. Norwood        Essential hypertension  - Blood pressure well controlled  - Continue Amlodipine  - monitor and adjust medications as needed      H/O TIA (transient ischemic attack) and stroke  - Continue home Statin      Dyslipidemia (high LDL; low HDL)  Continue STATIN      VTE Risk Mitigation (From admission, onward)         Ordered     Place sequential compression device  Until discontinued      11/04/20 1706     IP VTE HIGH RISK PATIENT  Once      11/04/20 1706                Discharge Planning   ELIESER:      Code Status: Not on file   Is the patient medically ready for discharge?:     Reason for patient still in hospital (select all that apply): Patient trending condition, Laboratory test, Treatment and Consult recommendations  Discharge Plan A: Home with family                  Tavia Murcia, MOI, ACNP-BC  Department of Hospital Medicine   [facility]

## 2020-11-06 NOTE — PLAN OF CARE
Pt remains free from falls/injuries this shift. Safety precautions maintained. Pain managed with PRN medications. No s/s of acute distress noted. Will continue to monitor. Chart check completed.

## 2020-11-07 LAB
ANION GAP SERPL CALC-SCNC: 7 MMOL/L (ref 8–16)
BACTERIA FLD AEROBE CULT: NO GROWTH
BASOPHILS # BLD AUTO: 0.01 K/UL (ref 0–0.2)
BASOPHILS NFR BLD: 0.2 % (ref 0–1.9)
BUN SERPL-MCNC: 12 MG/DL (ref 8–23)
CALCIUM SERPL-MCNC: 9.2 MG/DL (ref 8.7–10.5)
CHLORIDE SERPL-SCNC: 101 MMOL/L (ref 95–110)
CO2 SERPL-SCNC: 29 MMOL/L (ref 23–29)
CREAT SERPL-MCNC: 0.9 MG/DL (ref 0.5–1.4)
DIFFERENTIAL METHOD: ABNORMAL
EOSINOPHIL # BLD AUTO: 0.1 K/UL (ref 0–0.5)
EOSINOPHIL NFR BLD: 1.5 % (ref 0–8)
ERYTHROCYTE [DISTWIDTH] IN BLOOD BY AUTOMATED COUNT: 13.5 % (ref 11.5–14.5)
EST. GFR  (AFRICAN AMERICAN): >60 ML/MIN/1.73 M^2
EST. GFR  (NON AFRICAN AMERICAN): >60 ML/MIN/1.73 M^2
GLUCOSE SERPL-MCNC: 97 MG/DL (ref 70–110)
GRAM STN SPEC: NORMAL
GRAM STN SPEC: NORMAL
HCT VFR BLD AUTO: 28.9 % (ref 37–48.5)
HGB BLD-MCNC: 9.3 G/DL (ref 12–16)
IMM GRANULOCYTES # BLD AUTO: 0.03 K/UL (ref 0–0.04)
IMM GRANULOCYTES NFR BLD AUTO: 0.5 % (ref 0–0.5)
LYMPHOCYTES # BLD AUTO: 1.1 K/UL (ref 1–4.8)
LYMPHOCYTES NFR BLD: 20.5 % (ref 18–48)
MCH RBC QN AUTO: 35.2 PG (ref 27–31)
MCHC RBC AUTO-ENTMCNC: 32.2 G/DL (ref 32–36)
MCV RBC AUTO: 110 FL (ref 82–98)
MONOCYTES # BLD AUTO: 0.6 K/UL (ref 0.3–1)
MONOCYTES NFR BLD: 10.7 % (ref 4–15)
NEUTROPHILS # BLD AUTO: 3.7 K/UL (ref 1.8–7.7)
NEUTROPHILS NFR BLD: 66.6 % (ref 38–73)
NRBC BLD-RTO: 0 /100 WBC
PLATELET # BLD AUTO: 60 K/UL (ref 150–350)
PMV BLD AUTO: 11.3 FL (ref 9.2–12.9)
POTASSIUM SERPL-SCNC: 3.7 MMOL/L (ref 3.5–5.1)
RBC # BLD AUTO: 2.64 M/UL (ref 4–5.4)
SODIUM SERPL-SCNC: 137 MMOL/L (ref 136–145)
WBC # BLD AUTO: 5.51 K/UL (ref 3.9–12.7)

## 2020-11-07 PROCEDURE — 25000003 PHARM REV CODE 250: Mod: HCNC | Performed by: FAMILY MEDICINE

## 2020-11-07 PROCEDURE — 99024 POSTOP FOLLOW-UP VISIT: CPT | Mod: ,,, | Performed by: ORTHOPAEDIC SURGERY

## 2020-11-07 PROCEDURE — 87491 CHLMYD TRACH DNA AMP PROBE: CPT | Mod: HCNC

## 2020-11-07 PROCEDURE — 80048 BASIC METABOLIC PNL TOTAL CA: CPT | Mod: HCNC

## 2020-11-07 PROCEDURE — 85025 COMPLETE CBC W/AUTO DIFF WBC: CPT | Mod: HCNC

## 2020-11-07 PROCEDURE — 99900038 HC OT GENERIC THERAPY SCREENING (STAT): Mod: HCNC

## 2020-11-07 PROCEDURE — 25000003 PHARM REV CODE 250: Mod: HCNC | Performed by: NURSE PRACTITIONER

## 2020-11-07 PROCEDURE — 63600175 PHARM REV CODE 636 W HCPCS: Mod: HCNC | Performed by: FAMILY MEDICINE

## 2020-11-07 PROCEDURE — 99024 PR POST-OP FOLLOW-UP VISIT: ICD-10-PCS | Mod: ,,, | Performed by: ORTHOPAEDIC SURGERY

## 2020-11-07 PROCEDURE — 97165 OT EVAL LOW COMPLEX 30 MIN: CPT | Mod: HCNC

## 2020-11-07 PROCEDURE — 97116 GAIT TRAINING THERAPY: CPT | Mod: HCNC

## 2020-11-07 PROCEDURE — 63600175 PHARM REV CODE 636 W HCPCS: Mod: HCNC | Performed by: INTERNAL MEDICINE

## 2020-11-07 PROCEDURE — 36415 COLL VENOUS BLD VENIPUNCTURE: CPT | Mod: HCNC

## 2020-11-07 PROCEDURE — C1751 CATH, INF, PER/CENT/MIDLINE: HCPCS | Mod: HCNC

## 2020-11-07 PROCEDURE — 11000001 HC ACUTE MED/SURG PRIVATE ROOM: Mod: HCNC

## 2020-11-07 PROCEDURE — 25000003 PHARM REV CODE 250: Mod: HCNC | Performed by: ORTHOPAEDIC SURGERY

## 2020-11-07 PROCEDURE — 36573 INSJ PICC RS&I 5 YR+: CPT | Mod: HCNC

## 2020-11-07 RX ADMIN — CHLORHEXIDINE GLUCONATE 0.12% ORAL RINSE 10 ML: 1.2 LIQUID ORAL at 08:11

## 2020-11-07 RX ADMIN — VANCOMYCIN HYDROCHLORIDE 2500 MG: 10 INJECTION, POWDER, LYOPHILIZED, FOR SOLUTION INTRAVENOUS at 02:11

## 2020-11-07 RX ADMIN — CEFTRIAXONE 2 G: 2 INJECTION, SOLUTION INTRAVENOUS at 08:11

## 2020-11-07 RX ADMIN — TRAMADOL HYDROCHLORIDE 100 MG: 50 TABLET, FILM COATED ORAL at 12:11

## 2020-11-07 RX ADMIN — POLYETHYLENE GLYCOL 3350 17 G: 17 POWDER, FOR SOLUTION ORAL at 08:11

## 2020-11-07 NOTE — SUBJECTIVE & OBJECTIVE
Interval History:  Events overnight.  Patient currently resting comfortably in bed in no acute distress with no new complaints.  Cultures from surgery show NGTD.  Patient has been evaluated by Dr. Hernandez who recommended Rocephin 2 g IVPB and Daptomycin 7mg/kg IV daily x 2 weeks with plans to then transition to oral ABX.  PICC placed today.  CM following to arrange ABX at home.  Anticipate DC once ABX can be arranged.     Review of Systems   Constitutional: Positive for activity change. Negative for chills, diaphoresis, fatigue and fever.   HENT: Negative.  Negative for facial swelling, hearing loss, mouth sores, sneezing, sore throat, tinnitus and trouble swallowing.    Eyes: Negative for photophobia, pain, discharge, redness and visual disturbance.   Respiratory: Negative.  Negative for apnea, cough, choking, chest tightness, shortness of breath, wheezing and stridor.    Cardiovascular: Positive for leg swelling. Negative for chest pain and palpitations.   Gastrointestinal: Negative.  Negative for abdominal distention, abdominal pain, anal bleeding, blood in stool, constipation, diarrhea, nausea, rectal pain and vomiting.   Endocrine: Negative for cold intolerance, heat intolerance, polydipsia, polyphagia and polyuria.   Genitourinary: Negative.  Negative for difficulty urinating, dysuria, flank pain, frequency, hematuria, pelvic pain, urgency, vaginal bleeding, vaginal discharge and vaginal pain.   Musculoskeletal: Positive for arthralgias, gait problem and joint swelling. Negative for back pain, myalgias and neck stiffness.        C/o to be expected right knee pain.   Skin: Negative.  Negative for pallor, rash and wound.   Allergic/Immunologic: Negative for food allergies.   Neurological: Negative for dizziness, tremors, seizures, syncope, speech difficulty, weakness and headaches.   Hematological: Negative.  Negative for adenopathy. Does not bruise/bleed easily.   Psychiatric/Behavioral: Negative.  Negative for  behavioral problems and confusion. The patient is not nervous/anxious.    All other systems reviewed and are negative.    Objective:     Vital Signs (Most Recent):  Temp: 98.2 °F (36.8 °C) (11/07/20 1134)  Pulse: 84 (11/07/20 1134)  Resp: 16 (11/07/20 1206)  BP: 138/69 (11/07/20 1134)  SpO2: 95 % (11/07/20 1134) Vital Signs (24h Range):  Temp:  [96.1 °F (35.6 °C)-98.9 °F (37.2 °C)] 98.2 °F (36.8 °C)  Pulse:  [80-87] 84  Resp:  [16-18] 16  SpO2:  [94 %-97 %] 95 %  BP: (119-139)/(65-77) 138/69     Weight: 84.8 kg (187 lb)  Body mass index is 30.18 kg/m².    Intake/Output Summary (Last 24 hours) at 11/7/2020 1428  Last data filed at 11/7/2020 1206  Gross per 24 hour   Intake 360 ml   Output --   Net 360 ml      Physical Exam  Vitals signs and nursing note reviewed.   Constitutional:       Appearance: She is well-developed.   HENT:      Head: Normocephalic and atraumatic.      Nose: Nose normal.   Eyes:      General: No scleral icterus.     Conjunctiva/sclera: Conjunctivae normal.      Pupils: Pupils are equal, round, and reactive to light.   Neck:      Musculoskeletal: Normal range of motion and neck supple.   Cardiovascular:      Rate and Rhythm: Normal rate and regular rhythm.      Heart sounds: Normal heart sounds. No murmur. No friction rub. No gallop.    Pulmonary:      Effort: Pulmonary effort is normal. No respiratory distress.      Breath sounds: Normal breath sounds. No wheezing or rales.   Chest:      Chest wall: No tenderness.   Abdominal:      General: Bowel sounds are normal. There is no distension.      Palpations: Abdomen is soft. There is no mass.      Tenderness: There is no abdominal tenderness.   Musculoskeletal:         General: No tenderness.      Right knee: She exhibits decreased range of motion and swelling. She exhibits no ecchymosis and no deformity.      Comments: Limited ROM to right knee.   Skin:     General: Skin is warm and dry.      Findings: No erythema or rash.      Comments: Surgical  dressing to right knee CDI.   Neurological:      Mental Status: She is alert and oriented to person, place, and time.   Psychiatric:         Behavior: Behavior normal.         Thought Content: Thought content normal.         Judgment: Judgment normal.          Significant Labs:   BMP:   Recent Labs   Lab 11/07/20 0622   GLU 97      K 3.7      CO2 29   BUN 12   CREATININE 0.9   CALCIUM 9.2     CBC:   Recent Labs   Lab 11/06/20 0617 11/07/20 0622   WBC 6.53 5.51   HGB 9.8* 9.3*   HCT 30.5* 28.9*   PLT 62* 60*       Significant Imaging: I have reviewed all pertinent imaging results/findings within the past 24 hours.

## 2020-11-07 NOTE — PT/OT/SLP PROGRESS
Occupational Therapy      Patient Name:  Conchita Rouse   MRN:  [id number]    Patient not seen today secondary to  . Will follow-up EVAL INITIATED VIA CHART REVIEW. REPORT TO FOLLOW.    Brooke Lutz OT  11/7/2020   0736

## 2020-11-07 NOTE — PROCEDURES
"Conchita Rouse is a 65 y.o. female patient.    Temp: 98.2 °F (36.8 °C) (11/07/20 1134)  Pulse: 84 (11/07/20 1134)  Resp: 16 (11/07/20 1206)  BP: 138/69 (11/07/20 1134)  SpO2: 95 % (11/07/20 1134)  Weight: 84.8 kg (187 lb) (11/04/20 1140)  Height: 5' 6" (167.6 cm) (11/04/20 1140)    PICC  Date/Time: 11/7/2020 1:10 PM  Performed by: Linus Grewal RN  Consent Done: Yes  Time out: Immediately prior to procedure a time out was called to verify the correct patient, procedure, equipment, support staff and site/side marked as required  Indications: med administration  Anesthesia: local infiltration  Local anesthetic: lidocaine 1% without epinephrine  Anesthetic Total (mL): 2  Preparation: skin prepped with chlorhexidine (without alcohol)  Skin prep agent dried: skin prep agent completely dried prior to procedure  Sterile barriers: all five maximum sterile barriers used - cap, mask, sterile gown, sterile gloves, and large sterile sheet  Hand hygiene: hand hygiene performed prior to central venous catheter insertion  Location details: right brachial  Catheter type: double lumen  Catheter size: 5 Fr  Catheter Length: 32cm    Ultrasound guidance: yes  Vessel Caliber: medium and patent, compressibility normal  Needle advanced into vessel with real time Ultrasound guidance.  Guidewire confirmed in vessel.  Sterile sheath used.  Number of attempts: 1  Post-procedure: blood return through all ports, chlorhexidine patch and sterile dressing applied  Estimated blood loss (mL): 0  Specimens: No  Implants: No  Comments: Awaiting xray for confirmation of placement           Linus Grewal  11/7/2020    "

## 2020-11-07 NOTE — HPI
65 year old woman with  PMHx of dyslipidemia, HTN, asplastic anemia, prediabetes, TIA  who was admitted with right knee pain that started 2 weeks ago. She also had pain and and swelling in the left knee that resolved prior to the right knee pain.    Since admission, she was seen by Ortho and had on 11/04- right arthroscopy and I and D.  Operative note reviewed -There was evidence of significant synovitis with multiple joint areas of loculation.  She is afebrile.

## 2020-11-07 NOTE — ASSESSMENT & PLAN NOTE
- Ortho consulted, s/p  right knee arthroscopy for joint irrigation and debridement on 11/4/20 by Dr. Pompa   - Cultures show NGTD  - ID consulted; evaluated by Dr. Hernandez who recommended Rocephin 2 g IVPB and Daptomycin 7mg/kg IV daily x 2 weeks with plans to then transition to oral ABX.     -PICC placed today  - CM following to arrange ABX at home; Anticipate DC once ABX can be arranged.   - Continue PRN analgesia  - PT/OT consulted and recommended outpatient therapy upon DC  - Monitor

## 2020-11-07 NOTE — PT/OT/SLP PROGRESS
Physical Therapy  Treatment    Conchita Rouse   MRN: [id number]   Admitting Diagnosis: Pyogenic arthritis of right knee joint    PT Received On: 11/07/20  PT Start Time: 1102     PT Stop Time: 1112    PT Total Time (min): 10 min       Billable Minutes:  Gait Training 10    Treatment Type: Treatment  PT/PTA: PTA     PTA Visit Number: 1       General Precautions: Standard, fall  Orthopedic Precautions: LLE weight bearing as tolerated   Braces:           Subjective:  Communicated with epic and nurse prior to session.      Pain/Comfort  Pain Rating 1: 0/10  Pain Rating Post-Intervention 1: 0/10    Objective:        Functional Mobility:  Bed Mobility:        Transfers:       Gait:        Stairs:          Balance:   Static Sit: FAIR: Maintains without assist, but unable to take any challenges   Dynamic Sit: FAIR: Cannot move trunk without losing balance  Static Stand: FAIR: Maintains without assist but unable to take challenges  Dynamic stand: FAIR: Needs CONTACT GUARD during gait       Therapeutic Activities and Exercises:  Pt ambulated in [place] with RW and CGA for 200 feet.     AM-PAC 6 CLICK MOBILITY  How much help from another person does this patient currently need?   1 = Unable, Total/Dependent Assistance  2 = A lot, Maximum/Moderate Assistance  3 = A little, Minimum/Contact Guard/Supervision  4 = None, Modified [place]/Independent    Turning over in bed (including adjusting bedclothes, sheets and blankets)?: 3  Sitting down on and standing up from a chair with arms (e.g., wheelchair, bedside commode, etc.): 3  Moving from lying on back to sitting on the side of the bed?: 3  Moving to and from a bed to a chair (including a wheelchair)?: 3  Need to walk in hospital room?: 3  Climbing 3-5 steps with a railing?: 1(NT)  Basic Mobility Total Score: 16    AM-PAC Raw Score CMS G-Code Modifier Level of Impairment Assistance   6 % Total / Unable   7 - 9 CM 80 - 100% Maximal Assist   10 - 14 CL 60 - 80% Moderate  Assist   15 - 19 CK 40 - 60% Moderate Assist   20 - 22 CJ 20 - 40% Minimal Assist   23 CI 1-20% SBA / CGA   24 CH 0% Independent/ Mod I     Patient left supine with all lines intact, call button in reach and bed alarm on.    Assessment:  Conchita Rouse is a 65 y.o. female with a medical diagnosis of Pyogenic arthritis of right knee joint and presents with decreased endurance.    Rehab identified problem list/impairments: Rehab identified problem list/impairments: weakness, impaired endurance, impaired sensation, impaired self care skills, impaired functional mobilty, gait instability, impaired balance, decreased safety awareness    Rehab potential is excellent.    Activity tolerance: Excellent    Discharge recommendations:       Barriers to discharge:      Equipment recommendations:       GOALS:   Multidisciplinary Problems     Physical Therapy Goals        Problem: Physical Therapy Goal    Goal Priority Disciplines Outcome Goal Variances Interventions   Physical Therapy Goal     PT, PT/OT Ongoing, Progressing     Description: PT WILL BE SEEN FOR P.T. FOR A MIN OF 5 OUT OF 7 DAYS A WEEK  LT20  1. PT WILL COMPLETED BED MOBILITY IND  2. PT WILL T/F TO CHAIR WITH RW AND MOD I  3. PT WILL GT TRAIN X 500' WITH RW MOD I  4. PT WILL COMPLETE B LE TE X 20 REPS FOR STRENGTHENING                   PLAN:    Patient to be seen 5 x/week  to address the above listed problems via gait training, therapeutic activities, therapeutic exercises  Plan of Care expires: 20  Plan of Care reviewed with: patient         Raffy Vishnu, PT  2020

## 2020-11-07 NOTE — PT/OT/SLP EVAL
Occupational Therapy   Evaluation    Name: Conchita Rouse  MRN: [id number]  Admitting Diagnosis:  Pyogenic arthritis of right knee joint 3 Days Post-Op    Recommendations:     Discharge Recommendations: outpatient PT  Discharge Equipment Recommendations:     Barriers to discharge:       Assessment:     Conchita Rouse is a 65 y.o. female with a medical diagnosis of Pyogenic arthritis of right knee joint.  She presents with DEBILITY AND GENERALIZED WEAKNESS.. Performance deficits affecting function: weakness, impaired functional mobilty, impaired endurance, gait instability, impaired balance, impaired self care skills.      Rehab Prognosis: Good; patient would benefit from acute skilled OT services to address these deficits and reach maximum level of function.       Plan:     Patient to be seen 3 x/week to address the above listed problems via self-care/home management, therapeutic activities, therapeutic exercises  · Plan of Care Expires:    · Plan of Care Reviewed with: patient    Subjective     Chief Complaint: DEBILITY AND GENERALIZED WEAKNESS  Patient/Family Comments/goals:     Occupational Profile:  Living Environment: LIVES SPOUSE IN 1 STORY HOUSE WITH NO STEPS  Previous level of function: (I) WITH ADL'S AND FUNCTIONAL MOBILITY  Roles and Routines: OCCUPATIONAL THERAPY  Equipment Used at Home:  none  Assistance upon Discharge:   Pain/Comfort:  · Pain Rating 1: 5/10  · Location - Side 1: Right  · Location 1: knee    Patients cultural, spiritual, Roman Catholic conflicts given the current situation:      Objective:     Communicated with: NURSE AND Epic CHART REVIEW prior to session.  Patient found HOB elevated with peripheral IV upon OT entry to room.    General Precautions: Standard, fall   Orthopedic Precautions:LLE weight bearing as tolerated   Braces: N/A     Occupational Performance:    Bed Mobility:    · Patient completed Rolling/Turning to Right with contact guard assistance  · Patient completed  Scooting/Bridging with stand by assistance  · Patient completed Supine to Sit with contact guard assistance    Functional Mobility/Transfers:  · Patient completed Sit <> Stand Transfer with contact guard assistance  with  rolling walker   · Patient completed Bed <> Chair Transfer using Step Transfer technique with contact guard assistance with rolling walker  Functional Mobility: PT AMBULATED 20 FEET WITH CGA WITH RW   Activities of Daily Living:  · Upper Body Dressing: contact guard assistance .  · Lower Body Dressing: moderate assistance .    Cognitive/Visual Perceptual:  Cognitive/Psychosocial Skills:     -       Oriented to: Person, Place, Time and Situation   -       Follows Commands/attention:Follows multistep  commands  -       Communication: clear/fluent  -       Memory: No Deficits noted  -       Safety awareness/insight to disability: intact   Visual/Perceptual:      -Intact .    Physical Exam:  Upper Extremity Range of Motion:     -       Right Upper Extremity: WFL  -       Left Upper Extremity: WFL  Upper Extremity Strength:    -       Right Upper Extremity: MMT: 4/5 GROSSLY  -       Left Upper Extremity: MMT: 4/5 GROSSLY   Strength:    -       Right Upper Extremity: WFL  -       Left Upper Extremity: WFL    AMPAC 6 Click ADL:  AMPAC Total Score: 20    Treatment & Education:  OT EVALUATION COMPLETED AS DESCRIBED ABOVE. PT EDUCATED ON ROLE OF OT AND OT POC. PT EDUCATED ON UB EXERCISES TO COMPLETE THROUGHOUT THE DAY IN HER ROOM TO IMPROVE UB STRENGTH AS WELL AS ENDURANCE OOB THROUGHOUT THE DAY.     Education:    Patient left up in chair with all lines intact, call button in reach and NURSE notified    GOALS:   Multidisciplinary Problems     Occupational Therapy Goals        Problem: Occupational Therapy Goal    Goal Priority Disciplines Outcome Interventions   Occupational Therapy Goal     OT, PT/OT     Description: OT GOALS TO BE MET BY 11-14-20  PT WILL TOLERATE 1 SET X 15 REPS B UE ROM EXERCISE  SBA  "WITH LE DRESSING  MOD (I) WITH TOILET T/F'S                   History:     Past Medical History:   Diagnosis Date    Dyslipidemia (high LDL; low HDL)     10y CV event risk 14.7% (asuming no DM2), which could be reduced to 4.5% with RF optimization, for which I recommended atorvastatin 20 mg 1 po qd x2 weeks and then 40 mg 1 po qd    History of shingles 02/14/2018    Per note from Dr. Rogelio Norwood III on 2/14/18; left upper buttocks.   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 Rogelio Norwood III last visit 2/14/18 (tolerating CSA & promacta); also notes aplastic anemia (AA psot Horse ATG - week 7/17/17) and ITP, for which ATG & promacta following decadron pulse for ITP & AA; plan for second opinion on BM bx; next flow in 3 mo; intent of rx: palliative    Prediabetes     TIA (transient ischemic attack) 2007    She was at "[employer]", where she noted she was feeling slow, took a nap, woke up with a numb/tingling left jaw to arm, which persistned 20 minutes, took 3 baby aspirins, went to ER @ [facility], where facial droop was noted, but completely resolved & head CT revealed old minor abnormality.    Vitamin D deficiency        Past Surgical History:   Procedure Laterality Date    ARTHROSCOPY OF KNEE Right 11/4/2020    Procedure: ARTHROSCOPY, KNEE;  Surgeon: Sal Pompa MD;  Location: [facility] OR;  Service: Orthopedics;  Laterality: Right;    COLONOSCOPY      INCISION AND DRAINAGE OF KNEE Right 11/4/2020    Procedure: INCISION AND DRAINAGE, KNEE;  Surgeon: Sal Pompa MD;  Location: [facility] OR;  Service: Orthopedics;  Laterality: Right;    left eye surgery  1955    Due to left eye lid drooping       Time Tracking:     OT Date of Treatment: 11/07/20  OT Start Time: 1151  OT Stop Time: 1215  OT Total Time (min): 24 min    Billable Minutes:Evaluation 10 MINUTES  Therapeutic Exercise 14 MINUTES    Brooke Lutz OT  11/7/2020    "

## 2020-11-07 NOTE — PROGRESS NOTES
"Conchita Rouse is a 65 y.o. female patient.   1. Pyogenic arthritis of right knee joint, due to unspecified organism    2. Preop cardiovascular exam      Past Medical History:   Diagnosis Date    Dyslipidemia (high LDL; low HDL)     10y CV event risk 14.7% (asuming no DM2), which could be reduced to 4.5% with RF optimization, for which I recommended atorvastatin 20 mg 1 po qd x2 weeks and then 40 mg 1 po qd    History of shingles 02/14/2018    Per note from Dr. Rogelio Norwood III on 2/14/18; left upper buttocks.   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 Rogelio Norwood III last visit 2/14/18 (tolerating CSA & promacta); also notes aplastic anemia (AA psot Horse ATG - week 7/17/17) and ITP, for which ATG & promacta following decadron pulse for ITP & AA; plan for second opinion on BM bx; next flow in 3 mo; intent of rx: palliative    Prediabetes     TIA (transient ischemic attack) 2007    [facility] where she noted she was feeling slow, took a nap, woke up with a numb/tingling left jaw to arm, which persistned 20 minutes, took 3 baby aspirins, went to ER @ [facility], where facial droop was noted, but completely resolved & head CT revealed old minor abnormality.    Vitamin D deficiency      No past surgical history pertinent negatives on file.  Scheduled Meds:   cefTRIAXone (ROCEPHIN) IVPB  2 g Intravenous Q24H    chlorhexidine  10 mL Mouth/Throat BID    nozaseptin   Each Nostril BID    polyethylene glycol  17 g Oral Daily    vancomycin (VANCOCIN) IVPB  2,500 mg Intravenous Q24H     Continuous Infusions:  PRN Meds:morphine, ondansetron, sodium chloride 0.9%, traMADoL    Review of patient's allergies indicates:   Allergen Reactions    Bactrim [sulfamethoxazole-trimethoprim] Edema     Swollen lips    Codeine Other (See Comments)     Pt states codeine does not cause hives. Had tooth extraction and medication given caused her to be " "jittery, feel hot and have to lay down like she was weak. morhpine given on 5/16/2017 iv for pain. Pt martine well without any sign or sx of allergy or reaction.     Active Hospital Problems    Diagnosis  POA    *Pyogenic arthritis of right knee joint [M00.9]  Yes    Dyslipidemia (high LDL; low HDL) [E78.5]  Yes     10y CV event risk 14.7% (asuming no DM2), which could be reduced to 4.5% with RF optimization, for which I recommended atorvastatin 20 mg 1 po qd x2 weeks and then 40 mg 1 po qd      Thrombocytopenia [D69.6]  Yes     Thought to be 2/2 anaplastic anemia and pancytopenia; tx'd w/ Dr. De Leon in [place] [facility] with cyclosporine & eltrombopag.       Aplastic anemia [D61.9]  Yes    H/O TIA (transient ischemic attack) and stroke [Z86.73]  Not Applicable    Essential hypertension [I10]  Yes     Last Assessment & Plan:   Patient did not require Norvasc during hospital stay as patient is borderline hypotensive.  Likely due to anemia.  Recommend the patient to hold Norvasc going forward until seen by her primary care physician in follow-up.        Resolved Hospital Problems    Diagnosis Date Resolved POA    Hyperkalemia [E87.5] 11/05/2020 Yes     Blood pressure 119/69, pulse 80, temperature 98.9 °F (37.2 °C), temperature source Oral, resp. rate 18, height 5' 6" (1.676 m), weight 84.8 kg (187 lb), SpO2 97 %, not currently breastfeeding.    Subjective the patient remains hospitalized now 3 days postop right knee arthroscopy for septic arthritis. Her pain is decreasing and she is taking less analgesics.  Getting up out of bed more.  Seen by infectious disease and Rocephin added along with the vancomycin.  She has been advised to have 2 weeks of intravenous antibiotics followed by oral antibiotics adjusted by cultures.    Objective remains afebrile with stable vital signs. Right knee was healing arthroscopic incisions.  Minimal effusion. 0° to 80° range of motion. Quad strength is improved but still cannot do a " solid straight leg raise.     Assessment & Plan right knee septic arthritis.  Cultures are negative so far.  Further C&S is still pending as his pathology from synovial biopsy.  Antibiotics were being adjusted by fractures disease. Continue with local care measures for the incisions which are healing well. Walker ambulation, weight-bearing as tolerated.  Still pending is PICC line and outpatient IV therapy.  Orthopedic clinic follow-up next week post discharge.  She can change the Band-Aids over the sutures daily and clean with peroxide.       Sal Pompa MD  11/7/2020

## 2020-11-07 NOTE — PLAN OF CARE
Marylin with [facility] notified of referral.  SW sent referral packet via [employer].  Per Marylin, auth  may not be able to obtained.  Medication approval pending insurance auth  Possible medication approval tomorrow SW to follow up.     Lesli Nelson LMSW 11/7/2020 4:08 PM

## 2020-11-07 NOTE — SUBJECTIVE & OBJECTIVE
"Past Medical History:   Diagnosis Date    Dyslipidemia (high LDL; low HDL)     10y CV event risk 14.7% (asuming no DM2), which could be reduced to 4.5% with RF optimization, for which I recommended atorvastatin 20 mg 1 po qd x2 weeks and then 40 mg 1 po qd    History of shingles 02/14/2018    Per note from Dr. Rogelio Norwood III on 2/14/18; left upper buttocks.   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 Rogelio Norwood III last visit 2/14/18 (tolerating CSA & promacta); also notes aplastic anemia (AA psot Horse ATG - week 7/17/17) and ITP, for which ATG & promacta following decadron pulse for ITP & AA; plan for second opinion on BM bx; next flow in 3 mo; intent of rx: palliative    Prediabetes     TIA (transient ischemic attack) 2007    She was at [facility] where she noted she was feeling slow, took a nap, woke up with a numb/tingling left jaw to arm, which persistned 20 minutes, took 3 baby aspirins, went to ER @ [facility], where facial droop was noted, but completely resolved & head CT revealed old minor abnormality.    Vitamin D deficiency        Past Surgical History:   Procedure Laterality Date    ARTHROSCOPY OF KNEE Right 11/4/2020    Procedure: ARTHROSCOPY, KNEE;  Surgeon: Sal Pompa MD;  Location: [facility] OR;  Service: Orthopedics;  Laterality: Right;    COLONOSCOPY      INCISION AND DRAINAGE OF KNEE Right 11/4/2020    Procedure: INCISION AND DRAINAGE, KNEE;  Surgeon: Sal Pompa MD;  Location: [facility] OR;  Service: Orthopedics;  Laterality: Right;    left eye surgery  1955    Due to left eye lid drooping       Review of patient's allergies indicates:   Allergen Reactions    Bactrim [sulfamethoxazole-trimethoprim] Edema     Swollen lips    Codeine Other (See Comments)     Pt states codeine does not cause hives. Had tooth extraction and medication given caused her to be jittery, feel hot and have to lay down like she was weak. " morhpine given on 5/16/2017 iv for pain. Pt martine well without any sign or sx of allergy or reaction.       Medications:  Medications Prior to Admission   Medication Sig    amLODIPine (NORVASC) 5 MG tablet Take 1 tablet (5 mg total) by mouth once daily.    eltrombopag (PROMACTA) 75 mg Tab Take 75 mg by mouth once daily.     loratadine (CLARITIN) 10 mg tablet Take 1 tablet (10 mg total) by mouth once daily.    traMADoL (ULTRAM) 50 mg tablet Take 1 tablet (50 mg total) by mouth 2 (two) times daily as needed.    atorvastatin (LIPITOR) 40 MG tablet Take 1 tablet (40 mg total) by mouth once daily.    azelastine (ASTELIN) 137 mcg (0.1 %) nasal spray 1 spray (137 mcg total) by Nasal route 2 (two) times daily.    fluticasone (FLONASE) 50 mcg/actuation nasal spray USE 2 SPRAYS IN EACH NOSTRIL 1 TIME A DAY AS NEEDED    meloxicam (MOBIC) 15 MG tablet Take 1 tablet (15 mg total) by mouth daily as needed for Pain. Take with meal    valacyclovir (VALTREX) 500 MG tablet Take 500 mg by mouth.     Antibiotics (From admission, onward)    Start     Stop Route Frequency Ordered    11/07/20 1430  vancomycin (VANCOCIN) 2,500 mg in dextrose 5 % 500 mL IVPB      -- IV Every 24 hours (non-standard times) 11/06/20 1403        Antifungals (From admission, onward)    None        Antivirals (From admission, onward)    None             There is no immunization history on file for this patient.    Family History     Problem Relation (Age of Onset)    Breast cancer Sister    Cancer Sister, Brother    Diabetes Mother    Heart disease Mother, Father        Social History     Socioeconomic History    Marital status:      Spouse name: Not on file    Number of children: Not on file    Years of education: Not on file    Highest education level: Not on file   Occupational History    Not on file   Social Needs    Financial resource strain: Not on file    Food insecurity     Worry: Not on file     Inability: Not on file     "Transportation needs     Medical: Not on file     Non-medical: Not on file   Tobacco Use    Smoking status: Never Smoker    Smokeless tobacco: Never Used   Substance and Sexual Activity    Alcohol use: Yes     Alcohol/week: 0.0 - 1.0 standard drinks     Comment: rarely    Drug use: No    Sexual activity: Not Currently     Partners: Male     Birth control/protection: Post-menopausal   Lifestyle    Physical activity     Days per week: Not on file     Minutes per session: Not on file    Stress: Not on file   Relationships    Social connections     Talks on phone: Not on file     Gets together: Not on file     Attends Orthodoxy service: Not on file     Active member of club or organization: Not on file     Attends meetings of clubs or organizations: Not on file     Relationship status: Not on file   Other Topics Concern    Not on file   Social History Narrative    Breakfast: 2 TBS grits or french toast sticks or waffles (previously fried eggs); 1/2 cup coffee w/ 1 cream & 3 sugars & lemonade "all day"    Lunch: 50% turkey or ham sandwich w/ occasional chips; tea, rare soda    Dinner: Steak fries and baked hot wings, peppermint or chicken wings and french fries; tea or cool aid    Snacks: mini-[employer]ornton[employer] chocolates (1x/wk) or a few unsalted chips (2x/wk) or popcorn (2x/mo) or strawberries or pretzils     Eats out: 2x/wk    Water: 16 oz/d    PA: None     Review of Systems   Constitutional: Positive for activity change. Negative for chills, diaphoresis, fatigue and fever.   HENT: Negative.  Negative for facial swelling, hearing loss, mouth sores, sneezing, sore throat, tinnitus and trouble swallowing.    Eyes: Negative for photophobia, pain, discharge, redness and visual disturbance.   Respiratory: Negative.  Negative for apnea, cough, choking, chest tightness, shortness of breath, wheezing and stridor.    Cardiovascular: Positive for leg swelling. Negative for chest pain and palpitations.   Gastrointestinal: " Negative.  Negative for abdominal distention, abdominal pain, anal bleeding, blood in stool, constipation, diarrhea, nausea, rectal pain and vomiting.   Endocrine: Negative for cold intolerance, heat intolerance, polydipsia, polyphagia and polyuria.   Genitourinary: Negative.  Negative for difficulty urinating, dysuria, flank pain, frequency, hematuria, pelvic pain, urgency, vaginal bleeding, vaginal discharge and vaginal pain.   Musculoskeletal: Positive for arthralgias, gait problem and joint swelling. Negative for back pain, myalgias and neck stiffness.        Right knee pain.   Skin: Negative.  Negative for pallor, rash and wound.   Allergic/Immunologic: Negative for food allergies.   Neurological: Negative for dizziness, tremors, seizures, syncope, speech difficulty, weakness and headaches.   Hematological: Negative.  Negative for adenopathy. Does not bruise/bleed easily.   Psychiatric/Behavioral: Negative.  Negative for behavioral problems and confusion. The patient is not nervous/anxious.    All other systems reviewed and are negative.    Objective:     Vital Signs (Most Recent):  Temp: 96.1 °F (35.6 °C) (11/07/20 0451)  Pulse: 87 (11/07/20 0451)  Resp: 18 (11/07/20 0451)  BP: 137/77 (11/07/20 0451)  SpO2: (!) 94 % (11/07/20 0451) Vital Signs (24h Range):  Temp:  [96.1 °F (35.6 °C)-98.7 °F (37.1 °C)] 96.1 °F (35.6 °C)  Pulse:  [77-87] 87  Resp:  [16-18] 18  SpO2:  [94 %-97 %] 94 %  BP: (134-139)/(65-77) 137/77     Weight: 84.8 kg (187 lb)  Body mass index is 30.18 kg/m².    Estimated Creatinine Clearance: 68.4 mL/min (based on SCr of 0.9 mg/dL).    Physical Exam  Vitals signs and nursing note reviewed.   Constitutional:       Appearance: She is well-developed.   HENT:      Head: Normocephalic and atraumatic.   Eyes:      Pupils: Pupils are equal, round, and reactive to light.   Neck:      Musculoskeletal: Normal range of motion and neck supple.      Thyroid: No thyromegaly.      Trachea: No tracheal  deviation.   Cardiovascular:      Rate and Rhythm: Normal rate and regular rhythm.   Pulmonary:      Effort: No respiratory distress.      Breath sounds: No wheezing or rales.   Abdominal:      General: Bowel sounds are normal. There is no distension.      Palpations: Abdomen is soft.      Tenderness: There is no abdominal tenderness.   Musculoskeletal:      Comments: Dressing over right knee   Skin:     General: Skin is warm and dry.      Coloration: Skin is not pale.   Neurological:      Mental Status: She is alert and oriented to person, place, and time.      Cranial Nerves: No cranial nerve deficit.      Coordination: Coordination normal.      Deep Tendon Reflexes: Reflexes are normal and symmetric.   Psychiatric:         Thought Content: Thought content normal.         Judgment: Judgment normal.         Significant Labs:   Blood Culture: No results for input(s): LABBLOO in the last 4320 hours.  BMP:   Recent Labs   Lab 11/06/20  0617         K 3.6      CO2 28   BUN 12   CREATININE 0.9   CALCIUM 9.2     CBC:   Recent Labs   Lab 11/05/20  0939 11/06/20  0617   WBC 6.66 6.53   HGB 9.9* 9.8*   HCT 31.5* 30.5*   PLT 64* 62*     Wound Culture:   Recent Labs   Lab 11/04/20  1602   LABAERO No growth     All pertinent labs within the past 24 hours have been reviewed.    Significant Imaging: I have reviewed all pertinent imaging results/findings within the past 24 hours.

## 2020-11-07 NOTE — PROGRESS NOTES
[facility] Hospital Medicine  Progress Note    Patient Name: Conchita Rouse  MRN: [id number]  Patient Class: IP- Inpatient   Admission Date: 11/4/2020  Length of Stay: 3 days  Attending Physician: Heath Huston MD  Primary Care Provider: Nancy Valles MD        Subjective:     Principal Problem:Pyogenic arthritis of right knee joint        HPI:  Conchita Rouse is a 65 y.o. female patient  with PMHx of dyslipidemia, HTN, asplastic anemia, prediabetes, TIA  for evaluation of R knee pain and swelling which onset gradually 2 weeks ago. Patient was seen by Estrella Quinones PA-C yesterday in ortho clinic.  Patient was told to come to ED for pre-operative medical clearance. Last PO intake was coffee at 8:30 AM today. There is concerns for pyogenic joint of the right knee.  Empiric Vancomycin given in the ED. Uric acid is normal. COVID is negative. Pt denies any fever, chills, night sweats. Initially, she had pain and swelling to the left knee which is now improved. Pt now has pain and swelling to the right knee and unable to obtain arthrocentesis.   Vital signs Temp 98.3, pulse 82, resp 18, B/P 123/75  CXR -  No acute cardiopulmonary findings  Labs find WBC 6.05, H/H 10.7/33.5, platelets 57, Na 134, K+ 5.4. Pt is admitted for further evaluation and management of septic arthritis of the knee.       Overview/Hospital Course:  64 y/o female admitted right knee septic arthritis. Orthopedic surgery consulted. Pt underwent right knee arthroscopy for joint irrigation and debridement on 11/4/20 by Dr. Pompa. Cultures pending. Will continue IV vancomycin. Infectious Disease consult for abx recommendation.     As of 11/6 Cultures from surgery are still pending.  ID consult pending for ABX needs.  PT/OT have evaluated and recommended outpatient therapy upon DC.      As of 11/7 Cultures from surgery show NGTD.  Patient has been evaluated by Dr. Hernandez who recommended Rocephin 2 g IVPB and Daptomycin 7mg/kg IV  daily x 2 weeks with plans to then transition to oral ABX.  PICC placed today.  CM following to arrange ABX at home.  Anticipate DC once ABX can be arranged.     Interval History:  Events overnight.  Patient currently resting comfortably in bed in no acute distress with no new complaints.  Cultures from surgery show NGTD.  Patient has been evaluated by Dr. Hernandez who recommended Rocephin 2 g IVPB and Daptomycin 7mg/kg IV daily x 2 weeks with plans to then transition to oral ABX.  PICC placed today.  CM following to arrange ABX at home.  Anticipate DC once ABX can be arranged.     Review of Systems   Constitutional: Positive for activity change. Negative for chills, diaphoresis, fatigue and fever.   HENT: Negative.  Negative for facial swelling, hearing loss, mouth sores, sneezing, sore throat, tinnitus and trouble swallowing.    Eyes: Negative for photophobia, pain, discharge, redness and visual disturbance.   Respiratory: Negative.  Negative for apnea, cough, choking, chest tightness, shortness of breath, wheezing and stridor.    Cardiovascular: Positive for leg swelling. Negative for chest pain and palpitations.   Gastrointestinal: Negative.  Negative for abdominal distention, abdominal pain, anal bleeding, blood in stool, constipation, diarrhea, nausea, rectal pain and vomiting.   Endocrine: Negative for cold intolerance, heat intolerance, polydipsia, polyphagia and polyuria.   Genitourinary: Negative.  Negative for difficulty urinating, dysuria, flank pain, frequency, hematuria, pelvic pain, urgency, vaginal bleeding, vaginal discharge and vaginal pain.   Musculoskeletal: Positive for arthralgias, gait problem and joint swelling. Negative for back pain, myalgias and neck stiffness.        C/o to be expected right knee pain.   Skin: Negative.  Negative for pallor, rash and wound.   Allergic/Immunologic: Negative for food allergies.   Neurological: Negative for dizziness, tremors, seizures, syncope, speech  difficulty, weakness and headaches.   Hematological: Negative.  Negative for adenopathy. Does not bruise/bleed easily.   Psychiatric/Behavioral: Negative.  Negative for behavioral problems and confusion. The patient is not nervous/anxious.    All other systems reviewed and are negative.    Objective:     Vital Signs (Most Recent):  Temp: 98.2 °F (36.8 °C) (11/07/20 1134)  Pulse: 84 (11/07/20 1134)  Resp: 16 (11/07/20 1206)  BP: 138/69 (11/07/20 1134)  SpO2: 95 % (11/07/20 1134) Vital Signs (24h Range):  Temp:  [96.1 °F (35.6 °C)-98.9 °F (37.2 °C)] 98.2 °F (36.8 °C)  Pulse:  [80-87] 84  Resp:  [16-18] 16  SpO2:  [94 %-97 %] 95 %  BP: (119-139)/(65-77) 138/69     Weight: 84.8 kg (187 lb)  Body mass index is 30.18 kg/m².    Intake/Output Summary (Last 24 hours) at 11/7/2020 1428  Last data filed at 11/7/2020 1206  Gross per 24 hour   Intake 360 ml   Output --   Net 360 ml      Physical Exam  Vitals signs and nursing note reviewed.   Constitutional:       Appearance: She is well-developed.   HENT:      Head: Normocephalic and atraumatic.      Nose: Nose normal.   Eyes:      General: No scleral icterus.     Conjunctiva/sclera: Conjunctivae normal.      Pupils: Pupils are equal, round, and reactive to light.   Neck:      Musculoskeletal: Normal range of motion and neck supple.   Cardiovascular:      Rate and Rhythm: Normal rate and regular rhythm.      Heart sounds: Normal heart sounds. No murmur. No friction rub. No gallop.    Pulmonary:      Effort: Pulmonary effort is normal. No respiratory distress.      Breath sounds: Normal breath sounds. No wheezing or rales.   Chest:      Chest wall: No tenderness.   Abdominal:      General: Bowel sounds are normal. There is no distension.      Palpations: Abdomen is soft. There is no mass.      Tenderness: There is no abdominal tenderness.   Musculoskeletal:         General: No tenderness.      Right knee: She exhibits decreased range of motion and swelling. She exhibits no  ecchymosis and no deformity.      Comments: Limited ROM to right knee.   Skin:     General: Skin is warm and dry.      Findings: No erythema or rash.      Comments: Surgical dressing to right knee CDI.   Neurological:      Mental Status: She is alert and oriented to person, place, and time.   Psychiatric:         Behavior: Behavior normal.         Thought Content: Thought content normal.         Judgment: Judgment normal.          Significant Labs:   BMP:   Recent Labs   Lab 11/07/20  0622   GLU 97      K 3.7      CO2 29   BUN 12   CREATININE 0.9   CALCIUM 9.2     CBC:   Recent Labs   Lab 11/06/20  0617 11/07/20  0622   WBC 6.53 5.51   HGB 9.8* 9.3*   HCT 30.5* 28.9*   PLT 62* 60*       Significant Imaging: I have reviewed all pertinent imaging results/findings within the past 24 hours.      Assessment/Plan:      * Pyogenic arthritis of right knee joint  - Ortho consulted, s/p  right knee arthroscopy for joint irrigation and debridement on 11/4/20 by Dr. Pompa   - Cultures show NGTD  - ID consulted; evaluated by Dr. Hernandez who recommended Rocephin 2 g IVPB and Daptomycin 7mg/kg IV daily x 2 weeks with plans to then transition to oral ABX.     -PICC placed today  - CM following to arrange ABX at home; Anticipate DC once ABX can be arranged.   - Continue PRN analgesia  - PT/OT consulted and recommended outpatient therapy upon DC  - Monitor         Aplastic anemia  - Thought to be 2/2 anaplastic anemia and pancytopenia; tx'd w/ Dr. Norwood in [place] [facility]  - Currently on Promacto 150 mg daily at home, will resume at DC  - H/H stable, continue to monitor with daily CBC      Thrombocytopenia  - Platelet count of 60k  - No active s/s of bleeding  - Avoid anticoagulation  - Daily CBC  - Outpatient f/u with Dr. Norwood        Essential hypertension  - Blood pressure well controlled  - Continue Amlodipine  - monitor and adjust medications as needed      H/O TIA (transient ischemic attack) and stroke  -  Continue home Statin      Dyslipidemia (high LDL; low HDL)  Continue STATIN        VTE Risk Mitigation (From admission, onward)         Ordered     Place sequential compression device  Until discontinued      11/04/20 1706     IP VTE HIGH RISK PATIENT  Once      11/04/20 1706                Discharge Planning   ELIESER:      Code Status: Not on file   Is the patient medically ready for discharge?:     Reason for patient still in hospital (select all that apply):   Discharge Plan A: Home with family                  Tavia Murcia DNP, ACNP-BC  Department of Hospital Medicine   [facility]

## 2020-11-07 NOTE — PLAN OF CARE
Pt in bed and agreeable to PT. Pt ambulated in smith with RW and CGA for 200 feet. Pt declined exercises this treatment. Pt was returned to bed and left with all needs with in reach and bed alarm on.

## 2020-11-07 NOTE — ASSESSMENT & PLAN NOTE
- Thought to be 2/2 anaplastic anemia and pancytopenia; tx'd w/ Dr. Norwood in [place] [facility]  - Currently on Promacto 150 mg daily at home, will resume at DC  - H/H stable, continue to monitor with daily CBC

## 2020-11-07 NOTE — PLAN OF CARE
Fall precautions maintained. Pain is well controlled. Dressing intact. No drainage. Ambulating to br with walker. All needs met. Will continue to monitor.

## 2020-11-07 NOTE — CONSULTS
[facility]  Infectious Disease  Consult Note    Patient Name: Conchita Rouse  MRN: [id number]  Admission Date: 11/4/2020  Hospital Length of Stay: 3 days  Attending Physician: Heath Huston MD  Primary Care Provider: Nancy Valles MD     Isolation Status: No active isolations    Patient information was obtained from patient, past medical records and ER records.      Consults  Assessment/Plan:     * Pyogenic arthritis of right knee joint  Will follow cultures to guide therapy .  Will use IV Rocephin /IV Daptomycin at 7 mg.kg daily .  ESR is 83, CRP-80 .  Will plan to treat for 2 weeks and will then switch to PO regime .  EOC -11/21/2020 for IV therapy .  Will also send UA for GC    Thrombocytopenia  Will follow oncology .Monitor for bleeding     Aplastic anemia  Will follow out patient oncology     Essential hypertension  BP control as per primary team        Thank you for your consult. I will follow-up with patient. Please contact us if you have any additional questions.    Josesito Hernandez MD  Infectious Disease  [facility]    Subjective:     Principal Problem: Pyogenic arthritis of right knee joint    HPI: 65 year old woman with  PMHx of dyslipidemia, HTN, asplastic anemia, prediabetes, TIA  who was admitted with right knee pain that started 2 weeks ago. She also had pain and and swelling in the left knee that resolved prior to the right knee pain.    Since admission, she was seen by Ortho and had on 11/04- right arthroscopy and I and D.  Operative note reviewed -There was evidence of significant synovitis with multiple joint areas of loculation.  She is afebrile.     Past Medical History:   Diagnosis Date    Dyslipidemia (high LDL; low HDL)     10y CV event risk 14.7% (asuming no DM2), which could be reduced to 4.5% with RF optimization, for which I recommended atorvastatin 20 mg 1 po qd x2 weeks and then 40 mg 1 po qd    History of shingles 02/14/2018    Per note from Dr. Mercado  "Enma GARCIA on 2/14/18; left upper buttocks.   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 Rogelio Norwood III last visit 2/14/18 (tolerating CSA & promacta); also notes aplastic anemia (AA psot Horse ATG - week 7/17/17) and ITP, for which ATG & promacta following decadron pulse for ITP & AA; plan for second opinion on BM bx; next flow in 3 mo; intent of rx: palliative    Prediabetes     TIA (transient ischemic attack) 2007    [email or web address] she noted she was feeling slow, took a nap, woke up with a numb/tingling left jaw to arm, which persistned 20 minutes, took 3 baby aspirins, went to ER @ [facility], where facial droop was noted, but completely resolved & head CT revealed old minor abnormality.    Vitamin D deficiency        Past Surgical History:   Procedure Laterality Date    ARTHROSCOPY OF KNEE Right 11/4/2020    Procedure: ARTHROSCOPY, KNEE;  Surgeon: Sal Pompa MD;  Location: [facility] OR;  Service: Orthopedics;  Laterality: Right;    COLONOSCOPY      INCISION AND DRAINAGE OF KNEE Right 11/4/2020    Procedure: INCISION AND DRAINAGE, KNEE;  Surgeon: Sal Pompa MD;  Location: [facility] OR;  Service: Orthopedics;  Laterality: Right;    left eye surgery  1955    Due to left eye lid drooping       Review of patient's allergies indicates:   Allergen Reactions    Bactrim [sulfamethoxazole-trimethoprim] Edema     Swollen lips    Codeine Other (See Comments)     Pt states codeine does not cause hives. Had tooth extraction and medication given caused her to be jittery, feel hot and have to lay down like she was weak. morhpine given on 5/16/2017 iv for pain. Pt martine well without any sign or sx of allergy or reaction.       Medications:  Medications Prior to Admission   Medication Sig    amLODIPine (NORVASC) 5 MG tablet Take 1 tablet (5 mg total) by mouth once daily.    eltrombopag (PROMACTA) 75 mg Tab Take 75 mg by mouth once daily.     " loratadine (CLARITIN) 10 mg tablet Take 1 tablet (10 mg total) by mouth once daily.    traMADoL (ULTRAM) 50 mg tablet Take 1 tablet (50 mg total) by mouth 2 (two) times daily as needed.    atorvastatin (LIPITOR) 40 MG tablet Take 1 tablet (40 mg total) by mouth once daily.    azelastine (ASTELIN) 137 mcg (0.1 %) nasal spray 1 spray (137 mcg total) by Nasal route 2 (two) times daily.    fluticasone (FLONASE) 50 mcg/actuation nasal spray USE 2 SPRAYS IN EACH NOSTRIL 1 TIME A DAY AS NEEDED    meloxicam (MOBIC) 15 MG tablet Take 1 tablet (15 mg total) by mouth daily as needed for Pain. Take with meal    valacyclovir (VALTREX) 500 MG tablet Take 500 mg by mouth.     Antibiotics (From admission, onward)    Start     Stop Route Frequency Ordered    11/07/20 1430  vancomycin (VANCOCIN) 2,500 mg in dextrose 5 % 500 mL IVPB      -- IV Every 24 hours (non-standard times) 11/06/20 1403        Antifungals (From admission, onward)    None        Antivirals (From admission, onward)    None             There is no immunization history on file for this patient.    Family History     Problem Relation (Age of Onset)    Breast cancer Sister    Cancer Sister, Brother    Diabetes Mother    Heart disease Mother, Father        Social History     Socioeconomic History    Marital status:      Spouse name: Not on file    Number of children: Not on file    Years of education: Not on file    Highest education level: Not on file   Occupational History    Not on file   Social Needs    Financial resource strain: Not on file    Food insecurity     Worry: Not on file     Inability: Not on file    Transportation needs     Medical: Not on file     Non-medical: Not on file   Tobacco Use    Smoking status: Never Smoker    Smokeless tobacco: Never Used   Substance and Sexual Activity    Alcohol use: Yes     Alcohol/week: 0.0 - 1.0 standard drinks     Comment: rarely    Drug use: No    Sexual activity: Not Currently     Partners:  "Male     Birth control/protection: Post-menopausal   Lifestyle    Physical activity     Days per week: Not on file     Minutes per session: Not on file    Stress: Not on file   Relationships    Social connections     Talks on phone: Not on file     Gets together: Not on file     Attends [employer] service: Not on file     Active member of club or organization: Not on file     Attends meetings of clubs or organizations: Not on file     Relationship status: Not on file   Other Topics Concern    Not on file   Social History Narrative    Breakfast: 2 TBS grits or french toast sticks or waffles (previously fried eggs); 1/2 cup coffee w/ 1 cream & 3 sugars & lemonade "all day"    Lunch: 50% turkey or ham sandwich w/ occasional chips; tea, rare soda    Dinner: Steak fries and baked hot wings, peppermint or chicken wings and french fries; tea or cool aid    Snacks: mini-[employer] chocolates (1x/wk) or a few unsalted chips (2x/wk) or popcorn (2x/mo) or strawberries or pretzils     Eats out: 2x/wk    Water: 16 oz/d    PA: None     Review of Systems   Constitutional: Positive for activity change. Negative for chills, diaphoresis, fatigue and fever.   HENT: Negative.  Negative for facial swelling, hearing loss, mouth sores, sneezing, sore throat, tinnitus and trouble swallowing.    Eyes: Negative for photophobia, pain, discharge, redness and visual disturbance.   Respiratory: Negative.  Negative for apnea, cough, choking, chest tightness, shortness of breath, wheezing and stridor.    Cardiovascular: Positive for leg swelling. Negative for chest pain and palpitations.   Gastrointestinal: Negative.  Negative for abdominal distention, abdominal pain, anal bleeding, blood in stool, constipation, diarrhea, nausea, rectal pain and vomiting.   Endocrine: Negative for cold intolerance, heat intolerance, polydipsia, polyphagia and polyuria.   Genitourinary: Negative.  Negative for difficulty urinating, dysuria, flank pain, frequency, " hematuria, pelvic pain, urgency, vaginal bleeding, vaginal discharge and vaginal pain.   Musculoskeletal: Positive for arthralgias, gait problem and joint swelling. Negative for back pain, myalgias and neck stiffness.        Right knee pain.   Skin: Negative.  Negative for pallor, rash and wound.   Allergic/Immunologic: Negative for food allergies.   Neurological: Negative for dizziness, tremors, seizures, syncope, speech difficulty, weakness and headaches.   Hematological: Negative.  Negative for adenopathy. Does not bruise/bleed easily.   Psychiatric/Behavioral: Negative.  Negative for behavioral problems and confusion. The patient is not nervous/anxious.    All other systems reviewed and are negative.    Objective:     Vital Signs (Most Recent):  Temp: 96.1 °F (35.6 °C) (11/07/20 0451)  Pulse: 87 (11/07/20 0451)  Resp: 18 (11/07/20 0451)  BP: 137/77 (11/07/20 0451)  SpO2: (!) 94 % (11/07/20 0451) Vital Signs (24h Range):  Temp:  [96.1 °F (35.6 °C)-98.7 °F (37.1 °C)] 96.1 °F (35.6 °C)  Pulse:  [77-87] 87  Resp:  [16-18] 18  SpO2:  [94 %-97 %] 94 %  BP: (134-139)/(65-77) 137/77     Weight: 84.8 kg (187 lb)  Body mass index is 30.18 kg/m².    Estimated Creatinine Clearance: 68.4 mL/min (based on SCr of 0.9 mg/dL).    Physical Exam  Vitals signs and nursing note reviewed.   Constitutional:       Appearance: She is well-developed.   HENT:      Head: Normocephalic and atraumatic.   Eyes:      Pupils: Pupils are equal, round, and reactive to light.   Neck:      Musculoskeletal: Normal range of motion and neck supple.      Thyroid: No thyromegaly.      Trachea: No tracheal deviation.   Cardiovascular:      Rate and Rhythm: Normal rate and regular rhythm.   Pulmonary:      Effort: No respiratory distress.      Breath sounds: No wheezing or rales.   Abdominal:      General: Bowel sounds are normal. There is no distension.      Palpations: Abdomen is soft.      Tenderness: There is no abdominal tenderness.    Musculoskeletal:      Comments: Dressing over right knee   Skin:     General: Skin is warm and dry.      Coloration: Skin is not pale.   Neurological:      Mental Status: She is alert and oriented to person, place, and time.      Cranial Nerves: No cranial nerve deficit.      Coordination: Coordination normal.      Deep Tendon Reflexes: Reflexes are normal and symmetric.   Psychiatric:         Thought Content: Thought content normal.         Judgment: Judgment normal.         Significant Labs:   Blood Culture: No results for input(s): LABBLOO in the last 4320 hours.  BMP:   Recent Labs   Lab 11/06/20  0617         K 3.6      CO2 28   BUN 12   CREATININE 0.9   CALCIUM 9.2     CBC:   Recent Labs   Lab 11/05/20  0939 11/06/20  0617   WBC 6.66 6.53   HGB 9.9* 9.8*   HCT 31.5* 30.5*   PLT 64* 62*     Wound Culture:   Recent Labs   Lab 11/04/20  1602   LABAERO No growth     All pertinent labs within the past 24 hours have been reviewed.    Significant Imaging: I have reviewed all pertinent imaging results/findings within the past 24 hours.

## 2020-11-07 NOTE — ASSESSMENT & PLAN NOTE
Will follow cultures to guide therapy .  Will use IV Rocephin /IV Daptomycin at 7 mg.kg daily .  ESR is 83, CRP-80 .  Will plan to treat for 2 weeks and will then switch to PO regime .  EOC -11/21/2020 for IV therapy .  Will also send UA for GC

## 2020-11-07 NOTE — ASSESSMENT & PLAN NOTE
- Platelet count of 60k  - No active s/s of bleeding  - Avoid anticoagulation  - Daily CBC  - Outpatient f/u with Dr. Norwood

## 2020-11-08 ENCOUNTER — PATIENT OUTREACH (OUTPATIENT)
Dept: ADMINISTRATIVE | Facility: OTHER | Age: 65
End: 2020-11-08

## 2020-11-08 LAB
ANION GAP SERPL CALC-SCNC: 5 MMOL/L (ref 8–16)
BASOPHILS # BLD AUTO: 0 K/UL (ref 0–0.2)
BASOPHILS NFR BLD: 0 % (ref 0–1.9)
BUN SERPL-MCNC: 12 MG/DL (ref 8–23)
CALCIUM SERPL-MCNC: 9 MG/DL (ref 8.7–10.5)
CHLORIDE SERPL-SCNC: 100 MMOL/L (ref 95–110)
CO2 SERPL-SCNC: 32 MMOL/L (ref 23–29)
CREAT SERPL-MCNC: 0.8 MG/DL (ref 0.5–1.4)
DIFFERENTIAL METHOD: ABNORMAL
EOSINOPHIL # BLD AUTO: 0.1 K/UL (ref 0–0.5)
EOSINOPHIL NFR BLD: 1.3 % (ref 0–8)
ERYTHROCYTE [DISTWIDTH] IN BLOOD BY AUTOMATED COUNT: 13.5 % (ref 11.5–14.5)
EST. GFR  (AFRICAN AMERICAN): >60 ML/MIN/1.73 M^2
EST. GFR  (NON AFRICAN AMERICAN): >60 ML/MIN/1.73 M^2
GLUCOSE SERPL-MCNC: 102 MG/DL (ref 70–110)
HCT VFR BLD AUTO: 28.9 % (ref 37–48.5)
HGB BLD-MCNC: 9.4 G/DL (ref 12–16)
IMM GRANULOCYTES # BLD AUTO: 0.02 K/UL (ref 0–0.04)
IMM GRANULOCYTES NFR BLD AUTO: 0.4 % (ref 0–0.5)
LYMPHOCYTES # BLD AUTO: 1.4 K/UL (ref 1–4.8)
LYMPHOCYTES NFR BLD: 26.5 % (ref 18–48)
MCH RBC QN AUTO: 35.9 PG (ref 27–31)
MCHC RBC AUTO-ENTMCNC: 32.5 G/DL (ref 32–36)
MCV RBC AUTO: 110 FL (ref 82–98)
MONOCYTES # BLD AUTO: 0.5 K/UL (ref 0.3–1)
MONOCYTES NFR BLD: 9.9 % (ref 4–15)
NEUTROPHILS # BLD AUTO: 3.4 K/UL (ref 1.8–7.7)
NEUTROPHILS NFR BLD: 61.9 % (ref 38–73)
NRBC BLD-RTO: 0 /100 WBC
PLATELET # BLD AUTO: 55 K/UL (ref 150–350)
PMV BLD AUTO: 10.4 FL (ref 9.2–12.9)
POTASSIUM SERPL-SCNC: 3.7 MMOL/L (ref 3.5–5.1)
RBC # BLD AUTO: 2.62 M/UL (ref 4–5.4)
SODIUM SERPL-SCNC: 137 MMOL/L (ref 136–145)
VANCOMYCIN TROUGH SERPL-MCNC: 8.3 UG/ML (ref 10–22)
WBC # BLD AUTO: 5.44 K/UL (ref 3.9–12.7)

## 2020-11-08 PROCEDURE — 25000003 PHARM REV CODE 250: Mod: HCNC | Performed by: FAMILY MEDICINE

## 2020-11-08 PROCEDURE — 63600175 PHARM REV CODE 636 W HCPCS: Mod: HCNC | Performed by: INTERNAL MEDICINE

## 2020-11-08 PROCEDURE — 80202 ASSAY OF VANCOMYCIN: CPT | Mod: HCNC

## 2020-11-08 PROCEDURE — 25000003 PHARM REV CODE 250: Mod: HCNC | Performed by: NURSE PRACTITIONER

## 2020-11-08 PROCEDURE — 99233 PR SUBSEQUENT HOSPITAL CARE,LEVL III: ICD-10-PCS | Mod: ,,, | Performed by: INTERNAL MEDICINE

## 2020-11-08 PROCEDURE — 97116 GAIT TRAINING THERAPY: CPT | Mod: HCNC

## 2020-11-08 PROCEDURE — 63600175 PHARM REV CODE 636 W HCPCS: Mod: HCNC | Performed by: FAMILY MEDICINE

## 2020-11-08 PROCEDURE — 80048 BASIC METABOLIC PNL TOTAL CA: CPT | Mod: HCNC

## 2020-11-08 PROCEDURE — 63600175 PHARM REV CODE 636 W HCPCS: Mod: HCNC | Performed by: ORTHOPAEDIC SURGERY

## 2020-11-08 PROCEDURE — 36415 COLL VENOUS BLD VENIPUNCTURE: CPT | Mod: HCNC

## 2020-11-08 PROCEDURE — 11000001 HC ACUTE MED/SURG PRIVATE ROOM: Mod: HCNC

## 2020-11-08 PROCEDURE — 97530 THERAPEUTIC ACTIVITIES: CPT | Mod: HCNC

## 2020-11-08 PROCEDURE — 99233 SBSQ HOSP IP/OBS HIGH 50: CPT | Mod: ,,, | Performed by: INTERNAL MEDICINE

## 2020-11-08 PROCEDURE — 25000003 PHARM REV CODE 250: Mod: HCNC | Performed by: ORTHOPAEDIC SURGERY

## 2020-11-08 PROCEDURE — 85025 COMPLETE CBC W/AUTO DIFF WBC: CPT | Mod: HCNC

## 2020-11-08 RX ORDER — ATORVASTATIN CALCIUM 40 MG/1
40 TABLET, FILM COATED ORAL DAILY
Status: DISCONTINUED | OUTPATIENT
Start: 2020-11-08 | End: 2020-11-09 | Stop reason: HOSPADM

## 2020-11-08 RX ORDER — VANCOMYCIN HCL IN 5 % DEXTROSE 1G/250ML
1000 PLASTIC BAG, INJECTION (ML) INTRAVENOUS
Status: DISCONTINUED | OUTPATIENT
Start: 2020-11-09 | End: 2020-11-09 | Stop reason: HOSPADM

## 2020-11-08 RX ORDER — MUPIROCIN 20 MG/G
OINTMENT TOPICAL 2 TIMES DAILY
Status: DISCONTINUED | OUTPATIENT
Start: 2020-11-08 | End: 2020-11-08 | Stop reason: SDUPTHER

## 2020-11-08 RX ADMIN — CHLORHEXIDINE GLUCONATE 0.12% ORAL RINSE 10 ML: 1.2 LIQUID ORAL at 08:11

## 2020-11-08 RX ADMIN — CEFTRIAXONE 2 G: 2 INJECTION, SOLUTION INTRAVENOUS at 08:11

## 2020-11-08 RX ADMIN — CHLORHEXIDINE GLUCONATE 0.12% ORAL RINSE 10 ML: 1.2 LIQUID ORAL at 09:11

## 2020-11-08 RX ADMIN — TRAMADOL HYDROCHLORIDE 100 MG: 50 TABLET, FILM COATED ORAL at 10:11

## 2020-11-08 RX ADMIN — ONDANSETRON 4 MG: 2 INJECTION INTRAMUSCULAR; INTRAVENOUS at 09:11

## 2020-11-08 RX ADMIN — POLYETHYLENE GLYCOL 3350 17 G: 17 POWDER, FOR SOLUTION ORAL at 08:11

## 2020-11-08 RX ADMIN — VANCOMYCIN HYDROCHLORIDE 2500 MG: 10 INJECTION, POWDER, LYOPHILIZED, FOR SOLUTION INTRAVENOUS at 02:11

## 2020-11-08 RX ADMIN — TRAMADOL HYDROCHLORIDE 100 MG: 50 TABLET, FILM COATED ORAL at 01:11

## 2020-11-08 RX ADMIN — ATORVASTATIN CALCIUM 40 MG: 40 TABLET, FILM COATED ORAL at 02:11

## 2020-11-08 NOTE — SUBJECTIVE & OBJECTIVE
Interval History:  No acute events overnight.  Patient currently resting comfortably in bed in no acute distress with no new complaints.  CM following for DC planning.  [employer] has been contacted regarding the need for continued IV ABX at home.  Orders sent, per [employer] report today we are currently awaiting insurance approval.  Anticipate DC in AM once IV ABX can be arranged.     Review of Systems   Constitutional: Positive for activity change. Negative for chills, diaphoresis, fatigue and fever.   HENT: Negative.  Negative for facial swelling, hearing loss, mouth sores, sneezing, sore throat, tinnitus and trouble swallowing.    Eyes: Negative for photophobia, pain, discharge, redness and visual disturbance.   Respiratory: Negative.  Negative for apnea, cough, choking, chest tightness, shortness of breath, wheezing and stridor.    Cardiovascular: Positive for leg swelling. Negative for chest pain and palpitations.   Gastrointestinal: Negative.  Negative for abdominal distention, abdominal pain, anal bleeding, blood in stool, constipation, diarrhea, nausea, rectal pain and vomiting.   Endocrine: Negative for cold intolerance, heat intolerance, polydipsia, polyphagia and polyuria.   Genitourinary: Negative.  Negative for difficulty urinating, dysuria, flank pain, frequency, hematuria, pelvic pain, urgency, vaginal bleeding, vaginal discharge and vaginal pain.   Musculoskeletal: Positive for arthralgias, gait problem and joint swelling. Negative for back pain, myalgias and neck stiffness.        C/o to be expected right knee pain.   Skin: Negative.  Negative for pallor, rash and wound.   Allergic/Immunologic: Negative for food allergies.   Neurological: Negative for dizziness, tremors, seizures, syncope, speech difficulty, weakness and headaches.   Hematological: Negative.  Negative for adenopathy. Does not bruise/bleed easily.   Psychiatric/Behavioral: Negative.  Negative for behavioral problems and confusion.  The patient is not nervous/anxious.    All other systems reviewed and are negative.    Objective:     Vital Signs (Most Recent):  Temp: 98.5 °F (36.9 °C) (11/08/20 0721)  Pulse: 82 (11/08/20 0721)  Resp: 14 (11/08/20 0721)  BP: 132/60 (11/08/20 0721)  SpO2: 96 % (11/08/20 0721) Vital Signs (24h Range):  Temp:  [96.5 °F (35.8 °C)-98.5 °F (36.9 °C)] 98.5 °F (36.9 °C)  Pulse:  [76-86] 82  Resp:  [14-18] 14  SpO2:  [95 %-99 %] 96 %  BP: (103-139)/(57-66) 132/60     Weight: 84.8 kg (187 lb)  Body mass index is 30.18 kg/m².    Intake/Output Summary (Last 24 hours) at 11/8/2020 1316  Last data filed at 11/8/2020 0130  Gross per 24 hour   Intake 860 ml   Output --   Net 860 ml      Physical Exam  Vitals signs and nursing note reviewed.   Constitutional:       Appearance: She is well-developed.   HENT:      Head: Normocephalic and atraumatic.      Nose: Nose normal.   Eyes:      General: No scleral icterus.     Conjunctiva/sclera: Conjunctivae normal.      Pupils: Pupils are equal, round, and reactive to light.   Neck:      Musculoskeletal: Normal range of motion and neck supple.   Cardiovascular:      Rate and Rhythm: Normal rate and regular rhythm.      Heart sounds: Normal heart sounds. No murmur. No friction rub. No gallop.    Pulmonary:      Effort: Pulmonary effort is normal. No respiratory distress.      Breath sounds: Normal breath sounds. No wheezing or rales.   Chest:      Chest wall: No tenderness.   Abdominal:      General: Bowel sounds are normal. There is no distension.      Palpations: Abdomen is soft. There is no mass.      Tenderness: There is no abdominal tenderness.   Musculoskeletal:         General: No tenderness.      Right knee: She exhibits decreased range of motion and swelling. She exhibits no ecchymosis and no deformity.      Comments: Limited ROM to right knee.   Skin:     General: Skin is warm and dry.      Findings: No erythema or rash.      Comments: Dressing to right knee CDI.   Neurological:       Mental Status: She is alert and oriented to person, place, and time.   Psychiatric:         Behavior: Behavior normal.         Thought Content: Thought content normal.         Judgment: Judgment normal.         Significant Labs:   Blood Culture: No results for input(s): LABBLOO in the last 48 hours.  BMP:   Recent Labs   Lab 11/08/20 0448         K 3.7      CO2 32*   BUN 12   CREATININE 0.8   CALCIUM 9.0     CBC:   Recent Labs   Lab 11/07/20  0622 11/08/20 0448   WBC 5.51 5.44   HGB 9.3* 9.4*   HCT 28.9* 28.9*   PLT 60* 55*       Significant Imaging: I have reviewed all pertinent imaging results/findings within the past 24 hours.

## 2020-11-08 NOTE — PLAN OF CARE
Pt in bed and agreeable to PT. Pt worked on standing balance while putting her gown on and washing her hands. Pt worked on sitting balance during treatment. Pt ambulated 100 feet and 300 feet with one rest break in between. Pt utilized RW and CGA during gait. Pt was returned to bed and left with all needs with in reach.

## 2020-11-08 NOTE — ASSESSMENT & PLAN NOTE
- Thought to be 2/2 anaplastic anemia and pancytopenia; tx'd w/ Dr. Norwood in [place] [facility]  - Currently on Promacto 150 mg daily at home, will resume at DC  - H/H stable, continue to monitor with daily CBC

## 2020-11-08 NOTE — PT/OT/SLP PROGRESS
Physical Therapy  Treatment    Conchita Rouse   MRN: [id number]   Admitting Diagnosis: Pyogenic arthritis of right knee joint    PT Received On: 11/08/20  PT Start Time: 1122     PT Stop Time: 1147    PT Total Time (min): 25 min       Billable Minutes:  Gait Training 12 and Therapeutic Activity 13    Treatment Type: Treatment  PT/PTA: PTA     PTA Visit Number: 2       General Precautions: Standard, fall  Orthopedic Precautions: LLE weight bearing as tolerated   Braces:           Subjective:  Communicated with epic and nurse Abigail prior to session.      Pain/Comfort  Pain Rating 1: 0/10  Pain Rating Post-Intervention 1: 0/10    Objective:        Functional Mobility:  Bed Mobility:        Transfers:       Gait:        Stairs:          Balance:   Static Sit: FAIR: Maintains without assist, but unable to take any challenges   Dynamic Sit: FAIR+: Maintains balance through MINIMAL excursions of active trunk motion  Static Stand: FAIR: Maintains without assist but unable to take challenges  Dynamic stand: FAIR: Needs CONTACT GUARD during gait       Therapeutic Activities and Exercises:  Pt worked on sitting/standing balance and ambulation in [place] for 100 feet and 300 feet with RW and CGA.     AM-PAC 6 CLICK MOBILITY  How much help from another person does this patient currently need?   1 = Unable, Total/Dependent Assistance  2 = A lot, Maximum/Moderate Assistance  3 = A little, Minimum/Contact Guard/Supervision  4 = None, Modified [place]/Independent    Turning over in bed (including adjusting bedclothes, sheets and blankets)?: 4  Sitting down on and standing up from a chair with arms (e.g., wheelchair, bedside commode, etc.): 4  Moving from lying on back to sitting on the side of the bed?: 4  Moving to and from a bed to a chair (including a wheelchair)?: 4  Need to walk in hospital room?: 4  Climbing 3-5 steps with a railing?: 1(NT)  Basic Mobility Total Score: 21    AM-PAC Raw Score CMS G-Code Modifier Level of  Impairment Assistance   6 % Total / Unable   7 - 9 CM 80 - 100% Maximal Assist   10 - 14 CL 60 - 80% Moderate Assist   15 - 19 CK 40 - 60% Moderate Assist   20 - 22 CJ 20 - 40% Minimal Assist   23 CI 1-20% SBA / CGA   24 CH 0% Independent/ Mod I     Patient left supine with all lines intact and call button in reach.    Assessment:  Conchita Rouse is a 65 y.o. female with a medical diagnosis of Pyogenic arthritis of right knee joint and presents with decreased endurance.    Rehab identified problem list/impairments: Rehab identified problem list/impairments: weakness, impaired functional mobilty, gait instability, decreased lower extremity function    Rehab potential is excellent.    Activity tolerance: Excellent    Discharge recommendations: Discharge Facility/Level of Care Needs: outpatient PT     Barriers to discharge:      Equipment recommendations: Equipment Needed After Discharge: walker, rolling     GOALS:   Multidisciplinary Problems     Physical Therapy Goals        Problem: Physical Therapy Goal    Goal Priority Disciplines Outcome Goal Variances Interventions   Physical Therapy Goal     PT, PT/OT Ongoing, Progressing     Description: PT WILL BE SEEN FOR P.T. FOR A MIN OF 5 OUT OF 7 DAYS A WEEK  LT20  1. PT WILL COMPLETED BED MOBILITY IND  2. PT WILL T/F TO CHAIR WITH RW AND MOD I  3. PT WILL GT TRAIN X 500' WITH RW MOD I  4. PT WILL COMPLETE B LE TE X 20 REPS FOR STRENGTHENING                   PLAN:    Patient to be seen 5 x/week  to address the above listed problems via gait training, therapeutic activities, therapeutic exercises  Plan of Care expires: 20  Plan of Care reviewed with: patient         Raffy Mares, PT  2020

## 2020-11-08 NOTE — PROGRESS NOTES
Pharmacokinetic Assessment Follow Up: IV Vancomycin    Vancomycin serum concentration assessment(s):    The trough level was drawn correctly and can be used to guide therapy at this time. The measurement is below the desired definitive target range of 15 to 20 mcg/mL.    Vancomycin Regimen Plan:    Change regimen to Vancomycin 1000 mg IV every 12 hours with next serum trough concentration measured at 0130 prior to third new dose on 11/10.    Drug levels (last 3 results):  Recent Labs   Lab Result Units 11/06/20  1214 11/08/20  1433   Vancomycin-Trough ug/mL 8.1* 8.3*       Pharmacy will continue to follow and monitor vancomycin.    Please contact pharmacy at [phone] for questions regarding this assessment.    Thank you for the consult,   John Posada, PharmD 11/8/2020 4:56 PM       Patient brief summary:  Conchita Rouse is a 65 y.o. female initiated on antimicrobial therapy with IV Vancomycin for treatment of bone/joint infection.    The patient's new regimen is vancomycin 1000 mg IV q12 h.    Drug Allergies:   Review of patient's allergies indicates:   Allergen Reactions    Bactrim [sulfamethoxazole-trimethoprim] Edema     Swollen lips    Codeine Other (See Comments)     Pt states codeine does not cause hives. Had tooth extraction and medication given caused her to be jittery, feel hot and have to lay down like she was weak. morhpine given on 5/16/2017 iv for pain. Pt martine well without any sign or sx of allergy or reaction.       Actual Body Weight:   84.8 kg    Renal Function:   Estimated Creatinine Clearance: 76.9 mL/min (based on SCr of 0.8 mg/dL).,     Dialysis Method (if applicable):  Not on RRT    CBC (last 72 hours):  Recent Labs   Lab Result Units 11/06/20  0617 11/07/20  0622 11/08/20  0448   WBC K/uL 6.53 5.51 5.44   Hemoglobin g/dL 9.8* 9.3* 9.4*   Hematocrit % 30.5* 28.9* 28.9*   Platelets K/uL 62* 60* 55*   Gran % % 55.2 66.6 61.9   Lymph % % 33.5 20.5 26.5   Mono % % 9.8 10.7 9.9   Eosinophil  % % 0.8 1.5 1.3   Basophil % % 0.2 0.2 0.0   Differential Method  Automated Automated Automated       Metabolic Panel (last 72 hours):  Recent Labs   Lab Result Units 11/06/20  0617 11/07/20  0622 11/08/20  0448   Sodium mmol/L 140 137 137   Potassium mmol/L 3.6 3.7 3.7   Chloride mmol/L 101 101 100   CO2 mmol/L 28 29 32*   Glucose mg/dL 108 97 102   BUN mg/dL 12 12 12   Creatinine mg/dL 0.9 0.9 0.8       Vancomycin Administrations:  vancomycin given in the last 96 hours                   vancomycin (VANCOCIN) 2,500 mg in dextrose 5 % 500 mL IVPB (mg) 2,500 mg New Bag 11/08/20 1435     2,500 mg New Bag 11/07/20 1422    vancomycin (VANCOCIN) 2,250 mg in dextrose 5 % 500 mL IVPB (mg) 2,250 mg New Bag 11/05/20 1251                Microbiologic Results:  Microbiology Results (last 7 days)     Procedure Component Value Units Date/Time    C. trachomatis/N. gonorrhoeae by AMP DNA [facility]; Urine [[id number]] Collected: 11/07/20 0820    Order Status: Sent Specimen: Genital Updated: 11/07/20 1446    Aerobic culture [[id number]] Collected: 11/04/20 1602    Order Status: Completed Specimen: Joint Fluid from Knee, Right Updated: 11/06/20 0717     Aerobic Bacterial Culture No growth    Culture, Anaerobe [[id number]] Collected: 11/04/20 1602    Order Status: Completed Specimen: Joint Fluid from Knee, Right Updated: 11/06/20 0716     Anaerobic Culture Culture in progress    Gram stain [[id number]] Collected: 11/04/20 1602    Order Status: Completed Specimen: Joint Fluid from Knee, Right Updated: 11/05/20 0345     Gram Stain Result Rare WBC's      No organisms seen    Fungus culture [[id number]] Collected: 11/04/20 1602    Order Status: Sent Specimen: Joint Fluid from Knee, Right Updated: 11/05/20 0145

## 2020-11-08 NOTE — ASSESSMENT & PLAN NOTE
- Blood pressure well controlled  - Continue Amlodipine pending trends  - monitor and adjust medications as needed

## 2020-11-08 NOTE — PROGRESS NOTES
[facility] Hospital Medicine  Progress Note    Patient Name: Conchita Rouse  MRN: [id number]  Patient Class: IP- Inpatient   Admission Date: 11/4/2020  Length of Stay: 4 days  Attending Physician: Heath Huston MD  Primary Care Provider: Nancy Valles MD        Subjective:     Principal Problem:Pyogenic arthritis of right knee joint        HPI:  Conchita Rouse is a 65 y.o. female patient  with PMHx of dyslipidemia, HTN, asplastic anemia, prediabetes, TIA  for evaluation of R knee pain and swelling which onset gradually 2 weeks ago. Patient was seen by Estrella Quinones PA-C yesterday in ortho clinic.  Patient was told to come to ED for pre-operative medical clearance. Last PO intake was coffee at 8:30 AM today. There is concerns for pyogenic joint of the right knee.  Empiric Vancomycin given in the ED. Uric acid is normal. COVID is negative. Pt denies any fever, chills, night sweats. Initially, she had pain and swelling to the left knee which is now improved. Pt now has pain and swelling to the right knee and unable to obtain arthrocentesis.   Vital signs Temp 98.3, pulse 82, resp 18, B/P 123/75  CXR -  No acute cardiopulmonary findings  Labs find WBC 6.05, H/H 10.7/33.5, platelets 57, Na 134, K+ 5.4. Pt is admitted for further evaluation and management of septic arthritis of the knee.       Overview/Hospital Course:  64 y/o female admitted right knee septic arthritis. Orthopedic surgery consulted. Pt underwent right knee arthroscopy for joint irrigation and debridement on 11/4/20 by Dr. Pompa. Cultures pending. Will continue IV vancomycin. Infectious Disease consult for abx recommendation.     As of 11/6 Cultures from surgery are still pending.  ID consult pending for ABX needs.  PT/OT have evaluated and recommended outpatient therapy upon DC.      As of 11/7 Cultures from surgery show NGTD.  Patient has been evaluated by Dr. Hernandez who recommended Rocephin 2 g IVPB and Daptomycin 7mg/kg IV  daily x 2 weeks with plans to then transition to oral ABX.  PICC placed today.  CM following to arrange ABX at home.  Anticipate DC once ABX can be arranged.     As of 11/8 CM following for DC planning.  [employer] has been contacted regarding the need for continued IV ABX at home.  Orders sent, per [employer] report today we are currently awaiting insurance approval.  Anticipate DC in AM once IV ABX can be arranged.       Interval History:  No acute events overnight.  Patient currently resting comfortably in bed in no acute distress with no new complaints.  CM following for DC planning.  [employer] has been contacted regarding the need for continued IV ABX at home.  Orders sent, per [employer] report today we are currently awaiting insurance approval.  Anticipate DC in AM once IV ABX can be arranged.     Review of Systems   Constitutional: Positive for activity change. Negative for chills, diaphoresis, fatigue and fever.   HENT: Negative.  Negative for facial swelling, hearing loss, mouth sores, sneezing, sore throat, tinnitus and trouble swallowing.    Eyes: Negative for photophobia, pain, discharge, redness and visual disturbance.   Respiratory: Negative.  Negative for apnea, cough, choking, chest tightness, shortness of breath, wheezing and stridor.    Cardiovascular: Positive for leg swelling. Negative for chest pain and palpitations.   Gastrointestinal: Negative.  Negative for abdominal distention, abdominal pain, anal bleeding, blood in stool, constipation, diarrhea, nausea, rectal pain and vomiting.   Endocrine: Negative for cold intolerance, heat intolerance, polydipsia, polyphagia and polyuria.   Genitourinary: Negative.  Negative for difficulty urinating, dysuria, flank pain, frequency, hematuria, pelvic pain, urgency, vaginal bleeding, vaginal discharge and vaginal pain.   Musculoskeletal: Positive for arthralgias, gait problem and joint swelling. Negative for back pain, myalgias and neck stiffness.         C/o to be expected right knee pain.   Skin: Negative.  Negative for pallor, rash and wound.   Allergic/Immunologic: Negative for food allergies.   Neurological: Negative for dizziness, tremors, seizures, syncope, speech difficulty, weakness and headaches.   Hematological: Negative.  Negative for adenopathy. Does not bruise/bleed easily.   Psychiatric/Behavioral: Negative.  Negative for behavioral problems and confusion. The patient is not nervous/anxious.    All other systems reviewed and are negative.    Objective:     Vital Signs (Most Recent):  Temp: 98.5 °F (36.9 °C) (11/08/20 0721)  Pulse: 82 (11/08/20 0721)  Resp: 14 (11/08/20 0721)  BP: 132/60 (11/08/20 0721)  SpO2: 96 % (11/08/20 0721) Vital Signs (24h Range):  Temp:  [96.5 °F (35.8 °C)-98.5 °F (36.9 °C)] 98.5 °F (36.9 °C)  Pulse:  [76-86] 82  Resp:  [14-18] 14  SpO2:  [95 %-99 %] 96 %  BP: (103-139)/(57-66) 132/60     Weight: 84.8 kg (187 lb)  Body mass index is 30.18 kg/m².    Intake/Output Summary (Last 24 hours) at 11/8/2020 1316  Last data filed at 11/8/2020 0130  Gross per 24 hour   Intake 860 ml   Output --   Net 860 ml      Physical Exam  Vitals signs and nursing note reviewed.   Constitutional:       Appearance: She is well-developed.   HENT:      Head: Normocephalic and atraumatic.      Nose: Nose normal.   Eyes:      General: No scleral icterus.     Conjunctiva/sclera: Conjunctivae normal.      Pupils: Pupils are equal, round, and reactive to light.   Neck:      Musculoskeletal: Normal range of motion and neck supple.   Cardiovascular:      Rate and Rhythm: Normal rate and regular rhythm.      Heart sounds: Normal heart sounds. No murmur. No friction rub. No gallop.    Pulmonary:      Effort: Pulmonary effort is normal. No respiratory distress.      Breath sounds: Normal breath sounds. No wheezing or rales.   Chest:      Chest wall: No tenderness.   Abdominal:      General: Bowel sounds are normal. There is no distension.      Palpations: Abdomen  is soft. There is no mass.      Tenderness: There is no abdominal tenderness.   Musculoskeletal:         General: No tenderness.      Right knee: She exhibits decreased range of motion and swelling. She exhibits no ecchymosis and no deformity.      Comments: Limited ROM to right knee.   Skin:     General: Skin is warm and dry.      Findings: No erythema or rash.      Comments: Dressing to right knee CDI.   Neurological:      Mental Status: She is alert and oriented to person, place, and time.   Psychiatric:         Behavior: Behavior normal.         Thought Content: Thought content normal.         Judgment: Judgment normal.         Significant Labs:   Blood Culture: No results for input(s): LABBLOO in the last 48 hours.  BMP:   Recent Labs   Lab 11/08/20  0448         K 3.7      CO2 32*   BUN 12   CREATININE 0.8   CALCIUM 9.0     CBC:   Recent Labs   Lab 11/07/20  0622 11/08/20  0448   WBC 5.51 5.44   HGB 9.3* 9.4*   HCT 28.9* 28.9*   PLT 60* 55*       Significant Imaging: I have reviewed all pertinent imaging results/findings within the past 24 hours.      Assessment/Plan:      * Pyogenic arthritis of right knee joint  - Ortho consulted, s/p  right knee arthroscopy for joint irrigation and debridement on 11/4/20 by Dr. Pompa   - Cultures show NGTD  - ID consulted; evaluated by Dr. Hernandez who recommended Rocephin 2 g IVPB and Daptomycin 7mg/kg IV daily x 2 weeks with plans to then transition to oral ABX.     -PICC placed on 11/7  - CM following to arrange ABX at home; Anticipate DC once ABX can be arranged.   - Continue PRN analgesia  - PT/OT consulted and recommended outpatient therapy upon DC  - Monitor         Aplastic anemia  - Thought to be 2/2 anaplastic anemia and pancytopenia; tx'd w/ Dr. Norwood in [place] [facility]  - Currently on Promacto 150 mg daily at home, will resume at DC  - H/H stable, continue to monitor with daily CBC      Thrombocytopenia  - Platelet count of 55k  - No active  s/s of bleeding  - Avoid anticoagulation  - Daily CBC  - Outpatient f/u with Dr. Norwood        Essential hypertension  - Blood pressure well controlled  - Continue Amlodipine pending trends  - monitor and adjust medications as needed      H/O TIA (transient ischemic attack) and stroke  - Continue home Statin      Dyslipidemia (high LDL; low HDL)  - Continue STATIN      VTE Risk Mitigation (From admission, onward)         Ordered     Place sequential compression device  Until discontinued      11/04/20 1706     IP VTE HIGH RISK PATIENT  Once      11/04/20 1706                Discharge Planning   ELIESER:      Code Status: Not on file   Is the patient medically ready for discharge?:     Reason for patient still in hospital (select all that apply): Pending disposition  Discharge Plan A: Home with family                  Tavia Murcia, MOI, ACNP-BC  Department of Hospital Medicine   [facility]

## 2020-11-08 NOTE — ASSESSMENT & PLAN NOTE
- Platelet count of 55k  - No active s/s of bleeding  - Avoid anticoagulation  - Daily CBC  - Outpatient f/u with Dr. Norwood

## 2020-11-08 NOTE — ASSESSMENT & PLAN NOTE
- Ortho consulted, s/p  right knee arthroscopy for joint irrigation and debridement on 11/4/20 by Dr. Pompa   - Cultures show NGTD  - ID consulted; evaluated by Dr. Hernandez who recommended Rocephin 2 g IVPB and Daptomycin 7mg/kg IV daily x 2 weeks with plans to then transition to oral ABX.     -PICC placed on 11/7  - CM following to arrange ABX at home; Anticipate DC once ABX can be arranged.   - Continue PRN analgesia  - PT/OT consulted and recommended outpatient therapy upon DC  - Monitor

## 2020-11-08 NOTE — PROGRESS NOTES
Health Maintenance Due   Topic Date Due    Colorectal Cancer Screening  1955    DEXA SCAN  02/14/1995    Shingles Vaccine (1 of 2) 02/14/2005    Influenza Vaccine (1) 08/01/2020     Updates were requested from care everywhere.  Chart was reviewed for overdue Proactive [facility] Encounters ([facility]) topics (CRS, Breast Cancer Screening, Eye exam)  Health Maintenance has been updated.  LINKS immunization registry triggered.  Patient not found in LINKS.

## 2020-11-08 NOTE — PLAN OF CARE
Swelling to RLE. Pulses intact. VSS. Pt repositions independently.  Fall precautions in place. Call light and personal items within reach. Educated patient on side effects of medication administered. Pt verbalized understanding. 24 hour order check done.  Will continue to monitor.

## 2020-11-09 VITALS
WEIGHT: 187 LBS | RESPIRATION RATE: 18 BRPM | SYSTOLIC BLOOD PRESSURE: 124 MMHG | HEART RATE: 94 BPM | HEIGHT: 66 IN | OXYGEN SATURATION: 96 % | TEMPERATURE: 99 F | DIASTOLIC BLOOD PRESSURE: 65 MMHG | BODY MASS INDEX: 30.05 KG/M2

## 2020-11-09 LAB
ANION GAP SERPL CALC-SCNC: 5 MMOL/L (ref 8–16)
BACTERIA SPEC AEROBE CULT: NO GROWTH
BACTERIA SPEC ANAEROBE CULT: NORMAL
BACTERIA SPEC ANAEROBE CULT: NORMAL
BASOPHILS # BLD AUTO: 0.01 K/UL (ref 0–0.2)
BASOPHILS NFR BLD: 0.2 % (ref 0–1.9)
BUN SERPL-MCNC: 10 MG/DL (ref 8–23)
C TRACH DNA SPEC QL NAA+PROBE: NOT DETECTED
CALCIUM SERPL-MCNC: 9.4 MG/DL (ref 8.7–10.5)
CHLORIDE SERPL-SCNC: 100 MMOL/L (ref 95–110)
CO2 SERPL-SCNC: 33 MMOL/L (ref 23–29)
CREAT SERPL-MCNC: 0.8 MG/DL (ref 0.5–1.4)
DIFFERENTIAL METHOD: ABNORMAL
EOSINOPHIL # BLD AUTO: 0.1 K/UL (ref 0–0.5)
EOSINOPHIL NFR BLD: 1.1 % (ref 0–8)
ERYTHROCYTE [DISTWIDTH] IN BLOOD BY AUTOMATED COUNT: 13.5 % (ref 11.5–14.5)
EST. GFR  (AFRICAN AMERICAN): >60 ML/MIN/1.73 M^2
EST. GFR  (NON AFRICAN AMERICAN): >60 ML/MIN/1.73 M^2
FINAL PATHOLOGIC DIAGNOSIS: NORMAL
GLUCOSE SERPL-MCNC: 91 MG/DL (ref 70–110)
GROSS: NORMAL
HCT VFR BLD AUTO: 29.9 % (ref 37–48.5)
HGB BLD-MCNC: 9.6 G/DL (ref 12–16)
IMM GRANULOCYTES # BLD AUTO: 0.03 K/UL (ref 0–0.04)
IMM GRANULOCYTES NFR BLD AUTO: 0.5 % (ref 0–0.5)
LYMPHOCYTES # BLD AUTO: 1.5 K/UL (ref 1–4.8)
LYMPHOCYTES NFR BLD: 23.3 % (ref 18–48)
Lab: NORMAL
MCH RBC QN AUTO: 35.2 PG (ref 27–31)
MCHC RBC AUTO-ENTMCNC: 32.1 G/DL (ref 32–36)
MCV RBC AUTO: 110 FL (ref 82–98)
MONOCYTES # BLD AUTO: 0.7 K/UL (ref 0.3–1)
MONOCYTES NFR BLD: 11.7 % (ref 4–15)
N GONORRHOEA DNA SPEC QL NAA+PROBE: NOT DETECTED
NEUTROPHILS # BLD AUTO: 4 K/UL (ref 1.8–7.7)
NEUTROPHILS NFR BLD: 63.2 % (ref 38–73)
NRBC BLD-RTO: 0 /100 WBC
PLATELET # BLD AUTO: 46 K/UL (ref 150–350)
PMV BLD AUTO: 11 FL (ref 9.2–12.9)
POTASSIUM SERPL-SCNC: 3.6 MMOL/L (ref 3.5–5.1)
RBC # BLD AUTO: 2.73 M/UL (ref 4–5.4)
SODIUM SERPL-SCNC: 138 MMOL/L (ref 136–145)
WBC # BLD AUTO: 6.32 K/UL (ref 3.9–12.7)

## 2020-11-09 PROCEDURE — 63600175 PHARM REV CODE 636 W HCPCS: Mod: HCNC | Performed by: ORTHOPAEDIC SURGERY

## 2020-11-09 PROCEDURE — 80048 BASIC METABOLIC PNL TOTAL CA: CPT | Mod: HCNC

## 2020-11-09 PROCEDURE — 25000003 PHARM REV CODE 250: Mod: HCNC | Performed by: ORTHOPAEDIC SURGERY

## 2020-11-09 PROCEDURE — 97116 GAIT TRAINING THERAPY: CPT | Mod: HCNC | Performed by: PHYSICAL THERAPIST

## 2020-11-09 PROCEDURE — 85025 COMPLETE CBC W/AUTO DIFF WBC: CPT | Mod: HCNC

## 2020-11-09 PROCEDURE — 97530 THERAPEUTIC ACTIVITIES: CPT | Mod: HCNC | Performed by: PHYSICAL THERAPIST

## 2020-11-09 PROCEDURE — 63600175 PHARM REV CODE 636 W HCPCS: Mod: HCNC | Performed by: INTERNAL MEDICINE

## 2020-11-09 PROCEDURE — 63600175 PHARM REV CODE 636 W HCPCS: Mod: HCNC | Performed by: FAMILY MEDICINE

## 2020-11-09 PROCEDURE — 25000003 PHARM REV CODE 250: Mod: HCNC | Performed by: FAMILY MEDICINE

## 2020-11-09 RX ORDER — VANCOMYCIN HCL IN 5 % DEXTROSE 1G/250ML
1000 PLASTIC BAG, INJECTION (ML) INTRAVENOUS EVERY 12 HOURS
Start: 2020-11-09 | End: 2020-11-21

## 2020-11-09 RX ADMIN — CHLORHEXIDINE GLUCONATE 0.12% ORAL RINSE 10 ML: 1.2 LIQUID ORAL at 10:11

## 2020-11-09 RX ADMIN — VANCOMYCIN HYDROCHLORIDE 1000 MG: 1 INJECTION, POWDER, LYOPHILIZED, FOR SOLUTION INTRAVENOUS at 02:11

## 2020-11-09 RX ADMIN — ONDANSETRON 4 MG: 2 INJECTION INTRAMUSCULAR; INTRAVENOUS at 10:11

## 2020-11-09 RX ADMIN — CEFTRIAXONE 2 G: 2 INJECTION, SOLUTION INTRAVENOUS at 10:11

## 2020-11-09 RX ADMIN — TRAMADOL HYDROCHLORIDE 100 MG: 50 TABLET, FILM COATED ORAL at 10:11

## 2020-11-09 NOTE — PLAN OF CARE
Plan of care reviewed with pt, pt verbalized understanding. PICC site CDI. Dressing to right knee CDI, some mild swelling noted. Pt ambulates with walker, remains free of fall/trauma. Pain is controlled with current regimen. VSS. Purposeful q2h rounding done, safety maintained, call light in reach, bed locked and in lowest position, side rails up x2. Will continue to monitor, observe and report any changes.

## 2020-11-09 NOTE — DISCHARGE SUMMARY
[facility] Hospital Medicine  Discharge Summary      Patient Name: Conchita Rouse  MRN: [id number]  Admission Date: 11/4/2020  Hospital Length of Stay: 5 days  Discharge Date and Time:  11/09/2020 2:53 PM  Attending Physician: Heath Huston MD   Discharging Provider: Tavia Murcia NP  Primary Care Provider: Nancy Valles MD      HPI:   Conchita Rouse is a 65 y.o. female patient  with PMHx of dyslipidemia, HTN, asplastic anemia, prediabetes, TIA  for evaluation of R knee pain and swelling which onset gradually 2 weeks ago. Patient was seen by Estrella Quinones PA-C yesterday in ortho clinic.  Patient was told to come to ED for pre-operative medical clearance. Last PO intake was coffee at 8:30 AM today. There is concerns for pyogenic joint of the right knee.  Empiric Vancomycin given in the ED. Uric acid is normal. COVID is negative. Pt denies any fever, chills, night sweats. Initially, she had pain and swelling to the left knee which is now improved. Pt now has pain and swelling to the right knee and unable to obtain arthrocentesis.   Vital signs Temp 98.3, pulse 82, resp 18, B/P 123/75  CXR -  No acute cardiopulmonary findings  Labs find WBC 6.05, H/H 10.7/33.5, platelets 57, Na 134, K+ 5.4. Pt is admitted for further evaluation and management of septic arthritis of the knee.       Procedure(s) (LRB):  ARTHROSCOPY, KNEE (Right)  INCISION AND DRAINAGE, KNEE (Right)      Hospital Course:   64 y/o female admitted right knee septic arthritis. Orthopedic surgery consulted. Pt underwent right knee arthroscopy for joint irrigation and debridement on 11/4/20 by Dr. Pompa. Cultures pending. Will continue IV vancomycin. Infectious Disease consult for abx recommendation.     As of 11/6 Cultures from surgery are still pending.  ID consult pending for ABX needs.  PT/OT have evaluated and recommended outpatient therapy upon DC.      As of 11/7 Cultures from surgery show NGTD.  Patient has been  evaluated by Dr. Hernandez who recommended Rocephin 2 g IVPB and Daptomycin 7mg/kg IV daily x 2 weeks with plans to then transition to oral ABX.  PICC placed today.  CM following to arrange ABX at home.  Anticipate DC once ABX can be arranged.     As of 11/8 CM following for DC planning.  [facility] has been contacted regarding the need for continued IV ABX at home.  Orders sent, per [facility] report today we are currently awaiting insurance approval.  Anticipate DC in AM once IV ABX can be arranged.     As of 11/9 Patient is currently resting comfortably in bed in no acute distress with no new complaints, requesting to DC home today.  All cultures show no growth to date, and patient remains afebrile with no leukocytosis.  VS remain stable.  Per [facility], Daptomycin would cost patient $1200/week as an outpatient.  Case d/w Dr. Hernandez who recommended to complete a 2 week course of IV Rocephin and Vancomycin with [facility] to monitor labs and have their pharmacist dose Vancomycin accordingly.  IV ABX to be completed on 11/21 with plans to then transition to oral agents.  Given chronic aplastic anemia and thrombocytopenia, will not DC on OAC given increased risk of bleeding.  Patient was instructed to f/u with her hematologist Dr. Norwood later this week for monitoring, verbalized positive understanding.  She was also instructed to follow-up with ortho as directed, verbalized positive understanding.  Case d/w Dr. Huston, patient seen and examined and deemed medically stable to DC home today with outpatient IV antibiotics.  She was also instructed to keep her scheduled appointment with orthopedics for post hospital evaluation and monitoring, verbalized positive understanding.  Medications reconciled for DC home.   to be arranged for nursing visit in addition to PT/OT upon DC.     Consults:   Consults (From admission, onward)        Status Ordering Provider     Inpatient consult to Infectious Diseases  Once     Provider:  (Not  yet assigned)    Acknowledged RASHAWN LARIOS     Inpatient consult to Orthopedic Surgery  Once     Provider:  (Not yet assigned)    Acknowledged KATHI WATTS     [facility]  Once     Provider:  (Not yet assigned)    Acknowledged GENESIS SANCHEZ     Inpatient consult to Social Work  Once     Provider:  (Not yet assigned)    Acknowledged GENESIS SANCHEZ     Pharmacy to dose Vancomycin consult  Once     Provider:  (Not yet assigned)    Acknowledged KATHI WATTS          No new Assessment & Plan notes have been filed under this hospital service since the last note was generated.  Service: Hospital Medicine    Final Active Diagnoses:    Diagnosis Date Noted POA    PRINCIPAL PROBLEM:  Pyogenic arthritis of right knee joint [M00.9] 11/04/2020 Yes    Aplastic anemia [D61.9] 09/06/2017 Yes    Thrombocytopenia [D69.6] 07/23/2018 Yes    Essential hypertension [I10] 02/22/2017 Yes    H/O TIA (transient ischemic attack) and stroke [Z86.73] 02/22/2017 Not Applicable    Dyslipidemia (high LDL; low HDL) [E78.5]  Yes      Problems Resolved During this Admission:    Diagnosis Date Noted Date Resolved POA    Hyperkalemia [E87.5] 11/04/2020 11/05/2020 Yes       Discharged Condition: good    Disposition: Home or Self Care    Follow Up:  Follow-up Information     Nancy Valles MD In 3 days.    Specialty: Internal Medicine  Why: Follow-up with PCP within 3 days for post hospital evaluation and monitoring  Contact information:  [address] [facility] [address] [phone]             Rogelio Norwood MD In 1 week.    Specialty: Oncology  Why: Follow-up with hematology as directed for monitoring of chronic thrombocytopenia.  Contact information:  [address] [facility] [address] [phone]             Estrella Quinones PA-C In 1 day.    Specialty: Orthopedic Surgery  Why: Keep scheduled appointment with orthopedics tomorrow for post hospital  evaluation and monitoring.  Contact information:  [address]  [place] [address]  [phone]                 Patient Instructions:      Diet Adult Regular     Notify your health care provider if you experience any of the following:  temperature >100.4     Notify your health care provider if you experience any of the following:  persistent nausea and vomiting or diarrhea     Notify your health care provider if you experience any of the following:  severe uncontrolled pain     Notify your health care provider if you experience any of the following:  redness, tenderness, or signs of infection (pain, swelling, redness, odor or green/yellow discharge around incision site)     Change dressing (specify)   Order Comments: Dressing change: Cleanse right lower extremity incision with normal saline and pat dry, cover with band-aide.  Change daily and as needed for saturation.     Activity as tolerated       Significant Diagnostic Studies: Labs:   BMP:   Recent Labs   Lab 11/08/20 0448 11/09/20 0622    91    138   K 3.7 3.6    100   CO2 32* 33*   BUN 12 10   CREATININE 0.8 0.8   CALCIUM 9.0 9.4   , CMP   Recent Labs   Lab 11/08/20 0448 11/09/20 0622    138   K 3.7 3.6    100   CO2 32* 33*    91   BUN 12 10   CREATININE 0.8 0.8   CALCIUM 9.0 9.4   ANIONGAP 5* 5*   ESTGFRAFRICA >60 >60   EGFRNONAA >60 >60    and CBC   Recent Labs   Lab 11/08/20 0448 11/09/20 0622   WBC 5.44 6.32   HGB 9.4* 9.6*   HCT 28.9* 29.9*   PLT 55* 46*     Microbiology: Blood Culture No results found for: LABBLOO    Pending Diagnostic Studies:     Procedure Component Value Units Date/Time    VANCOMYCIN, TROUGH [[id number]] Collected: 11/08/20 1330    Order Status: Sent Lab Status: No result     Specimen: Blood          Medications:  Reconciled Home Medications:      Medication List      START taking these medications    cefTRIAXone 2 g/50 mL Pgbk IVPB  Commonly known as: ROCEPHIN  Inject 50 mLs (2 g  total) into the vein once daily. for 12 days     vancomycin in dextrose 5 % 1 gram/250 mL Soln  Inject 250 mLs (1,000 mg total) into the vein every 12 (twelve) hours. for 12 days        CONTINUE taking these medications    amLODIPine 5 MG tablet  Commonly known as: NORVASC  Take 1 tablet (5 mg total) by mouth once daily.     atorvastatin 40 MG tablet  Commonly known as: LIPITOR  Take 1 tablet (40 mg total) by mouth once daily.     azelastine 137 mcg (0.1 %) nasal spray  Commonly known as: ASTELIN  1 spray (137 mcg total) by Nasal route 2 (two) times daily.     fluticasone propionate 50 mcg/actuation nasal spray  Commonly known as: FLONASE  USE 2 SPRAYS IN EACH NOSTRIL 1 TIME A DAY AS NEEDED     loratadine 10 mg tablet  Commonly known as: CLARITIN  Take 1 tablet (10 mg total) by mouth once daily.     PROMACTA 75 mg Tab  Generic drug: eltrombopag  Take 75 mg by mouth once daily.     traMADoL 50 mg tablet  Commonly known as: ULTRAM  Take 1 tablet (50 mg total) by mouth 2 (two) times daily as needed.        STOP taking these medications    meloxicam 15 MG tablet  Commonly known as: MOBIC     valACYclovir 500 MG tablet  Commonly known as: VALTREX            Indwelling Lines/Drains at time of discharge:   Lines/Drains/Airways     Peripherally Inserted Central Catheter Line            PICC Double Lumen 11/07/20 1310 right brachial 2 days                Time spent on the discharge of patient: 45 minutes  Patient was seen and examined on the date of discharge and determined to be suitable for discharge.         Tavia Murcia DNP, ACNP-BC  Department of Hospital Medicine  [facility]

## 2020-11-09 NOTE — PLAN OF CARE
LEONILA left a message for Leigh with [facility] in regards to home IV antibiotics status.  CM awaiting a call back from Leigh.

## 2020-11-09 NOTE — PROGRESS NOTES
[facility]  Infectious Disease  Progress Note    Patient Name: Conchita Rouse  MRN: [id number]  Admission Date: 11/4/2020  Length of Stay: 5 days  Attending Physician: Heath Huston MD  Primary Care Provider: Nancy Valles MD    Isolation Status: No active isolations  Assessment/Plan:      * Pyogenic arthritis of right knee joint  Will follow cultures to guide therapy .  Will use IV Rocephin /IV Daptomycin at 7 mg.kg daily .  ESR is 83, CRP-80 .  Will plan to treat for 2 weeks and will then switch to PO regime .  EOC -11/21/2020 for IV therapy .  Will also send UA for GC    11/8- will continue Rocephin/Daptomycin -EOC 11/23, will complete therapy at that time with PO levaquin and doxycycline-will need clinic follow up,weekly ESR, CRP    Essential hypertension  BP control as per primary team        Anticipated Disposition:     Thank you for your consult. I will follow-up with patient. Please contact us if you have any additional questions.    Josesito Hernandez MD  Infectious Disease  [facility]    Subjective:     Principal Problem:Pyogenic arthritis of right knee joint    HPI: 65 year old woman with  PMHx of dyslipidemia, HTN, asplastic anemia, prediabetes, TIA  who was admitted with right knee pain that started 2 weeks ago. She also had pain and and swelling in the left knee that resolved prior to the right knee pain.    Since admission, she was seen by Ortho and had on 11/04- right arthroscopy and I and D.  Operative note reviewed -There was evidence of significant synovitis with multiple joint areas of loculation.  She is afebrile.   Interval History: 65 year old woman with   Right knee pyogenic arthritis .  All cultures remain negative .    Review of Systems   Constitutional: Positive for activity change. Negative for chills, diaphoresis, fatigue and fever.   HENT: Negative.  Negative for facial swelling, hearing loss, mouth sores, sneezing, sore throat, tinnitus and trouble  swallowing.    Eyes: Negative for photophobia, pain, discharge, redness and visual disturbance.   Respiratory: Negative.  Negative for apnea, cough, choking, chest tightness, shortness of breath, wheezing and stridor.    Cardiovascular: Positive for leg swelling. Negative for chest pain and palpitations.   Gastrointestinal: Negative.  Negative for abdominal distention, abdominal pain, anal bleeding, blood in stool, constipation, diarrhea, nausea, rectal pain and vomiting.   Endocrine: Negative for cold intolerance, heat intolerance, polydipsia, polyphagia and polyuria.   Genitourinary: Negative.  Negative for difficulty urinating, dysuria, flank pain, frequency, hematuria, pelvic pain, urgency, vaginal bleeding, vaginal discharge and vaginal pain.   Musculoskeletal: Positive for arthralgias, gait problem and joint swelling. Negative for back pain, myalgias and neck stiffness.        Right knee pain.   Skin: Negative.  Negative for pallor, rash and wound.   Allergic/Immunologic: Negative for food allergies.   Neurological: Negative for dizziness, tremors, seizures, syncope, speech difficulty, weakness and headaches.   Hematological: Negative.  Negative for adenopathy. Does not bruise/bleed easily.   Psychiatric/Behavioral: Negative.  Negative for behavioral problems and confusion. The patient is not nervous/anxious.    All other systems reviewed and are negative.    Objective:     Vital Signs (Most Recent):  Temp: 97.9 °F (36.6 °C) (11/09/20 0703)  Pulse: 87 (11/09/20 0703)  Resp: 18 (11/09/20 0703)  BP: 134/65 (11/09/20 0703)  SpO2: (!) 94 % (11/09/20 0703) Vital Signs (24h Range):  Temp:  [96.4 °F (35.8 °C)-99 °F (37.2 °C)] 97.9 °F (36.6 °C)  Pulse:  [87-94] 87  Resp:  [18] 18  SpO2:  [94 %-96 %] 94 %  BP: (129-140)/(64-65) 134/65     Weight: 84.8 kg (187 lb)  Body mass index is 30.18 kg/m².    Estimated Creatinine Clearance: 76.9 mL/min (based on SCr of 0.8 mg/dL).    Physical Exam  Vitals signs and nursing note  reviewed.   Constitutional:       Appearance: She is well-developed.   HENT:      Head: Normocephalic and atraumatic.      Nose: Nose normal.   Eyes:      General: No scleral icterus.     Conjunctiva/sclera: Conjunctivae normal.      Pupils: Pupils are equal, round, and reactive to light.   Neck:      Musculoskeletal: Normal range of motion and neck supple.   Cardiovascular:      Rate and Rhythm: Normal rate and regular rhythm.      Heart sounds: Normal heart sounds. No murmur. No friction rub. No gallop.    Pulmonary:      Effort: Pulmonary effort is normal. No respiratory distress.      Breath sounds: Normal breath sounds. No wheezing or rales.   Chest:      Chest wall: No tenderness.   Abdominal:      General: Bowel sounds are normal. There is no distension.      Palpations: Abdomen is soft. There is no mass.      Tenderness: There is no abdominal tenderness.   Musculoskeletal:         General: No tenderness.      Right knee: She exhibits decreased range of motion and swelling. She exhibits no ecchymosis and no deformity.      Comments: Limited ROM to right knee.   Skin:     General: Skin is warm and dry.      Findings: No erythema or rash.      Comments: Dressing to right knee CDI.   Neurological:      Mental Status: She is alert and oriented to person, place, and time.   Psychiatric:         Behavior: Behavior normal.         Thought Content: Thought content normal.         Judgment: Judgment normal.         Significant Labs:   Blood Culture: No results for input(s): LABBLOO in the last 4320 hours.  BMP:   Recent Labs   Lab 11/09/20 0622   GLU 91      K 3.6      CO2 33*   BUN 10   CREATININE 0.8   CALCIUM 9.4     CBC:   Recent Labs   Lab 11/08/20 0448 11/09/20 0622   WBC 5.44 6.32   HGB 9.4* 9.6*   HCT 28.9* 29.9*   PLT 55* 46*     CMP:   Recent Labs   Lab 11/08/20 0448 11/09/20 0622    138   K 3.7 3.6    100   CO2 32* 33*    91   BUN 12 10   CREATININE 0.8 0.8   CALCIUM 9.0  9.4   ANIONGAP 5* 5*   EGFRNONAA >60 >60       Significant Imaging: I have reviewed all pertinent imaging results/findings within the past 24 hours.

## 2020-11-09 NOTE — ASSESSMENT & PLAN NOTE
Will follow cultures to guide therapy .  Will use IV Rocephin /IV Daptomycin at 7 mg.kg daily .  ESR is 83, CRP-80 .  Will plan to treat for 2 weeks and will then switch to PO regime .  EOC -11/21/2020 for IV therapy .  Will also send UA for GC    11/8- will continue Rocephin/Daptomycin -EOC 11/23, will complete therapy at that time with PO levaquin and doxycycline-will need clinic follow up,weekly ESR, CRP

## 2020-11-09 NOTE — PROGRESS NOTES
Chart reviewed, discussed with nurse MARBIN Chaidez LPN. Photographs taken and placed in this chart of sacral wound are entered into this chart in ERROR and of a different patient. NO SACRAL WOUNDS present on this patient.

## 2020-11-09 NOTE — SUBJECTIVE & OBJECTIVE
Interval History: 65 year old woman with   Right knee pyogenic arthritis .  All cultures remain negative .    Review of Systems   Constitutional: Positive for activity change. Negative for chills, diaphoresis, fatigue and fever.   HENT: Negative.  Negative for facial swelling, hearing loss, mouth sores, sneezing, sore throat, tinnitus and trouble swallowing.    Eyes: Negative for photophobia, pain, discharge, redness and visual disturbance.   Respiratory: Negative.  Negative for apnea, cough, choking, chest tightness, shortness of breath, wheezing and stridor.    Cardiovascular: Positive for leg swelling. Negative for chest pain and palpitations.   Gastrointestinal: Negative.  Negative for abdominal distention, abdominal pain, anal bleeding, blood in stool, constipation, diarrhea, nausea, rectal pain and vomiting.   Endocrine: Negative for cold intolerance, heat intolerance, polydipsia, polyphagia and polyuria.   Genitourinary: Negative.  Negative for difficulty urinating, dysuria, flank pain, frequency, hematuria, pelvic pain, urgency, vaginal bleeding, vaginal discharge and vaginal pain.   Musculoskeletal: Positive for arthralgias, gait problem and joint swelling. Negative for back pain, myalgias and neck stiffness.        Right knee pain.   Skin: Negative.  Negative for pallor, rash and wound.   Allergic/Immunologic: Negative for food allergies.   Neurological: Negative for dizziness, tremors, seizures, syncope, speech difficulty, weakness and headaches.   Hematological: Negative.  Negative for adenopathy. Does not bruise/bleed easily.   Psychiatric/Behavioral: Negative.  Negative for behavioral problems and confusion. The patient is not nervous/anxious.    All other systems reviewed and are negative.    Objective:     Vital Signs (Most Recent):  Temp: 97.9 °F (36.6 °C) (11/09/20 0703)  Pulse: 87 (11/09/20 0703)  Resp: 18 (11/09/20 0703)  BP: 134/65 (11/09/20 0703)  SpO2: (!) 94 % (11/09/20 0703) Vital Signs (24h  Range):  Temp:  [96.4 °F (35.8 °C)-99 °F (37.2 °C)] 97.9 °F (36.6 °C)  Pulse:  [87-94] 87  Resp:  [18] 18  SpO2:  [94 %-96 %] 94 %  BP: (129-140)/(64-65) 134/65     Weight: 84.8 kg (187 lb)  Body mass index is 30.18 kg/m².    Estimated Creatinine Clearance: 76.9 mL/min (based on SCr of 0.8 mg/dL).    Physical Exam  Vitals signs and nursing note reviewed.   Constitutional:       Appearance: She is well-developed.   HENT:      Head: Normocephalic and atraumatic.      Nose: Nose normal.   Eyes:      General: No scleral icterus.     Conjunctiva/sclera: Conjunctivae normal.      Pupils: Pupils are equal, round, and reactive to light.   Neck:      Musculoskeletal: Normal range of motion and neck supple.   Cardiovascular:      Rate and Rhythm: Normal rate and regular rhythm.      Heart sounds: Normal heart sounds. No murmur. No friction rub. No gallop.    Pulmonary:      Effort: Pulmonary effort is normal. No respiratory distress.      Breath sounds: Normal breath sounds. No wheezing or rales.   Chest:      Chest wall: No tenderness.   Abdominal:      General: Bowel sounds are normal. There is no distension.      Palpations: Abdomen is soft. There is no mass.      Tenderness: There is no abdominal tenderness.   Musculoskeletal:         General: No tenderness.      Right knee: She exhibits decreased range of motion and swelling. She exhibits no ecchymosis and no deformity.      Comments: Limited ROM to right knee.   Skin:     General: Skin is warm and dry.      Findings: No erythema or rash.      Comments: Dressing to right knee CDI.   Neurological:      Mental Status: She is alert and oriented to person, place, and time.   Psychiatric:         Behavior: Behavior normal.         Thought Content: Thought content normal.         Judgment: Judgment normal.         Significant Labs:   Blood Culture: No results for input(s): LABBLOO in the last 4320 hours.  BMP:   Recent Labs   Lab 11/09/20  0622   GLU 91      K 3.6   CL  100   CO2 33*   BUN 10   CREATININE 0.8   CALCIUM 9.4     CBC:   Recent Labs   Lab 11/08/20 0448 11/09/20 0622   WBC 5.44 6.32   HGB 9.4* 9.6*   HCT 28.9* 29.9*   PLT 55* 46*     CMP:   Recent Labs   Lab 11/08/20 0448 11/09/20 0622    138   K 3.7 3.6    100   CO2 32* 33*    91   BUN 12 10   CREATININE 0.8 0.8   CALCIUM 9.0 9.4   ANIONGAP 5* 5*   EGFRNONAA >60 >60       Significant Imaging: I have reviewed all pertinent imaging results/findings within the past 24 hours.

## 2020-11-09 NOTE — PT/OT/SLP PROGRESS
Physical Therapy  Treatment    Conchita Rouse   MRN: [id number]   Admitting Diagnosis: Pyogenic arthritis of right knee joint    PT Received On: 11/09/20  PT Start Time: 1043     PT Stop Time: 1106    PT Total Time (min): 23 min       Billable Minutes:  Gait Training 13 min and Therapeutic Activity 10 min    Treatment Type: Treatment  PT/PTA: PT     PTA Visit Number: 0       General Precautions: Standard, fall  Orthopedic Precautions: LLE weight bearing as tolerated   Braces: N/A         Subjective:  Communicated with Nurse Tracy and epic chart review prior to session.  Pt found supine in bed and agreeable to tx at this time.     Pain/Comfort  Pain Rating 1: 0/10(7/10 (R) Knee pain when she touches it)  Location - Side 1: Right  Location 1: knee    Objective:   Patient found with: peripheral IV    Functional Mobility:  Therapeutic Activities and Exercises:  Pt performed supine>sit with Mod I, scooted to EOB with Mod I, sit>stand using RW with Supervision, donned gown as a robe with SBA in standing, gait trained ~220ft using RW with verbal cues for proper gait pattern and RW safety, t/f to chair using RW with Supervision. Pt requested that she need to use restroom, therefore ambulated to restroom using RW and t/f to toilet with Supervision, toilet hygiene with Mod I, t/f off of toilet using RW with Supervision, stood at sink to wash hands with Supervision, t/f to chair using RW with Supervision.     AM-PAC 6 CLICK MOBILITY  How much help from another person does this patient currently need?   1 = Unable, Total/Dependent Assistance  2 = A lot, Maximum/Moderate Assistance  3 = A little, Minimum/Contact Guard/Supervision  4 = None, Modified [place]/Independent    Turning over in bed (including adjusting bedclothes, sheets and blankets)?: 4  Sitting down on and standing up from a chair with arms (e.g., wheelchair, bedside commode, etc.): 4  Moving from lying on back to sitting on the side of the bed?: 4  Moving to  and from a bed to a chair (including a wheelchair)?: 4  Need to walk in hospital room?: 4  Climbing 3-5 steps with a railing?: 1  Basic Mobility Total Score: 21    AM-PAC Raw Score CMS G-Code Modifier Level of Impairment Assistance   6 % Total / Unable   7 - 9 CM 80 - 100% Maximal Assist   10 - 14 CL 60 - 80% Moderate Assist   15 - 19 CK 40 - 60% Moderate Assist   20 - 22 CJ 20 - 40% Minimal Assist   23 CI 1-20% SBA / CGA   24 CH 0% Independent/ Mod I     Patient left up in chair with all lines intact, call button in reach, chair alarm on and Nurse Rossana notified.    Assessment:  Conchita Rouse is a 65 y.o. female with a medical diagnosis of Pyogenic arthritis of right knee joint and presents with impaired functional mobility. Pt will benefit from continued skilled PT in order to address the listed impairments.     Rehab identified problem list/impairments: Rehab identified problem list/impairments: weakness, impaired endurance, impaired self care skills, impaired functional mobilty, gait instability, impaired balance, decreased coordination, decreased lower extremity function, decreased safety awareness, pain, decreased ROM    Rehab potential is good.    Activity tolerance: Good    Discharge recommendations: Discharge Facility/Level of Care Needs: outpatient PT     Barriers to discharge:  UNKNOWN    Equipment recommendations: Equipment Needed After Discharge: walker, rolling     GOALS:   Multidisciplinary Problems     Physical Therapy Goals        Problem: Physical Therapy Goal    Goal Priority Disciplines Outcome Goal Variances Interventions   Physical Therapy Goal     PT, PT/OT Ongoing, Progressing     Description: PT WILL BE SEEN FOR P.T. FOR A MIN OF 5 OUT OF 7 DAYS A WEEK  LT20  1. PT WILL COMPLETED BED MOBILITY IND  2. PT WILL T/F TO CHAIR WITH RW AND MOD I  3. PT WILL GT TRAIN X 500' WITH RW MOD I  4. PT WILL COMPLETE B LE TE X 20 REPS FOR STRENGTHENING                   PLAN:    Patient  to be seen 5 x/week  to address the above listed problems via gait training, therapeutic activities, therapeutic exercises  Plan of Care expires: 11/13/20  Plan of Care reviewed with: patient         Dahiana Wood, PT/OT  11/09/2020

## 2020-11-09 NOTE — PLAN OF CARE
IV ABT therapy remains in progress. No adverse reactions noted, remains afebrile. PICC to RUE. Ambulatory with assist of walker. Weight bearing as tolerated to RLE. PRN pain meds adm as needed. Remains free from injury/incident. Call light in reach.

## 2020-11-09 NOTE — PLAN OF CARE
[facility] declined the referral because of staffing.  Patient would like a referral made to [facility].  Choice form completed and placed in the blue folder.  Referral made via [facility].  Delilah with [facility] at bedside for IV antibiotic teaching.

## 2020-11-09 NOTE — PLAN OF CARE
11/09/20 1515   Post-Acute Status   Post-Acute Authorization Home Health   Home Health Status Referrals Sent   Patient choice form signed by patient/caregiver List from [facility] Post-Acute Care       CM met with patient at the bedside to discuss home IV antibiotics and home health services.  Patient is agreeable to this plan.  CM provided an integrated partner list of home health companies and her preference is for [facility].  Choice form completed and placed in blue folder.  Referral made via [facility].  [facility] is verifying coverage/benefits for antibiotics.

## 2020-11-10 ENCOUNTER — PATIENT OUTREACH (OUTPATIENT)
Dept: ADMINISTRATIVE | Facility: CLINIC | Age: 65
End: 2020-11-10

## 2020-11-10 ENCOUNTER — TELEPHONE (OUTPATIENT)
Dept: ORTHOPEDICS | Facility: CLINIC | Age: 65
End: 2020-11-10

## 2020-11-10 NOTE — TELEPHONE ENCOUNTER
----- Message from Kristal Grady sent at 11/10/2020  4:04 PM CST -----  Contact: Conchita Bronson is calling in regards to pain in knee. She stated that she just had surgery. Please call her back [phone].     [id number] - [place], [place] - [address]  [address] [facility] [address]  [address] [facility] [address]  Phone: [phone] Fax: [phone]    Thanks  DD

## 2020-11-10 NOTE — TELEPHONE ENCOUNTER
Returned call to patient, received no answer and unable to leave a message due to voicemail being full.

## 2020-11-10 NOTE — PLAN OF CARE
11/10/20 0817   Final Note   Assessment Type Final Discharge Note   Anticipated Discharge Disposition Home-Health   Hospital Follow Up  Appt(s) scheduled? Yes   Right Care Referral Info   Post Acute Recommendation Home-care   Facility Name [facility]

## 2020-11-10 NOTE — TELEPHONE ENCOUNTER
----- Message from Francesca Cheng sent at 11/10/2020 10:44 AM CST -----  Regarding: needs appt after lunch/reschedule  Type:  Sooner Apoointment Request    Caller is requesting a sooner appointment.  Caller declined first available appointment listed below.  Caller will not accept being placed on the waitlist and is requesting a message be sent to doctor.  Name of Caller:pt  When is the first available appointment?11/19/2020  Symptoms:f/u  Would the patient rather a call back or a response via [facility]? Call back   Best Call Back Number:[phone]  Additional Information: Please call back.thanks

## 2020-11-10 NOTE — TELEPHONE ENCOUNTER
----- Message from Suzy Braswell sent at 11/10/2020  1:25 PM CST -----  Regarding: Call back  Contact: Patient  Please ann patient concerning her earlier message for a late afternoon appointment. Please call at Ph .[phone] (home)

## 2020-11-10 NOTE — PROGRESS NOTES
C3 nurse attempted to contact patient. No answer. The following message was left for the patient to return the call:  Good Afternoon  I am a nurse calling on behalf of [facility] from the Care Coordination Center.  This is a Transitional Care Call for Conchita Rouse . When you have a moment please contact us at [phone] or [phone] Monday through Friday, between the hours of 8 am to 4 pm. We look forward to speaking with you. On behalf of [facility] have a nice day.    The patient does not have a scheduled HOSFU appointment within 7-14 days post hospital discharge date 10/09/2020 Message sent to Physician staff to assist with HOSFU appointment scheduling.

## 2020-11-11 ENCOUNTER — TELEPHONE (OUTPATIENT)
Dept: ORTHOPEDICS | Facility: CLINIC | Age: 65
End: 2020-11-11

## 2020-11-11 ENCOUNTER — LAB VISIT (OUTPATIENT)
Dept: LAB | Facility: HOSPITAL | Age: 65
End: 2020-11-11
Attending: INTERNAL MEDICINE
Payer: MEDICARE

## 2020-11-11 DIAGNOSIS — M00.9 PYOGENIC ARTHRITIS, UPPER ARM: Primary | ICD-10-CM

## 2020-11-11 LAB
ANION GAP SERPL CALC-SCNC: 9 MMOL/L (ref 8–16)
BASOPHILS # BLD AUTO: 0.01 K/UL (ref 0–0.2)
BASOPHILS NFR BLD: 0.2 % (ref 0–1.9)
BUN SERPL-MCNC: 11 MG/DL (ref 8–23)
CALCIUM SERPL-MCNC: 8.7 MG/DL (ref 8.7–10.5)
CHLORIDE SERPL-SCNC: 101 MMOL/L (ref 95–110)
CO2 SERPL-SCNC: 30 MMOL/L (ref 23–29)
CREAT SERPL-MCNC: 0.8 MG/DL (ref 0.5–1.4)
CRP SERPL-MCNC: 121.4 MG/L (ref 0–8.2)
DIFFERENTIAL METHOD: ABNORMAL
EOSINOPHIL # BLD AUTO: 0.1 K/UL (ref 0–0.5)
EOSINOPHIL NFR BLD: 1.8 % (ref 0–8)
ERYTHROCYTE [DISTWIDTH] IN BLOOD BY AUTOMATED COUNT: 13.8 % (ref 11.5–14.5)
EST. GFR  (AFRICAN AMERICAN): >60 ML/MIN/1.73 M^2
EST. GFR  (NON AFRICAN AMERICAN): >60 ML/MIN/1.73 M^2
GLUCOSE SERPL-MCNC: 70 MG/DL (ref 70–110)
HCT VFR BLD AUTO: 30.7 % (ref 37–48.5)
HGB BLD-MCNC: 10.3 G/DL (ref 12–16)
IMM GRANULOCYTES # BLD AUTO: 0.02 K/UL (ref 0–0.04)
IMM GRANULOCYTES NFR BLD AUTO: 0.4 % (ref 0–0.5)
LYMPHOCYTES # BLD AUTO: 1.4 K/UL (ref 1–4.8)
LYMPHOCYTES NFR BLD: 28.6 % (ref 18–48)
MCH RBC QN AUTO: 37.6 PG (ref 27–31)
MCHC RBC AUTO-ENTMCNC: 33.6 G/DL (ref 32–36)
MCV RBC AUTO: 112 FL (ref 82–98)
MONOCYTES # BLD AUTO: 0.5 K/UL (ref 0.3–1)
MONOCYTES NFR BLD: 10 % (ref 4–15)
NEUTROPHILS # BLD AUTO: 2.9 K/UL (ref 1.8–7.7)
NEUTROPHILS NFR BLD: 59 % (ref 38–73)
NRBC BLD-RTO: 0 /100 WBC
PLATELET # BLD AUTO: 51 K/UL (ref 150–350)
PMV BLD AUTO: 11.7 FL (ref 9.2–12.9)
POTASSIUM SERPL-SCNC: 3.6 MMOL/L (ref 3.5–5.1)
RBC # BLD AUTO: 2.74 M/UL (ref 4–5.4)
SODIUM SERPL-SCNC: 140 MMOL/L (ref 136–145)
VANCOMYCIN TROUGH SERPL-MCNC: 10.3 UG/ML (ref 10–22)
WBC # BLD AUTO: 4.9 K/UL (ref 3.9–12.7)

## 2020-11-11 PROCEDURE — 86140 C-REACTIVE PROTEIN: CPT | Mod: HCNC

## 2020-11-11 PROCEDURE — 80048 BASIC METABOLIC PNL TOTAL CA: CPT | Mod: HCNC

## 2020-11-11 PROCEDURE — 80202 ASSAY OF VANCOMYCIN: CPT | Mod: HCNC

## 2020-11-11 PROCEDURE — 85025 COMPLETE CBC W/AUTO DIFF WBC: CPT | Mod: HCNC

## 2020-11-11 NOTE — PATIENT INSTRUCTIONS
Incision Care  Remember: Follow-up visits allow your healthcare provider to make sure your incision is healing well. Be sure to keep your appointments.     Stitches (sutures), surgical staples, special strips of surgical tape, or surgical skin glue may be used to close incisions. They also help stop bleeding and speed healing. To help your incision heal, follow the tips on this handout.  Home care  Tips for home care include the following:  · Always wash your hands before touching your incision.  · Keep your incision clean and dry.  · Avoid doing things that could cause dirt or sweat to get on your incision.  · Dont pick at scabs. They help protect the wound.  · Keep your incision out of water.  · Take a sponge bath to avoid getting your incision wet, unless your healthcare provider tells you otherwise.  · Ask your provider when can you take a shower or bathe.  · Ask your provider about the best way to keep your incision dry when bathing or showering.  · Pat stitches dry if they get wet. Dont rub.  · Leave the bandage (dressing) in place until you are told to remove it or change it. Change it only as directed, using clean hands.  · After the first 12 hours, change your dressing every 24 hours, or as directed by your healthcare provider.  · Change your dressing if it gets wet or soiled.  Care for specific closures  Follow these guidelines unless your healthcare provider tells you otherwise:  · Stitches or staples. Once you no longer need to keep these dry, clean the wound daily. First remove the bandage using clean hands. Then wash the area gently with soap and warm water. Use a wet cotton swab to loosen and remove any blood or crust that forms. After cleaning, put a thin layer of antibiotic ointment on. Then put on a new bandage.  · Skin glue. Dont put liquid, ointment, or cream on your wound while the glue is in place. Avoid activities that cause heavy sweating. Protect the wound from sunlight. Do not scratch,  rub, or pick at the glue. Do not put tape directly over the glue. The glue should peel off within 5 to 10 days.  · Surgical tape. Keep the area dry. If it gets wet, blot the area dry with a clean towel. Surgical tape usually falls off within 7 to 10 days. If it has not fallen off after 10 days, contact your healthcare provider before taking it off yourself. If you are told to remove the tape, put mineral oil or petroleum jelly on a cotton ball. Gently rub the tape until it is removed.  Changing your dressing  Leave the dressing (bandage) in place until you are told to remove it or change it. Follow the instructions below unless told otherwise by your healthcare provider:  · Always wash your hands before changing your dressing.  · After the first 48 hours, the incision wound usually will have closed. At this point, leave the incision uncovered and open to the air. If the incision has not closed keep it covered.  · Cover your incision only if your clothing is rubbing it or causing irritation.  · Change your dressing if it gets wet or soiled.  Follow-up care  Follow up with your healthcare provider to ask how long sutures or staples should be left in place. Be sure to return for stitch or staple removal as directed. If dissolving stitches were used in your mouth, these will not need to be removed. They should fall out or dissolve on their own.  If tape closures were used, remove them yourself when your provider recommends if they have not fallen off on their own. If skin glue was used, the glue will wear off by itself.  When to seek medical care  Call your healthcare provider if you have any of the following:  · More pain, redness, swelling, bleeding, or foul-smelling discharge around the incision area  · Fever of 100.4°F (38ºC) or higher, or as directed by your healthcare provider  · Shaking chills  · Vomiting or nausea that doesn't go away  · Numbness, coldness, or tingling around the incision area, or changes in  skin color  · Opening of the sutures or wound  · Stitches or staples come apart or fall out or surgical tape falls off before 7 days or as directed by your healthcare provider   Date Last Reviewed: 12/1/2016  © 5537-1696 [employer]. [address] [place] [address], [place] [address]. All rights reserved. This information is not intended as a substitute for professional medical care. Always follow your healthcare professional's instructions.

## 2020-11-13 ENCOUNTER — TELEPHONE (OUTPATIENT)
Dept: ORTHOPEDICS | Facility: CLINIC | Age: 65
End: 2020-11-13

## 2020-11-13 ENCOUNTER — OFFICE VISIT (OUTPATIENT)
Dept: ORTHOPEDICS | Facility: CLINIC | Age: 65
End: 2020-11-13
Payer: MEDICARE

## 2020-11-13 ENCOUNTER — HOSPITAL ENCOUNTER (OUTPATIENT)
Dept: RADIOLOGY | Facility: HOSPITAL | Age: 65
Discharge: HOME | End: 2020-11-13
Attending: ORTHOPAEDIC SURGERY
Payer: MEDICARE

## 2020-11-13 VITALS
DIASTOLIC BLOOD PRESSURE: 90 MMHG | BODY MASS INDEX: 30.05 KG/M2 | HEART RATE: 90 BPM | WEIGHT: 187 LBS | SYSTOLIC BLOOD PRESSURE: 170 MMHG | HEIGHT: 66 IN

## 2020-11-13 DIAGNOSIS — M65.9 SYNOVITIS OF RIGHT KNEE: Primary | ICD-10-CM

## 2020-11-13 DIAGNOSIS — M65.9 SYNOVITIS OF RIGHT ANKLE: ICD-10-CM

## 2020-11-13 DIAGNOSIS — I82.431 ACUTE DEEP VEIN THROMBOSIS (DVT) OF POPLITEAL VEIN OF RIGHT LOWER EXTREMITY: ICD-10-CM

## 2020-11-13 PROBLEM — M65.971 SYNOVITIS OF RIGHT ANKLE: Status: ACTIVE | Noted: 2020-11-13

## 2020-11-13 PROBLEM — M65.961 SYNOVITIS OF RIGHT KNEE: Status: ACTIVE | Noted: 2020-11-13

## 2020-11-13 PROCEDURE — 99024 POSTOP FOLLOW-UP VISIT: CPT | Mod: HCNC,S$GLB,, | Performed by: ORTHOPAEDIC SURGERY

## 2020-11-13 PROCEDURE — 3077F PR MOST RECENT SYSTOLIC BLOOD PRESSURE >= 140 MM HG: ICD-10-PCS | Mod: HCNC,CPTII,S$GLB, | Performed by: ORTHOPAEDIC SURGERY

## 2020-11-13 PROCEDURE — 3080F PR MOST RECENT DIASTOLIC BLOOD PRESSURE >= 90 MM HG: ICD-10-PCS | Mod: HCNC,CPTII,S$GLB, | Performed by: ORTHOPAEDIC SURGERY

## 2020-11-13 PROCEDURE — 93971 EXTREMITY STUDY: CPT | Mod: TC,HCNC,RT

## 2020-11-13 PROCEDURE — 99999 PR PBB SHADOW E&M-EST. PATIENT-LVL IV: CPT | Mod: PBBFAC,HCNC,, | Performed by: ORTHOPAEDIC SURGERY

## 2020-11-13 PROCEDURE — 3288F FALL RISK ASSESSMENT DOCD: CPT | Mod: HCNC,CPTII,S$GLB, | Performed by: ORTHOPAEDIC SURGERY

## 2020-11-13 PROCEDURE — 3077F SYST BP >= 140 MM HG: CPT | Mod: HCNC,CPTII,S$GLB, | Performed by: ORTHOPAEDIC SURGERY

## 2020-11-13 PROCEDURE — 1101F PR PT FALLS ASSESS DOC 0-1 FALLS W/OUT INJ PAST YR: ICD-10-PCS | Mod: HCNC,CPTII,S$GLB, | Performed by: ORTHOPAEDIC SURGERY

## 2020-11-13 PROCEDURE — 3008F BODY MASS INDEX DOCD: CPT | Mod: HCNC,CPTII,S$GLB, | Performed by: ORTHOPAEDIC SURGERY

## 2020-11-13 PROCEDURE — 93971 US LOWER EXTREMITY VEINS RIGHT: ICD-10-PCS | Mod: 26,RT,, | Performed by: RADIOLOGY

## 2020-11-13 PROCEDURE — 93971 EXTREMITY STUDY: CPT | Mod: 26,RT,, | Performed by: RADIOLOGY

## 2020-11-13 PROCEDURE — 1125F PR PAIN SEVERITY QUANTIFIED, PAIN PRESENT: ICD-10-PCS | Mod: HCNC,S$GLB,, | Performed by: ORTHOPAEDIC SURGERY

## 2020-11-13 PROCEDURE — 3288F PR FALLS RISK ASSESSMENT DOCUMENTED: ICD-10-PCS | Mod: HCNC,CPTII,S$GLB, | Performed by: ORTHOPAEDIC SURGERY

## 2020-11-13 PROCEDURE — 3080F DIAST BP >= 90 MM HG: CPT | Mod: HCNC,CPTII,S$GLB, | Performed by: ORTHOPAEDIC SURGERY

## 2020-11-13 PROCEDURE — 3008F PR BODY MASS INDEX (BMI) DOCUMENTED: ICD-10-PCS | Mod: HCNC,CPTII,S$GLB, | Performed by: ORTHOPAEDIC SURGERY

## 2020-11-13 PROCEDURE — 99999 PR PBB SHADOW E&M-EST. PATIENT-LVL IV: ICD-10-PCS | Mod: PBBFAC,HCNC,, | Performed by: ORTHOPAEDIC SURGERY

## 2020-11-13 PROCEDURE — 99024 PR POST-OP FOLLOW-UP VISIT: ICD-10-PCS | Mod: HCNC,S$GLB,, | Performed by: ORTHOPAEDIC SURGERY

## 2020-11-13 PROCEDURE — 1101F PT FALLS ASSESS-DOCD LE1/YR: CPT | Mod: HCNC,CPTII,S$GLB, | Performed by: ORTHOPAEDIC SURGERY

## 2020-11-13 PROCEDURE — 1125F AMNT PAIN NOTED PAIN PRSNT: CPT | Mod: HCNC,S$GLB,, | Performed by: ORTHOPAEDIC SURGERY

## 2020-11-13 RX ORDER — ONDANSETRON 4 MG/1
4 TABLET, FILM COATED ORAL EVERY 6 HOURS PRN
Qty: 20 TABLET | Refills: 1 | Status: SHIPPED | OUTPATIENT
Start: 2020-11-13 | End: 2021-02-09

## 2020-11-13 NOTE — TELEPHONE ENCOUNTER
Left message for patient to return call. Patient has Ortho appointment scheduled 11-13-20 at 240p, Dr. Olmedo has an emergent surgery to do at that time. Left message to see if she could come in at 1pm today and if not we could reschedule to Monday. When patient calls back, need to reschedule to Monday if she cannot come for 1pm today. Also, sent Portal message to patient.

## 2020-11-13 NOTE — PROGRESS NOTES
"Subjective:     Patient ID: Conchita Rouse is a 65 y.o. female.    Chief Complaint: Pain and Post-op Evaluation of the Right Knee    This 65-year-old lady is 10 days post arthroscopic synovectomy 4/debridement for pyogenic arthritis of the right knee.  She has aplastic anemia diagnosed 3 years ago.  She states her pain level can get a 4/10.  But she feels like she is improving.  She states she sits a lot during the day.  She she has had no complaints of pain behind her right knee, however she is complaining of pain her right ankle.  She states this bothers her more than her knee.  She has no history of previous ankle swelling.  She has not been attending physical therapy.  She has not been doing much of a home exercise program.      Past Medical History:   Diagnosis Date    Dyslipidemia (high LDL; low HDL)     10y CV event risk 14.7% (asuming no DM2), which could be reduced to 4.5% with RF optimization, for which I recommended atorvastatin 20 mg 1 po qd x2 weeks and then 40 mg 1 po qd    History of shingles 02/14/2018    Per note from Dr. Rogelio Norwood III on 2/14/18; left upper buttocks.   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 Rogelio Norwood III last visit 2/14/18 (tolerating CSA & promacta); also notes aplastic anemia (AA psot [facility] ATG - week 7/17/17) and ITP, for which ATG & promacta following decadron pulse for ITP & AA; plan for second opinion on BM bx; next flow in 3 mo; intent of rx: palliative    Prediabetes     TIA (transient ischemic attack) 2007    She was at "[employer]", where she noted she was feeling slow, took a nap, woke up with a numb/tingling left jaw to arm, which persistned 20 minutes, took 3 baby aspirins, went to ER @ [facility], where facial droop was noted, but completely resolved & head CT revealed old minor abnormality.    Vitamin D deficiency      Past Surgical History:   Procedure Laterality Date    ARTHROSCOPY OF KNEE " Right 11/4/2020    Procedure: ARTHROSCOPY, KNEE;  Surgeon: Sal Pompa MD;  Location: [facility] OR;  Service: Orthopedics;  Laterality: Right;    COLONOSCOPY      INCISION AND DRAINAGE OF KNEE Right 11/4/2020    Procedure: INCISION AND DRAINAGE, KNEE;  Surgeon: Sal Pompa MD;  Location: [facility] OR;  Service: Orthopedics;  Laterality: Right;    left eye surgery  1955    Due to left eye lid drooping     Family History   Problem Relation Age of Onset    Heart disease Mother     Diabetes Mother     Heart disease Father     Cancer Sister     Breast cancer Sister     Cancer Brother      Social History     Socioeconomic History    Marital status:      Spouse name: Not on file    Number of children: Not on file    Years of education: Not on file    Highest education level: Not on file   Occupational History    Not on file   Social Needs    Financial resource strain: Not on file    Food insecurity     Worry: Not on file     Inability: Not on file    Transportation needs     Medical: Not on file     Non-medical: Not on file   Tobacco Use    Smoking status: Never Smoker    Smokeless tobacco: Never Used   Substance and Sexual Activity    Alcohol use: Yes     Alcohol/week: 0.0 - 1.0 standard drinks     Comment: rarely    Drug use: No    Sexual activity: Not Currently     Partners: Male     Birth control/protection: Post-menopausal   Lifestyle    Physical activity     Days per week: Not on file     Minutes per session: Not on file    Stress: Not on file   Relationships    Social connections     Talks on phone: Not on file     Gets together: Not on file     Attends Christianity service: Not on file     Active member of club or organization: Not on file     Attends meetings of clubs or organizations: Not on file     Relationship status: Not on file   Other Topics Concern    Not on file   Social History Narrative    Breakfast: 2 TBS grits or Setswana toast sticks or waffles (previously fried eggs); 1/2  "cup coffee w/ 1 cream & 3 sugars & lemonade "all day"    Lunch: 50% turkey or ham sandwich w/ occasional chips; tea, rare soda    Dinner: Steak fries and baked hot wings, peppermint or chicken wings and french fries; tea or cool aid    Snacks: mini-Hunter[employer] chocolates (1x/wk) or a few unsalted chips (2x/wk) or popcorn (2x/mo) or strawberries or pretzils     Eats out: 2x/wk    Water: 16 oz/d    PA: None     Medication List with Changes/Refills   New Medications    ONDANSETRON (ZOFRAN) 4 MG TABLET    Take 1 tablet (4 mg total) by mouth every 6 (six) hours as needed for Nausea.   Current Medications    AMLODIPINE (NORVASC) 5 MG TABLET    Take 1 tablet (5 mg total) by mouth once daily.    ATORVASTATIN (LIPITOR) 40 MG TABLET    Take 1 tablet (40 mg total) by mouth once daily.    AZELASTINE (ASTELIN) 137 MCG (0.1 %) NASAL SPRAY    1 spray (137 mcg total) by Nasal route 2 (two) times daily.    CEFTRIAXONE (ROCEPHIN) 2 G/50 ML PGBK IVPB    Inject 50 mLs (2 g total) into the vein once daily. for 12 days    ELTROMBOPAG (PROMACTA) 75 MG TAB    Take 75 mg by mouth once daily.     FLUTICASONE (FLONASE) 50 MCG/ACTUATION NASAL SPRAY    USE 2 SPRAYS IN EACH NOSTRIL 1 TIME A DAY AS NEEDED    LORATADINE (CLARITIN) 10 MG TABLET    Take 1 tablet (10 mg total) by mouth once daily.    TRAMADOL (ULTRAM) 50 MG TABLET    Take 1 tablet (50 mg total) by mouth 2 (two) times daily as needed.    VANCOMYCIN HCL IN 5 % DEXTROSE (VANCOMYCIN IN DEXTROSE 5 %) 1 GRAM/250 ML SOLN    Inject 250 mLs (1,000 mg total) into the vein every 12 (twelve) hours. for 12 days     Review of patient's allergies indicates:   Allergen Reactions    Bactrim [sulfamethoxazole-trimethoprim] Edema     Swollen lips    Codeine Other (See Comments)     Pt states codeine does not cause hives. Had tooth extraction and medication given caused her to be jittery, feel hot and have to lay down like she was weak. morhpine given on 5/16/2017 iv for pain. Pt martine well without any " "sign or sx of allergy or reaction.     Review of Systems   Musculoskeletal:        States she is better than she was preoperatively.  If she sits for a period of time the knee gets stiff.  She is not ambulating with aids.        Objective:   Body mass index is 30.18 kg/m².  Vitals:    11/13/20 1336   BP: (!) 170/90   Pulse: 90   Weight: 84.8 kg (187 lb)   Height: 5' 6" (1.676 m)   PainSc:   8   PainLoc: Knee         Physical Exam  Musculoskeletal:      Comments: This is a well-developed large  female alert cooperative.  The right knee has mild-to-moderate swelling with no joint line tenderness.  Portal sutures are present.  Range of motion is 0 to 100°.  She is exquisitely tender in the popliteal fossa and around the neurovascular bundle posterior there is moderate swelling and tenderness on the medial lateral aspect of the right ankle with full range of motion.  Neurovascular status intact.  She ambulates with a pronounced limp.           Synovitis of right knee  -     Cancel: VAS US Venous Leg Right; Future; Expected date: 11/13/2020  -     US Lower Extremity Veins Right; Future; Expected date: 11/13/2020    Synovitis of right ankle    Other orders  -     ondansetron (ZOFRAN) 4 MG tablet; Take 1 tablet (4 mg total) by mouth every 6 (six) hours as needed for Nausea.  Dispense: 20 tablet; Refill: 1        Plan:  A left knee ultrasound was done due to the physical findings suggesting DVT.  The study was normal.  She will be observed closely for resolution of the swelling in the knee and ankle over the next week.  She is able to contact me if she worsens or does not continue improving.  She was also instructed in exercises for the right knee and encouraged to use her stationary bicycle.  She was also instructed in use of ice.      "

## 2020-11-16 ENCOUNTER — EXTERNAL HOME HEALTH (OUTPATIENT)
Dept: HOME HEALTH SERVICES | Facility: HOSPITAL | Age: 65
End: 2020-11-16

## 2020-11-18 ENCOUNTER — LAB VISIT (OUTPATIENT)
Dept: LAB | Facility: HOSPITAL | Age: 65
End: 2020-11-18
Attending: INTERNAL MEDICINE
Payer: MEDICARE

## 2020-11-18 DIAGNOSIS — M00.9 PYOGENIC ARTHRITIS, UPPER ARM: Primary | ICD-10-CM

## 2020-11-18 LAB
ANION GAP SERPL CALC-SCNC: 8 MMOL/L (ref 8–16)
BASOPHILS # BLD AUTO: 0.01 K/UL (ref 0–0.2)
BASOPHILS NFR BLD: 0.3 % (ref 0–1.9)
BUN SERPL-MCNC: 10 MG/DL (ref 8–23)
CALCIUM SERPL-MCNC: 8.5 MG/DL (ref 8.7–10.5)
CHLORIDE SERPL-SCNC: 99 MMOL/L (ref 95–110)
CO2 SERPL-SCNC: 28 MMOL/L (ref 23–29)
CREAT SERPL-MCNC: 1.2 MG/DL (ref 0.5–1.4)
DIFFERENTIAL METHOD: ABNORMAL
EOSINOPHIL # BLD AUTO: 0.2 K/UL (ref 0–0.5)
EOSINOPHIL NFR BLD: 5.1 % (ref 0–8)
ERYTHROCYTE [DISTWIDTH] IN BLOOD BY AUTOMATED COUNT: 13.9 % (ref 11.5–14.5)
ERYTHROCYTE [SEDIMENTATION RATE] IN BLOOD BY WESTERGREN METHOD: 95 MM/HR (ref 0–20)
EST. GFR  (AFRICAN AMERICAN): 55 ML/MIN/1.73 M^2
EST. GFR  (NON AFRICAN AMERICAN): 48 ML/MIN/1.73 M^2
GLUCOSE SERPL-MCNC: 258 MG/DL (ref 70–110)
HCT VFR BLD AUTO: 30 % (ref 37–48.5)
HGB BLD-MCNC: 9.6 G/DL (ref 12–16)
IMM GRANULOCYTES # BLD AUTO: 0.02 K/UL (ref 0–0.04)
IMM GRANULOCYTES NFR BLD AUTO: 0.6 % (ref 0–0.5)
LYMPHOCYTES # BLD AUTO: 0.8 K/UL (ref 1–4.8)
LYMPHOCYTES NFR BLD: 24.4 % (ref 18–48)
MCH RBC QN AUTO: 35.6 PG (ref 27–31)
MCHC RBC AUTO-ENTMCNC: 32 G/DL (ref 32–36)
MCV RBC AUTO: 111 FL (ref 82–98)
MONOCYTES # BLD AUTO: 0.5 K/UL (ref 0.3–1)
MONOCYTES NFR BLD: 14.7 % (ref 4–15)
NEUTROPHILS # BLD AUTO: 1.7 K/UL (ref 1.8–7.7)
NEUTROPHILS NFR BLD: 54.9 % (ref 38–73)
NRBC BLD-RTO: 1 /100 WBC
PLATELET # BLD AUTO: 42 K/UL (ref 150–350)
PMV BLD AUTO: 11.9 FL (ref 9.2–12.9)
POTASSIUM SERPL-SCNC: 3.1 MMOL/L (ref 3.5–5.1)
RBC # BLD AUTO: 2.7 M/UL (ref 4–5.4)
SODIUM SERPL-SCNC: 135 MMOL/L (ref 136–145)
WBC # BLD AUTO: 3.12 K/UL (ref 3.9–12.7)

## 2020-11-18 PROCEDURE — 85651 RBC SED RATE NONAUTOMATED: CPT | Mod: HCNC

## 2020-11-18 PROCEDURE — 85025 COMPLETE CBC W/AUTO DIFF WBC: CPT | Mod: HCNC

## 2020-11-18 PROCEDURE — 80202 ASSAY OF VANCOMYCIN: CPT | Mod: HCNC

## 2020-11-18 PROCEDURE — 80048 BASIC METABOLIC PNL TOTAL CA: CPT | Mod: HCNC

## 2020-11-19 ENCOUNTER — TELEPHONE (OUTPATIENT)
Dept: PULMONOLOGY | Facility: CLINIC | Age: 65
End: 2020-11-19

## 2020-11-19 ENCOUNTER — TELEPHONE (OUTPATIENT)
Dept: ORTHOPEDICS | Facility: CLINIC | Age: 65
End: 2020-11-19

## 2020-11-19 RX ORDER — POTASSIUM CHLORIDE 20 MEQ/1
20 TABLET, EXTENDED RELEASE ORAL ONCE
Qty: 1 TABLET | Refills: 0 | Status: SHIPPED | OUTPATIENT
Start: 2020-11-19 | End: 2020-11-19

## 2020-11-19 NOTE — TELEPHONE ENCOUNTER
----- Message from Key Christian sent at 11/19/2020 10:51 AM CST -----  Regarding: nausea  Contact: Kanwal- [facility]  Ms Schofield states that she is still having nausea even after taking Zofran, would like to have something stronger called in, please call Ms Schofield back at [phone]

## 2020-11-19 NOTE — TELEPHONE ENCOUNTER
Spoke with Kanwal/ [facility] and informed her that I would forward her message to Rogelio Cristina PA-C to contact her. Understanding verbalized.

## 2020-11-19 NOTE — TELEPHONE ENCOUNTER
Pt states she is feeling bad since taking vancomycin.  She states that she does not know how long she is supposed to be taking it but she stopped yesterday due to her feeling bad.  Can you clarify with her?

## 2020-11-19 NOTE — TELEPHONE ENCOUNTER
----- Message from Kristie Phelan sent at 11/19/2020  9:33 AM CST -----  Contact: [phone]/SELF  Type:  Needs Medical Advice    Who Called: Patient  Symptoms (please be specific):  Weak and Nausea  How long has patient had these symptoms:    Pharmacy name and phone #:    Would the patient rather a call back or a response via [facility]? Call Back  Best Call Back Number: [phone]  Additional Information: Patient is very ill from medication and would like to speak with nurse        Mallorie/ZECHARIAH

## 2020-11-20 ENCOUNTER — OFFICE VISIT (OUTPATIENT)
Dept: INTERNAL MEDICINE | Facility: CLINIC | Age: 65
End: 2020-11-20
Payer: MEDICARE

## 2020-11-20 ENCOUNTER — OFFICE VISIT (OUTPATIENT)
Dept: ORTHOPEDICS | Facility: CLINIC | Age: 65
End: 2020-11-20
Payer: MEDICARE

## 2020-11-20 ENCOUNTER — TELEPHONE (OUTPATIENT)
Dept: PULMONOLOGY | Facility: CLINIC | Age: 65
End: 2020-11-20

## 2020-11-20 ENCOUNTER — TELEPHONE (OUTPATIENT)
Dept: ORTHOPEDICS | Facility: HOSPITAL | Age: 65
End: 2020-11-20

## 2020-11-20 VITALS
HEART RATE: 80 BPM | HEIGHT: 66 IN | BODY MASS INDEX: 29.83 KG/M2 | WEIGHT: 185.63 LBS | SYSTOLIC BLOOD PRESSURE: 124 MMHG | RESPIRATION RATE: 20 BRPM | DIASTOLIC BLOOD PRESSURE: 90 MMHG | TEMPERATURE: 98 F | OXYGEN SATURATION: 99 %

## 2020-11-20 VITALS
WEIGHT: 185 LBS | DIASTOLIC BLOOD PRESSURE: 80 MMHG | HEART RATE: 91 BPM | BODY MASS INDEX: 29.73 KG/M2 | SYSTOLIC BLOOD PRESSURE: 122 MMHG | HEIGHT: 66 IN

## 2020-11-20 DIAGNOSIS — R82.90 ABNORMAL URINE: ICD-10-CM

## 2020-11-20 DIAGNOSIS — I10 ESSENTIAL HYPERTENSION: ICD-10-CM

## 2020-11-20 DIAGNOSIS — R73.9 HYPERGLYCEMIA: Primary | ICD-10-CM

## 2020-11-20 DIAGNOSIS — M00.9 PYOGENIC ARTHRITIS OF RIGHT KNEE JOINT, DUE TO UNSPECIFIED ORGANISM: Primary | ICD-10-CM

## 2020-11-20 LAB
BILIRUB SERPL-MCNC: ABNORMAL MG/DL
BLOOD URINE, POC: 250
CLARITY, POC UA: CLEAR
COLOR, POC UA: ABNORMAL
GLUCOSE UR QL STRIP: ABNORMAL
KETONES UR QL STRIP: ABNORMAL
LEUKOCYTE ESTERASE URINE, POC: ABNORMAL
NITRITE, POC UA: ABNORMAL
PH, POC UA: 5
PROTEIN, POC: ABNORMAL
SPECIFIC GRAVITY, POC UA: 1.02
UROBILINOGEN, POC UA: ABNORMAL
VANCOMYCIN TROUGH SERPL-MCNC: 8.8 UG/ML (ref 10–22)

## 2020-11-20 PROCEDURE — 3008F PR BODY MASS INDEX (BMI) DOCUMENTED: ICD-10-PCS | Mod: HCNC,CPTII,S$GLB, | Performed by: PHYSICIAN ASSISTANT

## 2020-11-20 PROCEDURE — 3074F SYST BP LT 130 MM HG: CPT | Mod: HCNC,CPTII,S$GLB, | Performed by: FAMILY MEDICINE

## 2020-11-20 PROCEDURE — 3008F BODY MASS INDEX DOCD: CPT | Mod: HCNC,CPTII,S$GLB, | Performed by: FAMILY MEDICINE

## 2020-11-20 PROCEDURE — 81002 URINALYSIS NONAUTO W/O SCOPE: CPT | Mod: HCNC,S$GLB,, | Performed by: FAMILY MEDICINE

## 2020-11-20 PROCEDURE — 1125F PR PAIN SEVERITY QUANTIFIED, PAIN PRESENT: ICD-10-PCS | Mod: HCNC,S$GLB,, | Performed by: PHYSICIAN ASSISTANT

## 2020-11-20 PROCEDURE — 3078F DIAST BP <80 MM HG: CPT | Mod: HCNC,CPTII,S$GLB, | Performed by: FAMILY MEDICINE

## 2020-11-20 PROCEDURE — 99999 PR PBB SHADOW E&M-EST. PATIENT-LVL IV: ICD-10-PCS | Mod: PBBFAC,HCNC,, | Performed by: FAMILY MEDICINE

## 2020-11-20 PROCEDURE — 1125F AMNT PAIN NOTED PAIN PRSNT: CPT | Mod: HCNC,S$GLB,, | Performed by: PHYSICIAN ASSISTANT

## 2020-11-20 PROCEDURE — 3008F BODY MASS INDEX DOCD: CPT | Mod: HCNC,CPTII,S$GLB, | Performed by: PHYSICIAN ASSISTANT

## 2020-11-20 PROCEDURE — 1125F AMNT PAIN NOTED PAIN PRSNT: CPT | Mod: HCNC,S$GLB,, | Performed by: FAMILY MEDICINE

## 2020-11-20 PROCEDURE — 99214 OFFICE O/P EST MOD 30 MIN: CPT | Mod: HCNC,S$GLB,, | Performed by: FAMILY MEDICINE

## 2020-11-20 PROCEDURE — 99999 PR PBB SHADOW E&M-EST. PATIENT-LVL IV: CPT | Mod: PBBFAC,HCNC,, | Performed by: FAMILY MEDICINE

## 2020-11-20 PROCEDURE — 3008F PR BODY MASS INDEX (BMI) DOCUMENTED: ICD-10-PCS | Mod: HCNC,CPTII,S$GLB, | Performed by: FAMILY MEDICINE

## 2020-11-20 PROCEDURE — 81002 POCT URINE DIPSTICK WITHOUT MICROSCOPE: ICD-10-PCS | Mod: HCNC,S$GLB,, | Performed by: FAMILY MEDICINE

## 2020-11-20 PROCEDURE — 99024 POSTOP FOLLOW-UP VISIT: CPT | Mod: HCNC,S$GLB,, | Performed by: PHYSICIAN ASSISTANT

## 2020-11-20 PROCEDURE — 99214 PR OFFICE/OUTPT VISIT, EST, LEVL IV, 30-39 MIN: ICD-10-PCS | Mod: HCNC,S$GLB,, | Performed by: FAMILY MEDICINE

## 2020-11-20 PROCEDURE — 1125F PR PAIN SEVERITY QUANTIFIED, PAIN PRESENT: ICD-10-PCS | Mod: HCNC,S$GLB,, | Performed by: FAMILY MEDICINE

## 2020-11-20 PROCEDURE — 3078F PR MOST RECENT DIASTOLIC BLOOD PRESSURE < 80 MM HG: ICD-10-PCS | Mod: HCNC,CPTII,S$GLB, | Performed by: FAMILY MEDICINE

## 2020-11-20 PROCEDURE — 99024 PR POST-OP FOLLOW-UP VISIT: ICD-10-PCS | Mod: HCNC,S$GLB,, | Performed by: PHYSICIAN ASSISTANT

## 2020-11-20 PROCEDURE — 99999 PR PBB SHADOW E&M-EST. PATIENT-LVL III: ICD-10-PCS | Mod: PBBFAC,HCNC,, | Performed by: PHYSICIAN ASSISTANT

## 2020-11-20 PROCEDURE — 87086 URINE CULTURE/COLONY COUNT: CPT | Mod: HCNC

## 2020-11-20 PROCEDURE — 3074F PR MOST RECENT SYSTOLIC BLOOD PRESSURE < 130 MM HG: ICD-10-PCS | Mod: HCNC,CPTII,S$GLB, | Performed by: FAMILY MEDICINE

## 2020-11-20 PROCEDURE — 99999 PR PBB SHADOW E&M-EST. PATIENT-LVL III: CPT | Mod: PBBFAC,HCNC,, | Performed by: PHYSICIAN ASSISTANT

## 2020-11-20 RX ORDER — METFORMIN HYDROCHLORIDE 500 MG/1
500 TABLET ORAL
Qty: 30 TABLET | Refills: 3 | Status: SHIPPED | OUTPATIENT
Start: 2020-11-20 | End: 2021-10-15

## 2020-11-20 RX ORDER — PROMETHAZINE HYDROCHLORIDE 25 MG/1
25 TABLET ORAL EVERY 6 HOURS PRN
Qty: 20 TABLET | Refills: 0 | Status: SHIPPED | OUTPATIENT
Start: 2020-11-20 | End: 2021-02-09

## 2020-11-20 NOTE — TELEPHONE ENCOUNTER
----- Message from Alejandra Barboza sent at 11/19/2020  5:47 PM CST -----  Appointment  Request      Who called:Patient  Reason for visit:medications making her sick  New or Existing Patient:new  Callback or [facility] response:callback  Best contact number:[phone]  Additional information:

## 2020-11-20 NOTE — TELEPHONE ENCOUNTER
Will Rx promethazine 25mg q6h PRN nausea. Discontinue taking Zofran. Instructed to f/u c Dr. Hernandez for IVAB treatment f/u.       Rogelio Cristina PA-C  Orthopedic Surgery      ----- Message from Sachi Rivera sent at 11/19/2020 11:48 AM CST -----  Regarding: FW: nausea  Contact: [facility]  Ms Schofield states that she is still having nausea even after taking Zofran, would like to have something stronger called in, please call Ms Schofield back at [phone], also patient had a temp of 101 on yesterday, but states that she has no temp today and she is questioning the length of time she should be on her IV antibiotics.   Please Call [facility] [phone]  Thank You  ----- Message -----  From: Key Christian  Sent: 11/19/2020  10:51 AM CST  To: Joni RAJAN Staff  Subject: nausea                                           Ms Schofield states that she is still having nausea even after taking Zofran, would like to have something stronger called in, please call Ms Schofield back at [phone]

## 2020-11-22 LAB — BACTERIA UR CULT: NO GROWTH

## 2020-11-22 RX ORDER — LEVOFLOXACIN 500 MG/1
500 TABLET, FILM COATED ORAL DAILY
Qty: 14 TABLET | Refills: 0 | Status: SHIPPED | OUTPATIENT
Start: 2020-11-22 | End: 2020-12-06

## 2020-11-22 RX ORDER — MINOCYCLINE HYDROCHLORIDE 100 MG/1
100 TABLET ORAL EVERY 12 HOURS
Qty: 28 TABLET | Refills: 0 | Status: SHIPPED | OUTPATIENT
Start: 2020-11-22 | End: 2020-12-06

## 2020-11-24 ENCOUNTER — TELEPHONE (OUTPATIENT)
Dept: INTERNAL MEDICINE | Facility: CLINIC | Age: 65
End: 2020-11-24

## 2020-11-24 NOTE — TELEPHONE ENCOUNTER
----- Message from Marisol Betts sent at 11/24/2020  4:53 PM CST -----  .Type:  Patient Returning Call    Who Called:pt  Who Left Message for Patient:Chau  Does the patient know what this is regarding?:no  Would the patient rather a call back or a response via [facility]? Call back  Best Call Back Number:[phone]  Additional Information:

## 2020-11-25 ENCOUNTER — PATIENT OUTREACH (OUTPATIENT)
Dept: ADMINISTRATIVE | Facility: HOSPITAL | Age: 65
End: 2020-11-25

## 2020-11-25 ENCOUNTER — TELEPHONE (OUTPATIENT)
Dept: INTERNAL MEDICINE | Facility: CLINIC | Age: 65
End: 2020-11-25

## 2020-11-25 NOTE — TELEPHONE ENCOUNTER
----- Message from Nancy Valles MD sent at 11/25/2020  9:14 AM CST -----  Please contact [facility]  to ask them to fax me next bp when they go to her home.

## 2020-11-25 NOTE — PROGRESS NOTES
"Subjective:      Patient ID: Conchita Rouse is a 65 y.o. female.    Chief Complaint: Hospital Follow Up      Patient here today for inpatient follow-up, was hospitalized for suspected pyogenic arthritis of knee, has follow-up with ID.  Labs drawn previously reviewed today with the patient.  Blood pressure is uncontrolled, home health had stated patient had stopped amlodipine and atorvastatin after med rec but she says today she is taking it.  Prediabetes stable, A1c at 6.3.  She does report noticing abnormal urine, says she notices some frothing from the urine.    Review of Systems   Constitutional: Negative for activity change, appetite change, fatigue and fever.   Respiratory: Negative for cough and shortness of breath.    Cardiovascular: Negative for chest pain and palpitations.   Gastrointestinal: Negative for abdominal pain, constipation and diarrhea.   Endocrine: Negative for polydipsia and polyphagia.   Musculoskeletal: Negative for arthralgias and neck pain.   Psychiatric/Behavioral: Negative for sleep disturbance.     Past Medical History:   Diagnosis Date    Dyslipidemia (high LDL; low HDL)     10y CV event risk 14.7% (asuming no DM2), which could be reduced to 4.5% with RF optimization, for which I recommended atorvastatin 20 mg 1 po qd x2 weeks and then 40 mg 1 po qd    History of shingles 02/14/2018    Per note from Dr. Rogelio Norwood III on 2/14/18; left upper buttocks.   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 Rogelio Norwood III last visit 2/14/18 (tolerating CSA & promacta); also notes aplastic anemia (AA psot Horse ATG - week 7/17/17) and ITP, for which ATG & promacta following decadron pulse for ITP & AA; plan for second opinion on BM bx; next flow in 3 mo; intent of rx: palliative    Prediabetes     TIA (transient ischemic attack) 2007    She was at [employer]", where she noted she was feeling slow, took a nap, woke up with a " numb/tingling left jaw to arm, which persistned 20 minutes, took 3 baby aspirins, went to ER @ [facility], where facial droop was noted, but completely resolved & head CT revealed old minor abnormality.    Vitamin D deficiency           Past Surgical History:   Procedure Laterality Date    ARTHROSCOPY OF KNEE Right 11/4/2020    Procedure: ARTHROSCOPY, KNEE;  Surgeon: Sal Pompa MD;  Location: [facility] OR;  Service: Orthopedics;  Laterality: Right;    COLONOSCOPY      INCISION AND DRAINAGE OF KNEE Right 11/4/2020    Procedure: INCISION AND DRAINAGE, KNEE;  Surgeon: Sal Pompa MD;  Location: [facility] OR;  Service: Orthopedics;  Laterality: Right;    left eye surgery  1955    Due to left eye lid drooping     Family History   Problem Relation Age of Onset    Heart disease Mother     Diabetes Mother     Heart disease Father     Cancer Sister     Breast cancer Sister     Cancer Brother      Social History     Socioeconomic History    Marital status:      Spouse name: Not on file    Number of children: Not on file    Years of education: Not on file    Highest education level: Not on file   Occupational History    Not on file   Social Needs    Financial resource strain: Not on file    Food insecurity     Worry: Not on file     Inability: Not on file    Transportation needs     Medical: Not on file     Non-medical: Not on file   Tobacco Use    Smoking status: Never Smoker    Smokeless tobacco: Never Used   Substance and Sexual Activity    Alcohol use: Yes     Alcohol/week: 0.0 - 1.0 standard drinks     Comment: rarely    Drug use: No    Sexual activity: Not Currently     Partners: Male     Birth control/protection: Post-menopausal   Lifestyle    Physical activity     Days per week: Not on file     Minutes per session: Not on file    Stress: Not on file   Relationships    Social connections     Talks on phone: Not on file     Gets together: Not on file     Attends Muslim service: Not on file  "    Active member of club or organization: Not on file     Attends meetings of clubs or organizations: Not on file     Relationship status: Not on file   Other Topics Concern    Not on file   Social History Narrative    Breakfast: 2 TBS grits or french toast sticks or waffles (previously fried eggs); 1/2 cup coffee w/ 1 cream & 3 sugars & lemonade "all day"    Lunch: 50% turkey or ham sandwich w/ occasional chips; tea, rare soda    Dinner: Steak fries and baked hot wings, peppermint or chicken wings and french fries; tea or cool aid    Snacks: mini-[employer] chocolates (1x/wk) or a few unsalted chips (2x/wk) or popcorn (2x/mo) or strawberries or pretzils     Eats out: 2x/wk    Water: 16 oz/d    PA: None     Review of patient's allergies indicates:   Allergen Reactions    Bactrim [sulfamethoxazole-trimethoprim] Edema     Swollen lips    Codeine Other (See Comments)     Pt states codeine does not cause hives. Had tooth extraction and medication given caused her to be jittery, feel hot and have to lay down like she was weak. morhpine given on 5/16/2017 iv for pain. Pt martine well without any sign or sx of allergy or reaction.       Objective:       BP (!) 124/90   Pulse 80   Temp 97.7 °F (36.5 °C) (Temporal)   Resp 20   Ht 5' 6" (1.676 m)   Wt 84.2 kg (185 lb 10 oz)   SpO2 99%   BMI 29.96 kg/m²   Physical Exam  Vitals signs reviewed.   Constitutional:       General: She is not in acute distress.     Appearance: Normal appearance. She is well-developed. She is not ill-appearing or diaphoretic.   HENT:      Head: Normocephalic and atraumatic.      Right Ear: Hearing, tympanic membrane, ear canal and external ear normal.      Left Ear: Hearing, tympanic membrane, ear canal and external ear normal.      Nose: Nose normal.      Mouth/Throat:      Pharynx: Uvula midline. No oropharyngeal exudate.   Eyes:      Conjunctiva/sclera: Conjunctivae normal.      Pupils: Pupils are equal, round, and reactive to light.   Neck: "      Musculoskeletal: Normal range of motion and neck supple.      Thyroid: No thyromegaly.      Trachea: No tracheal deviation.   Cardiovascular:      Rate and Rhythm: Normal rate and regular rhythm.      Heart sounds: Normal heart sounds. No murmur.   Pulmonary:      Effort: Pulmonary effort is normal. No respiratory distress.      Breath sounds: Normal breath sounds.   Abdominal:      General: Bowel sounds are normal.      Palpations: Abdomen is soft.      Tenderness: There is no abdominal tenderness. There is no guarding.      Hernia: No hernia is present.   Musculoskeletal: Normal range of motion.   Lymphadenopathy:      Cervical: No cervical adenopathy.   Skin:     General: Skin is warm and dry.      Capillary Refill: Capillary refill takes less than 2 seconds.   Neurological:      General: No focal deficit present.      Mental Status: She is alert and oriented to person, place, and time.   Psychiatric:         Mood and Affect: Mood normal.         Behavior: Behavior normal.         Thought Content: Thought content normal.         Judgment: Judgment normal.       Assessment:     1. Hyperglycemia    2. Essential hypertension    3. Abnormal urine      Plan:   Hyperglycemia    Essential hypertension    Abnormal urine  -     POCT URINE DIPSTICK WITHOUT MICROSCOPE  -     Urine culture; Future; Expected date: 11/20/2020    Other orders  -     metFORMIN (GLUCOPHAGE) 500 MG tablet; Take 1 tablet (500 mg total) by mouth daily with breakfast.  Dispense: 30 tablet; Refill: 3     Continue current medications  Medication List with Changes/Refills   New Medications    LEVOFLOXACIN (LEVAQUIN) 500 MG TABLET    Take 1 tablet (500 mg total) by mouth once daily. for 14 days    METFORMIN (GLUCOPHAGE) 500 MG TABLET    Take 1 tablet (500 mg total) by mouth daily with breakfast.    MINOCYCLINE (DYNACIN) 100 MG TABLET    Take 1 tablet (100 mg total) by mouth every 12 (twelve) hours. for 14 days   Current Medications    AMLODIPINE  (NORVASC) 5 MG TABLET    Take 1 tablet (5 mg total) by mouth once daily.    ATORVASTATIN (LIPITOR) 40 MG TABLET    Take 1 tablet (40 mg total) by mouth once daily.    AZELASTINE (ASTELIN) 137 MCG (0.1 %) NASAL SPRAY    1 spray (137 mcg total) by Nasal route 2 (two) times daily.    ELTROMBOPAG (PROMACTA) 75 MG TAB    Take 75 mg by mouth once daily.     FLUTICASONE (FLONASE) 50 MCG/ACTUATION NASAL SPRAY    USE 2 SPRAYS IN EACH NOSTRIL 1 TIME A DAY AS NEEDED    LORATADINE (CLARITIN) 10 MG TABLET    Take 1 tablet (10 mg total) by mouth once daily.    ONDANSETRON (ZOFRAN) 4 MG TABLET    Take 1 tablet (4 mg total) by mouth every 6 (six) hours as needed for Nausea.    PROMETHAZINE (PHENERGAN) 25 MG TABLET    Take 1 tablet (25 mg total) by mouth every 6 (six) hours as needed for Nausea.    TRAMADOL (ULTRAM) 50 MG TABLET    Take 1 tablet (50 mg total) by mouth 2 (two) times daily as needed.        none

## 2020-11-25 NOTE — PROGRESS NOTES
HTN Report. Patient was seen in the ortho department on 11/20/20 and BP was 122/80. Patient is scheduled to f/u with ortho on 12/04/20 and with  on 12/16/20.

## 2020-11-30 ENCOUNTER — TELEPHONE (OUTPATIENT)
Dept: INTERNAL MEDICINE | Facility: CLINIC | Age: 65
End: 2020-11-30

## 2020-11-30 ENCOUNTER — OFFICE VISIT (OUTPATIENT)
Dept: INTERNAL MEDICINE | Facility: CLINIC | Age: 65
End: 2020-11-30
Payer: MEDICARE

## 2020-11-30 VITALS
OXYGEN SATURATION: 97 % | DIASTOLIC BLOOD PRESSURE: 80 MMHG | TEMPERATURE: 98 F | BODY MASS INDEX: 29.5 KG/M2 | HEART RATE: 87 BPM | SYSTOLIC BLOOD PRESSURE: 122 MMHG | WEIGHT: 182.75 LBS | RESPIRATION RATE: 18 BRPM

## 2020-11-30 DIAGNOSIS — L30.9 DERMATITIS: ICD-10-CM

## 2020-11-30 DIAGNOSIS — R11.0 NAUSEA: Primary | ICD-10-CM

## 2020-11-30 DIAGNOSIS — B37.9 CANDIDIASIS: ICD-10-CM

## 2020-11-30 DIAGNOSIS — M00.9 PYOGENIC ARTHRITIS OF RIGHT KNEE JOINT, DUE TO UNSPECIFIED ORGANISM: Primary | ICD-10-CM

## 2020-11-30 DIAGNOSIS — B37.0 THRUSH: ICD-10-CM

## 2020-11-30 PROCEDURE — 3079F DIAST BP 80-89 MM HG: CPT | Mod: HCNC,CPTII,S$GLB, | Performed by: FAMILY MEDICINE

## 2020-11-30 PROCEDURE — 3008F BODY MASS INDEX DOCD: CPT | Mod: HCNC,CPTII,S$GLB, | Performed by: FAMILY MEDICINE

## 2020-11-30 PROCEDURE — 3074F PR MOST RECENT SYSTOLIC BLOOD PRESSURE < 130 MM HG: ICD-10-PCS | Mod: HCNC,CPTII,S$GLB, | Performed by: FAMILY MEDICINE

## 2020-11-30 PROCEDURE — 99214 PR OFFICE/OUTPT VISIT, EST, LEVL IV, 30-39 MIN: ICD-10-PCS | Mod: HCNC,S$GLB,, | Performed by: FAMILY MEDICINE

## 2020-11-30 PROCEDURE — 99999 PR PBB SHADOW E&M-EST. PATIENT-LVL IV: ICD-10-PCS | Mod: PBBFAC,HCNC,, | Performed by: FAMILY MEDICINE

## 2020-11-30 PROCEDURE — 99999 PR PBB SHADOW E&M-EST. PATIENT-LVL IV: CPT | Mod: PBBFAC,HCNC,, | Performed by: FAMILY MEDICINE

## 2020-11-30 PROCEDURE — 3008F PR BODY MASS INDEX (BMI) DOCUMENTED: ICD-10-PCS | Mod: HCNC,CPTII,S$GLB, | Performed by: FAMILY MEDICINE

## 2020-11-30 PROCEDURE — 99214 OFFICE O/P EST MOD 30 MIN: CPT | Mod: HCNC,S$GLB,, | Performed by: FAMILY MEDICINE

## 2020-11-30 PROCEDURE — 1126F PR PAIN SEVERITY QUANTIFIED, NO PAIN PRESENT: ICD-10-PCS | Mod: HCNC,S$GLB,, | Performed by: FAMILY MEDICINE

## 2020-11-30 PROCEDURE — 3074F SYST BP LT 130 MM HG: CPT | Mod: HCNC,CPTII,S$GLB, | Performed by: FAMILY MEDICINE

## 2020-11-30 PROCEDURE — 3079F PR MOST RECENT DIASTOLIC BLOOD PRESSURE 80-89 MM HG: ICD-10-PCS | Mod: HCNC,CPTII,S$GLB, | Performed by: FAMILY MEDICINE

## 2020-11-30 PROCEDURE — 1126F AMNT PAIN NOTED NONE PRSNT: CPT | Mod: HCNC,S$GLB,, | Performed by: FAMILY MEDICINE

## 2020-11-30 RX ORDER — TRIAMCINOLONE ACETONIDE 0.25 MG/G
CREAM TOPICAL 2 TIMES DAILY
Qty: 80 G | Refills: 1 | Status: SHIPPED | OUTPATIENT
Start: 2020-11-30 | End: 2021-02-09

## 2020-11-30 RX ORDER — FLUCONAZOLE 150 MG/1
TABLET ORAL
Qty: 2 TABLET | Refills: 0 | Status: SHIPPED | OUTPATIENT
Start: 2020-11-30 | End: 2021-02-09

## 2020-11-30 NOTE — PROGRESS NOTES
"Subjective:      Patient ID: Conchita Rouse is a 65 y.o. female.    Chief Complaint: Nausea and Rash (between thighs)      Patient reports nausea which is almost constant, infectious specialist has her on minocycline and levaquin currently. Reports rash in groin which is improving and soreness, white film in mouth - has started using otc meds are both are improving but still bothering her. Also reports fine rash to arms and legs she first noticed today.   Saw her hematologist Dr. Norwood last week - was told she was stable    Review of Systems   Constitutional: Positive for fatigue. Negative for activity change, appetite change and fever (improved).   Gastrointestinal: Positive for nausea. Negative for abdominal pain, constipation, diarrhea and vomiting.   Genitourinary: Negative for dysuria.   Musculoskeletal: Positive for arthralgias and myalgias.   Psychiatric/Behavioral: Positive for sleep disturbance.     Past Medical History:   Diagnosis Date    Dyslipidemia (high LDL; low HDL)     10y CV event risk 14.7% (asuming no DM2), which could be reduced to 4.5% with RF optimization, for which I recommended atorvastatin 20 mg 1 po qd x2 weeks and then 40 mg 1 po qd    History of shingles 02/14/2018    Per note from Dr. Rogelio Norwood III on 2/14/18; left upper buttocks.   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 Rogelio Norwood III last visit 2/14/18 (tolerating CSA & promacta); also notes aplastic anemia (AA psot Horse ATG - week 7/17/17) and ITP, for which ATG & promacta following decadron pulse for ITP & AA; plan for second opinion on BM bx; next flow in 3 mo; intent of rx: palliative    Prediabetes     TIA (transient ischemic attack) 2007    She was at "[employer]", where she noted she was feeling slow, took a nap, woke up with a numb/tingling left jaw to arm, which persistned 20 minutes, took 3 baby aspirins, went to ER @ [facility], where facial droop " was noted, but completely resolved & head CT revealed old minor abnormality.    Vitamin D deficiency           Past Surgical History:   Procedure Laterality Date    ARTHROSCOPY OF KNEE Right 11/4/2020    Procedure: ARTHROSCOPY, KNEE;  Surgeon: Sal Pompa MD;  Location: [facility] OR;  Service: Orthopedics;  Laterality: Right;    COLONOSCOPY      INCISION AND DRAINAGE OF KNEE Right 11/4/2020    Procedure: INCISION AND DRAINAGE, KNEE;  Surgeon: Sal Pompa MD;  Location: [facility] OR;  Service: Orthopedics;  Laterality: Right;    left eye surgery  1955    Due to left eye lid drooping     Family History   Problem Relation Age of Onset    Heart disease Mother     Diabetes Mother     Heart disease Father     Cancer Sister     Breast cancer Sister     Cancer Brother      Social History     Socioeconomic History    Marital status:      Spouse name: Not on file    Number of children: Not on file    Years of education: Not on file    Highest education level: Not on file   Occupational History    Not on file   Social Needs    Financial resource strain: Not on file    Food insecurity     Worry: Not on file     Inability: Not on file    Transportation needs     Medical: Not on file     Non-medical: Not on file   Tobacco Use    Smoking status: Never Smoker    Smokeless tobacco: Never Used   Substance and Sexual Activity    Alcohol use: Yes     Alcohol/week: 0.0 - 1.0 standard drinks     Comment: rarely    Drug use: No    Sexual activity: Not Currently     Partners: Male     Birth control/protection: Post-menopausal   Lifestyle    Physical activity     Days per week: Not on file     Minutes per session: Not on file    Stress: Not on file   Relationships    Social connections     Talks on phone: Not on file     Gets together: Not on file     Attends Moravian service: Not on file     Active member of club or organization: Not on file     Attends meetings of clubs or organizations: Not on file      "Relationship status: Not on file   Other Topics Concern    Not on file   Social History Narrative    Breakfast: 2 TBS grits or french toast sticks or waffles (previously fried eggs); 1/2 cup coffee w/ 1 cream & 3 sugars & lemonade "all day"    Lunch: 50% turkey or ham sandwich w/ occasional chips; tea, rare soda    Dinner: Steak fries and baked hot wings, peppermint or chicken wings and french fries; tea or cool aid    Snacks: mini-[employer] chocolates (1x/wk) or a few unsalted chips (2x/wk) or popcorn (2x/mo) or strawberries or pretzils     Eats out: 2x/wk    Water: 16 oz/d    PA: None     Review of patient's allergies indicates:   Allergen Reactions    Bactrim [sulfamethoxazole-trimethoprim] Edema     Swollen lips    Codeine Other (See Comments)     Pt states codeine does not cause hives. Had tooth extraction and medication given caused her to be jittery, feel hot and have to lay down like she was weak. morhpine given on 5/16/2017 iv for pain. Pt martine well without any sign or sx of allergy or reaction.       Objective:       /80 (BP Location: Right arm, Patient Position: Sitting, BP Method: Medium (Manual))   Pulse 87   Temp 97.7 °F (36.5 °C) (Temporal)   Resp 18   Wt 82.9 kg (182 lb 12.2 oz)   SpO2 97%   BMI 29.50 kg/m²   Physical Exam  Vitals signs reviewed.   Constitutional:       General: She is not in acute distress.     Appearance: Normal appearance. She is well-developed. She is not ill-appearing or diaphoretic.   HENT:      Head: Normocephalic and atraumatic.      Right Ear: Hearing, tympanic membrane, ear canal and external ear normal.      Left Ear: Hearing, tympanic membrane, ear canal and external ear normal.      Nose: Nose normal.      Mouth/Throat:      Pharynx: Uvula midline. Oropharyngeal exudate present.      Comments: Mild thrush on tongue, buccal mucosa  Eyes:      Conjunctiva/sclera: Conjunctivae normal.      Pupils: Pupils are equal, round, and reactive to light.   Neck:      " Musculoskeletal: Normal range of motion and neck supple.      Thyroid: No thyromegaly.      Trachea: No tracheal deviation.   Cardiovascular:      Rate and Rhythm: Normal rate and regular rhythm.      Heart sounds: Normal heart sounds. No murmur.   Pulmonary:      Effort: Pulmonary effort is normal. No respiratory distress.      Breath sounds: Normal breath sounds.   Abdominal:      General: Bowel sounds are normal.      Palpations: Abdomen is soft.      Tenderness: There is no abdominal tenderness. There is no guarding.      Hernia: No hernia is present.   Musculoskeletal: Normal range of motion.   Lymphadenopathy:      Cervical: No cervical adenopathy.   Skin:     General: Skin is warm and dry.      Capillary Refill: Capillary refill takes less than 2 seconds.      Findings: Rash (fine erythematous macules on extremities, dry skin) present.   Neurological:      General: No focal deficit present.      Mental Status: She is alert and oriented to person, place, and time.   Psychiatric:         Mood and Affect: Mood normal.         Behavior: Behavior normal.         Thought Content: Thought content normal.         Judgment: Judgment normal.       Assessment:     1. Nausea    2. Thrush    3. Candidiasis    4. Dermatitis      Plan:   Nausea    Thrush    Candidiasis    Dermatitis    Other orders  -     fluconazole (DIFLUCAN) 150 MG Tab; Take first tablet today, then repeat in 3 days.  Dispense: 2 tablet; Refill: 0  -     triamcinolone acetonide 0.025% (KENALOG) 0.025 % cream; Apply topically 2 (two) times daily.  Dispense: 80 g; Refill: 1    discussed with patient that I suspect antibiotics may be making her feel bad, she is to follow up with Dr. benson next week.   Continue all other current medications.     Medication List with Changes/Refills   New Medications    FLUCONAZOLE (DIFLUCAN) 150 MG TAB    Take first tablet today, then repeat in 3 days.    TRIAMCINOLONE ACETONIDE 0.025% (KENALOG) 0.025 % CREAM    Apply  topically 2 (two) times daily.   Current Medications    AMLODIPINE (NORVASC) 5 MG TABLET    Take 1 tablet (5 mg total) by mouth once daily.    ATORVASTATIN (LIPITOR) 40 MG TABLET    Take 1 tablet (40 mg total) by mouth once daily.    AZELASTINE (ASTELIN) 137 MCG (0.1 %) NASAL SPRAY    1 spray (137 mcg total) by Nasal route 2 (two) times daily.    ELTROMBOPAG (PROMACTA) 75 MG TAB    Take 75 mg by mouth once daily.     FLUTICASONE (FLONASE) 50 MCG/ACTUATION NASAL SPRAY    USE 2 SPRAYS IN EACH NOSTRIL 1 TIME A DAY AS NEEDED    LEVOFLOXACIN (LEVAQUIN) 500 MG TABLET    Take 1 tablet (500 mg total) by mouth once daily. for 14 days    LORATADINE (CLARITIN) 10 MG TABLET    Take 1 tablet (10 mg total) by mouth once daily.    METFORMIN (GLUCOPHAGE) 500 MG TABLET    Take 1 tablet (500 mg total) by mouth daily with breakfast.    MINOCYCLINE (DYNACIN) 100 MG TABLET    Take 1 tablet (100 mg total) by mouth every 12 (twelve) hours. for 14 days    ONDANSETRON (ZOFRAN) 4 MG TABLET    Take 1 tablet (4 mg total) by mouth every 6 (six) hours as needed for Nausea.    PROMETHAZINE (PHENERGAN) 25 MG TABLET    Take 1 tablet (25 mg total) by mouth every 6 (six) hours as needed for Nausea.    TRAMADOL (ULTRAM) 50 MG TABLET    Take 1 tablet (50 mg total) by mouth 2 (two) times daily as needed.

## 2020-11-30 NOTE — TELEPHONE ENCOUNTER
----- Message from Lidia Vasquez sent at 11/30/2020 10:25 AM CST -----  Contact: Melissa - [facility]  The pt is being discharged from [facility] this week and needs a order / referral for physical therapy faxed to [facility] at [phone], Lesli can be reached at [phone]///[email or web address]

## 2020-12-04 ENCOUNTER — OFFICE VISIT (OUTPATIENT)
Dept: ORTHOPEDICS | Facility: CLINIC | Age: 65
End: 2020-12-04
Payer: MEDICARE

## 2020-12-04 VITALS
HEIGHT: 66 IN | WEIGHT: 182 LBS | SYSTOLIC BLOOD PRESSURE: 143 MMHG | HEART RATE: 88 BPM | BODY MASS INDEX: 29.25 KG/M2 | DIASTOLIC BLOOD PRESSURE: 85 MMHG

## 2020-12-04 DIAGNOSIS — M00.9 PYOGENIC ARTHRITIS OF RIGHT KNEE JOINT, DUE TO UNSPECIFIED ORGANISM: Primary | ICD-10-CM

## 2020-12-04 PROCEDURE — 1125F AMNT PAIN NOTED PAIN PRSNT: CPT | Mod: HCNC,S$GLB,, | Performed by: ORTHOPAEDIC SURGERY

## 2020-12-04 PROCEDURE — 3008F BODY MASS INDEX DOCD: CPT | Mod: HCNC,CPTII,S$GLB, | Performed by: ORTHOPAEDIC SURGERY

## 2020-12-04 PROCEDURE — 3008F PR BODY MASS INDEX (BMI) DOCUMENTED: ICD-10-PCS | Mod: HCNC,CPTII,S$GLB, | Performed by: ORTHOPAEDIC SURGERY

## 2020-12-04 PROCEDURE — 99999 PR PBB SHADOW E&M-EST. PATIENT-LVL III: ICD-10-PCS | Mod: PBBFAC,HCNC,, | Performed by: ORTHOPAEDIC SURGERY

## 2020-12-04 PROCEDURE — 99024 POSTOP FOLLOW-UP VISIT: CPT | Mod: HCNC,S$GLB,, | Performed by: ORTHOPAEDIC SURGERY

## 2020-12-04 PROCEDURE — 99024 PR POST-OP FOLLOW-UP VISIT: ICD-10-PCS | Mod: HCNC,S$GLB,, | Performed by: ORTHOPAEDIC SURGERY

## 2020-12-04 PROCEDURE — 1125F PR PAIN SEVERITY QUANTIFIED, PAIN PRESENT: ICD-10-PCS | Mod: HCNC,S$GLB,, | Performed by: ORTHOPAEDIC SURGERY

## 2020-12-04 PROCEDURE — 99999 PR PBB SHADOW E&M-EST. PATIENT-LVL III: CPT | Mod: PBBFAC,HCNC,, | Performed by: ORTHOPAEDIC SURGERY

## 2020-12-04 NOTE — PROGRESS NOTES
"Conchita Rouse is a 65 y.o. female patient.   1. Pyogenic arthritis of right knee joint, due to unspecified organism      Past Medical History:   Diagnosis Date    Dyslipidemia (high LDL; low HDL)     10y CV event risk 14.7% (asuming no DM2), which could be reduced to 4.5% with RF optimization, for which I recommended atorvastatin 20 mg 1 po qd x2 weeks and then 40 mg 1 po qd    History of shingles 02/14/2018    Per note from Dr. Rogelio Norwood III on 2/14/18; left upper buttocks.   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 Rogelio Norwood III last visit 2/14/18 (tolerating CSA & promacta); also notes aplastic anemia (AA psot Horse ATG - week 7/17/17) and ITP, for which ATG & promacta following decadron pulse for ITP & AA; plan for second opinion on BM bx; next flow in 3 mo; intent of rx: palliative    Prediabetes     TIA (transient ischemic attack) 2007    She was at "[employer]", where she noted she was feeling slow, took a nap, woke up with a numb/tingling left jaw to arm, which persistned 20 minutes, took 3 baby aspirins, went to ER @ [facility], where facial droop was noted, but completely resolved & head CT revealed old minor abnormality.    Vitamin D deficiency      No past surgical history pertinent negatives on file.  Scheduled Meds:  Continuous Infusions:  PRN Meds:    Review of patient's allergies indicates:   Allergen Reactions    Bactrim [sulfamethoxazole-trimethoprim] Edema     Swollen lips    Codeine Other (See Comments)     Pt states codeine does not cause hives. Had tooth extraction and medication given caused her to be jittery, feel hot and have to lay down like she was weak. morhpine given on 5/16/2017 iv for pain. Pt martine well without any sign or sx of allergy or reaction.     There are no hospital problems to display for this patient.    Blood pressure (!) 143/85, pulse 88, height 5' 6" (1.676 m), weight 82.6 kg (182 " lb).    Subjective   The patient returns for follow-up of right knee septic arthritis with arthroscopic joint irrigation on November 4.  She has been doing better and better.  Now on levofloxacin and minocycline.  She says it does cause some nausea.  Her right knee is doing much better.  Has been discharged by home therapy.  Has some in range discomfort and weakness.  No swelling.    Objective   The patient walks with a slight limp    Examination of the right knee reveals healed arthroscopic incisions.  There is mild tenderness at the medial scar.  No effusion.  Full range of motion.  Patellofemoral discomfort at full extension.  No instability or pain with stress testing.       Assessment & Plan   The patient is doing well following right knee septic arthritis.  No organism was ever identified.  She is going to finish up her oral antibiotics.  We discussed outpatient therapy and she has a prescription for that once she decides her insurance will cover.  Final follow-up in 4 weeks, December 30th       Sal Pompa MD  12/4/2020

## 2020-12-10 LAB — FUNGUS SPEC CULT: NORMAL

## 2020-12-11 ENCOUNTER — PATIENT MESSAGE (OUTPATIENT)
Dept: OTHER | Facility: OTHER | Age: 65
End: 2020-12-11

## 2020-12-15 ENCOUNTER — PATIENT OUTREACH (OUTPATIENT)
Dept: ADMINISTRATIVE | Facility: OTHER | Age: 65
End: 2020-12-15

## 2020-12-15 NOTE — PROGRESS NOTES
Health Maintenance Due   Topic Date Due    Colorectal Cancer Screening  1955    TETANUS VACCINE  02/14/1973    DEXA SCAN  02/14/1995    Shingles Vaccine (1 of 2) 02/14/2005    Pneumococcal Vaccine (65+ Low/Medium Risk) (1 of 2 - PCV13) 02/14/2020    Influenza Vaccine (1) 08/01/2020     Updates were requested from care everywhere.  Chart was reviewed for overdue Proactive [facility] Encounters ([facility]ENDR[facility]) topics (CRS, Breast Cancer Screening, Eye exam)  Health Maintenance has been updated.  LINKS immunization registry triggered.  LINKS not responding.

## 2020-12-16 ENCOUNTER — LAB VISIT (OUTPATIENT)
Dept: LAB | Facility: HOSPITAL | Age: 65
End: 2020-12-16
Attending: INTERNAL MEDICINE
Payer: MEDICARE

## 2020-12-16 ENCOUNTER — OFFICE VISIT (OUTPATIENT)
Dept: INFECTIOUS DISEASES | Facility: CLINIC | Age: 65
End: 2020-12-16
Payer: MEDICARE

## 2020-12-16 VITALS — TEMPERATURE: 98 F | HEART RATE: 76 BPM

## 2020-12-16 DIAGNOSIS — Z12.11 SPECIAL SCREENING FOR MALIGNANT NEOPLASMS, COLON: Primary | ICD-10-CM

## 2020-12-16 DIAGNOSIS — I10 ESSENTIAL HYPERTENSION: ICD-10-CM

## 2020-12-16 DIAGNOSIS — M00.9 PYOGENIC ARTHRITIS OF RIGHT KNEE JOINT, DUE TO UNSPECIFIED ORGANISM: Primary | ICD-10-CM

## 2020-12-16 DIAGNOSIS — M00.9 PYOGENIC ARTHRITIS OF RIGHT KNEE JOINT, DUE TO UNSPECIFIED ORGANISM: ICD-10-CM

## 2020-12-16 LAB
CRP SERPL-MCNC: 3.8 MG/L (ref 0–8.2)
ERYTHROCYTE [SEDIMENTATION RATE] IN BLOOD BY WESTERGREN METHOD: 30 MM/HR (ref 0–20)

## 2020-12-16 PROCEDURE — 99214 PR OFFICE/OUTPT VISIT, EST, LEVL IV, 30-39 MIN: ICD-10-PCS | Mod: HCNC,S$GLB,, | Performed by: INTERNAL MEDICINE

## 2020-12-16 PROCEDURE — 99999 PR PBB SHADOW E&M-EST. PATIENT-LVL III: ICD-10-PCS | Mod: PBBFAC,HCNC,, | Performed by: INTERNAL MEDICINE

## 2020-12-16 PROCEDURE — 3288F PR FALLS RISK ASSESSMENT DOCUMENTED: ICD-10-PCS | Mod: HCNC,CPTII,S$GLB, | Performed by: INTERNAL MEDICINE

## 2020-12-16 PROCEDURE — 1101F PR PT FALLS ASSESS DOC 0-1 FALLS W/OUT INJ PAST YR: ICD-10-PCS | Mod: HCNC,CPTII,S$GLB, | Performed by: INTERNAL MEDICINE

## 2020-12-16 PROCEDURE — 36415 COLL VENOUS BLD VENIPUNCTURE: CPT | Mod: HCNC

## 2020-12-16 PROCEDURE — 99999 PR PBB SHADOW E&M-EST. PATIENT-LVL III: CPT | Mod: PBBFAC,HCNC,, | Performed by: INTERNAL MEDICINE

## 2020-12-16 PROCEDURE — 1101F PT FALLS ASSESS-DOCD LE1/YR: CPT | Mod: HCNC,CPTII,S$GLB, | Performed by: INTERNAL MEDICINE

## 2020-12-16 PROCEDURE — 86140 C-REACTIVE PROTEIN: CPT | Mod: HCNC

## 2020-12-16 PROCEDURE — 99214 OFFICE O/P EST MOD 30 MIN: CPT | Mod: HCNC,S$GLB,, | Performed by: INTERNAL MEDICINE

## 2020-12-16 PROCEDURE — 3288F FALL RISK ASSESSMENT DOCD: CPT | Mod: HCNC,CPTII,S$GLB, | Performed by: INTERNAL MEDICINE

## 2020-12-16 PROCEDURE — 85651 RBC SED RATE NONAUTOMATED: CPT | Mod: HCNC

## 2020-12-16 NOTE — ASSESSMENT & PLAN NOTE
She has completed a month of therapy -  2 weeks of IV Rocephin/Daptomycin and 2 weeks of levaquin /doxycline .  Will do ESR and CRP today .

## 2020-12-16 NOTE — PROGRESS NOTES
Subjective:       Patient ID: Conchita Rouse is a 65 y.o. female.    Chief Complaint: Follow up   HPI   -  11/07/2020  65 year old woman with  PMHx of dyslipidemia, HTN, asplastic anemia, prediabetes, TIA  who was admitted with right knee pain that started 2 weeks ago. She also had pain and and swelling in the left knee that resolved prior to the right knee pain.     Since admission, she was seen by Ortho and had on 11/04- right arthroscopy and I and D.  Operative note reviewed -There was evidence of significant synovitis with multiple joint areas of loculation.  She is afebrile  The plan at that time was to - IV Rocephin /IV Daptomycin at 7 mg.kg daily .  ESR is 83, CRP-80 .  Will plan to treat for 2 weeks and will then switch to PO regime .  EOC -11/21/2020 for IV therapy . She was then given PO levaquin and Doxycycline for 14 days . She took he doxycycline once daily in error.. She has complete this regime too   Labs-  Component      Latest Ref Rng & Units 11/18/2020 11/3/2020   Sed Rate      0 - 20 mm/Hr 95 (H) 83 (H)     Component      Latest Ref Rng & Units 11/11/2020 11/3/2020   CRP      0.0 - 8.2 mg/L 121.4 (H) 80.4 (H)       Review of Systems   Constitutional: Negative for chills and fatigue.   HENT: Negative for nasal congestion, ear pain, facial swelling, sinus pressure/congestion and sore throat.    Eyes: Negative for pain.   Respiratory: Negative for apnea, chest tightness, shortness of breath and stridor.    Cardiovascular: Negative for chest pain, palpitations and leg swelling.   Gastrointestinal: Negative for abdominal distention, abdominal pain, diarrhea and nausea.   Endocrine: Negative for polydipsia and polyphagia.   Genitourinary: Negative for decreased urine volume, difficulty urinating, frequency and genital sores.   Musculoskeletal: Negative for arthralgias and gait problem.   Neurological: Negative for light-headedness and headaches.   Hematological: Negative for adenopathy.    Psychiatric/Behavioral: Negative for agitation, confusion and decreased concentration.         Objective:      Physical Exam  Vitals signs and nursing note reviewed.   Constitutional:       Appearance: She is well-developed.   HENT:      Head: Normocephalic and atraumatic.   Eyes:      Conjunctiva/sclera: Conjunctivae normal.      Pupils: Pupils are equal, round, and reactive to light.   Neck:      Musculoskeletal: Normal range of motion and neck supple.      Thyroid: No thyroid mass or thyromegaly.   Cardiovascular:      Rate and Rhythm: Normal rate.      Heart sounds: Normal heart sounds.   Pulmonary:      Effort: Pulmonary effort is normal. No accessory muscle usage or respiratory distress.      Breath sounds: Normal breath sounds.   Abdominal:      General: Bowel sounds are normal.      Palpations: Abdomen is soft. There is no mass.      Tenderness: There is no abdominal tenderness.   Musculoskeletal: Normal range of motion.      Comments: Please see pic   Skin:     Findings: No rash.   Neurological:      Mental Status: She is alert and oriented to person, place, and time.             Assessment:       1. Pyogenic arthritis of right knee joint, due to unspecified organism  Sedimentation rate    C-reactive protein   2. Essential hypertension         Plan:       Problem List Items Addressed This Visit     Essential hypertension     Follow PCP         Pyogenic arthritis of right knee joint - Primary     She has completed a month of therapy -  2 weeks of IV Rocephin/Daptomycin and 2 weeks of levaquin /doxycline .  Will do ESR and CRP today .         Relevant Orders    Sedimentation rate    C-reactive protein

## 2020-12-18 ENCOUNTER — TELEPHONE (OUTPATIENT)
Dept: ENDOSCOPY | Facility: HOSPITAL | Age: 65
End: 2020-12-18

## 2020-12-29 NOTE — ASSESSMENT & PLAN NOTE
- Ortho consulted, s/p  right knee arthroscopy for joint irrigation and debridement on 11/4/20 by Dr. Pompa   - Cultures show NGTD  -Pain control   -PT/OT consult

## 2020-12-29 NOTE — SUBJECTIVE & OBJECTIVE
Review of Systems   Constitutional: Positive for activity change.   HENT: Negative.    Respiratory: Negative.    Cardiovascular: Positive for leg swelling.   Gastrointestinal: Negative.    Genitourinary: Negative.    Musculoskeletal: Positive for gait problem and joint swelling.   Skin: Negative.    Hematological: Negative.    Psychiatric/Behavioral: Negative.      Objective:     Vital Signs (Most Recent):  Temp: 98.5 °F (36.9 °C) (11/09/20 1624)  Pulse: 94 (11/09/20 1624)  Resp: 18 (11/09/20 1624)  BP: 124/65 (11/09/20 1624)  SpO2: 96 % (11/09/20 1624) Vital Signs (24h Range):        Weight: 84.8 kg (187 lb)  Body mass index is 30.18 kg/m².  No intake or output data in the 24 hours ending 12/29/20 1538   Physical Exam  Vitals signs and nursing note reviewed.   Constitutional:       Appearance: She is well-developed.   HENT:      Head: Normocephalic and atraumatic.      Nose: Nose normal.   Eyes:      General: No scleral icterus.     Conjunctiva/sclera: Conjunctivae normal.      Pupils: Pupils are equal, round, and reactive to light.   Neck:      Musculoskeletal: Normal range of motion and neck supple.   Cardiovascular:      Rate and Rhythm: Normal rate and regular rhythm.      Heart sounds: Normal heart sounds. No murmur. No friction rub. No gallop.    Pulmonary:      Effort: Pulmonary effort is normal. No respiratory distress.      Breath sounds: Normal breath sounds. No wheezing or rales.   Chest:      Chest wall: No tenderness.   Abdominal:      General: Bowel sounds are normal. There is no distension.      Palpations: Abdomen is soft. There is no mass.      Tenderness: There is no abdominal tenderness.   Musculoskeletal:         General: No tenderness.      Right knee: She exhibits decreased range of motion and swelling. She exhibits no ecchymosis and no deformity.      Comments: Limited ROM to right knee.   Skin:     General: Skin is warm and dry.      Findings: No erythema or rash.      Comments: Surgical  dressing to right knee CDI.   Neurological:      Mental Status: She is alert and oriented to person, place, and time.   Psychiatric:         Behavior: Behavior normal.         Thought Content: Thought content normal.         Judgment: Judgment normal.         Significant Labs: BMP: No results for input(s): GLU, NA, K, CL, CO2, BUN, CREATININE, CALCIUM, MG in the last 48 hours.  CBC: No results for input(s): WBC, HGB, HCT, PLT in the last 48 hours.  CMP: No results for input(s): NA, K, CL, CO2, GLU, BUN, CREATININE, CALCIUM, PROT, ALBUMIN, BILITOT, ALKPHOS, AST, ALT, ANIONGAP, EGFRNONAA in the last 48 hours.    Invalid input(s): ESTGFAFR[place]    Significant Imaging: I have reviewed all pertinent imaging results/findings within the past 24 hours.

## 2020-12-29 NOTE — ASSESSMENT & PLAN NOTE
- Thought to be 2/2 anaplastic anemia and pancytopenia; tx'd w/ Dr. Norwood in [place] hematology  - Currently on Promacto 150 mg daily at home, will resume at DC  - H/H stable, continue to monitor with daily CBC

## 2020-12-29 NOTE — PROGRESS NOTES
[facility] Hospital Medicine  Progress Note    Patient Name: Conchita Rouse  MRN: [id number]  Patient Class: IP- Inpatient   Admission Date: 11/4/2020  Length of Stay: 5 days  Attending Physician: No att. providers found  Primary Care Provider: Nancy Valles MD        Subjective:     Principal Problem:Pyogenic arthritis of right knee joint        HPI:  Conchita Rouse is a 65 y.o. female patient  with PMHx of dyslipidemia, HTN, asplastic anemia, prediabetes, TIA  for evaluation of R knee pain and swelling which onset gradually 2 weeks ago. Patient was seen by Estrella Quinones PA-C yesterday in ortho clinic.  Patient was told to come to ED for pre-operative medical clearance. Last PO intake was coffee at 8:30 AM today. There is concerns for pyogenic joint of the right knee.  Empiric Vancomycin given in the ED. Uric acid is normal. COVID is negative. Pt denies any fever, chills, night sweats. Initially, she had pain and swelling to the left knee which is now improved. Pt now has pain and swelling to the right knee and unable to obtain arthrocentesis.   Vital signs Temp 98.3, pulse 82, resp 18, B/P 123/75  CXR -  No acute cardiopulmonary findings  Labs find WBC 6.05, H/H 10.7/33.5, platelets 57, Na 134, K+ 5.4. Pt is admitted for further evaluation and management of septic arthritis of the knee.       Overview/Hospital Course:  64 y/o female admitted right knee septic arthritis. Orthopedic surgery consulted. Pt underwent right knee arthroscopy for joint irrigation and debridement on 11/4/20 by Dr. Pompa. Cultures pending. Will continue IV vancomycin. Infectious Disease consult for abx recommendation.             Review of Systems   Constitutional: Positive for activity change.   HENT: Negative.    Respiratory: Negative.    Cardiovascular: Positive for leg swelling.   Gastrointestinal: Negative.    Genitourinary: Negative.    Musculoskeletal: Positive for gait problem and joint swelling.    Skin: Negative.    Hematological: Negative.    Psychiatric/Behavioral: Negative.      Objective:     Vital Signs (Most Recent):  Temp: 98.5 °F (36.9 °C) (11/09/20 1624)  Pulse: 94 (11/09/20 1624)  Resp: 18 (11/09/20 1624)  BP: 124/65 (11/09/20 1624)  SpO2: 96 % (11/09/20 1624) Vital Signs (24h Range):        Weight: 84.8 kg (187 lb)  Body mass index is 30.18 kg/m².  No intake or output data in the 24 hours ending 12/29/20 1538   Physical Exam  Vitals signs and nursing note reviewed.   Constitutional:       Appearance: She is well-developed.   HENT:      Head: Normocephalic and atraumatic.      Nose: Nose normal.   Eyes:      General: No scleral icterus.     Conjunctiva/sclera: Conjunctivae normal.      Pupils: Pupils are equal, round, and reactive to light.   Neck:      Musculoskeletal: Normal range of motion and neck supple.   Cardiovascular:      Rate and Rhythm: Normal rate and regular rhythm.      Heart sounds: Normal heart sounds. No murmur. No friction rub. No gallop.    Pulmonary:      Effort: Pulmonary effort is normal. No respiratory distress.      Breath sounds: Normal breath sounds. No wheezing or rales.   Chest:      Chest wall: No tenderness.   Abdominal:      General: Bowel sounds are normal. There is no distension.      Palpations: Abdomen is soft. There is no mass.      Tenderness: There is no abdominal tenderness.   Musculoskeletal:         General: No tenderness.      Right knee: She exhibits decreased range of motion and swelling. She exhibits no ecchymosis and no deformity.      Comments: Limited ROM to right knee.   Skin:     General: Skin is warm and dry.      Findings: No erythema or rash.      Comments: Surgical dressing to right knee CDI.   Neurological:      Mental Status: She is alert and oriented to person, place, and time.   Psychiatric:         Behavior: Behavior normal.         Thought Content: Thought content normal.         Judgment: Judgment normal.         Significant Labs: BMP:  No results for input(s): GLU, NA, K, CL, CO2, BUN, CREATININE, CALCIUM, MG in the last 48 hours.  CBC: No results for input(s): WBC, HGB, HCT, PLT in the last 48 hours.  CMP: No results for input(s): NA, K, CL, CO2, GLU, BUN, CREATININE, CALCIUM, PROT, ALBUMIN, BILITOT, ALKPHOS, AST, ALT, ANIONGAP, EGFRNONAA in the last 48 hours.    Invalid input(s): [place]GF[place]    Significant Imaging: I have reviewed all pertinent imaging results/findings within the past 24 hours.      Assessment/Plan:      * Pyogenic arthritis of right knee joint  - Ortho consulted, s/p  right knee arthroscopy for joint irrigation and debridement on 11/4/20 by Dr. Pompa   - Cultures show NGTD  -Pain control   -PT/OT consult       Dyslipidemia (high LDL; low HDL)  - Continue STATIN      Thrombocytopenia  - Platelet count of 55k  - No active s/s of bleeding  - Avoid anticoagulation  - Daily CBC  - Outpatient f/u with Dr. Norwood        Aplastic anemia  - Thought to be 2/2 anaplastic anemia and pancytopenia; tx'd w/ Dr. Norwood in [place] hematology  - Currently on Promacto 150 mg daily at home, will resume at DC  - H/H stable, continue to monitor with daily CBC      Essential hypertension  - Blood pressure well controlled  - Continue Amlodipine pending trends  - monitor and adjust medications as needed      H/O TIA (transient ischemic attack) and stroke  - Continue home Statin        VTE Risk Mitigation (From admission, onward)         Ordered     IP VTE HIGH RISK PATIENT  Once      11/04/20 1706                Discharge Planning   ELIESER: 11/9/2020     Code Status: Not on file   Is the patient medically ready for discharge?:     Reason for patient still in hospital (select all that apply): PT / OT recommendations and Pending disposition  Discharge Plan A: Home with family                  Love Phelan NP  Department of Hospital Medicine   [facility]

## 2021-01-11 ENCOUNTER — TELEPHONE (OUTPATIENT)
Dept: ORTHOPEDICS | Facility: CLINIC | Age: 66
End: 2021-01-11

## 2021-01-15 ENCOUNTER — HOSPITAL ENCOUNTER (OUTPATIENT)
Dept: RADIOLOGY | Facility: HOSPITAL | Age: 66
Discharge: HOME | End: 2021-01-15
Attending: ORTHOPAEDIC SURGERY
Payer: MEDICARE

## 2021-01-15 ENCOUNTER — OFFICE VISIT (OUTPATIENT)
Dept: ORTHOPEDICS | Facility: CLINIC | Age: 66
End: 2021-01-15
Payer: MEDICARE

## 2021-01-15 VITALS
HEIGHT: 66 IN | BODY MASS INDEX: 29.25 KG/M2 | WEIGHT: 182 LBS | HEART RATE: 71 BPM | DIASTOLIC BLOOD PRESSURE: 67 MMHG | SYSTOLIC BLOOD PRESSURE: 127 MMHG

## 2021-01-15 DIAGNOSIS — M65.9 SYNOVITIS OF RIGHT KNEE: Primary | ICD-10-CM

## 2021-01-15 DIAGNOSIS — M25.561 RIGHT KNEE PAIN, UNSPECIFIED CHRONICITY: Primary | ICD-10-CM

## 2021-01-15 DIAGNOSIS — M25.561 RIGHT KNEE PAIN, UNSPECIFIED CHRONICITY: ICD-10-CM

## 2021-01-15 DIAGNOSIS — M17.11 PATELLOFEMORAL ARTHRITIS OF RIGHT KNEE: ICD-10-CM

## 2021-01-15 PROCEDURE — 3008F PR BODY MASS INDEX (BMI) DOCUMENTED: ICD-10-PCS | Mod: CPTII,S$GLB,, | Performed by: ORTHOPAEDIC SURGERY

## 2021-01-15 PROCEDURE — 1125F PR PAIN SEVERITY QUANTIFIED, PAIN PRESENT: ICD-10-PCS | Mod: S$GLB,,, | Performed by: ORTHOPAEDIC SURGERY

## 2021-01-15 PROCEDURE — 99999 PR PBB SHADOW E&M-EST. PATIENT-LVL III: ICD-10-PCS | Mod: PBBFAC,,, | Performed by: ORTHOPAEDIC SURGERY

## 2021-01-15 PROCEDURE — 73560 XR KNEE ORTHO RIGHT: ICD-10-PCS | Mod: 26,LT,, | Performed by: RADIOLOGY

## 2021-01-15 PROCEDURE — 99999 PR PBB SHADOW E&M-EST. PATIENT-LVL III: CPT | Mod: PBBFAC,,, | Performed by: ORTHOPAEDIC SURGERY

## 2021-01-15 PROCEDURE — 99024 PR POST-OP FOLLOW-UP VISIT: ICD-10-PCS | Mod: S$GLB,,, | Performed by: ORTHOPAEDIC SURGERY

## 2021-01-15 PROCEDURE — 73560 X-RAY EXAM OF KNEE 1 OR 2: CPT | Mod: TC,LT

## 2021-01-15 PROCEDURE — 99024 POSTOP FOLLOW-UP VISIT: CPT | Mod: S$GLB,,, | Performed by: ORTHOPAEDIC SURGERY

## 2021-01-15 PROCEDURE — 73562 XR KNEE ORTHO RIGHT: ICD-10-PCS | Mod: 26,RT,, | Performed by: RADIOLOGY

## 2021-01-15 PROCEDURE — 73560 X-RAY EXAM OF KNEE 1 OR 2: CPT | Mod: 26,LT,, | Performed by: RADIOLOGY

## 2021-01-15 PROCEDURE — 73562 X-RAY EXAM OF KNEE 3: CPT | Mod: 26,RT,, | Performed by: RADIOLOGY

## 2021-01-15 PROCEDURE — 1125F AMNT PAIN NOTED PAIN PRSNT: CPT | Mod: S$GLB,,, | Performed by: ORTHOPAEDIC SURGERY

## 2021-01-15 PROCEDURE — 3008F BODY MASS INDEX DOCD: CPT | Mod: CPTII,S$GLB,, | Performed by: ORTHOPAEDIC SURGERY

## 2021-02-09 ENCOUNTER — OFFICE VISIT (OUTPATIENT)
Dept: INTERNAL MEDICINE | Facility: CLINIC | Age: 66
End: 2021-02-09
Payer: MEDICARE

## 2021-02-09 VITALS
BODY MASS INDEX: 29.39 KG/M2 | WEIGHT: 182.88 LBS | OXYGEN SATURATION: 100 % | HEART RATE: 57 BPM | DIASTOLIC BLOOD PRESSURE: 82 MMHG | TEMPERATURE: 98 F | HEIGHT: 66 IN | SYSTOLIC BLOOD PRESSURE: 112 MMHG

## 2021-02-09 DIAGNOSIS — M25.511 CHRONIC PAIN OF BOTH SHOULDERS: ICD-10-CM

## 2021-02-09 DIAGNOSIS — K30 INDIGESTION: ICD-10-CM

## 2021-02-09 DIAGNOSIS — M25.512 CHRONIC PAIN OF BOTH SHOULDERS: ICD-10-CM

## 2021-02-09 DIAGNOSIS — G89.29 CHRONIC PAIN OF BOTH SHOULDERS: ICD-10-CM

## 2021-02-09 DIAGNOSIS — R10.13 EPIGASTRIC PAIN: Primary | ICD-10-CM

## 2021-02-09 PROCEDURE — 1101F PR PT FALLS ASSESS DOC 0-1 FALLS W/OUT INJ PAST YR: ICD-10-PCS | Mod: CPTII,S$GLB,, | Performed by: NURSE PRACTITIONER

## 2021-02-09 PROCEDURE — 1126F PR PAIN SEVERITY QUANTIFIED, NO PAIN PRESENT: ICD-10-PCS | Mod: S$GLB,,, | Performed by: NURSE PRACTITIONER

## 2021-02-09 PROCEDURE — 3079F PR MOST RECENT DIASTOLIC BLOOD PRESSURE 80-89 MM HG: ICD-10-PCS | Mod: CPTII,S$GLB,, | Performed by: NURSE PRACTITIONER

## 2021-02-09 PROCEDURE — 1126F AMNT PAIN NOTED NONE PRSNT: CPT | Mod: S$GLB,,, | Performed by: NURSE PRACTITIONER

## 2021-02-09 PROCEDURE — 3074F SYST BP LT 130 MM HG: CPT | Mod: CPTII,S$GLB,, | Performed by: NURSE PRACTITIONER

## 2021-02-09 PROCEDURE — 99999 PR PBB SHADOW E&M-EST. PATIENT-LVL IV: CPT | Mod: PBBFAC,,, | Performed by: NURSE PRACTITIONER

## 2021-02-09 PROCEDURE — 1101F PT FALLS ASSESS-DOCD LE1/YR: CPT | Mod: CPTII,S$GLB,, | Performed by: NURSE PRACTITIONER

## 2021-02-09 PROCEDURE — 3079F DIAST BP 80-89 MM HG: CPT | Mod: CPTII,S$GLB,, | Performed by: NURSE PRACTITIONER

## 2021-02-09 PROCEDURE — 3008F PR BODY MASS INDEX (BMI) DOCUMENTED: ICD-10-PCS | Mod: CPTII,S$GLB,, | Performed by: NURSE PRACTITIONER

## 2021-02-09 PROCEDURE — 99214 OFFICE O/P EST MOD 30 MIN: CPT | Mod: S$GLB,,, | Performed by: NURSE PRACTITIONER

## 2021-02-09 PROCEDURE — 3008F BODY MASS INDEX DOCD: CPT | Mod: CPTII,S$GLB,, | Performed by: NURSE PRACTITIONER

## 2021-02-09 PROCEDURE — 3074F PR MOST RECENT SYSTOLIC BLOOD PRESSURE < 130 MM HG: ICD-10-PCS | Mod: CPTII,S$GLB,, | Performed by: NURSE PRACTITIONER

## 2021-02-09 PROCEDURE — 99214 PR OFFICE/OUTPT VISIT, EST, LEVL IV, 30-39 MIN: ICD-10-PCS | Mod: S$GLB,,, | Performed by: NURSE PRACTITIONER

## 2021-02-09 PROCEDURE — 99999 PR PBB SHADOW E&M-EST. PATIENT-LVL IV: ICD-10-PCS | Mod: PBBFAC,,, | Performed by: NURSE PRACTITIONER

## 2021-02-09 PROCEDURE — 3288F PR FALLS RISK ASSESSMENT DOCUMENTED: ICD-10-PCS | Mod: CPTII,S$GLB,, | Performed by: NURSE PRACTITIONER

## 2021-02-09 PROCEDURE — 3288F FALL RISK ASSESSMENT DOCD: CPT | Mod: CPTII,S$GLB,, | Performed by: NURSE PRACTITIONER

## 2021-02-09 RX ORDER — OMEPRAZOLE 20 MG/1
20 CAPSULE, DELAYED RELEASE ORAL DAILY
Qty: 30 CAPSULE | Refills: 2 | Status: SHIPPED | OUTPATIENT
Start: 2021-02-09 | End: 2021-05-19 | Stop reason: SDUPTHER

## 2021-02-09 RX ORDER — TRAMADOL HYDROCHLORIDE 50 MG/1
50 TABLET ORAL 2 TIMES DAILY PRN
Qty: 24 TABLET | Refills: 0 | Status: SHIPPED | OUTPATIENT
Start: 2021-02-09 | End: 2021-10-15

## 2021-02-12 ENCOUNTER — TELEPHONE (OUTPATIENT)
Dept: INTERNAL MEDICINE | Facility: CLINIC | Age: 66
End: 2021-02-12

## 2021-02-25 NOTE — TELEPHONE ENCOUNTER
#2. Mailbox full unable to leave message.  
----- Message from Nancy Valles MD sent at 11/23/2020  6:56 AM CST -----  Please notify that her urine did not grow out any infection.  
Fadia Elizabeth)

## 2021-03-22 ENCOUNTER — OFFICE VISIT (OUTPATIENT)
Dept: INTERNAL MEDICINE | Facility: CLINIC | Age: 66
End: 2021-03-22
Payer: MEDICARE

## 2021-03-22 VITALS
BODY MASS INDEX: 29.35 KG/M2 | HEART RATE: 62 BPM | RESPIRATION RATE: 16 BRPM | WEIGHT: 182.63 LBS | OXYGEN SATURATION: 100 % | DIASTOLIC BLOOD PRESSURE: 84 MMHG | HEIGHT: 66 IN | SYSTOLIC BLOOD PRESSURE: 122 MMHG | TEMPERATURE: 98 F

## 2021-03-22 DIAGNOSIS — D69.3 CHRONIC ITP (IDIOPATHIC THROMBOCYTOPENIA): ICD-10-CM

## 2021-03-22 DIAGNOSIS — Z12.11 COLON CANCER SCREENING: ICD-10-CM

## 2021-03-22 DIAGNOSIS — Z86.73 H/O TIA (TRANSIENT ISCHEMIC ATTACK) AND STROKE: ICD-10-CM

## 2021-03-22 DIAGNOSIS — Z12.31 ENCOUNTER FOR SCREENING MAMMOGRAM FOR MALIGNANT NEOPLASM OF BREAST: ICD-10-CM

## 2021-03-22 DIAGNOSIS — Z00.00 ANNUAL PHYSICAL EXAM: ICD-10-CM

## 2021-03-22 DIAGNOSIS — R73.03 PREDIABETES: Primary | ICD-10-CM

## 2021-03-22 PROCEDURE — 99999 PR PBB SHADOW E&M-EST. PATIENT-LVL IV: ICD-10-PCS | Mod: PBBFAC,,, | Performed by: FAMILY MEDICINE

## 2021-03-22 PROCEDURE — 1126F AMNT PAIN NOTED NONE PRSNT: CPT | Mod: S$GLB,,, | Performed by: FAMILY MEDICINE

## 2021-03-22 PROCEDURE — 3008F PR BODY MASS INDEX (BMI) DOCUMENTED: ICD-10-PCS | Mod: CPTII,S$GLB,, | Performed by: FAMILY MEDICINE

## 2021-03-22 PROCEDURE — 99397 PR PREVENTIVE VISIT,EST,65 & OVER: ICD-10-PCS | Mod: S$GLB,,, | Performed by: FAMILY MEDICINE

## 2021-03-22 PROCEDURE — 99397 PER PM REEVAL EST PAT 65+ YR: CPT | Mod: S$GLB,,, | Performed by: FAMILY MEDICINE

## 2021-03-22 PROCEDURE — 1126F PR PAIN SEVERITY QUANTIFIED, NO PAIN PRESENT: ICD-10-PCS | Mod: S$GLB,,, | Performed by: FAMILY MEDICINE

## 2021-03-22 PROCEDURE — 3008F BODY MASS INDEX DOCD: CPT | Mod: CPTII,S$GLB,, | Performed by: FAMILY MEDICINE

## 2021-03-22 PROCEDURE — 99999 PR PBB SHADOW E&M-EST. PATIENT-LVL IV: CPT | Mod: PBBFAC,,, | Performed by: FAMILY MEDICINE

## 2021-03-22 PROCEDURE — 3079F DIAST BP 80-89 MM HG: CPT | Mod: CPTII,S$GLB,, | Performed by: FAMILY MEDICINE

## 2021-03-22 PROCEDURE — 3079F PR MOST RECENT DIASTOLIC BLOOD PRESSURE 80-89 MM HG: ICD-10-PCS | Mod: CPTII,S$GLB,, | Performed by: FAMILY MEDICINE

## 2021-03-22 PROCEDURE — 3074F PR MOST RECENT SYSTOLIC BLOOD PRESSURE < 130 MM HG: ICD-10-PCS | Mod: CPTII,S$GLB,, | Performed by: FAMILY MEDICINE

## 2021-03-22 PROCEDURE — 3074F SYST BP LT 130 MM HG: CPT | Mod: CPTII,S$GLB,, | Performed by: FAMILY MEDICINE

## 2021-03-23 ENCOUNTER — LAB VISIT (OUTPATIENT)
Dept: LAB | Facility: HOSPITAL | Age: 66
End: 2021-03-23
Attending: FAMILY MEDICINE
Payer: MEDICARE

## 2021-03-23 ENCOUNTER — TELEPHONE (OUTPATIENT)
Dept: ENDOSCOPY | Facility: HOSPITAL | Age: 66
End: 2021-03-23

## 2021-03-23 ENCOUNTER — TELEPHONE (OUTPATIENT)
Dept: GASTROENTEROLOGY | Facility: CLINIC | Age: 66
End: 2021-03-23

## 2021-03-23 DIAGNOSIS — R73.03 PREDIABETES: ICD-10-CM

## 2021-03-23 LAB
ALBUMIN SERPL BCP-MCNC: 3.7 G/DL (ref 3.5–5.2)
ALP SERPL-CCNC: 75 U/L (ref 55–135)
ALT SERPL W/O P-5'-P-CCNC: 15 U/L (ref 10–44)
ANION GAP SERPL CALC-SCNC: 6 MMOL/L (ref 8–16)
AST SERPL-CCNC: 18 U/L (ref 10–40)
BASOPHILS # BLD AUTO: 0.01 K/UL (ref 0–0.2)
BASOPHILS NFR BLD: 0.3 % (ref 0–1.9)
BILIRUB SERPL-MCNC: 0.3 MG/DL (ref 0.1–1)
BUN SERPL-MCNC: 13 MG/DL (ref 8–23)
CALCIUM SERPL-MCNC: 9.4 MG/DL (ref 8.7–10.5)
CHLORIDE SERPL-SCNC: 107 MMOL/L (ref 95–110)
CO2 SERPL-SCNC: 28 MMOL/L (ref 23–29)
CREAT SERPL-MCNC: 0.8 MG/DL (ref 0.5–1.4)
DIFFERENTIAL METHOD: ABNORMAL
EOSINOPHIL # BLD AUTO: 0.1 K/UL (ref 0–0.5)
EOSINOPHIL NFR BLD: 1.3 % (ref 0–8)
ERYTHROCYTE [DISTWIDTH] IN BLOOD BY AUTOMATED COUNT: 14.6 % (ref 11.5–14.5)
EST. GFR  (AFRICAN AMERICAN): >60 ML/MIN/1.73 M^2
EST. GFR  (NON AFRICAN AMERICAN): >60 ML/MIN/1.73 M^2
GLUCOSE SERPL-MCNC: 107 MG/DL (ref 70–110)
HCT VFR BLD AUTO: 33.4 % (ref 37–48.5)
HGB BLD-MCNC: 10.5 G/DL (ref 12–16)
IMM GRANULOCYTES # BLD AUTO: 0.01 K/UL (ref 0–0.04)
IMM GRANULOCYTES NFR BLD AUTO: 0.3 % (ref 0–0.5)
LYMPHOCYTES # BLD AUTO: 1.3 K/UL (ref 1–4.8)
LYMPHOCYTES NFR BLD: 33.8 % (ref 18–48)
MCH RBC QN AUTO: 35.6 PG (ref 27–31)
MCHC RBC AUTO-ENTMCNC: 31.4 G/DL (ref 32–36)
MCV RBC AUTO: 113 FL (ref 82–98)
MONOCYTES # BLD AUTO: 0.4 K/UL (ref 0.3–1)
MONOCYTES NFR BLD: 9.6 % (ref 4–15)
NEUTROPHILS # BLD AUTO: 2.2 K/UL (ref 1.8–7.7)
NEUTROPHILS NFR BLD: 54.7 % (ref 38–73)
NRBC BLD-RTO: 1 /100 WBC
PLATELET # BLD AUTO: 60 K/UL (ref 150–350)
PMV BLD AUTO: 13 FL (ref 9.2–12.9)
POTASSIUM SERPL-SCNC: 3.8 MMOL/L (ref 3.5–5.1)
PROT SERPL-MCNC: 7.4 G/DL (ref 6–8.4)
RBC # BLD AUTO: 2.95 M/UL (ref 4–5.4)
SODIUM SERPL-SCNC: 141 MMOL/L (ref 136–145)
WBC # BLD AUTO: 3.97 K/UL (ref 3.9–12.7)

## 2021-03-23 PROCEDURE — 85025 COMPLETE CBC W/AUTO DIFF WBC: CPT | Performed by: FAMILY MEDICINE

## 2021-03-23 PROCEDURE — 83036 HEMOGLOBIN GLYCOSYLATED A1C: CPT | Performed by: FAMILY MEDICINE

## 2021-03-23 PROCEDURE — 80053 COMPREHEN METABOLIC PANEL: CPT | Performed by: FAMILY MEDICINE

## 2021-03-23 PROCEDURE — 36415 COLL VENOUS BLD VENIPUNCTURE: CPT | Mod: PO | Performed by: FAMILY MEDICINE

## 2021-03-24 LAB
ESTIMATED AVG GLUCOSE: 117 MG/DL (ref 68–131)
HBA1C MFR BLD: 5.7 % (ref 4–5.6)

## 2021-04-19 ENCOUNTER — HOSPITAL ENCOUNTER (OUTPATIENT)
Dept: RADIOLOGY | Facility: HOSPITAL | Age: 66
Discharge: HOME | End: 2021-04-19
Attending: FAMILY MEDICINE
Payer: MEDICARE

## 2021-04-19 VITALS — BODY MASS INDEX: 29.37 KG/M2 | WEIGHT: 182.75 LBS | HEIGHT: 66 IN

## 2021-04-19 DIAGNOSIS — Z12.31 ENCOUNTER FOR SCREENING MAMMOGRAM FOR MALIGNANT NEOPLASM OF BREAST: ICD-10-CM

## 2021-04-19 PROCEDURE — 77067 SCR MAMMO BI INCL CAD: CPT | Mod: 26,,, | Performed by: RADIOLOGY

## 2021-04-19 PROCEDURE — 77067 SCR MAMMO BI INCL CAD: CPT | Mod: TC

## 2021-04-19 PROCEDURE — 77067 MAMMO DIGITAL SCREENING BILAT WITH TOMO: ICD-10-PCS | Mod: 26,,, | Performed by: RADIOLOGY

## 2021-04-19 PROCEDURE — 77063 BREAST TOMOSYNTHESIS BI: CPT | Mod: 26,,, | Performed by: RADIOLOGY

## 2021-04-19 PROCEDURE — 77063 MAMMO DIGITAL SCREENING BILAT WITH TOMO: ICD-10-PCS | Mod: 26,,, | Performed by: RADIOLOGY

## 2021-04-23 ENCOUNTER — PATIENT MESSAGE (OUTPATIENT)
Dept: INTERNAL MEDICINE | Facility: CLINIC | Age: 66
End: 2021-04-23

## 2021-04-26 ENCOUNTER — HOSPITAL ENCOUNTER (OUTPATIENT)
Dept: RADIOLOGY | Facility: HOSPITAL | Age: 66
Discharge: HOME | End: 2021-04-26
Attending: FAMILY MEDICINE
Payer: MEDICARE

## 2021-04-26 DIAGNOSIS — R92.8 ABNORMAL MAMMOGRAM: ICD-10-CM

## 2021-04-26 PROCEDURE — 77065 MAMMO DIGITAL DIAGNOSTIC LEFT WITH TOMO: ICD-10-PCS | Mod: 26,LT,, | Performed by: RADIOLOGY

## 2021-04-26 PROCEDURE — 77065 DX MAMMO INCL CAD UNI: CPT | Mod: 26,LT,, | Performed by: RADIOLOGY

## 2021-04-26 PROCEDURE — 77061 MAMMO DIGITAL DIAGNOSTIC LEFT WITH TOMO: ICD-10-PCS | Mod: 26,LT,, | Performed by: RADIOLOGY

## 2021-04-26 PROCEDURE — 77061 BREAST TOMOSYNTHESIS UNI: CPT | Mod: TC,LT

## 2021-04-26 PROCEDURE — 77061 BREAST TOMOSYNTHESIS UNI: CPT | Mod: 26,LT,, | Performed by: RADIOLOGY

## 2021-05-19 DIAGNOSIS — R10.13 EPIGASTRIC PAIN: ICD-10-CM

## 2021-05-19 RX ORDER — OMEPRAZOLE 20 MG/1
20 CAPSULE, DELAYED RELEASE ORAL DAILY
Qty: 30 CAPSULE | Refills: 2 | Status: SHIPPED | OUTPATIENT
Start: 2021-05-19 | End: 2021-09-17

## 2021-05-27 ENCOUNTER — PATIENT MESSAGE (OUTPATIENT)
Dept: ENDOSCOPY | Facility: HOSPITAL | Age: 66
End: 2021-05-27

## 2021-05-27 DIAGNOSIS — Z01.818 PRE-OP TESTING: Primary | ICD-10-CM

## 2021-05-27 RX ORDER — SODIUM, POTASSIUM,MAG SULFATES 17.5-3.13G
1 SOLUTION, RECONSTITUTED, ORAL ORAL DAILY
Qty: 1 KIT | Refills: 0 | Status: SHIPPED | OUTPATIENT
Start: 2021-05-27 | End: 2021-05-29

## 2021-06-21 ENCOUNTER — TELEPHONE (OUTPATIENT)
Dept: PREADMISSION TESTING | Facility: HOSPITAL | Age: 66
End: 2021-06-21

## 2021-06-22 ENCOUNTER — ANESTHESIA EVENT (OUTPATIENT)
Dept: ENDOSCOPY | Facility: HOSPITAL | Age: 66
End: 2021-06-22
Payer: MEDICARE

## 2021-06-23 ENCOUNTER — ANESTHESIA (OUTPATIENT)
Dept: ENDOSCOPY | Facility: HOSPITAL | Age: 66
End: 2021-06-23
Payer: MEDICARE

## 2021-06-23 ENCOUNTER — HOSPITAL ENCOUNTER (OUTPATIENT)
Facility: HOSPITAL | Age: 66
Discharge: HOME | End: 2021-06-23
Attending: INTERNAL MEDICINE | Admitting: INTERNAL MEDICINE
Payer: MEDICARE

## 2021-06-23 VITALS
HEART RATE: 68 BPM | TEMPERATURE: 98 F | OXYGEN SATURATION: 100 % | BODY MASS INDEX: 29.02 KG/M2 | DIASTOLIC BLOOD PRESSURE: 61 MMHG | RESPIRATION RATE: 17 BRPM | HEIGHT: 66 IN | SYSTOLIC BLOOD PRESSURE: 131 MMHG | WEIGHT: 180.56 LBS

## 2021-06-23 DIAGNOSIS — K63.5 POLYP OF COLON, UNSPECIFIED PART OF COLON, UNSPECIFIED TYPE: Primary | ICD-10-CM

## 2021-06-23 LAB — SARS-COV-2 RDRP RESP QL NAA+PROBE: NEGATIVE

## 2021-06-23 PROCEDURE — 27202363 HC INJECTION AGENT, SUBMUCOSAL, ANY: Performed by: INTERNAL MEDICINE

## 2021-06-23 PROCEDURE — U0002 COVID-19 LAB TEST NON-CDC: HCPCS | Performed by: INTERNAL MEDICINE

## 2021-06-23 PROCEDURE — 88305 TISSUE EXAM BY PATHOLOGIST: ICD-10-PCS | Mod: 26,,, | Performed by: PATHOLOGY

## 2021-06-23 PROCEDURE — 37000009 HC ANESTHESIA EA ADD 15 MINS: Performed by: INTERNAL MEDICINE

## 2021-06-23 PROCEDURE — 45385 COLONOSCOPY W/LESION REMOVAL: CPT | Performed by: INTERNAL MEDICINE

## 2021-06-23 PROCEDURE — 27200997: Performed by: INTERNAL MEDICINE

## 2021-06-23 PROCEDURE — 45381 COLONOSCOPY SUBMUCOUS NJX: CPT | Performed by: INTERNAL MEDICINE

## 2021-06-23 PROCEDURE — 88305 TISSUE EXAM BY PATHOLOGIST: CPT | Performed by: PATHOLOGY

## 2021-06-23 PROCEDURE — D9220A PRA ANESTHESIA: Mod: PT,ANES,, | Performed by: ANESTHESIOLOGY

## 2021-06-23 PROCEDURE — 27201028 HC NEEDLE, SCLERO: Performed by: INTERNAL MEDICINE

## 2021-06-23 PROCEDURE — 63600175 PHARM REV CODE 636 W HCPCS: Performed by: INTERNAL MEDICINE

## 2021-06-23 PROCEDURE — 88305 TISSUE EXAM BY PATHOLOGIST: CPT | Mod: 26,,, | Performed by: PATHOLOGY

## 2021-06-23 PROCEDURE — 27201089 HC SNARE, DISP (ANY): Performed by: INTERNAL MEDICINE

## 2021-06-23 PROCEDURE — 45385 PR COLONOSCOPY,REMV LESN,SNARE: ICD-10-PCS | Mod: PT,,, | Performed by: INTERNAL MEDICINE

## 2021-06-23 PROCEDURE — 45381 PR COLONOSCPY,FLEX,W/DIR SUBMUC INJECT: ICD-10-PCS | Mod: 51,,, | Performed by: INTERNAL MEDICINE

## 2021-06-23 PROCEDURE — D9220A PRA ANESTHESIA: ICD-10-PCS | Mod: PT,ANES,, | Performed by: ANESTHESIOLOGY

## 2021-06-23 PROCEDURE — 37000008 HC ANESTHESIA 1ST 15 MINUTES: Performed by: INTERNAL MEDICINE

## 2021-06-23 PROCEDURE — 45385 COLONOSCOPY W/LESION REMOVAL: CPT | Mod: PT,,, | Performed by: INTERNAL MEDICINE

## 2021-06-23 PROCEDURE — 45381 COLONOSCOPY SUBMUCOUS NJX: CPT | Mod: 51,,, | Performed by: INTERNAL MEDICINE

## 2021-06-23 PROCEDURE — D9220A PRA ANESTHESIA: ICD-10-PCS | Mod: PT,CRNA,, | Performed by: NURSE ANESTHETIST, CERTIFIED REGISTERED

## 2021-06-23 PROCEDURE — 25000003 PHARM REV CODE 250: Performed by: NURSE ANESTHETIST, CERTIFIED REGISTERED

## 2021-06-23 PROCEDURE — 63600175 PHARM REV CODE 636 W HCPCS: Performed by: NURSE ANESTHETIST, CERTIFIED REGISTERED

## 2021-06-23 PROCEDURE — D9220A PRA ANESTHESIA: Mod: PT,CRNA,, | Performed by: NURSE ANESTHETIST, CERTIFIED REGISTERED

## 2021-06-23 RX ORDER — SODIUM, POTASSIUM,MAG SULFATES 17.5-3.13G
1 SOLUTION, RECONSTITUTED, ORAL ORAL DAILY
Qty: 1 KIT | Refills: 0 | Status: SHIPPED | OUTPATIENT
Start: 2021-06-23 | End: 2021-06-25

## 2021-06-23 RX ORDER — SODIUM CHLORIDE, SODIUM LACTATE, POTASSIUM CHLORIDE, CALCIUM CHLORIDE 600; 310; 30; 20 MG/100ML; MG/100ML; MG/100ML; MG/100ML
INJECTION, SOLUTION INTRAVENOUS CONTINUOUS
Status: DISCONTINUED | OUTPATIENT
Start: 2021-06-23 | End: 2021-06-23 | Stop reason: HOSPADM

## 2021-06-23 RX ORDER — PROPOFOL 10 MG/ML
VIAL (ML) INTRAVENOUS
Status: DISCONTINUED | OUTPATIENT
Start: 2021-06-23 | End: 2021-06-23

## 2021-06-23 RX ORDER — LIDOCAINE HYDROCHLORIDE 20 MG/ML
INJECTION, SOLUTION EPIDURAL; INFILTRATION; INTRACAUDAL; PERINEURAL
Status: DISCONTINUED | OUTPATIENT
Start: 2021-06-23 | End: 2021-06-23

## 2021-06-23 RX ADMIN — PROPOFOL 100 MG: 10 INJECTION, EMULSION INTRAVENOUS at 02:06

## 2021-06-23 RX ADMIN — SODIUM CHLORIDE, SODIUM LACTATE, POTASSIUM CHLORIDE, AND CALCIUM CHLORIDE: 600; 310; 30; 20 INJECTION, SOLUTION INTRAVENOUS at 02:06

## 2021-06-23 RX ADMIN — PROPOFOL 50 MG: 10 INJECTION, EMULSION INTRAVENOUS at 02:06

## 2021-06-23 RX ADMIN — PROPOFOL 30 MG: 10 INJECTION, EMULSION INTRAVENOUS at 02:06

## 2021-06-23 RX ADMIN — LIDOCAINE HYDROCHLORIDE 60 MG: 20 INJECTION, SOLUTION EPIDURAL; INFILTRATION; INTRACAUDAL; PERINEURAL at 02:06

## 2021-06-23 RX ADMIN — PROPOFOL 20 MG: 10 INJECTION, EMULSION INTRAVENOUS at 02:06

## 2021-06-30 LAB
FINAL PATHOLOGIC DIAGNOSIS: NORMAL
Lab: NORMAL

## 2021-07-01 ENCOUNTER — PATIENT MESSAGE (OUTPATIENT)
Dept: GASTROENTEROLOGY | Facility: CLINIC | Age: 66
End: 2021-07-01

## 2021-07-06 ENCOUNTER — TELEPHONE (OUTPATIENT)
Dept: OBSTETRICS AND GYNECOLOGY | Facility: CLINIC | Age: 66
End: 2021-07-06

## 2021-07-12 ENCOUNTER — OFFICE VISIT (OUTPATIENT)
Dept: INTERNAL MEDICINE | Facility: CLINIC | Age: 66
End: 2021-07-12
Payer: MEDICARE

## 2021-07-12 VITALS
BODY MASS INDEX: 29.41 KG/M2 | WEIGHT: 183 LBS | HEIGHT: 66 IN | SYSTOLIC BLOOD PRESSURE: 124 MMHG | OXYGEN SATURATION: 97 % | HEART RATE: 83 BPM | DIASTOLIC BLOOD PRESSURE: 70 MMHG | TEMPERATURE: 99 F

## 2021-07-12 DIAGNOSIS — M25.511 CHRONIC PAIN OF BOTH SHOULDERS: ICD-10-CM

## 2021-07-12 DIAGNOSIS — K21.9 GASTROESOPHAGEAL REFLUX DISEASE, UNSPECIFIED WHETHER ESOPHAGITIS PRESENT: Primary | ICD-10-CM

## 2021-07-12 DIAGNOSIS — I10 ESSENTIAL HYPERTENSION: ICD-10-CM

## 2021-07-12 DIAGNOSIS — M25.512 CHRONIC PAIN OF BOTH SHOULDERS: ICD-10-CM

## 2021-07-12 DIAGNOSIS — D69.6 THROMBOCYTOPENIA: ICD-10-CM

## 2021-07-12 DIAGNOSIS — G89.29 CHRONIC PAIN OF BOTH SHOULDERS: ICD-10-CM

## 2021-07-12 PROCEDURE — 99999 PR PBB SHADOW E&M-EST. PATIENT-LVL IV: ICD-10-PCS | Mod: PBBFAC,,, | Performed by: PHYSICIAN ASSISTANT

## 2021-07-12 PROCEDURE — 1101F PR PT FALLS ASSESS DOC 0-1 FALLS W/OUT INJ PAST YR: ICD-10-PCS | Mod: CPTII,S$GLB,, | Performed by: PHYSICIAN ASSISTANT

## 2021-07-12 PROCEDURE — 3288F FALL RISK ASSESSMENT DOCD: CPT | Mod: CPTII,S$GLB,, | Performed by: PHYSICIAN ASSISTANT

## 2021-07-12 PROCEDURE — 1101F PT FALLS ASSESS-DOCD LE1/YR: CPT | Mod: CPTII,S$GLB,, | Performed by: PHYSICIAN ASSISTANT

## 2021-07-12 PROCEDURE — 1126F AMNT PAIN NOTED NONE PRSNT: CPT | Mod: S$GLB,,, | Performed by: PHYSICIAN ASSISTANT

## 2021-07-12 PROCEDURE — 1159F PR MEDICATION LIST DOCUMENTED IN MEDICAL RECORD: ICD-10-PCS | Mod: S$GLB,,, | Performed by: PHYSICIAN ASSISTANT

## 2021-07-12 PROCEDURE — 3288F PR FALLS RISK ASSESSMENT DOCUMENTED: ICD-10-PCS | Mod: CPTII,S$GLB,, | Performed by: PHYSICIAN ASSISTANT

## 2021-07-12 PROCEDURE — 99999 PR PBB SHADOW E&M-EST. PATIENT-LVL IV: CPT | Mod: PBBFAC,,, | Performed by: PHYSICIAN ASSISTANT

## 2021-07-12 PROCEDURE — 3008F PR BODY MASS INDEX (BMI) DOCUMENTED: ICD-10-PCS | Mod: CPTII,S$GLB,, | Performed by: PHYSICIAN ASSISTANT

## 2021-07-12 PROCEDURE — 3008F BODY MASS INDEX DOCD: CPT | Mod: CPTII,S$GLB,, | Performed by: PHYSICIAN ASSISTANT

## 2021-07-12 PROCEDURE — 99213 OFFICE O/P EST LOW 20 MIN: CPT | Mod: S$GLB,,, | Performed by: PHYSICIAN ASSISTANT

## 2021-07-12 PROCEDURE — 1159F MED LIST DOCD IN RCRD: CPT | Mod: S$GLB,,, | Performed by: PHYSICIAN ASSISTANT

## 2021-07-12 PROCEDURE — 1126F PR PAIN SEVERITY QUANTIFIED, NO PAIN PRESENT: ICD-10-PCS | Mod: S$GLB,,, | Performed by: PHYSICIAN ASSISTANT

## 2021-07-12 PROCEDURE — 99213 PR OFFICE/OUTPT VISIT, EST, LEVL III, 20-29 MIN: ICD-10-PCS | Mod: S$GLB,,, | Performed by: PHYSICIAN ASSISTANT

## 2021-07-12 RX ORDER — TRAMADOL HYDROCHLORIDE 50 MG/1
50 TABLET ORAL 2 TIMES DAILY PRN
Qty: 24 TABLET | Refills: 0 | OUTPATIENT
Start: 2021-07-12

## 2021-08-13 ENCOUNTER — TELEPHONE (OUTPATIENT)
Dept: GASTROENTEROLOGY | Facility: CLINIC | Age: 66
End: 2021-08-13

## 2021-09-14 DIAGNOSIS — I10 ESSENTIAL HYPERTENSION: ICD-10-CM

## 2021-09-14 RX ORDER — AMLODIPINE BESYLATE 5 MG/1
5 TABLET ORAL DAILY
Qty: 90 TABLET | Refills: 3 | Status: SHIPPED | OUTPATIENT
Start: 2021-09-14

## 2021-09-17 ENCOUNTER — OFFICE VISIT (OUTPATIENT)
Dept: INTERNAL MEDICINE | Facility: CLINIC | Age: 66
End: 2021-09-17
Payer: MEDICARE

## 2021-09-17 VITALS
SYSTOLIC BLOOD PRESSURE: 124 MMHG | DIASTOLIC BLOOD PRESSURE: 72 MMHG | HEIGHT: 66 IN | BODY MASS INDEX: 29.55 KG/M2 | WEIGHT: 183.88 LBS | OXYGEN SATURATION: 95 % | TEMPERATURE: 99 F | HEART RATE: 83 BPM

## 2021-09-17 DIAGNOSIS — I10 ESSENTIAL HYPERTENSION: ICD-10-CM

## 2021-09-17 DIAGNOSIS — D61.9 APLASTIC ANEMIA: ICD-10-CM

## 2021-09-17 DIAGNOSIS — L98.9 SKIN LESION: ICD-10-CM

## 2021-09-17 DIAGNOSIS — D69.6 THROMBOCYTOPENIA: ICD-10-CM

## 2021-09-17 DIAGNOSIS — E78.5 DYSLIPIDEMIA (HIGH LDL; LOW HDL): Primary | ICD-10-CM

## 2021-09-17 DIAGNOSIS — E55.9 VITAMIN D DEFICIENCY: ICD-10-CM

## 2021-09-17 DIAGNOSIS — R73.03 PREDIABETES: ICD-10-CM

## 2021-09-17 PROCEDURE — 99214 OFFICE O/P EST MOD 30 MIN: CPT | Mod: S$GLB,,, | Performed by: PHYSICIAN ASSISTANT

## 2021-09-17 PROCEDURE — 87529 HSV DNA AMP PROBE: CPT | Performed by: PHYSICIAN ASSISTANT

## 2021-09-17 PROCEDURE — 3044F HG A1C LEVEL LT 7.0%: CPT | Mod: CPTII,S$GLB,, | Performed by: PHYSICIAN ASSISTANT

## 2021-09-17 PROCEDURE — 3078F DIAST BP <80 MM HG: CPT | Mod: CPTII,S$GLB,, | Performed by: PHYSICIAN ASSISTANT

## 2021-09-17 PROCEDURE — 3008F PR BODY MASS INDEX (BMI) DOCUMENTED: ICD-10-PCS | Mod: CPTII,S$GLB,, | Performed by: PHYSICIAN ASSISTANT

## 2021-09-17 PROCEDURE — 1160F PR REVIEW ALL MEDS BY PRESCRIBER/CLIN PHARMACIST DOCUMENTED: ICD-10-PCS | Mod: CPTII,S$GLB,, | Performed by: PHYSICIAN ASSISTANT

## 2021-09-17 PROCEDURE — 3078F PR MOST RECENT DIASTOLIC BLOOD PRESSURE < 80 MM HG: ICD-10-PCS | Mod: CPTII,S$GLB,, | Performed by: PHYSICIAN ASSISTANT

## 2021-09-17 PROCEDURE — 1159F PR MEDICATION LIST DOCUMENTED IN MEDICAL RECORD: ICD-10-PCS | Mod: CPTII,S$GLB,, | Performed by: PHYSICIAN ASSISTANT

## 2021-09-17 PROCEDURE — 1160F RVW MEDS BY RX/DR IN RCRD: CPT | Mod: CPTII,S$GLB,, | Performed by: PHYSICIAN ASSISTANT

## 2021-09-17 PROCEDURE — 1159F MED LIST DOCD IN RCRD: CPT | Mod: CPTII,S$GLB,, | Performed by: PHYSICIAN ASSISTANT

## 2021-09-17 PROCEDURE — 3074F PR MOST RECENT SYSTOLIC BLOOD PRESSURE < 130 MM HG: ICD-10-PCS | Mod: CPTII,S$GLB,, | Performed by: PHYSICIAN ASSISTANT

## 2021-09-17 PROCEDURE — 3074F SYST BP LT 130 MM HG: CPT | Mod: CPTII,S$GLB,, | Performed by: PHYSICIAN ASSISTANT

## 2021-09-17 PROCEDURE — 99214 PR OFFICE/OUTPT VISIT, EST, LEVL IV, 30-39 MIN: ICD-10-PCS | Mod: S$GLB,,, | Performed by: PHYSICIAN ASSISTANT

## 2021-09-17 PROCEDURE — 99999 PR PBB SHADOW E&M-EST. PATIENT-LVL III: ICD-10-PCS | Mod: PBBFAC,,, | Performed by: PHYSICIAN ASSISTANT

## 2021-09-17 PROCEDURE — 3044F PR MOST RECENT HEMOGLOBIN A1C LEVEL <7.0%: ICD-10-PCS | Mod: CPTII,S$GLB,, | Performed by: PHYSICIAN ASSISTANT

## 2021-09-17 PROCEDURE — 3008F BODY MASS INDEX DOCD: CPT | Mod: CPTII,S$GLB,, | Performed by: PHYSICIAN ASSISTANT

## 2021-09-17 PROCEDURE — 99999 PR PBB SHADOW E&M-EST. PATIENT-LVL III: CPT | Mod: PBBFAC,,, | Performed by: PHYSICIAN ASSISTANT

## 2021-09-17 RX ORDER — TRAMADOL HYDROCHLORIDE 50 MG/1
50 TABLET ORAL 2 TIMES DAILY PRN
Qty: 24 TABLET | Refills: 0 | Status: CANCELLED | OUTPATIENT
Start: 2021-09-17

## 2021-09-23 LAB
HSV1 DNA SPEC QL NAA+PROBE: NEGATIVE
HSV2 DNA SPEC QL NAA+PROBE: NEGATIVE
SPECIMEN SOURCE: NORMAL

## 2021-09-24 ENCOUNTER — LAB VISIT (OUTPATIENT)
Dept: LAB | Facility: HOSPITAL | Age: 66
End: 2021-09-24
Attending: PHYSICIAN ASSISTANT
Payer: MEDICARE

## 2021-09-24 DIAGNOSIS — D69.6 THROMBOCYTOPENIA: ICD-10-CM

## 2021-09-24 DIAGNOSIS — I10 ESSENTIAL HYPERTENSION: ICD-10-CM

## 2021-09-24 DIAGNOSIS — E78.5 DYSLIPIDEMIA (HIGH LDL; LOW HDL): ICD-10-CM

## 2021-09-24 DIAGNOSIS — R73.03 PREDIABETES: ICD-10-CM

## 2021-09-24 DIAGNOSIS — D61.9 APLASTIC ANEMIA: ICD-10-CM

## 2021-09-24 DIAGNOSIS — E55.9 VITAMIN D DEFICIENCY: ICD-10-CM

## 2021-09-24 LAB
25(OH)D3+25(OH)D2 SERPL-MCNC: 22 NG/ML (ref 30–96)
ALBUMIN SERPL BCP-MCNC: 3.9 G/DL (ref 3.5–5.2)
ALP SERPL-CCNC: 69 U/L (ref 55–135)
ALT SERPL W/O P-5'-P-CCNC: 15 U/L (ref 10–44)
ANION GAP SERPL CALC-SCNC: 10 MMOL/L (ref 8–16)
AST SERPL-CCNC: 19 U/L (ref 10–40)
BILIRUB SERPL-MCNC: 0.4 MG/DL (ref 0.1–1)
BUN SERPL-MCNC: 13 MG/DL (ref 8–23)
CALCIUM SERPL-MCNC: 9.7 MG/DL (ref 8.7–10.5)
CHLORIDE SERPL-SCNC: 104 MMOL/L (ref 95–110)
CHOLEST SERPL-MCNC: 184 MG/DL (ref 120–199)
CHOLEST/HDLC SERPL: 4.4 {RATIO} (ref 2–5)
CO2 SERPL-SCNC: 24 MMOL/L (ref 23–29)
CREAT SERPL-MCNC: 0.8 MG/DL (ref 0.5–1.4)
EST. GFR  (AFRICAN AMERICAN): >60 ML/MIN/1.73 M^2
EST. GFR  (NON AFRICAN AMERICAN): >60 ML/MIN/1.73 M^2
ESTIMATED AVG GLUCOSE: 128 MG/DL (ref 68–131)
GLUCOSE SERPL-MCNC: 99 MG/DL (ref 70–110)
HBA1C MFR BLD: 6.1 % (ref 4–5.6)
HDLC SERPL-MCNC: 42 MG/DL (ref 40–75)
HDLC SERPL: 22.8 % (ref 20–50)
LDLC SERPL CALC-MCNC: 125.2 MG/DL (ref 63–159)
NONHDLC SERPL-MCNC: 142 MG/DL
POTASSIUM SERPL-SCNC: 4.3 MMOL/L (ref 3.5–5.1)
PROT SERPL-MCNC: 7.3 G/DL (ref 6–8.4)
SODIUM SERPL-SCNC: 138 MMOL/L (ref 136–145)
TRIGL SERPL-MCNC: 84 MG/DL (ref 30–150)

## 2021-09-24 PROCEDURE — 80061 LIPID PANEL: CPT | Performed by: PHYSICIAN ASSISTANT

## 2021-09-24 PROCEDURE — 83036 HEMOGLOBIN GLYCOSYLATED A1C: CPT | Performed by: PHYSICIAN ASSISTANT

## 2021-09-24 PROCEDURE — 80053 COMPREHEN METABOLIC PANEL: CPT | Performed by: PHYSICIAN ASSISTANT

## 2021-09-24 PROCEDURE — 82306 VITAMIN D 25 HYDROXY: CPT | Performed by: PHYSICIAN ASSISTANT

## 2021-09-24 PROCEDURE — 85025 COMPLETE CBC W/AUTO DIFF WBC: CPT | Performed by: PHYSICIAN ASSISTANT

## 2021-09-24 PROCEDURE — 36415 COLL VENOUS BLD VENIPUNCTURE: CPT | Mod: PO | Performed by: PHYSICIAN ASSISTANT

## 2021-09-25 LAB
BASOPHILS # BLD AUTO: 0.01 K/UL (ref 0–0.2)
BASOPHILS NFR BLD: 0.2 % (ref 0–1.9)
DIFFERENTIAL METHOD: ABNORMAL
EOSINOPHIL # BLD AUTO: 0.1 K/UL (ref 0–0.5)
EOSINOPHIL NFR BLD: 1.2 % (ref 0–8)
ERYTHROCYTE [DISTWIDTH] IN BLOOD BY AUTOMATED COUNT: 15.4 % (ref 11.5–14.5)
HCT VFR BLD AUTO: 35.8 % (ref 37–48.5)
HGB BLD-MCNC: 11.7 G/DL (ref 12–16)
IMM GRANULOCYTES # BLD AUTO: 0.01 K/UL (ref 0–0.04)
IMM GRANULOCYTES NFR BLD AUTO: 0.2 % (ref 0–0.5)
LYMPHOCYTES # BLD AUTO: 1.4 K/UL (ref 1–4.8)
LYMPHOCYTES NFR BLD: 35.6 % (ref 18–48)
MCH RBC QN AUTO: 36.9 PG (ref 27–31)
MCHC RBC AUTO-ENTMCNC: 32.7 G/DL (ref 32–36)
MCV RBC AUTO: 113 FL (ref 82–98)
MONOCYTES # BLD AUTO: 0.4 K/UL (ref 0.3–1)
MONOCYTES NFR BLD: 10 % (ref 4–15)
NEUTROPHILS # BLD AUTO: 2.1 K/UL (ref 1.8–7.7)
NEUTROPHILS NFR BLD: 52.8 % (ref 38–73)
NRBC BLD-RTO: 0 /100 WBC
PLATELET # BLD AUTO: 59 K/UL (ref 150–450)
PMV BLD AUTO: 11.6 FL (ref 9.2–12.9)
RBC # BLD AUTO: 3.17 M/UL (ref 4–5.4)
WBC # BLD AUTO: 4.02 K/UL (ref 3.9–12.7)

## 2021-10-15 ENCOUNTER — PATIENT MESSAGE (OUTPATIENT)
Dept: GASTROENTEROLOGY | Facility: CLINIC | Age: 66
End: 2021-10-15

## 2021-10-15 ENCOUNTER — OFFICE VISIT (OUTPATIENT)
Dept: INTERNAL MEDICINE | Facility: CLINIC | Age: 66
End: 2021-10-15
Payer: MEDICARE

## 2021-10-15 ENCOUNTER — TELEPHONE (OUTPATIENT)
Dept: GASTROENTEROLOGY | Facility: CLINIC | Age: 66
End: 2021-10-15

## 2021-10-15 ENCOUNTER — APPOINTMENT (OUTPATIENT)
Dept: RADIOLOGY | Facility: HOSPITAL | Age: 66
End: 2021-10-15
Attending: FAMILY MEDICINE
Payer: MEDICARE

## 2021-10-15 VITALS
DIASTOLIC BLOOD PRESSURE: 94 MMHG | OXYGEN SATURATION: 99 % | RESPIRATION RATE: 18 BRPM | SYSTOLIC BLOOD PRESSURE: 154 MMHG | WEIGHT: 180.56 LBS | TEMPERATURE: 99 F | HEART RATE: 57 BPM | HEIGHT: 66 IN | BODY MASS INDEX: 29.02 KG/M2

## 2021-10-15 DIAGNOSIS — Z78.0 POSTMENOPAUSAL: ICD-10-CM

## 2021-10-15 DIAGNOSIS — Z76.89 ESTABLISHING CARE WITH NEW DOCTOR, ENCOUNTER FOR: ICD-10-CM

## 2021-10-15 DIAGNOSIS — E55.9 VITAMIN D INSUFFICIENCY: ICD-10-CM

## 2021-10-15 DIAGNOSIS — R73.03 PREDIABETES: Primary | ICD-10-CM

## 2021-10-15 PROCEDURE — 3044F PR MOST RECENT HEMOGLOBIN A1C LEVEL <7.0%: ICD-10-PCS | Mod: CPTII,S$GLB,, | Performed by: FAMILY MEDICINE

## 2021-10-15 PROCEDURE — 3077F SYST BP >= 140 MM HG: CPT | Mod: CPTII,S$GLB,, | Performed by: FAMILY MEDICINE

## 2021-10-15 PROCEDURE — 77080 DXA BONE DENSITY AXIAL: CPT | Mod: TC

## 2021-10-15 PROCEDURE — 3080F PR MOST RECENT DIASTOLIC BLOOD PRESSURE >= 90 MM HG: ICD-10-PCS | Mod: CPTII,S$GLB,, | Performed by: FAMILY MEDICINE

## 2021-10-15 PROCEDURE — 99213 OFFICE O/P EST LOW 20 MIN: CPT | Mod: S$GLB,,, | Performed by: FAMILY MEDICINE

## 2021-10-15 PROCEDURE — 1101F PT FALLS ASSESS-DOCD LE1/YR: CPT | Mod: CPTII,S$GLB,, | Performed by: FAMILY MEDICINE

## 2021-10-15 PROCEDURE — 99999 PR PBB SHADOW E&M-EST. PATIENT-LVL IV: ICD-10-PCS | Mod: PBBFAC,,, | Performed by: FAMILY MEDICINE

## 2021-10-15 PROCEDURE — 3044F HG A1C LEVEL LT 7.0%: CPT | Mod: CPTII,S$GLB,, | Performed by: FAMILY MEDICINE

## 2021-10-15 PROCEDURE — 3008F PR BODY MASS INDEX (BMI) DOCUMENTED: ICD-10-PCS | Mod: CPTII,S$GLB,, | Performed by: FAMILY MEDICINE

## 2021-10-15 PROCEDURE — 77080 DXA BONE DENSITY AXIAL: CPT | Mod: 26,,, | Performed by: RADIOLOGY

## 2021-10-15 PROCEDURE — 1159F PR MEDICATION LIST DOCUMENTED IN MEDICAL RECORD: ICD-10-PCS | Mod: CPTII,S$GLB,, | Performed by: FAMILY MEDICINE

## 2021-10-15 PROCEDURE — 99213 PR OFFICE/OUTPT VISIT, EST, LEVL III, 20-29 MIN: ICD-10-PCS | Mod: S$GLB,,, | Performed by: FAMILY MEDICINE

## 2021-10-15 PROCEDURE — 3288F FALL RISK ASSESSMENT DOCD: CPT | Mod: CPTII,S$GLB,, | Performed by: FAMILY MEDICINE

## 2021-10-15 PROCEDURE — 3077F PR MOST RECENT SYSTOLIC BLOOD PRESSURE >= 140 MM HG: ICD-10-PCS | Mod: CPTII,S$GLB,, | Performed by: FAMILY MEDICINE

## 2021-10-15 PROCEDURE — 1159F MED LIST DOCD IN RCRD: CPT | Mod: CPTII,S$GLB,, | Performed by: FAMILY MEDICINE

## 2021-10-15 PROCEDURE — 1126F PR PAIN SEVERITY QUANTIFIED, NO PAIN PRESENT: ICD-10-PCS | Mod: CPTII,S$GLB,, | Performed by: FAMILY MEDICINE

## 2021-10-15 PROCEDURE — 3288F PR FALLS RISK ASSESSMENT DOCUMENTED: ICD-10-PCS | Mod: CPTII,S$GLB,, | Performed by: FAMILY MEDICINE

## 2021-10-15 PROCEDURE — 99999 PR PBB SHADOW E&M-EST. PATIENT-LVL IV: CPT | Mod: PBBFAC,,, | Performed by: FAMILY MEDICINE

## 2021-10-15 PROCEDURE — 1101F PR PT FALLS ASSESS DOC 0-1 FALLS W/OUT INJ PAST YR: ICD-10-PCS | Mod: CPTII,S$GLB,, | Performed by: FAMILY MEDICINE

## 2021-10-15 PROCEDURE — 1126F AMNT PAIN NOTED NONE PRSNT: CPT | Mod: CPTII,S$GLB,, | Performed by: FAMILY MEDICINE

## 2021-10-15 PROCEDURE — 77080 DEXA BONE DENSITY SPINE HIP: ICD-10-PCS | Mod: 26,,, | Performed by: RADIOLOGY

## 2021-10-15 PROCEDURE — 3080F DIAST BP >= 90 MM HG: CPT | Mod: CPTII,S$GLB,, | Performed by: FAMILY MEDICINE

## 2021-10-15 PROCEDURE — 3008F BODY MASS INDEX DOCD: CPT | Mod: CPTII,S$GLB,, | Performed by: FAMILY MEDICINE

## 2021-10-15 RX ORDER — ERGOCALCIFEROL 1.25 MG/1
50000 CAPSULE ORAL
Qty: 12 CAPSULE | Refills: 3 | Status: SHIPPED | OUTPATIENT
Start: 2021-10-15 | End: 2022-03-08

## 2021-10-15 RX ORDER — METFORMIN HYDROCHLORIDE 500 MG/1
500 TABLET, EXTENDED RELEASE ORAL
Qty: 90 TABLET | Refills: 3 | Status: SHIPPED | OUTPATIENT
Start: 2021-10-15 | End: 2022-02-22

## 2021-10-22 ENCOUNTER — PATIENT OUTREACH (OUTPATIENT)
Dept: ADMINISTRATIVE | Facility: HOSPITAL | Age: 66
End: 2021-10-22

## 2021-11-10 ENCOUNTER — PATIENT OUTREACH (OUTPATIENT)
Dept: ADMINISTRATIVE | Facility: OTHER | Age: 66
End: 2021-11-10
Payer: MEDICARE

## 2021-11-12 ENCOUNTER — OFFICE VISIT (OUTPATIENT)
Dept: GASTROENTEROLOGY | Facility: CLINIC | Age: 66
End: 2021-11-12
Payer: MEDICARE

## 2021-11-12 VITALS
HEART RATE: 61 BPM | OXYGEN SATURATION: 97 % | SYSTOLIC BLOOD PRESSURE: 138 MMHG | BODY MASS INDEX: 28.67 KG/M2 | HEIGHT: 66 IN | DIASTOLIC BLOOD PRESSURE: 80 MMHG | WEIGHT: 178.38 LBS

## 2021-11-12 DIAGNOSIS — Z86.010 HISTORY OF COLON POLYPS: Primary | ICD-10-CM

## 2021-11-12 PROCEDURE — 1159F MED LIST DOCD IN RCRD: CPT | Mod: CPTII,S$GLB,, | Performed by: PHYSICIAN ASSISTANT

## 2021-11-12 PROCEDURE — 3008F BODY MASS INDEX DOCD: CPT | Mod: CPTII,S$GLB,, | Performed by: PHYSICIAN ASSISTANT

## 2021-11-12 PROCEDURE — 1126F AMNT PAIN NOTED NONE PRSNT: CPT | Mod: CPTII,S$GLB,, | Performed by: PHYSICIAN ASSISTANT

## 2021-11-12 PROCEDURE — 3075F SYST BP GE 130 - 139MM HG: CPT | Mod: CPTII,S$GLB,, | Performed by: PHYSICIAN ASSISTANT

## 2021-11-12 PROCEDURE — 99999 PR PBB SHADOW E&M-EST. PATIENT-LVL III: CPT | Mod: PBBFAC,,, | Performed by: PHYSICIAN ASSISTANT

## 2021-11-12 PROCEDURE — 1101F PR PT FALLS ASSESS DOC 0-1 FALLS W/OUT INJ PAST YR: ICD-10-PCS | Mod: CPTII,S$GLB,, | Performed by: PHYSICIAN ASSISTANT

## 2021-11-12 PROCEDURE — 1126F PR PAIN SEVERITY QUANTIFIED, NO PAIN PRESENT: ICD-10-PCS | Mod: CPTII,S$GLB,, | Performed by: PHYSICIAN ASSISTANT

## 2021-11-12 PROCEDURE — 3288F PR FALLS RISK ASSESSMENT DOCUMENTED: ICD-10-PCS | Mod: CPTII,S$GLB,, | Performed by: PHYSICIAN ASSISTANT

## 2021-11-12 PROCEDURE — 1159F PR MEDICATION LIST DOCUMENTED IN MEDICAL RECORD: ICD-10-PCS | Mod: CPTII,S$GLB,, | Performed by: PHYSICIAN ASSISTANT

## 2021-11-12 PROCEDURE — 1101F PT FALLS ASSESS-DOCD LE1/YR: CPT | Mod: CPTII,S$GLB,, | Performed by: PHYSICIAN ASSISTANT

## 2021-11-12 PROCEDURE — 99499 NO LOS: ICD-10-PCS | Mod: S$GLB,,, | Performed by: PHYSICIAN ASSISTANT

## 2021-11-12 PROCEDURE — 3008F PR BODY MASS INDEX (BMI) DOCUMENTED: ICD-10-PCS | Mod: CPTII,S$GLB,, | Performed by: PHYSICIAN ASSISTANT

## 2021-11-12 PROCEDURE — 99499 UNLISTED E&M SERVICE: CPT | Mod: S$GLB,,, | Performed by: PHYSICIAN ASSISTANT

## 2021-11-12 PROCEDURE — 3044F HG A1C LEVEL LT 7.0%: CPT | Mod: CPTII,S$GLB,, | Performed by: PHYSICIAN ASSISTANT

## 2021-11-12 PROCEDURE — 3075F PR MOST RECENT SYSTOLIC BLOOD PRESS GE 130-139MM HG: ICD-10-PCS | Mod: CPTII,S$GLB,, | Performed by: PHYSICIAN ASSISTANT

## 2021-11-12 PROCEDURE — 3044F PR MOST RECENT HEMOGLOBIN A1C LEVEL <7.0%: ICD-10-PCS | Mod: CPTII,S$GLB,, | Performed by: PHYSICIAN ASSISTANT

## 2021-11-12 PROCEDURE — 3288F FALL RISK ASSESSMENT DOCD: CPT | Mod: CPTII,S$GLB,, | Performed by: PHYSICIAN ASSISTANT

## 2021-11-12 PROCEDURE — 3079F PR MOST RECENT DIASTOLIC BLOOD PRESSURE 80-89 MM HG: ICD-10-PCS | Mod: CPTII,S$GLB,, | Performed by: PHYSICIAN ASSISTANT

## 2021-11-12 PROCEDURE — 3079F DIAST BP 80-89 MM HG: CPT | Mod: CPTII,S$GLB,, | Performed by: PHYSICIAN ASSISTANT

## 2021-11-12 PROCEDURE — 99999 PR PBB SHADOW E&M-EST. PATIENT-LVL III: ICD-10-PCS | Mod: PBBFAC,,, | Performed by: PHYSICIAN ASSISTANT

## 2021-11-12 RX ORDER — SOD SULF/POT CHLORIDE/MAG SULF 1.479 G
12 TABLET ORAL DAILY
Qty: 24 TABLET | Refills: 0 | Status: SHIPPED | OUTPATIENT
Start: 2021-11-12 | End: 2022-03-08

## 2021-11-18 ENCOUNTER — TELEPHONE (OUTPATIENT)
Dept: ENDOSCOPY | Facility: HOSPITAL | Age: 66
End: 2021-11-18
Payer: MEDICARE

## 2021-11-23 ENCOUNTER — PATIENT MESSAGE (OUTPATIENT)
Dept: INTERNAL MEDICINE | Facility: CLINIC | Age: 66
End: 2021-11-23
Payer: MEDICARE

## 2021-11-24 ENCOUNTER — TELEPHONE (OUTPATIENT)
Dept: PREADMISSION TESTING | Facility: HOSPITAL | Age: 66
End: 2021-11-24
Payer: MEDICARE

## 2021-11-24 NOTE — PRE-PROCEDURE INSTRUCTIONS
PAT call completed and patient educated on the bowel prep, clear liquid diet and procedure instructions. Medical history discussed and patient informed of arrival time of 12:00 noon and 2nd prep dose at  6AM. Pt is unsure of  at the moment and is made aware that  is to remain during entire visit. All questions and concerns addressed. Bowel prep ordered to patient's verified pharmacy and copy of instructions sent via [employer]. Informed to take AM medications of Norvasc. NPO after 2nd bowel prep. Patient verbalizes understanding of teaching and all instructions.Patient is fully vaccinated against covid and will bring card as proof of second vaccination.

## 2021-11-26 ENCOUNTER — TELEPHONE (OUTPATIENT)
Dept: GASTROENTEROLOGY | Facility: CLINIC | Age: 66
End: 2021-11-26
Payer: MEDICARE

## 2021-11-30 ENCOUNTER — ANESTHESIA EVENT (OUTPATIENT)
Dept: ENDOSCOPY | Facility: HOSPITAL | Age: 66
End: 2021-11-30
Payer: MEDICARE

## 2021-12-01 ENCOUNTER — HOSPITAL ENCOUNTER (OUTPATIENT)
Facility: HOSPITAL | Age: 66
Discharge: HOME | End: 2021-12-01
Attending: INTERNAL MEDICINE | Admitting: INTERNAL MEDICINE
Payer: MEDICARE

## 2021-12-01 ENCOUNTER — ANESTHESIA (OUTPATIENT)
Dept: ENDOSCOPY | Facility: HOSPITAL | Age: 66
End: 2021-12-01
Payer: MEDICARE

## 2021-12-01 VITALS
DIASTOLIC BLOOD PRESSURE: 83 MMHG | WEIGHT: 176.06 LBS | HEIGHT: 66 IN | TEMPERATURE: 98 F | RESPIRATION RATE: 18 BRPM | HEART RATE: 70 BPM | SYSTOLIC BLOOD PRESSURE: 129 MMHG | OXYGEN SATURATION: 99 % | BODY MASS INDEX: 28.3 KG/M2

## 2021-12-01 LAB — POCT GLUCOSE: 95 MG/DL (ref 70–110)

## 2021-12-01 PROCEDURE — 45380 COLONOSCOPY AND BIOPSY: CPT | Mod: PT,,, | Performed by: INTERNAL MEDICINE

## 2021-12-01 PROCEDURE — D9220A PRA ANESTHESIA: ICD-10-PCS | Mod: PT,ANES,, | Performed by: ANESTHESIOLOGY

## 2021-12-01 PROCEDURE — 63600175 PHARM REV CODE 636 W HCPCS: Performed by: NURSE ANESTHETIST, CERTIFIED REGISTERED

## 2021-12-01 PROCEDURE — 27201012 HC FORCEPS, HOT/COLD, DISP: Performed by: INTERNAL MEDICINE

## 2021-12-01 PROCEDURE — 45380 PR COLONOSCOPY,BIOPSY: ICD-10-PCS | Mod: PT,,, | Performed by: INTERNAL MEDICINE

## 2021-12-01 PROCEDURE — D9220A PRA ANESTHESIA: Mod: PT,ANES,, | Performed by: ANESTHESIOLOGY

## 2021-12-01 PROCEDURE — 37000008 HC ANESTHESIA 1ST 15 MINUTES: Performed by: INTERNAL MEDICINE

## 2021-12-01 PROCEDURE — 45380 COLONOSCOPY AND BIOPSY: CPT | Performed by: INTERNAL MEDICINE

## 2021-12-01 PROCEDURE — 88305 TISSUE EXAM BY PATHOLOGIST: ICD-10-PCS | Mod: 26,,, | Performed by: PATHOLOGY

## 2021-12-01 PROCEDURE — 82962 GLUCOSE BLOOD TEST: CPT | Performed by: INTERNAL MEDICINE

## 2021-12-01 PROCEDURE — 25000003 PHARM REV CODE 250: Performed by: NURSE ANESTHETIST, CERTIFIED REGISTERED

## 2021-12-01 PROCEDURE — D9220A PRA ANESTHESIA: ICD-10-PCS | Mod: PT,CRNA,, | Performed by: NURSE ANESTHETIST, CERTIFIED REGISTERED

## 2021-12-01 PROCEDURE — 37000009 HC ANESTHESIA EA ADD 15 MINS: Performed by: INTERNAL MEDICINE

## 2021-12-01 PROCEDURE — D9220A PRA ANESTHESIA: Mod: PT,CRNA,, | Performed by: NURSE ANESTHETIST, CERTIFIED REGISTERED

## 2021-12-01 PROCEDURE — 88305 TISSUE EXAM BY PATHOLOGIST: CPT | Mod: 26,,, | Performed by: PATHOLOGY

## 2021-12-01 PROCEDURE — 88305 TISSUE EXAM BY PATHOLOGIST: CPT | Performed by: PATHOLOGY

## 2021-12-01 PROCEDURE — 63600175 PHARM REV CODE 636 W HCPCS: Performed by: INTERNAL MEDICINE

## 2021-12-01 RX ORDER — LIDOCAINE HYDROCHLORIDE 10 MG/ML
INJECTION, SOLUTION EPIDURAL; INFILTRATION; INTRACAUDAL; PERINEURAL
Status: DISCONTINUED | OUTPATIENT
Start: 2021-12-01 | End: 2021-12-01

## 2021-12-01 RX ORDER — PROPOFOL 10 MG/ML
VIAL (ML) INTRAVENOUS
Status: DISCONTINUED | OUTPATIENT
Start: 2021-12-01 | End: 2021-12-01

## 2021-12-01 RX ORDER — SODIUM CHLORIDE, SODIUM LACTATE, POTASSIUM CHLORIDE, CALCIUM CHLORIDE 600; 310; 30; 20 MG/100ML; MG/100ML; MG/100ML; MG/100ML
INJECTION, SOLUTION INTRAVENOUS CONTINUOUS
Status: DISCONTINUED | OUTPATIENT
Start: 2021-12-01 | End: 2022-01-20

## 2021-12-01 RX ADMIN — GLYCOPYRROLATE 0.2 MG: 0.2 INJECTION, SOLUTION INTRAMUSCULAR; INTRAVITREAL at 11:12

## 2021-12-01 RX ADMIN — PROPOFOL 20 MG: 10 INJECTION, EMULSION INTRAVENOUS at 11:12

## 2021-12-01 RX ADMIN — SODIUM CHLORIDE, SODIUM LACTATE, POTASSIUM CHLORIDE, AND CALCIUM CHLORIDE: .6; .31; .03; .02 INJECTION, SOLUTION INTRAVENOUS at 09:12

## 2021-12-01 RX ADMIN — PROPOFOL 100 MG: 10 INJECTION, EMULSION INTRAVENOUS at 11:12

## 2021-12-01 RX ADMIN — LIDOCAINE HYDROCHLORIDE 40 MG: 10 INJECTION, SOLUTION EPIDURAL; INFILTRATION; INTRACAUDAL; PERINEURAL at 11:12

## 2021-12-01 NOTE — PLAN OF CARE
Discharge instructions reviewed with patient and visitor. Handouts given & verbalized understanding with no further questions at this time. Made aware they are awaiting biopsy results with MD telephone number provided per AVS sheet. VSS on RA, no pain or nausea noted, tolerating po fluids, no complaints noted. Fall precautions reviewed, and consents in chart.

## 2021-12-01 NOTE — H&P
"PRE PROCEDURE H&P    Patient Name: Conchita Rouse  MRN: [id number]  : 1955  Date of Procedure:  2021  Referring Physician: Mao Pepe *  Primary Physician: Minoo Araujo MD  Procedure Physician: Mao Pepe MD       Planned Procedure: Colonoscopy  Diagnosis: post EMR surviellance of a hepatic flexure adenoma  Chief Complaint: Same as above    HPI: Patient is an 66 y.o. female is here for the above.     Risks and benefits of the procedure were discussed including the risks of bleeding, perforation, requiring surgery, risks related to anesthesia. The patient expressed understanding and agreed to proceed. No language barriers were present.       Past Medical History:   Past Medical History:   Diagnosis Date    Arthritis     Asthma     Dyslipidemia (high LDL; low HDL)     10y CV event risk 14.7% (asuming no DM2), which could be reduced to 4.5% with RF optimization, for which I recommended atorvastatin 20 mg 1 po qd x2 weeks and then 40 mg 1 po qd    Encounter for blood transfusion     History of shingles 2018    Per note from Dr. Rogelio Norwood III on 18; left upper buttocks.    Hypertension     PNH (paroxysmal nocturnal hemoglobinuria) 2017    PNH small; Followed by Dr. Rogelio Norwood III last visit 18 (tolerating CSA & promacta); also notes aplastic anemia (AA psot Horse ATG - week 17) and ITP, for which ATG & promacta following decadron pulse for ITP & AA; plan for second opinion on BM bx; next flow in 3 mo; intent of rx: palliative    Prediabetes     Stroke     TIA (transient ischemic attack)     She was at "[employer]", where she noted she was feeling slow, took a nap, woke up with a numb/tingling left jaw to arm, which persistned 20 minutes, took 3 baby aspirins, went to ER @ [facility], where facial droop was noted, but completely resolved & head CT revealed old minor abnormality.    Vitamin D deficiency         Past Surgical History:  Past " Surgical History:   Procedure Laterality Date    ARTHROSCOPY OF KNEE Right 11/4/2020    Procedure: ARTHROSCOPY, KNEE;  Surgeon: Sal Pompa MD;  Location: [facility] OR;  Service: Orthopedics;  Laterality: Right;    COLONOSCOPY      COLONOSCOPY N/A 6/23/2021    Procedure: COLONOSCOPY;  Surgeon: Mao Pepe MD;  Location: [facility];  Service: Endoscopy;  Laterality: N/A;    INCISION AND DRAINAGE OF KNEE Right 11/4/2020    Procedure: INCISION AND DRAINAGE, KNEE;  Surgeon: Sal Pompa MD;  Location: [facility] OR;  Service: Orthopedics;  Laterality: Right;    left eye surgery  1955    Due to left eye lid drooping        Home Medications:  Prior to Admission medications    Medication Sig Start Date End Date Taking? Authorizing Provider   amLODIPine (NORVASC) 5 MG tablet Take 1 tablet (5 mg total) by mouth once daily. 9/14/21  Yes Nancy Valles MD   eltrombopag (PROMACTA) 75 mg Tab Take 75 mg by mouth once daily.  8/25/17  Yes Historical Provider   ergocalciferol (ERGOCALCIFEROL) 50,000 unit Cap Take 1 capsule (50,000 Units total) by mouth every 7 days. 10/15/21  Yes Minoo Araujo MD   fluticasone (FLONASE) 50 mcg/actuation nasal spray USE 2 SPRAYS IN EACH NOSTRIL 1 TIME A DAY AS NEEDED 11/29/16  Yes Historical Provider   azelastine (ASTELIN) 137 mcg (0.1 %) nasal spray 1 spray (137 mcg total) by Nasal route 2 (two) times daily. 8/31/20 10/15/21  Nancy Valles MD   loratadine (CLARITIN) 10 mg tablet Take 1 tablet (10 mg total) by mouth once daily.  Patient not taking: Reported on 11/12/2021 2/17/20   Roberto Ron MD   metFORMIN (GLUCOPHAGE-XR) 500 MG ER 24hr tablet Take 1 tablet (500 mg total) by mouth daily with breakfast.  Patient not taking: Reported on 11/12/2021 10/15/21 10/15/22  Minoo Araujo MD   sod sulf-pot chloride-mag sulf (SUTAB) 1.479-0.188- 0.225 gram tablet Take 12 tablets by mouth once daily. Take according to package instructions with indicated amount of water.  "11/12/21   Lavinia Ruiz PA-C        Allergies:  Review of patient's allergies indicates:   Allergen Reactions    Bactrim [sulfamethoxazole-trimethoprim] Edema     Swollen lips    Codeine Other (See Comments)     Pt states codeine does not cause hives. Had tooth extraction and medication given caused her to be jittery, feel hot and have to lay down like she was weak. morhpine given on 5/16/2017 iv for pain. Pt martine well without any sign or sx of allergy or reaction.        Social History:   Social History     Socioeconomic History    Marital status:    Tobacco Use    Smoking status: Never Smoker    Smokeless tobacco: Never Used   Substance and Sexual Activity    Alcohol use: Yes     Alcohol/week: 0.0 - 1.0 standard drinks     Comment: rarely    Drug use: No    Sexual activity: Not Currently     Partners: Male     Birth control/protection: Post-menopausal   Social History Narrative    Breakfast: 2 TBS grits or french toast sticks or waffles (previously fried eggs); 1/2 cup coffee w/ 1 cream & 3 sugars & lemonade "all day"    Lunch: 50% turkey or ham sandwich w/ occasional chips; tea, rare soda    Dinner: Steak fries and baked hot wings, peppermint or chicken wings and french fries; tea or cool aid    Snacks: mini-[employer]aston[employer] chocolates (1x/wk) or a few unsalted chips (2x/wk) or popcorn (2x/mo) or strawberries or pretzils     Eats out: 2x/wk    Water: 16 oz/d    PA: None       Family History:  Family History   Problem Relation Age of Onset    Heart disease Mother     Diabetes Mother     Heart disease Father     Cancer Sister     Breast cancer Sister     Cancer Brother        ROS: No acute cardiac events, no acute respiratory complaints.     Physical Exam (all patients):    BP (!) 156/83 (BP Location: Right arm, Patient Position: Sitting)   Pulse 69   Temp 97.9 °F (36.6 °C) (Temporal)   Resp 16   Ht 5' 6" (1.676 m)   Wt 79.9 kg (176 lb 0.6 oz)   SpO2 98%   Breastfeeding No   BMI 28.41 " kg/m²   Lungs: Clear to auscultation bilaterally, respirations unlabored  Heart: Regular rate and rhythm, S1 and S2 normal, no obvious murmurs  Abdomen:         Soft, non-tender, bowel sounds normal, no masses, no organomegaly    Lab Results   Component Value Date    WBC 4.02 09/24/2021     (H) 09/24/2021    RDW 15.4 (H) 09/24/2021    PLT 59 (L) 09/24/2021    INR 1.0 04/05/2017    GLU 99 09/24/2021    HGBA1C 6.1 (H) 09/24/2021    BUN 13 09/24/2021     09/24/2021    K 4.3 09/24/2021     09/24/2021        SEDATION PLAN: per anesthesia      History reviewed, vital signs satisfactory, cardiopulmonary status satisfactory, sedation options, risks and plans have been discussed with the patient  All their questions were answered and the patient agrees to the sedation procedures as planned and the patient is deemed an appropriate candidate for the sedation as planned.    Procedure explained to patient, informed consent obtained and placed in chart.    Mao Pepe  12/1/2021  10:50 AM

## 2021-12-01 NOTE — PROVATION PATIENT INSTRUCTIONS
Discharge Summary/Instructions after an Endoscopic Procedure  Patient Name: Conchita Rouse  Patient MRN: [id number]  Patient YOB: 1955 Wednesday, December 1, 2021  Mao Pepe MD  Dear patient,  As a result of recent federal legislation (The Federal Cures Act), you may   receive lab or pathology results from your procedure in your [facility]   account before your physician is able to contact you. Your physician or   their representative will relay the results to you with their   recommendations at their soonest availability.  Thank you,  RESTRICTIONS:  During your procedure today, you received medications for sedation.  These   medications may affect your judgment, balance and coordination.  Therefore,   for 24 hours, you have the following restrictions:   - DO NOT drive a car, operate machinery, make legal/financial decisions,   sign important papers or drink alcohol.    ACTIVITY:  Today: no heavy lifting, straining or running due to procedural   sedation/anesthesia.  The following day: return to full activity including work.  DIET:  Eat and drink normally unless instructed otherwise.     TREATMENT FOR COMMON SIDE EFFECTS:  - Mild abdominal pain, nausea, belching, bloating or excessive gas:  rest,   eat lightly and use a heating pad.  - Sore Throat: treat with throat lozenges and/or gargle with warm salt   water.  - Because air was used during the procedure, expelling large amounts of air   from your rectum or belching is normal.  - If a bowel prep was taken, you may not have a bowel movement for 1-3 days.    This is normal.  SYMPTOMS TO WATCH FOR AND REPORT TO YOUR PHYSICIAN:  1. Abdominal pain or bloating, other than gas cramps.  2. Chest pain.  3. Back pain.  4. Signs of infection such as: chills or fever occurring within 24 hours   after the procedure.  5. Rectal bleeding, which would show as bright red, maroon, or black stools.   (A tablespoon of blood from the rectum is not serious,  especially if   hemorrhoids are present.)  6. Vomiting.  7. Weakness or dizziness.  GO DIRECTLY TO THE NEAREST EMERGENCY ROOM IF YOU HAVE ANY OF THE FOLLOWING:      Difficulty breathing              Chills and/or fever over 101 F   Persistent vomiting and/or vomiting blood   Severe abdominal pain   Severe chest pain   Black, tarry stools   Bleeding- more than one tablespoon   Any other symptom or condition that you feel may need urgent attention  Your doctor recommends these additional instructions:  If any biopsies were taken, your doctors clinic will contact you in 1 to 2   weeks with any results.  - Discharge patient to home.   - Resume previous diet.   - Continue present medications.   - Await pathology results.   - Repeat colonoscopy in 3 years for surveillance based on pathology   results.  For questions, problems or results please call your physician Mao Pepe MD at Work:  [phone]  If you have any questions about the above instructions, call the GI   department at [phone] or call the endoscopy unit at [phone]   from 7am until 3 pm.  [facility] [place], EMERGENCY ROOM PHONE NUMBER:   [phone]  IF A COMPLICATION OR EMERGENCY SITUATION ARISES AND YOU ARE UNABLE TO REACH   YOUR PHYSICIAN - GO DIRECTLY TO THE EMERGENCY ROOM.  I have read or have had read to me these discharge instructions for my   procedure and have received a written copy.  I understand these   instructions and will follow-up with my physician if I have any questions.     __________________________________       _____________________________________  Nurse Signature                                          Patient/Designated   Responsible Party Signature  MD Mao Rodriguez MD  12/1/2021 11:51:09 AM  This report has been verified and signed electronically.  Dear patient,  As a result of recent federal legislation (The Federal Cures Act), you may    receive lab or pathology results from your procedure in your [facility]   account before your physician is able to contact you. Your physician or   their representative will relay the results to you with their   recommendations at their soonest availability.  Thank you,  PROVATION

## 2021-12-01 NOTE — PLAN OF CARE
Dr. Pepe spoke to pt at bedside, reviewed procedure and findings, answered questions. PIV removed per orders.

## 2021-12-07 LAB
FINAL PATHOLOGIC DIAGNOSIS: NORMAL
GROSS: NORMAL
Lab: NORMAL

## 2022-01-20 ENCOUNTER — OFFICE VISIT (OUTPATIENT)
Dept: OBSTETRICS AND GYNECOLOGY | Facility: CLINIC | Age: 67
End: 2022-01-20
Payer: MEDICARE

## 2022-01-20 VITALS
SYSTOLIC BLOOD PRESSURE: 160 MMHG | WEIGHT: 182 LBS | HEIGHT: 66 IN | BODY MASS INDEX: 29.25 KG/M2 | DIASTOLIC BLOOD PRESSURE: 94 MMHG

## 2022-01-20 DIAGNOSIS — Z12.31 ENCOUNTER FOR SCREENING MAMMOGRAM FOR MALIGNANT NEOPLASM OF BREAST: ICD-10-CM

## 2022-01-20 DIAGNOSIS — G89.29 HIP PAIN, CHRONIC, LEFT: ICD-10-CM

## 2022-01-20 DIAGNOSIS — M25.552 HIP PAIN, CHRONIC, LEFT: ICD-10-CM

## 2022-01-20 DIAGNOSIS — R82.90 ABNORMAL URINE ODOR: Primary | ICD-10-CM

## 2022-01-20 PROCEDURE — 1101F PR PT FALLS ASSESS DOC 0-1 FALLS W/OUT INJ PAST YR: ICD-10-PCS | Mod: CPTII,S$GLB,, | Performed by: OBSTETRICS & GYNECOLOGY

## 2022-01-20 PROCEDURE — 1101F PT FALLS ASSESS-DOCD LE1/YR: CPT | Mod: CPTII,S$GLB,, | Performed by: OBSTETRICS & GYNECOLOGY

## 2022-01-20 PROCEDURE — 1159F PR MEDICATION LIST DOCUMENTED IN MEDICAL RECORD: ICD-10-PCS | Mod: CPTII,S$GLB,, | Performed by: OBSTETRICS & GYNECOLOGY

## 2022-01-20 PROCEDURE — 1125F AMNT PAIN NOTED PAIN PRSNT: CPT | Mod: CPTII,S$GLB,, | Performed by: OBSTETRICS & GYNECOLOGY

## 2022-01-20 PROCEDURE — 3008F PR BODY MASS INDEX (BMI) DOCUMENTED: ICD-10-PCS | Mod: CPTII,S$GLB,, | Performed by: OBSTETRICS & GYNECOLOGY

## 2022-01-20 PROCEDURE — 99999 PR PBB SHADOW E&M-EST. PATIENT-LVL III: CPT | Mod: PBBFAC,,, | Performed by: OBSTETRICS & GYNECOLOGY

## 2022-01-20 PROCEDURE — 99203 PR OFFICE/OUTPT VISIT, NEW, LEVL III, 30-44 MIN: ICD-10-PCS | Mod: S$GLB,,, | Performed by: OBSTETRICS & GYNECOLOGY

## 2022-01-20 PROCEDURE — 99203 OFFICE O/P NEW LOW 30 MIN: CPT | Mod: S$GLB,,, | Performed by: OBSTETRICS & GYNECOLOGY

## 2022-01-20 PROCEDURE — 3080F DIAST BP >= 90 MM HG: CPT | Mod: CPTII,S$GLB,, | Performed by: OBSTETRICS & GYNECOLOGY

## 2022-01-20 PROCEDURE — 3288F FALL RISK ASSESSMENT DOCD: CPT | Mod: CPTII,S$GLB,, | Performed by: OBSTETRICS & GYNECOLOGY

## 2022-01-20 PROCEDURE — 3288F PR FALLS RISK ASSESSMENT DOCUMENTED: ICD-10-PCS | Mod: CPTII,S$GLB,, | Performed by: OBSTETRICS & GYNECOLOGY

## 2022-01-20 PROCEDURE — 1159F MED LIST DOCD IN RCRD: CPT | Mod: CPTII,S$GLB,, | Performed by: OBSTETRICS & GYNECOLOGY

## 2022-01-20 PROCEDURE — 3080F PR MOST RECENT DIASTOLIC BLOOD PRESSURE >= 90 MM HG: ICD-10-PCS | Mod: CPTII,S$GLB,, | Performed by: OBSTETRICS & GYNECOLOGY

## 2022-01-20 PROCEDURE — 3077F SYST BP >= 140 MM HG: CPT | Mod: CPTII,S$GLB,, | Performed by: OBSTETRICS & GYNECOLOGY

## 2022-01-20 PROCEDURE — 99999 PR PBB SHADOW E&M-EST. PATIENT-LVL III: ICD-10-PCS | Mod: PBBFAC,,, | Performed by: OBSTETRICS & GYNECOLOGY

## 2022-01-20 PROCEDURE — 87086 URINE CULTURE/COLONY COUNT: CPT | Performed by: OBSTETRICS & GYNECOLOGY

## 2022-01-20 PROCEDURE — 3077F PR MOST RECENT SYSTOLIC BLOOD PRESSURE >= 140 MM HG: ICD-10-PCS | Mod: CPTII,S$GLB,, | Performed by: OBSTETRICS & GYNECOLOGY

## 2022-01-20 PROCEDURE — 3008F BODY MASS INDEX DOCD: CPT | Mod: CPTII,S$GLB,, | Performed by: OBSTETRICS & GYNECOLOGY

## 2022-01-20 PROCEDURE — 1125F PR PAIN SEVERITY QUANTIFIED, PAIN PRESENT: ICD-10-PCS | Mod: CPTII,S$GLB,, | Performed by: OBSTETRICS & GYNECOLOGY

## 2022-01-20 NOTE — PROGRESS NOTES
Subjective:       Patient ID: Conchita Rouse is a 66 y.o. female.    Chief Complaint:  Vaginal Pain      History of Present Illness  HPI  Here for problem    Previously followed by dr galvan/brunilda    C/o odor to urine--intermittently for past 3 months  Denies dysuria, urgency or frequency  Also c/o intermittent lower groin pain--along bone and weakness in lifting left leg    Feels bowels move daily  No vaginal bleeding  Denies use of hrt    mammo due after 2022    GYN & OB History  No LMP recorded. Patient is postmenopausal.   Date of Last Pap: No result found    OB History    Para Term  AB Living   3 3 3     3   SAB IAB Ectopic Multiple Live Births           3      # Outcome Date GA Lbr Hemant/2nd Weight Sex Delivery Anes PTL Lv   3 Term      Vag-Spont   ADILENE   2 Term      Vag-Spont   ADILENE   1 Term      Vag-Spont   ADILENE       Review of Systems  Review of Systems   Genitourinary:        Urine odor     Musculoskeletal: Positive for leg pain and myalgias.   All other systems reviewed and are negative.          Objective:      Physical Exam:   Constitutional: She appears well-developed.     Eyes: Pupils are equal, round, and reactive to light. Conjunctivae and EOM are normal.      Pulmonary/Chest: Effort normal. Right breast exhibits no mass, no nipple discharge, no skin change, no tenderness and presence. Left breast exhibits no mass, no nipple discharge, no skin change, no tenderness and presence. Breasts are symmetrical.        Abdominal: Soft.     Genitourinary:    Inguinal canal, uterus, right adnexa, left adnexa and rectum normal.      Pelvic exam was performed with patient supine.   Labial bartholins normal.Cervix is normal. There is vaginal discharge (scant white) in the vagina. Vaginal atrophy noted.           Musculoskeletal: Normal range of motion.       Neurological: She is alert.    Skin: Skin is warm.    Psychiatric: She has a normal mood and affect.           Assessment:        1. Abnormal  urine odor    2. Encounter for screening mammogram for malignant neoplasm of breast     3. Hip pain, chronic, left               Plan:      ucx today  Advised heat/nsaid for hip flexor; stretching  mammo ordered; due after 4/2022  Records requested from dr padron  Colonoscopy current per pt report

## 2022-01-22 LAB — BACTERIA UR CULT: NORMAL

## 2022-02-15 ENCOUNTER — PATIENT OUTREACH (OUTPATIENT)
Dept: ADMINISTRATIVE | Facility: HOSPITAL | Age: 67
End: 2022-02-15
Payer: MEDICARE

## 2022-02-15 NOTE — PROGRESS NOTES
Patient returned call and states she has not taken her BP in a while and not sure what the last reading is. Patient will check her BP and call back with the reading.

## 2022-02-15 NOTE — PROGRESS NOTES
HTN Report: Attempting to contact pt to obtain a home BP reading or schedule office visit to recheck BP. Unable to reach patient at this time. Left voicemail.

## 2022-02-22 ENCOUNTER — OFFICE VISIT (OUTPATIENT)
Dept: INTERNAL MEDICINE | Facility: CLINIC | Age: 67
End: 2022-02-22
Payer: MEDICARE

## 2022-02-22 ENCOUNTER — APPOINTMENT (OUTPATIENT)
Dept: RADIOLOGY | Facility: HOSPITAL | Age: 67
End: 2022-02-22
Attending: NURSE PRACTITIONER
Payer: MEDICARE

## 2022-02-22 VITALS
SYSTOLIC BLOOD PRESSURE: 124 MMHG | RESPIRATION RATE: 18 BRPM | HEIGHT: 66 IN | WEIGHT: 182.19 LBS | DIASTOLIC BLOOD PRESSURE: 84 MMHG | HEART RATE: 63 BPM | OXYGEN SATURATION: 99 % | TEMPERATURE: 98 F | BODY MASS INDEX: 29.28 KG/M2

## 2022-02-22 DIAGNOSIS — M54.31 RIGHT SIDED SCIATICA: Primary | ICD-10-CM

## 2022-02-22 DIAGNOSIS — M25.461 PAIN AND SWELLING OF RIGHT KNEE: ICD-10-CM

## 2022-02-22 DIAGNOSIS — M25.561 PAIN AND SWELLING OF RIGHT KNEE: ICD-10-CM

## 2022-02-22 PROCEDURE — 3079F PR MOST RECENT DIASTOLIC BLOOD PRESSURE 80-89 MM HG: ICD-10-PCS | Mod: CPTII,S$GLB,, | Performed by: NURSE PRACTITIONER

## 2022-02-22 PROCEDURE — 3074F SYST BP LT 130 MM HG: CPT | Mod: CPTII,S$GLB,, | Performed by: NURSE PRACTITIONER

## 2022-02-22 PROCEDURE — 1101F PR PT FALLS ASSESS DOC 0-1 FALLS W/OUT INJ PAST YR: ICD-10-PCS | Mod: CPTII,S$GLB,, | Performed by: NURSE PRACTITIONER

## 2022-02-22 PROCEDURE — 99999 PR PBB SHADOW E&M-EST. PATIENT-LVL IV: CPT | Mod: PBBFAC,,, | Performed by: NURSE PRACTITIONER

## 2022-02-22 PROCEDURE — 3008F BODY MASS INDEX DOCD: CPT | Mod: CPTII,S$GLB,, | Performed by: NURSE PRACTITIONER

## 2022-02-22 PROCEDURE — 3288F FALL RISK ASSESSMENT DOCD: CPT | Mod: CPTII,S$GLB,, | Performed by: NURSE PRACTITIONER

## 2022-02-22 PROCEDURE — 99214 OFFICE O/P EST MOD 30 MIN: CPT | Mod: S$GLB,,, | Performed by: NURSE PRACTITIONER

## 2022-02-22 PROCEDURE — 1125F AMNT PAIN NOTED PAIN PRSNT: CPT | Mod: CPTII,S$GLB,, | Performed by: NURSE PRACTITIONER

## 2022-02-22 PROCEDURE — 73562 XR KNEE ORTHO RIGHT: ICD-10-PCS | Mod: 26,RT,, | Performed by: RADIOLOGY

## 2022-02-22 PROCEDURE — 3079F DIAST BP 80-89 MM HG: CPT | Mod: CPTII,S$GLB,, | Performed by: NURSE PRACTITIONER

## 2022-02-22 PROCEDURE — 3288F PR FALLS RISK ASSESSMENT DOCUMENTED: ICD-10-PCS | Mod: CPTII,S$GLB,, | Performed by: NURSE PRACTITIONER

## 2022-02-22 PROCEDURE — 1159F MED LIST DOCD IN RCRD: CPT | Mod: CPTII,S$GLB,, | Performed by: NURSE PRACTITIONER

## 2022-02-22 PROCEDURE — 3008F PR BODY MASS INDEX (BMI) DOCUMENTED: ICD-10-PCS | Mod: CPTII,S$GLB,, | Performed by: NURSE PRACTITIONER

## 2022-02-22 PROCEDURE — 99214 PR OFFICE/OUTPT VISIT, EST, LEVL IV, 30-39 MIN: ICD-10-PCS | Mod: S$GLB,,, | Performed by: NURSE PRACTITIONER

## 2022-02-22 PROCEDURE — 73560 X-RAY EXAM OF KNEE 1 OR 2: CPT | Mod: TC,PO,LT

## 2022-02-22 PROCEDURE — 99999 PR PBB SHADOW E&M-EST. PATIENT-LVL IV: ICD-10-PCS | Mod: PBBFAC,,, | Performed by: NURSE PRACTITIONER

## 2022-02-22 PROCEDURE — 73562 X-RAY EXAM OF KNEE 3: CPT | Mod: 26,RT,, | Performed by: RADIOLOGY

## 2022-02-22 PROCEDURE — 73560 XR KNEE ORTHO RIGHT: ICD-10-PCS | Mod: 26,LT,, | Performed by: RADIOLOGY

## 2022-02-22 PROCEDURE — 1101F PT FALLS ASSESS-DOCD LE1/YR: CPT | Mod: CPTII,S$GLB,, | Performed by: NURSE PRACTITIONER

## 2022-02-22 PROCEDURE — 3074F PR MOST RECENT SYSTOLIC BLOOD PRESSURE < 130 MM HG: ICD-10-PCS | Mod: CPTII,S$GLB,, | Performed by: NURSE PRACTITIONER

## 2022-02-22 PROCEDURE — 1160F PR REVIEW ALL MEDS BY PRESCRIBER/CLIN PHARMACIST DOCUMENTED: ICD-10-PCS | Mod: CPTII,S$GLB,, | Performed by: NURSE PRACTITIONER

## 2022-02-22 PROCEDURE — 1160F RVW MEDS BY RX/DR IN RCRD: CPT | Mod: CPTII,S$GLB,, | Performed by: NURSE PRACTITIONER

## 2022-02-22 PROCEDURE — 1125F PR PAIN SEVERITY QUANTIFIED, PAIN PRESENT: ICD-10-PCS | Mod: CPTII,S$GLB,, | Performed by: NURSE PRACTITIONER

## 2022-02-22 PROCEDURE — 73560 X-RAY EXAM OF KNEE 1 OR 2: CPT | Mod: 26,LT,, | Performed by: RADIOLOGY

## 2022-02-22 PROCEDURE — 1159F PR MEDICATION LIST DOCUMENTED IN MEDICAL RECORD: ICD-10-PCS | Mod: CPTII,S$GLB,, | Performed by: NURSE PRACTITIONER

## 2022-02-22 RX ORDER — NAPROXEN 500 MG/1
500 TABLET ORAL 2 TIMES DAILY WITH MEALS
Qty: 20 TABLET | Refills: 1 | Status: SHIPPED | OUTPATIENT
Start: 2022-02-22 | End: 2022-09-29

## 2022-02-22 NOTE — PROGRESS NOTES
Subjective:       Patient ID: Conchita Rouse is a 67 y.o. female.    Chief Complaint: Leg Pain (Right and thigh x 5 days)    Patient presents with right leg and thigh pain.  Reports pain radiates down the leg.  No numbness.   Tried ibuprofen and naproxen.      Reports pain and swelling to right knee.  Has history of septic arthritis.      Review of Systems   Constitutional: Negative for chills, fatigue and fever.   Cardiovascular: Negative for chest pain and leg swelling.   Gastrointestinal: Negative for abdominal pain.   Musculoskeletal: Positive for arthralgias, gait problem, joint swelling (right knee ) and leg pain.   Psychiatric/Behavioral: Negative for agitation and confusion.         Objective:      Physical Exam  Vitals reviewed.   Constitutional:       Appearance: Normal appearance.   Cardiovascular:      Rate and Rhythm: Normal rate and regular rhythm.   Pulmonary:      Effort: Pulmonary effort is normal.      Breath sounds: Normal breath sounds.   Musculoskeletal:         General: Swelling and tenderness present.      Right knee: Swelling present. Decreased range of motion. Tenderness present.   Skin:     General: Skin is warm.   Neurological:      General: No focal deficit present.      Mental Status: She is alert and oriented to person, place, and time.   Psychiatric:         Mood and Affect: Mood normal.         Behavior: Behavior normal. Behavior is cooperative.         Assessment:       Problem List Items Addressed This Visit    None     Visit Diagnoses     Right sided sciatica    -  Primary    Relevant Medications    naproxen (NAPROSYN) 500 MG tablet    Pain and swelling of right knee        Relevant Medications    naproxen (NAPROSYN) 500 MG tablet    Other Relevant Orders    X-ray Knee Ortho Right (Completed)          Plan:             Right sided sciatica  -     naproxen (NAPROSYN) 500 MG tablet; Take 1 tablet (500 mg total) by mouth 2 (two) times daily with meals.  Dispense: 20 tablet; Refill:  1    Pain and swelling of right knee  -     X-ray Knee Ortho Right; Future; Expected date: 02/22/2022  -     naproxen (NAPROSYN) 500 MG tablet; Take 1 tablet (500 mg total) by mouth 2 (two) times daily with meals.  Dispense: 20 tablet; Refill: 1        Start naproxen.    Follow up if no improvement.

## 2022-03-08 ENCOUNTER — OFFICE VISIT (OUTPATIENT)
Dept: INTERNAL MEDICINE | Facility: CLINIC | Age: 67
End: 2022-03-08
Payer: MEDICARE

## 2022-03-08 ENCOUNTER — APPOINTMENT (OUTPATIENT)
Dept: RADIOLOGY | Facility: HOSPITAL | Age: 67
End: 2022-03-08
Attending: NURSE PRACTITIONER
Payer: MEDICARE

## 2022-03-08 VITALS
DIASTOLIC BLOOD PRESSURE: 80 MMHG | HEIGHT: 66 IN | TEMPERATURE: 98 F | WEIGHT: 182 LBS | BODY MASS INDEX: 29.25 KG/M2 | OXYGEN SATURATION: 97 % | RESPIRATION RATE: 18 BRPM | SYSTOLIC BLOOD PRESSURE: 126 MMHG | HEART RATE: 60 BPM

## 2022-03-08 DIAGNOSIS — M25.462 PAIN AND SWELLING OF KNEE, LEFT: ICD-10-CM

## 2022-03-08 DIAGNOSIS — M25.562 PAIN AND SWELLING OF KNEE, LEFT: Primary | ICD-10-CM

## 2022-03-08 DIAGNOSIS — M25.562 PAIN AND SWELLING OF KNEE, LEFT: ICD-10-CM

## 2022-03-08 DIAGNOSIS — M25.462 PAIN AND SWELLING OF KNEE, LEFT: Primary | ICD-10-CM

## 2022-03-08 PROCEDURE — 3288F PR FALLS RISK ASSESSMENT DOCUMENTED: ICD-10-PCS | Mod: CPTII,S$GLB,, | Performed by: NURSE PRACTITIONER

## 2022-03-08 PROCEDURE — 73560 X-RAY EXAM OF KNEE 1 OR 2: CPT | Mod: 26,RT,, | Performed by: RADIOLOGY

## 2022-03-08 PROCEDURE — 3079F PR MOST RECENT DIASTOLIC BLOOD PRESSURE 80-89 MM HG: ICD-10-PCS | Mod: CPTII,S$GLB,, | Performed by: NURSE PRACTITIONER

## 2022-03-08 PROCEDURE — 1159F MED LIST DOCD IN RCRD: CPT | Mod: CPTII,S$GLB,, | Performed by: NURSE PRACTITIONER

## 2022-03-08 PROCEDURE — 73562 XR KNEE ORTHO LEFT: ICD-10-PCS | Mod: 26,LT,, | Performed by: RADIOLOGY

## 2022-03-08 PROCEDURE — 1159F PR MEDICATION LIST DOCUMENTED IN MEDICAL RECORD: ICD-10-PCS | Mod: CPTII,S$GLB,, | Performed by: NURSE PRACTITIONER

## 2022-03-08 PROCEDURE — 73560 XR KNEE ORTHO LEFT: ICD-10-PCS | Mod: 26,RT,, | Performed by: RADIOLOGY

## 2022-03-08 PROCEDURE — 3288F FALL RISK ASSESSMENT DOCD: CPT | Mod: CPTII,S$GLB,, | Performed by: NURSE PRACTITIONER

## 2022-03-08 PROCEDURE — 3008F PR BODY MASS INDEX (BMI) DOCUMENTED: ICD-10-PCS | Mod: CPTII,S$GLB,, | Performed by: NURSE PRACTITIONER

## 2022-03-08 PROCEDURE — 1101F PT FALLS ASSESS-DOCD LE1/YR: CPT | Mod: CPTII,S$GLB,, | Performed by: NURSE PRACTITIONER

## 2022-03-08 PROCEDURE — 99999 PR PBB SHADOW E&M-EST. PATIENT-LVL IV: ICD-10-PCS | Mod: PBBFAC,,, | Performed by: NURSE PRACTITIONER

## 2022-03-08 PROCEDURE — 73562 X-RAY EXAM OF KNEE 3: CPT | Mod: 26,LT,, | Performed by: RADIOLOGY

## 2022-03-08 PROCEDURE — 96372 THER/PROPH/DIAG INJ SC/IM: CPT | Mod: S$GLB,,, | Performed by: NURSE PRACTITIONER

## 2022-03-08 PROCEDURE — 96372 PR INJECTION,THERAP/PROPH/DIAG2ST, IM OR SUBCUT: ICD-10-PCS | Mod: S$GLB,,, | Performed by: NURSE PRACTITIONER

## 2022-03-08 PROCEDURE — 3079F DIAST BP 80-89 MM HG: CPT | Mod: CPTII,S$GLB,, | Performed by: NURSE PRACTITIONER

## 2022-03-08 PROCEDURE — 1125F AMNT PAIN NOTED PAIN PRSNT: CPT | Mod: CPTII,S$GLB,, | Performed by: NURSE PRACTITIONER

## 2022-03-08 PROCEDURE — 1160F PR REVIEW ALL MEDS BY PRESCRIBER/CLIN PHARMACIST DOCUMENTED: ICD-10-PCS | Mod: CPTII,S$GLB,, | Performed by: NURSE PRACTITIONER

## 2022-03-08 PROCEDURE — 1125F PR PAIN SEVERITY QUANTIFIED, PAIN PRESENT: ICD-10-PCS | Mod: CPTII,S$GLB,, | Performed by: NURSE PRACTITIONER

## 2022-03-08 PROCEDURE — 3074F PR MOST RECENT SYSTOLIC BLOOD PRESSURE < 130 MM HG: ICD-10-PCS | Mod: CPTII,S$GLB,, | Performed by: NURSE PRACTITIONER

## 2022-03-08 PROCEDURE — 99213 PR OFFICE/OUTPT VISIT, EST, LEVL III, 20-29 MIN: ICD-10-PCS | Mod: 25,S$GLB,, | Performed by: NURSE PRACTITIONER

## 2022-03-08 PROCEDURE — 99213 OFFICE O/P EST LOW 20 MIN: CPT | Mod: 25,S$GLB,, | Performed by: NURSE PRACTITIONER

## 2022-03-08 PROCEDURE — 3008F BODY MASS INDEX DOCD: CPT | Mod: CPTII,S$GLB,, | Performed by: NURSE PRACTITIONER

## 2022-03-08 PROCEDURE — 99999 PR PBB SHADOW E&M-EST. PATIENT-LVL IV: CPT | Mod: PBBFAC,,, | Performed by: NURSE PRACTITIONER

## 2022-03-08 PROCEDURE — 1160F RVW MEDS BY RX/DR IN RCRD: CPT | Mod: CPTII,S$GLB,, | Performed by: NURSE PRACTITIONER

## 2022-03-08 PROCEDURE — 1101F PR PT FALLS ASSESS DOC 0-1 FALLS W/OUT INJ PAST YR: ICD-10-PCS | Mod: CPTII,S$GLB,, | Performed by: NURSE PRACTITIONER

## 2022-03-08 PROCEDURE — 3074F SYST BP LT 130 MM HG: CPT | Mod: CPTII,S$GLB,, | Performed by: NURSE PRACTITIONER

## 2022-03-08 PROCEDURE — 73560 X-RAY EXAM OF KNEE 1 OR 2: CPT | Mod: TC,PO,RT

## 2022-03-08 RX ORDER — KETOROLAC TROMETHAMINE 30 MG/ML
30 INJECTION, SOLUTION INTRAMUSCULAR; INTRAVENOUS
Status: COMPLETED | OUTPATIENT
Start: 2022-03-08 | End: 2022-03-08

## 2022-03-08 RX ADMIN — KETOROLAC TROMETHAMINE 30 MG: 30 INJECTION, SOLUTION INTRAMUSCULAR; INTRAVENOUS at 03:03

## 2022-03-08 NOTE — PROGRESS NOTES
Subjective:       Patient ID: Conchita Rouse is a 67 y.o. female.    Chief Complaint: Edema (Lt knee x 5 days)    Patient presents with left knee pain and swelling.  Started about 5 days ago.  No injury or trauma.  No change in activity.      Tried naproxen 3-4 for the left knee.      Do a lot walking and standing at work.  Working more at the days.      Review of Systems   Constitutional: Negative for chills, fatigue and fever.   Respiratory: Negative for cough and shortness of breath.    Musculoskeletal: Positive for arthralgias, joint swelling and leg pain.   Psychiatric/Behavioral: Negative for agitation and confusion.         Objective:      Physical Exam  Vitals reviewed.   Constitutional:       Appearance: Normal appearance.   HENT:      Head: Normocephalic.   Cardiovascular:      Rate and Rhythm: Normal rate.   Pulmonary:      Effort: Pulmonary effort is normal.   Musculoskeletal:         General: Swelling and tenderness present.      Left knee: Swelling present. Decreased range of motion. Tenderness present.   Skin:     General: Skin is warm.   Neurological:      General: No focal deficit present.      Mental Status: She is alert.   Psychiatric:         Mood and Affect: Mood normal.         Behavior: Behavior normal. Behavior is cooperative.         Assessment:       Problem List Items Addressed This Visit    None     Visit Diagnoses     Pain and swelling of knee, left    -  Primary    Relevant Medications    ketorolac injection 30 mg (Completed)    Other Relevant Orders    X-ray Knee Ortho Left (Completed)          Plan:       Pain and swelling of knee, left  -     X-ray Knee Ortho Left; Future; Expected date: 03/08/2022  -     ketorolac injection 30 mg        X-ray today.    toradol     Continue naproxen.     Ortho if no improvement

## 2022-03-09 ENCOUNTER — TELEPHONE (OUTPATIENT)
Dept: INTERNAL MEDICINE | Facility: CLINIC | Age: 67
End: 2022-03-09
Payer: MEDICARE

## 2022-03-09 DIAGNOSIS — M25.462 EFFUSION OF LEFT KNEE: Primary | ICD-10-CM

## 2022-03-09 NOTE — TELEPHONE ENCOUNTER
----- Message from Eugenio Raza sent at 3/9/2022  2:41 PM CST -----  Contact: self  Type:  Test Results    Who Called: Conchita Rouse   Name of Test (Lab/Mammo/Etc): X ray  Date of Test: 03/08/22  Ordering Provider: Zeyad  Where the test was performed: [facility]  Would the patient rather a call back or a response via [facility]? Call back   Best Call Back Number:  [phone]   Additional Information:

## 2022-03-09 NOTE — TELEPHONE ENCOUNTER
----- Message from Nae Figueroa sent at 3/9/2022  4:33 PM CST -----  Contact: Conchita Arango called regarding a missed called about her results, please give her a call back at [phone]      Thanks  kb

## 2022-03-10 ENCOUNTER — TELEPHONE (OUTPATIENT)
Dept: RHEUMATOLOGY | Facility: CLINIC | Age: 67
End: 2022-03-10
Payer: MEDICARE

## 2022-03-10 ENCOUNTER — PATIENT OUTREACH (OUTPATIENT)
Dept: ADMINISTRATIVE | Facility: OTHER | Age: 67
End: 2022-03-10
Payer: MEDICARE

## 2022-03-10 NOTE — TELEPHONE ENCOUNTER
----- Message from Taty Ely MA sent at 3/9/2022  5:11 PM CST -----  Contact: Conchita    ----- Message -----  From: Nae Figueroa  Sent: 3/9/2022   4:57 PM CST  To: Joni RAJAN Staff    Conchita called regarding swollen knee, she has fluid on her knee and would like a sooner appointment, please give her a call back at [phone]      Thanks  bk

## 2022-03-10 NOTE — TELEPHONE ENCOUNTER
Spoke to pt. Pt was trying to decide if she need to be othro or a rheumatologist regarding her knee pain. Advised pt that it would be up to her so she scheduled an appt tomorrow with Anne.

## 2022-03-11 ENCOUNTER — OFFICE VISIT (OUTPATIENT)
Dept: RHEUMATOLOGY | Facility: CLINIC | Age: 67
End: 2022-03-11
Payer: MEDICARE

## 2022-03-11 ENCOUNTER — LAB VISIT (OUTPATIENT)
Dept: LAB | Facility: HOSPITAL | Age: 67
End: 2022-03-11
Attending: PHYSICIAN ASSISTANT
Payer: MEDICARE

## 2022-03-11 ENCOUNTER — TELEPHONE (OUTPATIENT)
Dept: RHEUMATOLOGY | Facility: CLINIC | Age: 67
End: 2022-03-11

## 2022-03-11 ENCOUNTER — TELEPHONE (OUTPATIENT)
Dept: ORTHOPEDICS | Facility: CLINIC | Age: 67
End: 2022-03-11
Payer: MEDICARE

## 2022-03-11 VITALS
SYSTOLIC BLOOD PRESSURE: 127 MMHG | BODY MASS INDEX: 29.27 KG/M2 | WEIGHT: 182.13 LBS | HEIGHT: 66 IN | DIASTOLIC BLOOD PRESSURE: 75 MMHG | HEART RATE: 61 BPM

## 2022-03-11 DIAGNOSIS — M25.562 ACUTE PAIN OF LEFT KNEE: ICD-10-CM

## 2022-03-11 DIAGNOSIS — M25.462 EFFUSION OF LEFT KNEE: Primary | ICD-10-CM

## 2022-03-11 DIAGNOSIS — M25.462 EFFUSION OF LEFT KNEE: ICD-10-CM

## 2022-03-11 DIAGNOSIS — M17.0 BILATERAL PRIMARY OSTEOARTHRITIS OF KNEE: Primary | ICD-10-CM

## 2022-03-11 LAB
ALBUMIN SERPL BCP-MCNC: 3.6 G/DL (ref 3.5–5.2)
ALP SERPL-CCNC: 90 U/L (ref 55–135)
ALT SERPL W/O P-5'-P-CCNC: 15 U/L (ref 10–44)
ANION GAP SERPL CALC-SCNC: 9 MMOL/L (ref 8–16)
AST SERPL-CCNC: 12 U/L (ref 10–40)
BASOPHILS # BLD AUTO: 0.02 K/UL (ref 0–0.2)
BASOPHILS NFR BLD: 0.4 % (ref 0–1.9)
BILIRUB SERPL-MCNC: 0.4 MG/DL (ref 0.1–1)
BODY FLD TYPE: NORMAL
BUN SERPL-MCNC: 13 MG/DL (ref 8–23)
CALCIUM SERPL-MCNC: 9.6 MG/DL (ref 8.7–10.5)
CHLORIDE SERPL-SCNC: 105 MMOL/L (ref 95–110)
CO2 SERPL-SCNC: 29 MMOL/L (ref 23–29)
CREAT SERPL-MCNC: 0.7 MG/DL (ref 0.5–1.4)
CRP SERPL-MCNC: 32.8 MG/L (ref 0–8.2)
CRYSTALS FLD MICRO: NEGATIVE
DIFFERENTIAL METHOD: ABNORMAL
EOSINOPHIL # BLD AUTO: 0.1 K/UL (ref 0–0.5)
EOSINOPHIL NFR BLD: 1.3 % (ref 0–8)
ERYTHROCYTE [DISTWIDTH] IN BLOOD BY AUTOMATED COUNT: 13.5 % (ref 11.5–14.5)
ERYTHROCYTE [SEDIMENTATION RATE] IN BLOOD BY WESTERGREN METHOD: 33 MM/HR (ref 0–36)
EST. GFR  (AFRICAN AMERICAN): >60 ML/MIN/1.73 M^2
EST. GFR  (NON AFRICAN AMERICAN): >60 ML/MIN/1.73 M^2
GLUCOSE SERPL-MCNC: 103 MG/DL (ref 70–110)
HCT VFR BLD AUTO: 37 % (ref 37–48.5)
HGB BLD-MCNC: 12.1 G/DL (ref 12–16)
IMM GRANULOCYTES # BLD AUTO: 0.02 K/UL (ref 0–0.04)
IMM GRANULOCYTES NFR BLD AUTO: 0.4 % (ref 0–0.5)
LYMPHOCYTES # BLD AUTO: 1.1 K/UL (ref 1–4.8)
LYMPHOCYTES NFR BLD: 24.3 % (ref 18–48)
MCH RBC QN AUTO: 37.1 PG (ref 27–31)
MCHC RBC AUTO-ENTMCNC: 32.7 G/DL (ref 32–36)
MCV RBC AUTO: 114 FL (ref 82–98)
MONOCYTES # BLD AUTO: 0.5 K/UL (ref 0.3–1)
MONOCYTES NFR BLD: 10.4 % (ref 4–15)
NEUTROPHILS # BLD AUTO: 2.9 K/UL (ref 1.8–7.7)
NEUTROPHILS NFR BLD: 63.2 % (ref 38–73)
NRBC BLD-RTO: 0 /100 WBC
PLATELET # BLD AUTO: 68 K/UL (ref 150–450)
PMV BLD AUTO: 11.4 FL (ref 9.2–12.9)
POTASSIUM SERPL-SCNC: 4 MMOL/L (ref 3.5–5.1)
PROT SERPL-MCNC: 7.5 G/DL (ref 6–8.4)
RBC # BLD AUTO: 3.26 M/UL (ref 4–5.4)
RHEUMATOID FACT SERPL-ACNC: <13 IU/ML (ref 0–15)
RHEUMATOID FACT SERPL-ACNC: <13 IU/ML (ref 0–15)
SODIUM SERPL-SCNC: 143 MMOL/L (ref 136–145)
URATE SERPL-MCNC: 5 MG/DL (ref 2.4–5.7)
WBC # BLD AUTO: 4.6 K/UL (ref 3.9–12.7)

## 2022-03-11 PROCEDURE — 87205 SMEAR GRAM STAIN: CPT | Performed by: PHYSICIAN ASSISTANT

## 2022-03-11 PROCEDURE — 36415 COLL VENOUS BLD VENIPUNCTURE: CPT | Performed by: PHYSICIAN ASSISTANT

## 2022-03-11 PROCEDURE — 84550 ASSAY OF BLOOD/URIC ACID: CPT | Performed by: PHYSICIAN ASSISTANT

## 2022-03-11 PROCEDURE — 89060 EXAM SYNOVIAL FLUID CRYSTALS: CPT | Performed by: PHYSICIAN ASSISTANT

## 2022-03-11 PROCEDURE — 85652 RBC SED RATE AUTOMATED: CPT | Performed by: PHYSICIAN ASSISTANT

## 2022-03-11 PROCEDURE — 3008F BODY MASS INDEX DOCD: CPT | Mod: CPTII,S$GLB,, | Performed by: PHYSICIAN ASSISTANT

## 2022-03-11 PROCEDURE — 99215 PR OFFICE/OUTPT VISIT, EST, LEVL V, 40-54 MIN: ICD-10-PCS | Mod: 25,S$GLB,, | Performed by: PHYSICIAN ASSISTANT

## 2022-03-11 PROCEDURE — 99999 PR PBB SHADOW E&M-EST. PATIENT-LVL III: ICD-10-PCS | Mod: PBBFAC,,, | Performed by: PHYSICIAN ASSISTANT

## 2022-03-11 PROCEDURE — 20610 LARGE JOINT ASPIRATION/INJECTION: L KNEE: ICD-10-PCS | Mod: LT,S$GLB,, | Performed by: PHYSICIAN ASSISTANT

## 2022-03-11 PROCEDURE — 89051 BODY FLUID CELL COUNT: CPT | Performed by: PHYSICIAN ASSISTANT

## 2022-03-11 PROCEDURE — 1101F PT FALLS ASSESS-DOCD LE1/YR: CPT | Mod: CPTII,S$GLB,, | Performed by: PHYSICIAN ASSISTANT

## 2022-03-11 PROCEDURE — 99999 PR PBB SHADOW E&M-EST. PATIENT-LVL III: CPT | Mod: PBBFAC,,, | Performed by: PHYSICIAN ASSISTANT

## 2022-03-11 PROCEDURE — 1101F PR PT FALLS ASSESS DOC 0-1 FALLS W/OUT INJ PAST YR: ICD-10-PCS | Mod: CPTII,S$GLB,, | Performed by: PHYSICIAN ASSISTANT

## 2022-03-11 PROCEDURE — 86038 ANTINUCLEAR ANTIBODIES: CPT | Performed by: PHYSICIAN ASSISTANT

## 2022-03-11 PROCEDURE — 85025 COMPLETE CBC W/AUTO DIFF WBC: CPT | Performed by: PHYSICIAN ASSISTANT

## 2022-03-11 PROCEDURE — 99215 OFFICE O/P EST HI 40 MIN: CPT | Mod: 25,S$GLB,, | Performed by: PHYSICIAN ASSISTANT

## 2022-03-11 PROCEDURE — 80053 COMPREHEN METABOLIC PANEL: CPT | Performed by: PHYSICIAN ASSISTANT

## 2022-03-11 PROCEDURE — 20610 DRAIN/INJ JOINT/BURSA W/O US: CPT | Mod: LT,S$GLB,, | Performed by: PHYSICIAN ASSISTANT

## 2022-03-11 PROCEDURE — 87075 CULTR BACTERIA EXCEPT BLOOD: CPT | Performed by: PHYSICIAN ASSISTANT

## 2022-03-11 PROCEDURE — 3078F PR MOST RECENT DIASTOLIC BLOOD PRESSURE < 80 MM HG: ICD-10-PCS | Mod: CPTII,S$GLB,, | Performed by: PHYSICIAN ASSISTANT

## 2022-03-11 PROCEDURE — 3008F PR BODY MASS INDEX (BMI) DOCUMENTED: ICD-10-PCS | Mod: CPTII,S$GLB,, | Performed by: PHYSICIAN ASSISTANT

## 2022-03-11 PROCEDURE — 86140 C-REACTIVE PROTEIN: CPT | Performed by: PHYSICIAN ASSISTANT

## 2022-03-11 PROCEDURE — 86431 RHEUMATOID FACTOR QUANT: CPT | Performed by: PHYSICIAN ASSISTANT

## 2022-03-11 PROCEDURE — 1125F PR PAIN SEVERITY QUANTIFIED, PAIN PRESENT: ICD-10-PCS | Mod: CPTII,S$GLB,, | Performed by: PHYSICIAN ASSISTANT

## 2022-03-11 PROCEDURE — 87070 CULTURE OTHR SPECIMN AEROBIC: CPT | Performed by: PHYSICIAN ASSISTANT

## 2022-03-11 PROCEDURE — 3074F PR MOST RECENT SYSTOLIC BLOOD PRESSURE < 130 MM HG: ICD-10-PCS | Mod: CPTII,S$GLB,, | Performed by: PHYSICIAN ASSISTANT

## 2022-03-11 PROCEDURE — 1159F MED LIST DOCD IN RCRD: CPT | Mod: CPTII,S$GLB,, | Performed by: PHYSICIAN ASSISTANT

## 2022-03-11 PROCEDURE — 3078F DIAST BP <80 MM HG: CPT | Mod: CPTII,S$GLB,, | Performed by: PHYSICIAN ASSISTANT

## 2022-03-11 PROCEDURE — 1159F PR MEDICATION LIST DOCUMENTED IN MEDICAL RECORD: ICD-10-PCS | Mod: CPTII,S$GLB,, | Performed by: PHYSICIAN ASSISTANT

## 2022-03-11 PROCEDURE — 1125F AMNT PAIN NOTED PAIN PRSNT: CPT | Mod: CPTII,S$GLB,, | Performed by: PHYSICIAN ASSISTANT

## 2022-03-11 PROCEDURE — 3074F SYST BP LT 130 MM HG: CPT | Mod: CPTII,S$GLB,, | Performed by: PHYSICIAN ASSISTANT

## 2022-03-11 PROCEDURE — 3288F FALL RISK ASSESSMENT DOCD: CPT | Mod: CPTII,S$GLB,, | Performed by: PHYSICIAN ASSISTANT

## 2022-03-11 PROCEDURE — 3288F PR FALLS RISK ASSESSMENT DOCUMENTED: ICD-10-PCS | Mod: CPTII,S$GLB,, | Performed by: PHYSICIAN ASSISTANT

## 2022-03-11 RX ORDER — TRAMADOL HYDROCHLORIDE 50 MG/1
50 TABLET ORAL EVERY 6 HOURS PRN
Qty: 28 EACH | Refills: 0 | Status: SHIPPED | OUTPATIENT
Start: 2022-03-11 | End: 2022-11-16

## 2022-03-11 RX ORDER — METHYLPREDNISOLONE 4 MG/1
TABLET ORAL
Qty: 21 TABLET | Refills: 0 | Status: SHIPPED | OUTPATIENT
Start: 2022-03-11 | End: 2022-09-29

## 2022-03-11 NOTE — Clinical Note
Hey! This pt seen in rheum today and has an ortho referral. Possible septic joint. Trauma ortho notified. Needs follow up with ortho. Referral placed on 3/9 by PCP

## 2022-03-11 NOTE — PROCEDURES
Large Joint Aspiration/Injection: L knee    Date/Time: 3/11/2022 8:00 AM  Performed by: Anne Almazan PA-C  Authorized by: Anne Almazan PA-C     Consent Done?:  Yes (Verbal)  Indications:  Pain, joint swelling, arthritis and diagnostic evaluation  Site marked: the procedure site was marked    Timeout: prior to procedure the correct patient, procedure, and site was verified    Prep: patient was prepped and draped in usual sterile fashion      Local anesthesia used?: Yes    Anesthesia:  Local infiltration  Local anesthetic:  Lidocaine 2% without epinephrine and co-phenylcaine spray  Anesthetic total (ml):  1      Details:  Needle Size:  22 G and 18 G  Ultrasonic Guidance for needle placement?: No    Approach: superiolateral.  Location:  Knee  Site:  L knee  Aspirate amount (mL):  75  Aspirate:  Cloudy and bloody  Lab: fluid sent for laboratory analysis    Patient tolerance:  Patient tolerated the procedure well with no immediate complications     History of right knee septic joint; all labs ordered STAT. Findings discussed in detail with patient and ortho trauma notified. if any concerning lab results will send to ER

## 2022-03-11 NOTE — TELEPHONE ENCOUNTER
Anne discussed w pt.  instructed her to hold off of steroids and go to the ER for worsening symptoms.  Orthopedics has been alerted that.  She should get follow-up with them next week.

## 2022-03-11 NOTE — TELEPHONE ENCOUNTER
Called patient and reviewed lab results that are available so far. Labs seem more c/w inflammatory process vs. Septic joint. Advised to still not take the medrol dose pack. Strict ER precautions reviewed again with the patient. Pt stated her understanding.

## 2022-03-11 NOTE — TELEPHONE ENCOUNTER
----- Message from Petrona Bernstein LPN sent at 3/11/2022 11:22 AM CST -----  Leigh Aleman already spoke to Ayden regarding this patient. Can you please get her scheduled.     Thank you!   ----- Message -----  From: Anne Almazan PA-C  Sent: 3/11/2022  11:14 AM CST  To: ABBIE Hewitt! This pt seen in rheum today and has an ortho referral. Possible septic joint. Trauma ortho notified. Needs follow up with ortho. Referral placed on 3/9 by PCP

## 2022-03-11 NOTE — TELEPHONE ENCOUNTER
Spoke with patient and scheduled her appointment. Patient verbalized understanding of appointment date, time and location.

## 2022-03-11 NOTE — PROGRESS NOTES
"     RHEUMATOLOGY OUTPATIENT CLINIC NOTE    3/11/2022    Attending Rheumatologist: Anne Almazan PA-C  Primary Care Provider: Minoo Araujo MD   Chief Complaint/Reason For Consultation:  Joint Pain      Subjective:        Conchita Rouse is a 67 y.o. female here today to establish care. She has a history of bilateral knee arthritis but has had a flare of the left knee for about 1 week now. No trauma. Has had XR and toradol IM injection by PCP w/o much relief. Also taking naproxen OTC. She has had fluid drained from bilateral knees before. Right knee surgery 2020 for knee infection- required surgery. No hx of RA, skin psoriasis, gout. Rheumatologic systems otherwise negative.    Current Rheum Medications:  · naproxen  Previous Rheum Medications:   · n/a    Past Medical History:   Diagnosis Date    Arthritis     Asthma     Dyslipidemia (high LDL; low HDL)     10y CV event risk 14.7% (asuming no DM2), which could be reduced to 4.5% with RF optimization, for which I recommended atorvastatin 20 mg 1 po qd x2 weeks and then 40 mg 1 po qd    Encounter for blood transfusion     History of shingles 02/14/2018    Per note from Dr. Rogelio Norwood III on 2/14/18; left upper buttocks.   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 Rogelio Norwood III last visit 2/14/18 (tolerating CSA & promacta); also notes aplastic anemia (AA psot [facility] ATG - week 7/17/17) and ITP, for which ATG & promacta following decadron pulse for ITP & AA; plan for second opinion on BM bx; next flow in 3 mo; intent of rx: palliative    Prediabetes     Stroke     TIA (transient ischemic attack) 2007    She was at [employer] where she noted she was feeling slow, took a nap, woke up with a numb/tingling left jaw to arm, which persistned 20 minutes, took 3 baby aspirins, went to ER @ [facility], where facial droop was noted, but completely resolved & head CT revealed old minor abnormality. " "   Vitamin D deficiency      Review of patient's allergies indicates:   Allergen Reactions    Bactrim [sulfamethoxazole-trimethoprim] Edema     Swollen lips    Codeine Other (See Comments)     Pt states codeine does not cause hives. Had tooth extraction and medication given caused her to be jittery, feel hot and have to lay down like she was weak. morhpine given on 5/16/2017 iv for pain. Pt martine well without any sign or sx of allergy or reaction.       Objective:   /75   Pulse 61   Ht 5' 6" (1.676 m)   Wt 82.6 kg (182 lb 1.6 oz)   BMI 29.39 kg/m²     Immunization History   Administered Date(s) Administered    COVID-19, MRNA, LN-S, PF (MODERNA FULL 0.5 ML DOSE) 07/30/2021       Current Outpatient Medications:     amLODIPine (NORVASC) 5 MG tablet, Take 1 tablet (5 mg total) by mouth once daily., Disp: 90 tablet, Rfl: 3    azelastine (ASTELIN) 137 mcg (0.1 %) nasal spray, 1 spray (137 mcg total) by Nasal route 2 (two) times daily., Disp: 30 mL, Rfl: 1    eltrombopag (PROMACTA) 75 mg Tab, Take 75 mg by mouth once daily. , Disp: , Rfl:     fluticasone (FLONASE) 50 mcg/actuation nasal spray, USE 2 SPRAYS IN EACH NOSTRIL 1 TIME A DAY AS NEEDED, Disp: , Rfl: 0    loratadine (CLARITIN) 10 mg tablet, Take 1 tablet (10 mg total) by mouth once daily., Disp: 30 tablet, Rfl: 3    naproxen (NAPROSYN) 500 MG tablet, Take 1 tablet (500 mg total) by mouth 2 (two) times daily with meals., Disp: 20 tablet, Rfl: 1    Physical Exam   Constitutional: She is oriented to person, place, and time.   HENT:   Head: Normocephalic and atraumatic.   Pulmonary/Chest: Effort normal. No respiratory distress.   Abdominal: Normal appearance.   Musculoskeletal:         General: Swelling and tenderness present.      Comments: + effusion to the left knee, left knee tender in multiple points. No SOI   Neurological: She is alert and oriented to person, place, and time.   Skin: Skin is warm and dry. No rash noted.   Psychiatric: Her " behavior is normal. Mood normal.   Nursing note and vitals reviewed.    Imaging:  All imaging reviewed and independently interpreted by me.    XR KNEE ORTHO LEFT 3/8/22  FINDINGS:  No acute fracture.  Tricompartmental degenerative findings noted greater within the right knee.  Left knee suprapatellar joint effusion present.    Assessment:     1. Bilateral primary osteoarthritis of knee    2. Effusion of left knee    3. Acute pain of left knee        Plan:       · Check STAT CBC, CMP, ESR, CRP, uric acid, SHELLY, RF, CCP w/ joint fluid analysis. R/o possible septic joint  · Left knee joint aspirated today- see procedure note  · Tramadol prescribed today for acute knee pain  · Return to clinic: keep ortho referral and contact with lab results and further recommendations. Strict ER precautions given.    The patient understands, chooses and consents to this plan and accepts all the risks which include but are not limited to the risks mentioned above.     Method of contact with patient concerns: Neena attn Rheumatology    60 minutes of total time spent on the encounter, which includes face to face time and non-face to face time preparing to see the patient (eg, review of tests), Obtaining and/or reviewing separately obtained history, Documenting clinical information in the electronic or other health record, Independently interpreting results (not separately reported) and communicating results to the patient/family/caregiver, or Care coordination (not separately reported).             Anne Almazan PA-C  Rheumatology Department   [facility] [facility]

## 2022-03-12 LAB
APPEARANCE FLD: NORMAL
BODY FLD TYPE: NORMAL
COLOR FLD: YELLOW
LYMPHOCYTES NFR FLD MANUAL: 23 %
MONOS+MACROS NFR FLD MANUAL: 4 %
NEUTROPHILS NFR FLD MANUAL: 73 %
PATH INTERP FLD-IMP: NORMAL
WBC # FLD: NORMAL /CU MM

## 2022-03-14 ENCOUNTER — OFFICE VISIT (OUTPATIENT)
Dept: ORTHOPEDICS | Facility: CLINIC | Age: 67
End: 2022-03-14
Payer: MEDICARE

## 2022-03-14 ENCOUNTER — TELEPHONE (OUTPATIENT)
Dept: RHEUMATOLOGY | Facility: CLINIC | Age: 67
End: 2022-03-14
Payer: MEDICARE

## 2022-03-14 VITALS
HEIGHT: 66 IN | DIASTOLIC BLOOD PRESSURE: 72 MMHG | HEART RATE: 58 BPM | BODY MASS INDEX: 29.27 KG/M2 | SYSTOLIC BLOOD PRESSURE: 154 MMHG | WEIGHT: 182.13 LBS

## 2022-03-14 DIAGNOSIS — M25.562 LEFT KNEE PAIN, UNSPECIFIED CHRONICITY: Primary | ICD-10-CM

## 2022-03-14 LAB
ANA SER QL IF: NORMAL
PATH INTERP FLD-IMP: NORMAL

## 2022-03-14 PROCEDURE — 1125F AMNT PAIN NOTED PAIN PRSNT: CPT | Mod: CPTII,S$GLB,, | Performed by: PHYSICIAN ASSISTANT

## 2022-03-14 PROCEDURE — 99999 PR PBB SHADOW E&M-EST. PATIENT-LVL III: ICD-10-PCS | Mod: PBBFAC,,, | Performed by: PHYSICIAN ASSISTANT

## 2022-03-14 PROCEDURE — 1125F PR PAIN SEVERITY QUANTIFIED, PAIN PRESENT: ICD-10-PCS | Mod: CPTII,S$GLB,, | Performed by: PHYSICIAN ASSISTANT

## 2022-03-14 PROCEDURE — 3288F FALL RISK ASSESSMENT DOCD: CPT | Mod: CPTII,S$GLB,, | Performed by: PHYSICIAN ASSISTANT

## 2022-03-14 PROCEDURE — 3077F SYST BP >= 140 MM HG: CPT | Mod: CPTII,S$GLB,, | Performed by: PHYSICIAN ASSISTANT

## 2022-03-14 PROCEDURE — 3008F BODY MASS INDEX DOCD: CPT | Mod: CPTII,S$GLB,, | Performed by: PHYSICIAN ASSISTANT

## 2022-03-14 PROCEDURE — 3078F DIAST BP <80 MM HG: CPT | Mod: CPTII,S$GLB,, | Performed by: PHYSICIAN ASSISTANT

## 2022-03-14 PROCEDURE — 1160F RVW MEDS BY RX/DR IN RCRD: CPT | Mod: CPTII,S$GLB,, | Performed by: PHYSICIAN ASSISTANT

## 2022-03-14 PROCEDURE — 99999 PR PBB SHADOW E&M-EST. PATIENT-LVL III: CPT | Mod: PBBFAC,,, | Performed by: PHYSICIAN ASSISTANT

## 2022-03-14 PROCEDURE — 3078F PR MOST RECENT DIASTOLIC BLOOD PRESSURE < 80 MM HG: ICD-10-PCS | Mod: CPTII,S$GLB,, | Performed by: PHYSICIAN ASSISTANT

## 2022-03-14 PROCEDURE — 1159F PR MEDICATION LIST DOCUMENTED IN MEDICAL RECORD: ICD-10-PCS | Mod: CPTII,S$GLB,, | Performed by: PHYSICIAN ASSISTANT

## 2022-03-14 PROCEDURE — 3077F PR MOST RECENT SYSTOLIC BLOOD PRESSURE >= 140 MM HG: ICD-10-PCS | Mod: CPTII,S$GLB,, | Performed by: PHYSICIAN ASSISTANT

## 2022-03-14 PROCEDURE — 1101F PR PT FALLS ASSESS DOC 0-1 FALLS W/OUT INJ PAST YR: ICD-10-PCS | Mod: CPTII,S$GLB,, | Performed by: PHYSICIAN ASSISTANT

## 2022-03-14 PROCEDURE — 1159F MED LIST DOCD IN RCRD: CPT | Mod: CPTII,S$GLB,, | Performed by: PHYSICIAN ASSISTANT

## 2022-03-14 PROCEDURE — 1101F PT FALLS ASSESS-DOCD LE1/YR: CPT | Mod: CPTII,S$GLB,, | Performed by: PHYSICIAN ASSISTANT

## 2022-03-14 PROCEDURE — 3288F PR FALLS RISK ASSESSMENT DOCUMENTED: ICD-10-PCS | Mod: CPTII,S$GLB,, | Performed by: PHYSICIAN ASSISTANT

## 2022-03-14 PROCEDURE — 3008F PR BODY MASS INDEX (BMI) DOCUMENTED: ICD-10-PCS | Mod: CPTII,S$GLB,, | Performed by: PHYSICIAN ASSISTANT

## 2022-03-14 PROCEDURE — 1160F PR REVIEW ALL MEDS BY PRESCRIBER/CLIN PHARMACIST DOCUMENTED: ICD-10-PCS | Mod: CPTII,S$GLB,, | Performed by: PHYSICIAN ASSISTANT

## 2022-03-14 PROCEDURE — 99499 UNLISTED E&M SERVICE: CPT | Mod: S$GLB,,, | Performed by: PHYSICIAN ASSISTANT

## 2022-03-14 PROCEDURE — 99499 NO LOS: ICD-10-PCS | Mod: S$GLB,,, | Performed by: PHYSICIAN ASSISTANT

## 2022-03-14 NOTE — TELEPHONE ENCOUNTER
----- Message from Anne Almazan PA-C sent at 3/14/2022  3:53 PM CDT -----  Let her know labs are normal. No evidence of any rheumatic disease at this time. Does she have an appointment yet with ortho?

## 2022-03-14 NOTE — PROGRESS NOTES
Let her know labs are normal. No evidence of any rheumatic disease at this time. Does she have an appointment yet with ortho?

## 2022-03-14 NOTE — TELEPHONE ENCOUNTER
----- Message from Anne Almazan PA-C sent at 3/14/2022  7:53 AM CDT -----  Please call her and see how the knee is doing. She can start the medrol dose pack now if she is having pain still in the knee. So far no bacteria has been identified in the fluid taken from her knee.

## 2022-03-14 NOTE — PROGRESS NOTES
"  Subjective:      Patient ID: Conchita Rouse is a 67 y.o. female.    Chief Complaint: Pain of the Left Knee      HPI: Conchita Rouse is a 67-year-old female in clinic today for left knee pain.  Patient was last seen on Friday, 03/11/2022 by Rheumatology for this problem.  Of note, patient had a right knee septic joint that was operated on by Dr. Pompa about a year and half ago.  Left knee was drained 3 days ago which produced about 75 cc of fluid that was sent for labs.  Preliminary labs show no signs of infection    Past Medical History:   Diagnosis Date    Arthritis     Asthma     Dyslipidemia (high LDL; low HDL)     10y CV event risk 14.7% (asuming no DM2), which could be reduced to 4.5% with RF optimization, for which I recommended atorvastatin 20 mg 1 po qd x2 weeks and then 40 mg 1 po qd    Encounter for blood transfusion     History of shingles 02/14/2018    Per note from Dr. Rogelio Norwood III on 2/14/18; left upper buttocks.   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 Rogelio Norwood III last visit 2/14/18 (tolerating CSA & promacta); also notes aplastic anemia (AA psot Horse ATG - week 7/17/17) and ITP, for which ATG & promacta following decadron pulse for ITP & AA; plan for second opinion on BM bx; next flow in 3 mo; intent of rx: palliative    Prediabetes     Stroke     TIA (transient ischemic attack) 2007    She was at "[facility]", where she noted she was feeling slow, took a nap, woke up with a numb/tingling left jaw to arm, which persistned 20 minutes, took 3 baby aspirins, went to ER @ [facility], where facial droop was noted, but completely resolved & head CT revealed old minor abnormality.    Vitamin D deficiency        Current Outpatient Medications:     amLODIPine (NORVASC) 5 MG tablet, Take 1 tablet (5 mg total) by mouth once daily., Disp: 90 tablet, Rfl: 3    eltrombopag (PROMACTA) 75 mg Tab, Take 75 mg by mouth once daily. , " "Disp: , Rfl:     fluticasone (FLONASE) 50 mcg/actuation nasal spray, USE 2 SPRAYS IN EACH NOSTRIL 1 TIME A DAY AS NEEDED, Disp: , Rfl: 0    loratadine (CLARITIN) 10 mg tablet, Take 1 tablet (10 mg total) by mouth once daily., Disp: 30 tablet, Rfl: 3    methylPREDNISolone (MEDROL DOSEPACK) 4 mg tablet, use as directed, Disp: 21 tablet, Rfl: 0    naproxen (NAPROSYN) 500 MG tablet, Take 1 tablet (500 mg total) by mouth 2 (two) times daily with meals., Disp: 20 tablet, Rfl: 1    traMADoL (ULTRAM) 50 mg tablet, Take 1 tablet (50 mg total) by mouth every 6 (six) hours as needed for Pain., Disp: 28 each, Rfl: 0    azelastine (ASTELIN) 137 mcg (0.1 %) nasal spray, 1 spray (137 mcg total) by Nasal route 2 (two) times daily., Disp: 30 mL, Rfl: 1  Review of patient's allergies indicates:   Allergen Reactions    Bactrim [sulfamethoxazole-trimethoprim] Edema     Swollen lips    Codeine Other (See Comments)     Pt states codeine does not cause hives. Had tooth extraction and medication given caused her to be jittery, feel hot and have to lay down like she was weak. morhpine given on 5/16/2017 iv for pain. Pt martine well without any sign or sx of allergy or reaction.       BP (!) 154/72 (BP Location: Left arm, Patient Position: Sitting, BP Method: Large (Automatic))   Pulse (!) 58   Ht 5' 6" (1.676 m)   Wt 82.6 kg (182 lb 1.6 oz)   BMI 29.39 kg/m²     ROS      Objective:    Ortho Exam   Left knee:  Small effusion noted on exam today  Mild TTP  ROM decreased secondary to swelling  Motor exam normal  Sensation and pulses intact    GEN: Well developed, well nourished female. AAOX3. No acute distress.   Normocephalic, atraumatic.   NENA  Breathing unlabored.  Mood and affect appropriate.        Assessment:     Imaging:  No new imaging ordered today        1. Left knee pain, unspecified chronicity          Plan:       We will send the patient to see Dr. July fregoso for evaluation of bilateral knee  arthritis.  She will " follow up with us as needed     Follow up if symptoms worsen or fail to improve.          Patient note was created using MModal Dictation.  Any errors in syntax or even information may not have been identified and edited on initial review prior to signing this note.

## 2022-03-14 NOTE — PROGRESS NOTES
Please call her and see how the knee is doing. She can start the medrol dose pack now if she is having pain still in the knee. So far no bacteria has been identified in the fluid taken from her knee.

## 2022-03-14 NOTE — TELEPHONE ENCOUNTER
"Patient called back. Advised patient per Mrs. Anne Almazan PA-C that so far no bacteria has been identified in the fluid taken from her knee and that she can go ahead and start taking the medrol dose pack if she is having pain in her knee. Patient stated that she wanted to let  Anne Almazan PA-C know that the swelling in her knee had gone down but she went back to work today and her knee started swelling again. Patient is on her way to the clinic now to see orthopedics. Advised patient to keep us updated and that I am going to route this message to Chel Almazan PA-C. Patient verbalized understanding. All questions answered.     Shital Hernandez MA ([place])  Rheumatology Department  "

## 2022-03-15 LAB
BACTERIA FLD AEROBE CULT: NO GROWTH
GRAM STN SPEC: NORMAL
GRAM STN SPEC: NORMAL

## 2022-03-18 LAB — BACTERIA SPEC ANAEROBE CULT: NORMAL

## 2022-03-23 ENCOUNTER — PATIENT MESSAGE (OUTPATIENT)
Dept: RHEUMATOLOGY | Facility: CLINIC | Age: 67
End: 2022-03-23
Payer: MEDICARE

## 2022-03-29 ENCOUNTER — TELEPHONE (OUTPATIENT)
Dept: RHEUMATOLOGY | Facility: CLINIC | Age: 67
End: 2022-03-29
Payer: MEDICARE

## 2022-03-29 NOTE — TELEPHONE ENCOUNTER
"Attempted to call patient to discuss this with her. Left v/m for patient to call back at earliest convenience.    Shital Irwin" MAN Hernandez  Rheumatology Department   "

## 2022-03-29 NOTE — TELEPHONE ENCOUNTER
----- Message from Estrella Quinones PA-C sent at 3/29/2022  2:28 PM CDT -----  Hasn't read my portal message. I'd like to see her back in the clinic when I return

## 2022-07-01 ENCOUNTER — HOSPITAL ENCOUNTER (OUTPATIENT)
Dept: RADIOLOGY | Facility: HOSPITAL | Age: 67
Discharge: HOME | End: 2022-07-01
Attending: OBSTETRICS & GYNECOLOGY
Payer: MEDICARE

## 2022-07-01 DIAGNOSIS — Z12.31 ENCOUNTER FOR SCREENING MAMMOGRAM FOR MALIGNANT NEOPLASM OF BREAST: ICD-10-CM

## 2022-07-01 PROCEDURE — 77063 BREAST TOMOSYNTHESIS BI: CPT | Mod: 26,,, | Performed by: RADIOLOGY

## 2022-07-01 PROCEDURE — 77067 SCR MAMMO BI INCL CAD: CPT | Mod: TC

## 2022-07-01 PROCEDURE — 77063 BREAST TOMOSYNTHESIS BI: CPT | Mod: TC

## 2022-07-01 PROCEDURE — 77067 MAMMO DIGITAL SCREENING BILAT WITH TOMO: ICD-10-PCS | Mod: 26,,, | Performed by: RADIOLOGY

## 2022-07-01 PROCEDURE — 77063 MAMMO DIGITAL SCREENING BILAT WITH TOMO: ICD-10-PCS | Mod: 26,,, | Performed by: RADIOLOGY

## 2022-07-01 PROCEDURE — 77067 SCR MAMMO BI INCL CAD: CPT | Mod: 26,,, | Performed by: RADIOLOGY

## 2022-07-19 NOTE — PROGRESS NOTES
Post-Op Progress Note    Subjective:    Conchita Rouse is a 65 y.o.  female s/p arthroscopy and irrigation debridement of right knee 2 weeks ago with Dr. Pompa, for a septic right knee.  Patient states she has been weight-bearing as tolerated and continuing her IV antibiotics.  Blankenship that she has some pain at the inferomedial aspect of her right knee that is tender to palpation.      Objective:  Incisions are well approximated.  No drainage or infection noted.  Range of motion knee:  Extension full, flexion to 100°  Tender to palpation at the inferomedial aspect of her right knee  Neurovascularly intact      Assessment/Plan:    Encounter Diagnosis   Name Primary?    Pyogenic arthritis of right knee joint, due to unspecified organism Yes     Today nothing has grown on the cultures.  Patient will continue her IV antibiotic treatment per Infectious Disease with possible transition to p.o. antibiotics per chart review.  Patient will continue weight-bearing as tolerated.  Follow up in 2 weeks with Dr. Pompa.    Follow-up:  In 2 weeks      -Discussed findings with patient. Patient expressed understanding. Patient was given the opportunity to ask questions and be an active participant in their medical care. Patient had no further questions or concerns at this time.     Disclaimer: This note was generated using a voice recognition system and may have sound alike errors within the text.   The generator pocket was irrigated with Vancomycin 1g/1000mL NS.

## 2022-09-29 ENCOUNTER — OFFICE VISIT (OUTPATIENT)
Dept: INTERNAL MEDICINE | Facility: CLINIC | Age: 67
End: 2022-09-29
Payer: MEDICARE

## 2022-09-29 VITALS
WEIGHT: 185.19 LBS | OXYGEN SATURATION: 96 % | TEMPERATURE: 98 F | SYSTOLIC BLOOD PRESSURE: 126 MMHG | RESPIRATION RATE: 18 BRPM | HEART RATE: 66 BPM | DIASTOLIC BLOOD PRESSURE: 84 MMHG | BODY MASS INDEX: 29.76 KG/M2 | HEIGHT: 66 IN

## 2022-09-29 DIAGNOSIS — H61.22 IMPACTED CERUMEN OF LEFT EAR: ICD-10-CM

## 2022-09-29 DIAGNOSIS — J01.90 ACUTE SINUSITIS, RECURRENCE NOT SPECIFIED, UNSPECIFIED LOCATION: Primary | ICD-10-CM

## 2022-09-29 PROCEDURE — 1126F PR PAIN SEVERITY QUANTIFIED, NO PAIN PRESENT: ICD-10-PCS | Mod: CPTII,S$GLB,, | Performed by: NURSE PRACTITIONER

## 2022-09-29 PROCEDURE — 1160F RVW MEDS BY RX/DR IN RCRD: CPT | Mod: CPTII,S$GLB,, | Performed by: NURSE PRACTITIONER

## 2022-09-29 PROCEDURE — 1101F PR PT FALLS ASSESS DOC 0-1 FALLS W/OUT INJ PAST YR: ICD-10-PCS | Mod: CPTII,S$GLB,, | Performed by: NURSE PRACTITIONER

## 2022-09-29 PROCEDURE — 3074F SYST BP LT 130 MM HG: CPT | Mod: CPTII,S$GLB,, | Performed by: NURSE PRACTITIONER

## 2022-09-29 PROCEDURE — 3008F PR BODY MASS INDEX (BMI) DOCUMENTED: ICD-10-PCS | Mod: CPTII,S$GLB,, | Performed by: NURSE PRACTITIONER

## 2022-09-29 PROCEDURE — 3079F PR MOST RECENT DIASTOLIC BLOOD PRESSURE 80-89 MM HG: ICD-10-PCS | Mod: CPTII,S$GLB,, | Performed by: NURSE PRACTITIONER

## 2022-09-29 PROCEDURE — 99999 PR PBB SHADOW E&M-EST. PATIENT-LVL IV: CPT | Mod: PBBFAC,,, | Performed by: NURSE PRACTITIONER

## 2022-09-29 PROCEDURE — 1101F PT FALLS ASSESS-DOCD LE1/YR: CPT | Mod: CPTII,S$GLB,, | Performed by: NURSE PRACTITIONER

## 2022-09-29 PROCEDURE — 99999 PR PBB SHADOW E&M-EST. PATIENT-LVL IV: ICD-10-PCS | Mod: PBBFAC,,, | Performed by: NURSE PRACTITIONER

## 2022-09-29 PROCEDURE — 1159F PR MEDICATION LIST DOCUMENTED IN MEDICAL RECORD: ICD-10-PCS | Mod: CPTII,S$GLB,, | Performed by: NURSE PRACTITIONER

## 2022-09-29 PROCEDURE — 3079F DIAST BP 80-89 MM HG: CPT | Mod: CPTII,S$GLB,, | Performed by: NURSE PRACTITIONER

## 2022-09-29 PROCEDURE — 3008F BODY MASS INDEX DOCD: CPT | Mod: CPTII,S$GLB,, | Performed by: NURSE PRACTITIONER

## 2022-09-29 PROCEDURE — 1160F PR REVIEW ALL MEDS BY PRESCRIBER/CLIN PHARMACIST DOCUMENTED: ICD-10-PCS | Mod: CPTII,S$GLB,, | Performed by: NURSE PRACTITIONER

## 2022-09-29 PROCEDURE — 3074F PR MOST RECENT SYSTOLIC BLOOD PRESSURE < 130 MM HG: ICD-10-PCS | Mod: CPTII,S$GLB,, | Performed by: NURSE PRACTITIONER

## 2022-09-29 PROCEDURE — 3288F FALL RISK ASSESSMENT DOCD: CPT | Mod: CPTII,S$GLB,, | Performed by: NURSE PRACTITIONER

## 2022-09-29 PROCEDURE — 99213 PR OFFICE/OUTPT VISIT, EST, LEVL III, 20-29 MIN: ICD-10-PCS | Mod: S$GLB,,, | Performed by: NURSE PRACTITIONER

## 2022-09-29 PROCEDURE — 1126F AMNT PAIN NOTED NONE PRSNT: CPT | Mod: CPTII,S$GLB,, | Performed by: NURSE PRACTITIONER

## 2022-09-29 PROCEDURE — 99213 OFFICE O/P EST LOW 20 MIN: CPT | Mod: S$GLB,,, | Performed by: NURSE PRACTITIONER

## 2022-09-29 PROCEDURE — 1159F MED LIST DOCD IN RCRD: CPT | Mod: CPTII,S$GLB,, | Performed by: NURSE PRACTITIONER

## 2022-09-29 PROCEDURE — 3288F PR FALLS RISK ASSESSMENT DOCUMENTED: ICD-10-PCS | Mod: CPTII,S$GLB,, | Performed by: NURSE PRACTITIONER

## 2022-09-29 RX ORDER — FLUTICASONE PROPIONATE 50 MCG
2 SPRAY, SUSPENSION (ML) NASAL DAILY
Qty: 9.9 ML | Refills: 0 | Status: SHIPPED | OUTPATIENT
Start: 2022-09-29 | End: 2022-11-16

## 2022-09-29 RX ORDER — AZITHROMYCIN 250 MG/1
250 TABLET, FILM COATED ORAL DAILY
Qty: 6 TABLET | Refills: 0 | Status: SHIPPED | OUTPATIENT
Start: 2022-09-29 | End: 2022-11-16

## 2022-09-29 RX ORDER — AMOXICILLIN AND CLAVULANATE POTASSIUM 875; 125 MG/1; MG/1
1 TABLET, FILM COATED ORAL 2 TIMES DAILY
Qty: 20 TABLET | Refills: 0 | Status: SHIPPED | OUTPATIENT
Start: 2022-09-29 | End: 2022-09-29 | Stop reason: ALTCHOICE

## 2022-09-29 NOTE — PROGRESS NOTES
"CHIEF COMPLAINT/REASON FOR VISIT: nasal congestion, post nasal drip, sore throat, facial pressure    HISTORY OF PRESENT ILLNESS:    Conchita Rouse.  67 y.o.  female complains of a sinus issue with nasal congestion, facial pressure, and no productive cough onset 2 weeks ago. Patient admits tried OTC medications with little relief.     PAST MEDICAL HISTORY:   Past Medical History:   Diagnosis Date    Arthritis     Asthma     Dyslipidemia (high LDL; low HDL)     10y CV event risk 14.7% (asuming no DM2), which could be reduced to 4.5% with RF optimization, for which I recommended atorvastatin 20 mg 1 po qd x2 weeks and then 40 mg 1 po qd    Encounter for blood transfusion     History of shingles 02/14/2018    Per note from Dr. Rogelio Norwood III on 2/14/18; left upper buttocks.    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 Rogelio Norwood III last visit 2/14/18 (tolerating CSA & promacta); also notes aplastic anemia (AA psot Horse ATG - week 7/17/17) and ITP, for which ATG & promacta following decadron pulse for ITP & AA; plan for second opinion on BM bx; next flow in 3 mo; intent of rx: palliative    Prediabetes     Stroke     TIA (transient ischemic attack) 2007    She was at "[facility]", where she noted she was feeling slow, took a nap, woke up with a numb/tingling left jaw to arm, which persistned 20 minutes, took 3 baby aspirins, went to ER @ [facility], where facial droop was noted, but completely resolved & head CT revealed old minor abnormality.    Vitamin D deficiency          PAST SURGICAL HISTORY:.  Past Surgical History:   Procedure Laterality Date    ARTHROSCOPY OF KNEE Right 11/4/2020    Procedure: ARTHROSCOPY, KNEE;  Surgeon: Sal Pompa MD;  Location: [facility] OR;  Service: Orthopedics;  Laterality: Right;    COLONOSCOPY      COLONOSCOPY N/A 6/23/2021    Procedure: COLONOSCOPY;  Surgeon: Mao Pepe MD;  Location: [facility];  Service: " "Endoscopy;  Laterality: N/A;    COLONOSCOPY N/A 12/1/2021    Procedure: COLONOSCOPY post EMR check;  Surgeon: Mao Pepe MD;  Location: [facility];  Service: Endoscopy;  Laterality: N/A;    INCISION AND DRAINAGE OF KNEE Right 11/4/2020    Procedure: INCISION AND DRAINAGE, KNEE;  Surgeon: Sal Pompa MD;  Location: [facility];  Service: Orthopedics;  Laterality: Right;    left eye surgery  1955    Due to left eye lid drooping         SOCIAL HISTORY:.  Social History     Socioeconomic History    Marital status:    Tobacco Use    Smoking status: Never     Passive exposure: Never    Smokeless tobacco: Never   Substance and Sexual Activity    Alcohol use: Yes     Alcohol/week: 0.0 - 1.0 standard drinks     Comment: rarely    Drug use: No    Sexual activity: Not Currently     Partners: Male     Birth control/protection: Post-menopausal   Social History Narrative    Breakfast: 2 TBS grits or french toast sticks or waffles (previously fried eggs); 1/2 cup coffee w/ 1 cream & 3 sugars & lemonade "all day"    Lunch: 50% turkey or ham sandwich w/ occasional chips; tea, rare soda    Dinner: Steak fries and baked hot wings, peppermint or chicken wings and french fries; tea or cool aid    Snacks: mini-[employer] chocolates (1x/wk) or a few unsalted chips (2x/wk) or popcorn (2x/mo) or strawberries or pretzils     Eats out: 2x/wk    Water: 16 oz/d    PA: None       ROS:  GENERAL: Reports no fever, chills.  SKIN: No rashes, itching or changes in color or texture of skin.  HEENT: Reports sinus pressure and burning eyes and nostril.   Denies nasal congestion, postnasal drip, sore throat, ear discomfort, rhinorrhea.   CHEST: Denies cough and sputum production.  Denies shortness of breath and wheezing.  CARDIOVASCULAR: Denies chest pain, PND, orthopnea or reduced exercise tolerance.  ABDOMEN: Appetite fine. No weight loss.   MUSCULOSKELETAL: No joint stiffness, pain or swelling. Denies back pain.  NEUROLOGIC: " "Denies headaches. No history of seizures, paralysis, alteration of gait or coordination.  PSYCHIATRIC: Denies mood swings, depression or suicidal thoughts.    /84 (BP Location: Left arm, Patient Position: Sitting, BP Method: Large (Manual))   Pulse 66   Temp 97.7 °F (36.5 °C) (Temporal)   Resp 18   Ht 5' 6" (1.676 m)   Wt 84 kg (185 lb 3 oz)   SpO2 96%   BMI 29.89 kg/m² .    PE:   APPEARANCE: Well nourished, well developed, in mild distress.   SKIN: Normal skin turgor, no lesions.  HEENT: Turbinates red and enlarge, no sinus tenderness on palpation, erythematous pharynx, TMs poor light reflex bilaterally. Cerumen impaction (left)  CHEST: Lungs clear to auscultation. no wheezing  CARDIOVASCULAR: Regular rate and rhythm.  MUSCULOSKELETAL: Motor: 5/5 strength major flexors/extensors.    PLAN:   Advise Hydrate and rest.      Practice good handwashing.    See PCP or go to ER if symptoms worsen or fail to improve with treatment.          DIAGNOSIS:  Acute sinusitis, recurrence not specified, unspecified location  -     Discontinue: amoxicillin-clavulanate 875-125mg (AUGMENTIN) 875-125 mg per tablet; Take 1 tablet by mouth 2 (two) times daily. Take with food for 10 days  Dispense: 20 tablet; Refill: 0  -     fluticasone propionate (FLONASE) 50 mcg/actuation nasal spray; 2 sprays (100 mcg total) by Each Nostril route once daily.  Dispense: 9.9 mL; Refill: 0  -     azithromycin (Z-TRAVIS) 250 MG tablet; Take 1 tablet (250 mg total) by mouth once daily. Take 2 tablets by mouth on day 1, then one tablet daily on days 2-5.  Dispense: 6 tablet; Refill: 0    Impacted cerumen of left ear  -     carbamide peroxide (DEBROX) 6.5 % otic solution; Place 5 drops into the left ear 2 (two) times daily.  Dispense: 15 mL; Refill: 0      Instructed to take all medications as ordered.  Informed if no improvement or symptoms worsens to follow up with primary care physician.  Informed to hydrate and rest.  Printed and review after " visit summary with patient.

## 2022-11-16 ENCOUNTER — OFFICE VISIT (OUTPATIENT)
Dept: INTERNAL MEDICINE | Facility: CLINIC | Age: 67
End: 2022-11-16
Payer: MEDICARE

## 2022-11-16 VITALS
OXYGEN SATURATION: 98 % | BODY MASS INDEX: 30.16 KG/M2 | DIASTOLIC BLOOD PRESSURE: 84 MMHG | HEART RATE: 81 BPM | HEIGHT: 66 IN | WEIGHT: 187.63 LBS | SYSTOLIC BLOOD PRESSURE: 136 MMHG | TEMPERATURE: 97 F

## 2022-11-16 DIAGNOSIS — Z86.73 HISTORY OF TIA (TRANSIENT ISCHEMIC ATTACK): ICD-10-CM

## 2022-11-16 DIAGNOSIS — R73.03 PREDIABETES: ICD-10-CM

## 2022-11-16 DIAGNOSIS — I10 ESSENTIAL HYPERTENSION: Primary | ICD-10-CM

## 2022-11-16 DIAGNOSIS — D69.6 THROMBOCYTOPENIA: ICD-10-CM

## 2022-11-16 PROCEDURE — 3079F DIAST BP 80-89 MM HG: CPT | Mod: CPTII,S$GLB,, | Performed by: INTERNAL MEDICINE

## 2022-11-16 PROCEDURE — 1159F MED LIST DOCD IN RCRD: CPT | Mod: CPTII,S$GLB,, | Performed by: INTERNAL MEDICINE

## 2022-11-16 PROCEDURE — 3075F PR MOST RECENT SYSTOLIC BLOOD PRESS GE 130-139MM HG: ICD-10-PCS | Mod: CPTII,S$GLB,, | Performed by: INTERNAL MEDICINE

## 2022-11-16 PROCEDURE — 99999 PR PBB SHADOW E&M-EST. PATIENT-LVL IV: ICD-10-PCS | Mod: PBBFAC,,, | Performed by: INTERNAL MEDICINE

## 2022-11-16 PROCEDURE — 3008F BODY MASS INDEX DOCD: CPT | Mod: CPTII,S$GLB,, | Performed by: INTERNAL MEDICINE

## 2022-11-16 PROCEDURE — 1126F PR PAIN SEVERITY QUANTIFIED, NO PAIN PRESENT: ICD-10-PCS | Mod: CPTII,S$GLB,, | Performed by: INTERNAL MEDICINE

## 2022-11-16 PROCEDURE — 99214 PR OFFICE/OUTPT VISIT, EST, LEVL IV, 30-39 MIN: ICD-10-PCS | Mod: S$GLB,,, | Performed by: INTERNAL MEDICINE

## 2022-11-16 PROCEDURE — 1159F PR MEDICATION LIST DOCUMENTED IN MEDICAL RECORD: ICD-10-PCS | Mod: CPTII,S$GLB,, | Performed by: INTERNAL MEDICINE

## 2022-11-16 PROCEDURE — 1126F AMNT PAIN NOTED NONE PRSNT: CPT | Mod: CPTII,S$GLB,, | Performed by: INTERNAL MEDICINE

## 2022-11-16 PROCEDURE — 3288F FALL RISK ASSESSMENT DOCD: CPT | Mod: CPTII,S$GLB,, | Performed by: INTERNAL MEDICINE

## 2022-11-16 PROCEDURE — 1160F RVW MEDS BY RX/DR IN RCRD: CPT | Mod: CPTII,S$GLB,, | Performed by: INTERNAL MEDICINE

## 2022-11-16 PROCEDURE — 1160F PR REVIEW ALL MEDS BY PRESCRIBER/CLIN PHARMACIST DOCUMENTED: ICD-10-PCS | Mod: CPTII,S$GLB,, | Performed by: INTERNAL MEDICINE

## 2022-11-16 PROCEDURE — 3288F PR FALLS RISK ASSESSMENT DOCUMENTED: ICD-10-PCS | Mod: CPTII,S$GLB,, | Performed by: INTERNAL MEDICINE

## 2022-11-16 PROCEDURE — 3008F PR BODY MASS INDEX (BMI) DOCUMENTED: ICD-10-PCS | Mod: CPTII,S$GLB,, | Performed by: INTERNAL MEDICINE

## 2022-11-16 PROCEDURE — 99999 PR PBB SHADOW E&M-EST. PATIENT-LVL IV: CPT | Mod: PBBFAC,,, | Performed by: INTERNAL MEDICINE

## 2022-11-16 PROCEDURE — 1101F PR PT FALLS ASSESS DOC 0-1 FALLS W/OUT INJ PAST YR: ICD-10-PCS | Mod: CPTII,S$GLB,, | Performed by: INTERNAL MEDICINE

## 2022-11-16 PROCEDURE — 3075F SYST BP GE 130 - 139MM HG: CPT | Mod: CPTII,S$GLB,, | Performed by: INTERNAL MEDICINE

## 2022-11-16 PROCEDURE — 1101F PT FALLS ASSESS-DOCD LE1/YR: CPT | Mod: CPTII,S$GLB,, | Performed by: INTERNAL MEDICINE

## 2022-11-16 PROCEDURE — 3079F PR MOST RECENT DIASTOLIC BLOOD PRESSURE 80-89 MM HG: ICD-10-PCS | Mod: CPTII,S$GLB,, | Performed by: INTERNAL MEDICINE

## 2022-11-16 PROCEDURE — 99214 OFFICE O/P EST MOD 30 MIN: CPT | Mod: S$GLB,,, | Performed by: INTERNAL MEDICINE

## 2022-11-16 NOTE — PROGRESS NOTES
"Subjective:       Patient ID: Conchita Rouse is a 67 y.o. female.    Chief Complaint: Establish Care    67 y.o. Black or  female     Patient presents with:  Establish Care    HPI: Here today to establish care. She is new to me.   HTN--stable on amlodipine, although she does not take it every day.   Prediabetes--not on medication. Denies symptoms of diabetes.                  HGBA1C                   6.1 (H)             09/24/2021            She has a history of a TIA. She is not on a statin. She is due for labs.                    LDLCALC                  125.2               09/24/2021            Low platelets--management per hematology.                  PLT                      68 (L)              03/11/2022              Past Medical History:  Arthritis  Asthma  Dyslipidemia (high LDL; low HDL)      Comment:  10y CV event risk 14.7% (asuming no DM2), which could be               reduced to 4.5% with RF optimization, for which I                recommended atorvastatin 20 mg 1 po qd x2 weeks and then                40 mg 1 po qd  Encounter for blood transfusion  02/14/2018: History of shingles      Comment:  Per note from Dr. Rogelio Norwood III on 2/14/18; left                upper buttocks.  Hypertension  02/21/2017: PNH (paroxysmal nocturnal hemoglobinuria)      Comment:  PNH small; Followed by Dr. Rogelio Norwood III last                visit 2/14/18 (tolerating CSA & promacta); also notes                aplastic anemia (AA psot Horse ATG - week 7/17/17) and                ITP, for which ATG & promacta following decadron pulse                for ITP & AA; plan for second opinion on BM bx; next flow               in 3 mo; intent of rx: palliative  Prediabetes  Stroke  2007: TIA (transient ischemic attack)      Comment:  She was at "[employer]", where she noted she was feeling               slow, took a nap, woke up with a numb/tingling left jaw                to arm, which persistned 20 minutes, " took 3 baby                aspirins, went to ER @ [facility], where facial droop was                noted, but completely resolved & head CT revealed old                minor abnormality.  Vitamin D deficiency    Past Surgical History:  11/4/2020: ARTHROSCOPY OF KNEE; Right      Comment:  Procedure: ARTHROSCOPY, KNEE;  Surgeon: Sal Pompa MD;  Location: [facility] OR;  Service: Orthopedics;                 Laterality: Right;  No date: COLONOSCOPY  6/23/2021: COLONOSCOPY; N/A      Comment:  Procedure: COLONOSCOPY;  Surgeon: Mao Pepe MD;  Location: [facility] ENDO;  Service: Endoscopy;               Laterality: N/A;  12/1/2021: COLONOSCOPY; N/A      Comment:  Procedure: COLONOSCOPY post EMR check;  Surgeon:                Mao Pepe MD;  Location: [facility] ENDO;                 Service: Endoscopy;  Laterality: N/A;  11/4/2020: INCISION AND DRAINAGE OF KNEE; Right      Comment:  Procedure: INCISION AND DRAINAGE, KNEE;  Surgeon: Sal Pompa MD;  Location: [facility] OR;  Service: Orthopedics;                Laterality: Right;  1955: left eye surgery      Comment:  Due to left eye lid drooping    Family history includes Breast cancer in her sister; Cancer in her brother and sister; Diabetes in her mother; Heart disease in her brother, father, and mother.    Current Outpatient Medications on File Prior to Visit:  amLODIPine (NORVASC) 5 MG tablet, Take 1 tablet (5 mg total) by mouth once daily., Disp: 90 tablet, Rfl: 3  carbamide peroxide (DEBROX) 6.5 % otic solution, Place 5 drops into the left ear 2 (two) times daily., Disp: 15 mL, Rfl: 0  eltrombopag (PROMACTA) 75 mg Tab, Take 75 mg by mouth once daily. , Disp: , Rfl:  azelastine (ASTELIN) 137 mcg (0.1 %) nasal spray, 1 spray (137 mcg total) by Nasal route 2 (two) times daily., Disp: 30 mL, Rfl: 1    Allergies:   Review of patient's allergies indicates:   -- Bactrim [sulfamethoxazole-trimethoprim] -- Edema    --   Swollen lips   -- Codeine -- Other (See Comments)    --  Pt states codeine does not cause hives. Had tooth             extraction and medication given caused her to be             jittery, feel hot and have to lay down like she             was weak. morhpine given on 5/16/2017 iv for pain.             Pt martine well without any sign or sx of allergy or             reaction.              Review of Systems   Constitutional:  Negative for fever.   Respiratory:  Negative for cough and shortness of breath.    Cardiovascular:  Negative for chest pain and leg swelling.   Gastrointestinal:  Negative for abdominal pain and blood in stool.   Genitourinary:  Negative for difficulty urinating and dysuria.   Neurological:  Negative for dizziness and headaches.   Hematological:  Does not bruise/bleed easily.       Objective:      Physical Exam  Vitals reviewed.   Constitutional:       General: She is not in acute distress.     Appearance: She is well-developed. She is not ill-appearing.   Eyes:      General: No scleral icterus.  Cardiovascular:      Rate and Rhythm: Normal rate and regular rhythm.      Heart sounds: Normal heart sounds.   Pulmonary:      Effort: Pulmonary effort is normal. No respiratory distress.      Breath sounds: Normal breath sounds. No wheezing or rales.   Abdominal:      General: Bowel sounds are normal.      Palpations: Abdomen is soft.   Skin:     General: Skin is warm and dry.   Neurological:      Mental Status: She is alert and oriented to person, place, and time.   Psychiatric:         Behavior: Behavior normal.       Assessment:       Problem List Items Addressed This Visit       Essential hypertension - Primary    Relevant Orders    Comprehensive Metabolic Panel    Lipid Panel    TSH    Thrombocytopenia    Prediabetes    Relevant Orders    Comprehensive Metabolic Panel    Lipid Panel    Hemoglobin A1C     Other Visit Diagnoses       History of TIA (transient ischemic attack)        Relevant Orders     Lipid Panel              Plan:       Conchita was seen today for establish care.    Diagnoses and all orders for this visit:    Essential hypertension  -     Comprehensive Metabolic Panel; Standing  -     Lipid Panel; Standing  -     TSH; Future    Prediabetes  -     Comprehensive Metabolic Panel; Standing  -     Lipid Panel; Standing  -     Hemoglobin A1C; Standing    History of TIA (transient ischemic attack)  -     Lipid Panel; Standing    Thrombocytopenia  -     obtain records from hematologist    Schedule labs.

## 2022-11-18 ENCOUNTER — LAB VISIT (OUTPATIENT)
Dept: LAB | Facility: HOSPITAL | Age: 67
End: 2022-11-18
Attending: INTERNAL MEDICINE
Payer: MEDICARE

## 2022-11-18 DIAGNOSIS — I10 ESSENTIAL HYPERTENSION: ICD-10-CM

## 2022-11-18 DIAGNOSIS — R73.03 PREDIABETES: ICD-10-CM

## 2022-11-18 DIAGNOSIS — Z86.73 HISTORY OF TIA (TRANSIENT ISCHEMIC ATTACK): ICD-10-CM

## 2022-11-18 PROCEDURE — 80061 LIPID PANEL: CPT | Performed by: INTERNAL MEDICINE

## 2022-11-18 PROCEDURE — 84443 ASSAY THYROID STIM HORMONE: CPT | Performed by: INTERNAL MEDICINE

## 2022-11-18 PROCEDURE — 80053 COMPREHEN METABOLIC PANEL: CPT | Performed by: INTERNAL MEDICINE

## 2022-11-18 PROCEDURE — 83036 HEMOGLOBIN GLYCOSYLATED A1C: CPT | Performed by: INTERNAL MEDICINE

## 2022-11-18 PROCEDURE — 36415 COLL VENOUS BLD VENIPUNCTURE: CPT | Mod: PO | Performed by: INTERNAL MEDICINE

## 2022-11-19 LAB
ALBUMIN SERPL BCP-MCNC: 4.1 G/DL (ref 3.5–5.2)
ALP SERPL-CCNC: 70 U/L (ref 55–135)
ALT SERPL W/O P-5'-P-CCNC: 19 U/L (ref 10–44)
ANION GAP SERPL CALC-SCNC: 8 MMOL/L (ref 8–16)
AST SERPL-CCNC: 19 U/L (ref 10–40)
BILIRUB SERPL-MCNC: 0.5 MG/DL (ref 0.1–1)
BUN SERPL-MCNC: 13 MG/DL (ref 8–23)
CALCIUM SERPL-MCNC: 9.7 MG/DL (ref 8.7–10.5)
CHLORIDE SERPL-SCNC: 108 MMOL/L (ref 95–110)
CHOLEST SERPL-MCNC: 186 MG/DL (ref 120–199)
CHOLEST/HDLC SERPL: 4.2 {RATIO} (ref 2–5)
CO2 SERPL-SCNC: 26 MMOL/L (ref 23–29)
CREAT SERPL-MCNC: 0.8 MG/DL (ref 0.5–1.4)
EST. GFR  (NO RACE VARIABLE): >60 ML/MIN/1.73 M^2
ESTIMATED AVG GLUCOSE: 131 MG/DL (ref 68–131)
GLUCOSE SERPL-MCNC: 139 MG/DL (ref 70–110)
HBA1C MFR BLD: 6.2 % (ref 4–5.6)
HDLC SERPL-MCNC: 44 MG/DL (ref 40–75)
HDLC SERPL: 23.7 % (ref 20–50)
LDLC SERPL CALC-MCNC: 126.4 MG/DL (ref 63–159)
NONHDLC SERPL-MCNC: 142 MG/DL
POTASSIUM SERPL-SCNC: 3.9 MMOL/L (ref 3.5–5.1)
PROT SERPL-MCNC: 7.6 G/DL (ref 6–8.4)
SODIUM SERPL-SCNC: 142 MMOL/L (ref 136–145)
TRIGL SERPL-MCNC: 78 MG/DL (ref 30–150)
TSH SERPL DL<=0.005 MIU/L-ACNC: 2.36 UIU/ML (ref 0.4–4)

## 2023-02-10 ENCOUNTER — NURSE TRIAGE (OUTPATIENT)
Dept: ADMINISTRATIVE | Facility: CLINIC | Age: 68
End: 2023-02-10
Payer: MEDICARE

## 2023-02-17 ENCOUNTER — OFFICE VISIT (OUTPATIENT)
Dept: INTERNAL MEDICINE | Facility: CLINIC | Age: 68
End: 2023-02-17
Payer: MEDICARE

## 2023-02-17 VITALS
WEIGHT: 188.25 LBS | RESPIRATION RATE: 18 BRPM | OXYGEN SATURATION: 98 % | HEIGHT: 66 IN | BODY MASS INDEX: 30.25 KG/M2 | SYSTOLIC BLOOD PRESSURE: 130 MMHG | TEMPERATURE: 98 F | HEART RATE: 67 BPM | DIASTOLIC BLOOD PRESSURE: 88 MMHG

## 2023-02-17 DIAGNOSIS — J01.90 ACUTE SINUSITIS, RECURRENCE NOT SPECIFIED, UNSPECIFIED LOCATION: Primary | ICD-10-CM

## 2023-02-17 PROCEDURE — 99213 PR OFFICE/OUTPT VISIT, EST, LEVL III, 20-29 MIN: ICD-10-PCS | Mod: S$GLB,,, | Performed by: NURSE PRACTITIONER

## 2023-02-17 PROCEDURE — 3079F PR MOST RECENT DIASTOLIC BLOOD PRESSURE 80-89 MM HG: ICD-10-PCS | Mod: CPTII,S$GLB,, | Performed by: NURSE PRACTITIONER

## 2023-02-17 PROCEDURE — 3288F FALL RISK ASSESSMENT DOCD: CPT | Mod: CPTII,S$GLB,, | Performed by: NURSE PRACTITIONER

## 2023-02-17 PROCEDURE — 1126F AMNT PAIN NOTED NONE PRSNT: CPT | Mod: CPTII,S$GLB,, | Performed by: NURSE PRACTITIONER

## 2023-02-17 PROCEDURE — 1159F MED LIST DOCD IN RCRD: CPT | Mod: CPTII,S$GLB,, | Performed by: NURSE PRACTITIONER

## 2023-02-17 PROCEDURE — 3079F DIAST BP 80-89 MM HG: CPT | Mod: CPTII,S$GLB,, | Performed by: NURSE PRACTITIONER

## 2023-02-17 PROCEDURE — 1126F PR PAIN SEVERITY QUANTIFIED, NO PAIN PRESENT: ICD-10-PCS | Mod: CPTII,S$GLB,, | Performed by: NURSE PRACTITIONER

## 2023-02-17 PROCEDURE — 1101F PR PT FALLS ASSESS DOC 0-1 FALLS W/OUT INJ PAST YR: ICD-10-PCS | Mod: CPTII,S$GLB,, | Performed by: NURSE PRACTITIONER

## 2023-02-17 PROCEDURE — 3075F PR MOST RECENT SYSTOLIC BLOOD PRESS GE 130-139MM HG: ICD-10-PCS | Mod: CPTII,S$GLB,, | Performed by: NURSE PRACTITIONER

## 2023-02-17 PROCEDURE — 99999 PR PBB SHADOW E&M-EST. PATIENT-LVL IV: CPT | Mod: PBBFAC,,, | Performed by: NURSE PRACTITIONER

## 2023-02-17 PROCEDURE — 3008F BODY MASS INDEX DOCD: CPT | Mod: CPTII,S$GLB,, | Performed by: NURSE PRACTITIONER

## 2023-02-17 PROCEDURE — 1159F PR MEDICATION LIST DOCUMENTED IN MEDICAL RECORD: ICD-10-PCS | Mod: CPTII,S$GLB,, | Performed by: NURSE PRACTITIONER

## 2023-02-17 PROCEDURE — 1160F PR REVIEW ALL MEDS BY PRESCRIBER/CLIN PHARMACIST DOCUMENTED: ICD-10-PCS | Mod: CPTII,S$GLB,, | Performed by: NURSE PRACTITIONER

## 2023-02-17 PROCEDURE — 3075F SYST BP GE 130 - 139MM HG: CPT | Mod: CPTII,S$GLB,, | Performed by: NURSE PRACTITIONER

## 2023-02-17 PROCEDURE — 1101F PT FALLS ASSESS-DOCD LE1/YR: CPT | Mod: CPTII,S$GLB,, | Performed by: NURSE PRACTITIONER

## 2023-02-17 PROCEDURE — 99999 PR PBB SHADOW E&M-EST. PATIENT-LVL IV: ICD-10-PCS | Mod: PBBFAC,,, | Performed by: NURSE PRACTITIONER

## 2023-02-17 PROCEDURE — 3288F PR FALLS RISK ASSESSMENT DOCUMENTED: ICD-10-PCS | Mod: CPTII,S$GLB,, | Performed by: NURSE PRACTITIONER

## 2023-02-17 PROCEDURE — 99213 OFFICE O/P EST LOW 20 MIN: CPT | Mod: S$GLB,,, | Performed by: NURSE PRACTITIONER

## 2023-02-17 PROCEDURE — 3008F PR BODY MASS INDEX (BMI) DOCUMENTED: ICD-10-PCS | Mod: CPTII,S$GLB,, | Performed by: NURSE PRACTITIONER

## 2023-02-17 PROCEDURE — 1160F RVW MEDS BY RX/DR IN RCRD: CPT | Mod: CPTII,S$GLB,, | Performed by: NURSE PRACTITIONER

## 2023-02-17 RX ORDER — FLUTICASONE PROPIONATE 50 MCG
2 SPRAY, SUSPENSION (ML) NASAL DAILY
Qty: 9.9 ML | Refills: 1 | Status: SHIPPED | OUTPATIENT
Start: 2023-02-17 | End: 2023-03-03

## 2023-02-17 RX ORDER — AMOXICILLIN 875 MG/1
875 TABLET, FILM COATED ORAL 2 TIMES DAILY
Qty: 20 TABLET | Refills: 0 | Status: SHIPPED | OUTPATIENT
Start: 2023-02-17 | End: 2023-09-22

## 2023-02-17 NOTE — PROGRESS NOTES
"CHIEF COMPLAINT/REASON FOR VISIT: nasal congestion, post nasal drip, sore throat, facial pressure    HISTORY OF PRESENT ILLNESS:    Conchita Rouse.  68 y.o.  female complains of a sinus issue with nasal congestion, post nasal drip, sore throat, facial pressure, no ear discomfort  and productive cough onset 1 week ago. Patient admits tried OTC medications with little relief.     PAST MEDICAL HISTORY:   Past Medical History:   Diagnosis Date    Aplastic anemia     Dr. Rogelio Norwood--hematology    Arthritis     Asthma     Chronic ITP (idiopathic thrombocytopenia)     Dr. Rogelio Norwood--hematology    Dyslipidemia (high LDL; low HDL)     10y CV event risk 14.7% (asuming no DM2), which could be reduced to 4.5% with RF optimization, for which I recommended atorvastatin 20 mg 1 po qd x2 weeks and then 40 mg 1 po qd    Encounter for blood transfusion     History of shingles 02/14/2018    Per note from Dr. Rogelio Norwood III on 2/14/18; left upper buttocks.    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 Rogelio Norwood III last visit 2/14/18 (tolerating CSA & promacta); also notes aplastic anemia (AA psot Horse ATG - week 7/17/17) and ITP, for which ATG & promacta following decadron pulse for ITP & AA; plan for second opinion on BM bx; next flow in 3 mo; intent of rx: palliative    Prediabetes     Stroke     TIA (transient ischemic attack) 2007    She was at "[facility] where she noted she was feeling slow, took a nap, woke up with a numb/tingling left jaw to arm, which persistned 20 minutes, took 3 baby aspirins, went to ER @ [facility], where facial droop was noted, but completely resolved & head CT revealed old minor abnormality.    Vitamin D deficiency          PAST SURGICAL HISTORY:.  Past Surgical History:   Procedure Laterality Date    ARTHROSCOPY OF KNEE Right 11/4/2020    Procedure: ARTHROSCOPY, KNEE;  Surgeon: Sal Pompa MD;  Location: [facility] OR;  " "Service: Orthopedics;  Laterality: Right;    COLONOSCOPY      COLONOSCOPY N/A 6/23/2021    Procedure: COLONOSCOPY;  Surgeon: Moa Pepe MD;  Location: [facility] ENDO;  Service: Endoscopy;  Laterality: N/A;    COLONOSCOPY N/A 12/1/2021    Procedure: COLONOSCOPY post EMR check;  Surgeon: Mao Pepe MD;  Location: [facility] ENDO;  Service: Endoscopy;  Laterality: N/A;    INCISION AND DRAINAGE OF KNEE Right 11/4/2020    Procedure: INCISION AND DRAINAGE, KNEE;  Surgeon: Sal Pompa MD;  Location: [facility] OR;  Service: Orthopedics;  Laterality: Right;    left eye surgery  1955    Due to left eye lid drooping         SOCIAL HISTORY:.  Social History     Socioeconomic History    Marital status:    Tobacco Use    Smoking status: Never     Passive exposure: Never    Smokeless tobacco: Never   Substance and Sexual Activity    Alcohol use: Yes     Alcohol/week: 0.0 - 1.0 standard drinks     Comment: rarely    Drug use: No    Sexual activity: Not Currently     Partners: Male     Birth control/protection: Post-menopausal   Social History Narrative    Breakfast: 2 TBS grits or french toast sticks or waffles (previously fried eggs); 1/2 cup coffee w/ 1 cream & 3 sugars & lemonade "all day"    Lunch: 50% turkey or ham sandwich w/ occasional chips; tea, rare soda    Dinner: Steak fries and baked hot wings, peppermint or chicken wings and french fries; tea or cool aid    Snacks: mini-[employer] chocolates (1x/wk) or a few unsalted chips (2x/wk) or popcorn (2x/mo) or strawberries or pretzils     Eats out: 2x/wk    Water: 16 oz/d    PA: None       ROS:  GENERAL: Reports no fever, chills.  SKIN: No rashes, itching or changes in color or texture of skin.  HEENT: Reports sinus pressure, nasal congestion, postnasal drip, rhinorrhea.  CHEST:Denies cough and sputum production.  Denies shortness of breath and wheezing.  CARDIOVASCULAR: Denies chest pain, PND, orthopnea or reduced exercise tolerance.  ABDOMEN: " "Appetite fine. No weight loss.   MUSCULOSKELETAL: No joint stiffness, pain or swelling. Denies back pain.  NEUROLOGIC: Report headaches. No history of seizures, paralysis, alteration of gait or coordination.  PSYCHIATRIC: Denies mood swings, depression or suicidal thoughts.    /88 (BP Location: Left arm, Patient Position: Sitting, BP Method: Large (Manual))   Pulse 67   Temp 98.3 °F (36.8 °C) (Oral)   Resp 18   Ht 5' 6" (1.676 m)   Wt 85.4 kg (188 lb 4.4 oz)   SpO2 98%   BMI 30.39 kg/m² .    PE:   APPEARANCE: Well nourished, well developed, in mild distress.   SKIN: Normal skin turgor, no lesions.  HEENT: Turbinates red and enlarge, sinus tenderness on palpation, erythematous pharynx, TMs poor light reflex bilaterally.  CHEST: Lungs clear to auscultation. no wheezing  CARDIOVASCULAR: Regular rate and rhythm.  MUSCULOSKELETAL: Motor: 5/5 strength major flexors/extensors.    PLAN:   Advise Hydrate and rest.      Practice good handwashing.    See PCP or go to ER if symptoms worsen or fail to improve with treatment.          DIAGNOSIS:  Acute sinusitis, recurrence not specified, unspecified location  -     amoxicillin (AMOXIL) 875 MG tablet; Take 1 tablet (875 mg total) by mouth 2 (two) times daily. Take with food  Dispense: 20 tablet; Refill: 0  -     fluticasone propionate (FLONASE) 50 mcg/actuation nasal spray; 2 sprays (100 mcg total) by Each Nostril route once daily. for 14 days  Dispense: 9.9 mL; Refill: 1        Instructed to take all medications as ordered.  Informed if no improvement or symptoms worsens to follow up with primary care physician.  Informed to hydrate and rest.  Printed and review after visit summary with patient.  "

## 2023-03-07 ENCOUNTER — TELEPHONE (OUTPATIENT)
Dept: OPHTHALMOLOGY | Facility: CLINIC | Age: 68
End: 2023-03-07
Payer: MEDICARE

## 2023-03-07 NOTE — TELEPHONE ENCOUNTER
No answer when called - left msg to return call      ----- Message from Kunal Vasquez sent at 3/7/2023  4:26 PM CST -----  Contact: Conchita Griffin is calling to speak with the nurse in regards to appt. Reports needing to schedule annual eye exam. Please give patient a callback at .[phone].

## 2023-04-17 ENCOUNTER — OFFICE VISIT (OUTPATIENT)
Dept: OPHTHALMOLOGY | Facility: CLINIC | Age: 68
End: 2023-04-17
Payer: MEDICARE

## 2023-04-17 DIAGNOSIS — H52.02 HYPEROPIA OF LEFT EYE WITH ASTIGMATISM: ICD-10-CM

## 2023-04-17 DIAGNOSIS — H52.11 MYOPIA OF RIGHT EYE: ICD-10-CM

## 2023-04-17 DIAGNOSIS — H52.4 PRESBYOPIA: ICD-10-CM

## 2023-04-17 DIAGNOSIS — H25.013 CORTICAL AGE-RELATED CATARACT OF BOTH EYES: ICD-10-CM

## 2023-04-17 DIAGNOSIS — H25.13 NUCLEAR SCLEROSIS, BILATERAL: Primary | ICD-10-CM

## 2023-04-17 DIAGNOSIS — H53.012 DEPRIVATION AMBLYOPIA OF LEFT EYE: ICD-10-CM

## 2023-04-17 DIAGNOSIS — H04.129 DRY EYE: ICD-10-CM

## 2023-04-17 DIAGNOSIS — Q10.0 CONGENITAL PTOSIS OF LEFT EYELID: ICD-10-CM

## 2023-04-17 DIAGNOSIS — H52.202 HYPEROPIA OF LEFT EYE WITH ASTIGMATISM: ICD-10-CM

## 2023-04-17 PROCEDURE — 99999 PR PBB SHADOW E&M-EST. PATIENT-LVL II: CPT | Mod: PBBFAC,,, | Performed by: OPTOMETRIST

## 2023-04-17 PROCEDURE — 92014 PR EYE EXAM, EST PATIENT,COMPREHESV: ICD-10-PCS | Mod: S$GLB,,, | Performed by: OPTOMETRIST

## 2023-04-17 PROCEDURE — 99999 PR PBB SHADOW E&M-EST. PATIENT-LVL II: ICD-10-PCS | Mod: PBBFAC,,, | Performed by: OPTOMETRIST

## 2023-04-17 PROCEDURE — 92015 PR REFRACTION: ICD-10-PCS | Mod: S$GLB,,, | Performed by: OPTOMETRIST

## 2023-04-17 PROCEDURE — 1160F PR REVIEW ALL MEDS BY PRESCRIBER/CLIN PHARMACIST DOCUMENTED: ICD-10-PCS | Mod: CPTII,S$GLB,, | Performed by: OPTOMETRIST

## 2023-04-17 PROCEDURE — 92014 COMPRE OPH EXAM EST PT 1/>: CPT | Mod: S$GLB,,, | Performed by: OPTOMETRIST

## 2023-04-17 PROCEDURE — 1160F RVW MEDS BY RX/DR IN RCRD: CPT | Mod: CPTII,S$GLB,, | Performed by: OPTOMETRIST

## 2023-04-17 PROCEDURE — 1159F PR MEDICATION LIST DOCUMENTED IN MEDICAL RECORD: ICD-10-PCS | Mod: CPTII,S$GLB,, | Performed by: OPTOMETRIST

## 2023-04-17 PROCEDURE — 92015 DETERMINE REFRACTIVE STATE: CPT | Mod: S$GLB,,, | Performed by: OPTOMETRIST

## 2023-04-17 PROCEDURE — 1159F MED LIST DOCD IN RCRD: CPT | Mod: CPTII,S$GLB,, | Performed by: OPTOMETRIST

## 2023-04-17 NOTE — PROGRESS NOTES
HPI    RTC 1 yr for dilated eye exam  Vision changes since last eye exam?: yes, night driving more difficult     Any eye pain today: no, just burning    Other ocular symptoms: no    Interested in contact lens fitting today? no             Last edited by Reyna Champion on 4/17/2023  1:57 PM.            Assessment /Plan     For exam results, see Encounter Report.    Nuclear sclerosis, bilateral  Cortical age-related cataract of both eyes  Visually significant cataract.  Patient is at the option stage.   Cataract surgery will improve vision, but necessity is dependent on patient's symptoms.   Pt declines surgical consult at this time.  Will continue to monitor over the next 12 months. Pt to call or RTC with any significant change in vision prior to next visit.    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   Discussed above and answered questions.

## 2023-04-19 ENCOUNTER — TELEPHONE (OUTPATIENT)
Dept: INTERNAL MEDICINE | Facility: CLINIC | Age: 68
End: 2023-04-19
Payer: MEDICARE

## 2023-05-23 ENCOUNTER — LAB VISIT (OUTPATIENT)
Dept: LAB | Facility: HOSPITAL | Age: 68
End: 2023-05-23
Attending: INTERNAL MEDICINE
Payer: MEDICARE

## 2023-05-23 DIAGNOSIS — R73.03 PREDIABETES: ICD-10-CM

## 2023-05-23 DIAGNOSIS — I10 ESSENTIAL HYPERTENSION: ICD-10-CM

## 2023-05-23 DIAGNOSIS — Z86.73 HISTORY OF TIA (TRANSIENT ISCHEMIC ATTACK): ICD-10-CM

## 2023-05-23 LAB
CHOLEST SERPL-MCNC: 214 MG/DL (ref 120–199)
CHOLEST/HDLC SERPL: 5.1 {RATIO} (ref 2–5)
ESTIMATED AVG GLUCOSE: 126 MG/DL (ref 68–131)
HBA1C MFR BLD: 6 % (ref 4–5.6)
HDLC SERPL-MCNC: 42 MG/DL (ref 40–75)
HDLC SERPL: 19.6 % (ref 20–50)
LDLC SERPL CALC-MCNC: 151 MG/DL (ref 63–159)
NONHDLC SERPL-MCNC: 172 MG/DL
TRIGL SERPL-MCNC: 105 MG/DL (ref 30–150)

## 2023-05-23 PROCEDURE — 36415 COLL VENOUS BLD VENIPUNCTURE: CPT | Mod: PO | Performed by: INTERNAL MEDICINE

## 2023-05-23 PROCEDURE — 83036 HEMOGLOBIN GLYCOSYLATED A1C: CPT | Performed by: INTERNAL MEDICINE

## 2023-05-23 PROCEDURE — 80061 LIPID PANEL: CPT | Performed by: INTERNAL MEDICINE

## 2023-05-23 PROCEDURE — 80053 COMPREHEN METABOLIC PANEL: CPT | Performed by: INTERNAL MEDICINE

## 2023-05-24 LAB
ALBUMIN SERPL BCP-MCNC: 3.9 G/DL (ref 3.5–5.2)
ALP SERPL-CCNC: 70 U/L (ref 55–135)
ALT SERPL W/O P-5'-P-CCNC: 21 U/L (ref 10–44)
ANION GAP SERPL CALC-SCNC: 8 MMOL/L (ref 8–16)
AST SERPL-CCNC: 18 U/L (ref 10–40)
BILIRUB SERPL-MCNC: 0.4 MG/DL (ref 0.1–1)
BUN SERPL-MCNC: 11 MG/DL (ref 8–23)
CALCIUM SERPL-MCNC: 9.2 MG/DL (ref 8.7–10.5)
CHLORIDE SERPL-SCNC: 106 MMOL/L (ref 95–110)
CO2 SERPL-SCNC: 27 MMOL/L (ref 23–29)
CREAT SERPL-MCNC: 0.9 MG/DL (ref 0.5–1.4)
EST. GFR  (NO RACE VARIABLE): >60 ML/MIN/1.73 M^2
GLUCOSE SERPL-MCNC: 120 MG/DL (ref 70–110)
POTASSIUM SERPL-SCNC: 4.3 MMOL/L (ref 3.5–5.1)
PROT SERPL-MCNC: 7.4 G/DL (ref 6–8.4)
SODIUM SERPL-SCNC: 141 MMOL/L (ref 136–145)

## 2023-08-14 ENCOUNTER — TELEPHONE (OUTPATIENT)
Dept: INTERNAL MEDICINE | Facility: CLINIC | Age: 68
End: 2023-08-14
Payer: MEDICARE

## 2023-08-14 DIAGNOSIS — Z12.31 ENCOUNTER FOR SCREENING MAMMOGRAM FOR MALIGNANT NEOPLASM OF BREAST: Primary | ICD-10-CM

## 2023-08-14 NOTE — TELEPHONE ENCOUNTER
----- Message from Keke Lawson sent at 8/14/2023 12:40 PM CDT -----  Contact: Conchita Bronson is calling in regards to getting her mammo order.please call  back at [phone]          Thanks  MADHAVI

## 2023-08-14 NOTE — TELEPHONE ENCOUNTER
I returned a call back to the pt and she was calling for [facility] office who's her PCP and was routed to [facility] NP staff. She was requesting a mammogram order. I placed it on WO guidelines . She verbalized understanding and replied thanks . //kah

## 2023-09-22 ENCOUNTER — OFFICE VISIT (OUTPATIENT)
Dept: INTERNAL MEDICINE | Facility: CLINIC | Age: 68
End: 2023-09-22
Payer: MEDICARE

## 2023-09-22 VITALS
HEART RATE: 74 BPM | TEMPERATURE: 97 F | BODY MASS INDEX: 29.62 KG/M2 | SYSTOLIC BLOOD PRESSURE: 134 MMHG | RESPIRATION RATE: 20 BRPM | WEIGHT: 184.31 LBS | OXYGEN SATURATION: 98 % | HEIGHT: 66 IN | DIASTOLIC BLOOD PRESSURE: 82 MMHG

## 2023-09-22 DIAGNOSIS — R73.03 PREDIABETES: ICD-10-CM

## 2023-09-22 DIAGNOSIS — I10 ESSENTIAL HYPERTENSION: Primary | ICD-10-CM

## 2023-09-22 DIAGNOSIS — Z86.73 HISTORY OF TIA (TRANSIENT ISCHEMIC ATTACK): ICD-10-CM

## 2023-09-22 DIAGNOSIS — G47.00 INSOMNIA, UNSPECIFIED TYPE: ICD-10-CM

## 2023-09-22 DIAGNOSIS — M65.331 TRIGGER MIDDLE FINGER OF RIGHT HAND: ICD-10-CM

## 2023-09-22 DIAGNOSIS — E78.5 HYPERLIPIDEMIA LDL GOAL <70: ICD-10-CM

## 2023-09-22 PROCEDURE — 3288F FALL RISK ASSESSMENT DOCD: CPT | Mod: CPTII,S$GLB,, | Performed by: INTERNAL MEDICINE

## 2023-09-22 PROCEDURE — 3044F HG A1C LEVEL LT 7.0%: CPT | Mod: CPTII,S$GLB,, | Performed by: INTERNAL MEDICINE

## 2023-09-22 PROCEDURE — 99999 PR PBB SHADOW E&M-EST. PATIENT-LVL III: ICD-10-PCS | Mod: PBBFAC,,, | Performed by: INTERNAL MEDICINE

## 2023-09-22 PROCEDURE — 3079F DIAST BP 80-89 MM HG: CPT | Mod: CPTII,S$GLB,, | Performed by: INTERNAL MEDICINE

## 2023-09-22 PROCEDURE — 3075F PR MOST RECENT SYSTOLIC BLOOD PRESS GE 130-139MM HG: ICD-10-PCS | Mod: CPTII,S$GLB,, | Performed by: INTERNAL MEDICINE

## 2023-09-22 PROCEDURE — 99214 OFFICE O/P EST MOD 30 MIN: CPT | Mod: S$GLB,,, | Performed by: INTERNAL MEDICINE

## 2023-09-22 PROCEDURE — 3008F PR BODY MASS INDEX (BMI) DOCUMENTED: ICD-10-PCS | Mod: CPTII,S$GLB,, | Performed by: INTERNAL MEDICINE

## 2023-09-22 PROCEDURE — 1126F AMNT PAIN NOTED NONE PRSNT: CPT | Mod: CPTII,S$GLB,, | Performed by: INTERNAL MEDICINE

## 2023-09-22 PROCEDURE — 3288F PR FALLS RISK ASSESSMENT DOCUMENTED: ICD-10-PCS | Mod: CPTII,S$GLB,, | Performed by: INTERNAL MEDICINE

## 2023-09-22 PROCEDURE — 3008F BODY MASS INDEX DOCD: CPT | Mod: CPTII,S$GLB,, | Performed by: INTERNAL MEDICINE

## 2023-09-22 PROCEDURE — 1101F PR PT FALLS ASSESS DOC 0-1 FALLS W/OUT INJ PAST YR: ICD-10-PCS | Mod: CPTII,S$GLB,, | Performed by: INTERNAL MEDICINE

## 2023-09-22 PROCEDURE — 1159F MED LIST DOCD IN RCRD: CPT | Mod: CPTII,S$GLB,, | Performed by: INTERNAL MEDICINE

## 2023-09-22 PROCEDURE — 1126F PR PAIN SEVERITY QUANTIFIED, NO PAIN PRESENT: ICD-10-PCS | Mod: CPTII,S$GLB,, | Performed by: INTERNAL MEDICINE

## 2023-09-22 PROCEDURE — 3044F PR MOST RECENT HEMOGLOBIN A1C LEVEL <7.0%: ICD-10-PCS | Mod: CPTII,S$GLB,, | Performed by: INTERNAL MEDICINE

## 2023-09-22 PROCEDURE — 1160F RVW MEDS BY RX/DR IN RCRD: CPT | Mod: CPTII,S$GLB,, | Performed by: INTERNAL MEDICINE

## 2023-09-22 PROCEDURE — 1101F PT FALLS ASSESS-DOCD LE1/YR: CPT | Mod: CPTII,S$GLB,, | Performed by: INTERNAL MEDICINE

## 2023-09-22 PROCEDURE — 1159F PR MEDICATION LIST DOCUMENTED IN MEDICAL RECORD: ICD-10-PCS | Mod: CPTII,S$GLB,, | Performed by: INTERNAL MEDICINE

## 2023-09-22 PROCEDURE — 99999 PR PBB SHADOW E&M-EST. PATIENT-LVL III: CPT | Mod: PBBFAC,,, | Performed by: INTERNAL MEDICINE

## 2023-09-22 PROCEDURE — 3079F PR MOST RECENT DIASTOLIC BLOOD PRESSURE 80-89 MM HG: ICD-10-PCS | Mod: CPTII,S$GLB,, | Performed by: INTERNAL MEDICINE

## 2023-09-22 PROCEDURE — 3075F SYST BP GE 130 - 139MM HG: CPT | Mod: CPTII,S$GLB,, | Performed by: INTERNAL MEDICINE

## 2023-09-22 PROCEDURE — 1160F PR REVIEW ALL MEDS BY PRESCRIBER/CLIN PHARMACIST DOCUMENTED: ICD-10-PCS | Mod: CPTII,S$GLB,, | Performed by: INTERNAL MEDICINE

## 2023-09-22 PROCEDURE — 99214 PR OFFICE/OUTPT VISIT, EST, LEVL IV, 30-39 MIN: ICD-10-PCS | Mod: S$GLB,,, | Performed by: INTERNAL MEDICINE

## 2023-09-22 RX ORDER — NAPROXEN 500 MG/1
500 TABLET ORAL 2 TIMES DAILY PRN
Qty: 30 TABLET | Refills: 1 | Status: SHIPPED | OUTPATIENT
Start: 2023-09-22 | End: 2023-10-30

## 2023-09-22 RX ORDER — ROSUVASTATIN CALCIUM 20 MG/1
20 TABLET, COATED ORAL DAILY
Qty: 30 TABLET | Refills: 5 | Status: SHIPPED | OUTPATIENT
Start: 2023-09-22 | End: 2024-04-03 | Stop reason: SDUPTHER

## 2023-09-22 RX ORDER — TRAZODONE HYDROCHLORIDE 50 MG/1
50 TABLET ORAL NIGHTLY PRN
Qty: 30 TABLET | Refills: 3 | Status: SHIPPED | OUTPATIENT
Start: 2023-09-22

## 2023-09-22 NOTE — PROGRESS NOTES
Conchita Rouse  68 y.o. Black or  female  [id number]    Chief Complaint:  Chief Complaint   Patient presents with    Follow-up       History of Present Illness:  HTN--stable.   Prediabetes--diet controlled.   HLD/TIA--took atorvastatin but it caused constipation.   Insomnia--tried Melatonin and Tylenol PM without relief.   Has pain in her right middle finger and it often gets stuck in a flexed position.     Laboratory:  Lab Results   Component Value Date    WBC 4.60 03/11/2022    HGB 12.1 03/11/2022    HCT 37.0 03/11/2022    PLT 68 (L) 03/11/2022    CHOL 214 (H) 05/23/2023    TRIG 105 05/23/2023    HDL 42 05/23/2023    ALT 21 05/23/2023    AST 18 05/23/2023     05/23/2023    K 4.3 05/23/2023     05/23/2023    CREATININE 0.9 05/23/2023    BUN 11 05/23/2023    CO2 27 05/23/2023    TSH 2.363 11/18/2022    INR 1.0 04/05/2017    HGBA1C 6.0 (H) 05/23/2023     Lab Results   Component Value Date    LDLCALC 151.0 05/23/2023     History:  Past Medical History:   Diagnosis Date    Aplastic anemia     Dr. Rogelio Norwood--hematology    Arthritis     Asthma     Chronic ITP (idiopathic thrombocytopenia)     Dr. Rogelio Norwood--hematology    Dyslipidemia (high LDL; low HDL)     10y CV event risk 14.7% (asuming no DM2), which could be reduced to 4.5% with RF optimization, for which I recommended atorvastatin 20 mg 1 po qd x2 weeks and then 40 mg 1 po qd    Encounter for blood transfusion     History of shingles 02/14/2018    Per note from Dr. Rogelio Norwood III on 2/14/18; left upper buttocks.    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 Rogelio Norwood III last visit 2/14/18 (tolerating CSA & promacta); also notes aplastic anemia (AA psot Horse ATG - week 7/17/17) and ITP, for which ATG & promacta following decadron pulse for ITP & AA; plan for second opinion on BM bx; next flow in 3 mo; intent of rx: palliative    Prediabetes     Stroke     TIA  "(transient ischemic attack) 2007    She was at "[facility]", where she noted she was feeling slow, took a nap, woke up with a numb/tingling left jaw to arm, which persistned 20 minutes, took 3 baby aspirins, went to ER @ [facility], where facial droop was noted, but completely resolved & head CT revealed old minor abnormality.    Vitamin D deficiency        Medications:  Current Outpatient Medications on File Prior to Visit   Medication Sig Dispense Refill    amLODIPine (NORVASC) 5 MG tablet Take 1 tablet (5 mg total) by mouth once daily. 90 tablet 3    azelastine (ASTELIN) 137 mcg (0.1 %) nasal spray 1 spray (137 mcg total) by Nasal route 2 (two) times daily. 30 mL 1    eltrombopag (PROMACTA) 75 mg Tab Take 75 mg by mouth once daily.          Allergies:  Review of patient's allergies indicates:   Allergen Reactions    Bactrim [sulfamethoxazole-trimethoprim] Edema     Swollen lips    Codeine Other (See Comments)     Pt states codeine does not cause hives. Had tooth extraction and medication given caused her to be jittery, feel hot and have to lay down like she was weak. morhpine given on 5/16/2017 iv for pain. Pt martine well without any sign or sx of allergy or reaction.       Review of Systems   Constitutional:  Negative for fever.   Respiratory:  Negative for shortness of breath.    Cardiovascular:  Negative for chest pain and leg swelling.   Gastrointestinal:  Negative for abdominal pain.   Musculoskeletal:  Positive for joint pain.   Neurological:  Negative for dizziness and headaches.   Psychiatric/Behavioral:  The patient has insomnia.        Exam:  Vitals:    09/22/23 1047   BP: 134/82   Pulse: 74   Resp: 20   Temp: 97 °F (36.1 °C)     Weight: 83.6 kg (184 lb 4.9 oz)   Body mass index is 29.75 kg/m².      Physical Exam  Vitals reviewed.   Constitutional:       General: She is not in acute distress.     Appearance: She is well-developed. She is not ill-appearing.   Eyes:      General: No scleral " icterus.  Cardiovascular:      Rate and Rhythm: Normal rate and regular rhythm.      Heart sounds: Normal heart sounds.   Pulmonary:      Effort: Pulmonary effort is normal. No respiratory distress.      Breath sounds: Normal breath sounds.   Musculoskeletal:      Right hand: Decreased range of motion.   Skin:     General: Skin is warm and dry.   Neurological:      Mental Status: She is alert and oriented to person, place, and time.   Psychiatric:         Behavior: Behavior normal.     Assessment:  The primary encounter diagnosis was Essential hypertension. Diagnoses of Prediabetes, Hyperlipidemia LDL goal <70, History of TIA (transient ischemic attack), Insomnia, unspecified type, and Trigger middle finger of right hand were also pertinent to this visit.    Plan:  Essential hypertension  -     continue amlodipine    Prediabetes  -     check A1C and CMP    Hyperlipidemia LDL goal <70  -     start rosuvastatin (CRESTOR) 20 MG tablet; Take 1 tablet (20 mg total) by mouth once daily.  Dispense: 30 tablet; Refill: 5    History of TIA (transient ischemic attack)  -     start rosuvastatin (CRESTOR) 20 MG tablet; Take 1 tablet (20 mg total) by mouth once daily.  Dispense: 30 tablet; Refill: 5    Insomnia, unspecified type  -     start traZODone (DESYREL) 50 MG tablet; Take 1 tablet (50 mg total) by mouth nightly as needed for Insomnia.  Dispense: 30 tablet; Refill: 3    Trigger middle finger of right hand  -     naproxen (NAPROSYN) 500 MG tablet; Take 1 tablet (500 mg total) by mouth 2 (two) times daily as needed.  Dispense: 30 tablet; Refill: 1    Schedule labs.

## 2023-09-25 ENCOUNTER — LAB VISIT (OUTPATIENT)
Dept: LAB | Facility: HOSPITAL | Age: 68
End: 2023-09-25
Attending: INTERNAL MEDICINE
Payer: MEDICARE

## 2023-09-25 DIAGNOSIS — I10 ESSENTIAL HYPERTENSION: ICD-10-CM

## 2023-09-25 DIAGNOSIS — Z86.73 HISTORY OF TIA (TRANSIENT ISCHEMIC ATTACK): ICD-10-CM

## 2023-09-25 DIAGNOSIS — R73.03 PREDIABETES: ICD-10-CM

## 2023-09-25 LAB
ALBUMIN SERPL BCP-MCNC: 3.7 G/DL (ref 3.5–5.2)
ALP SERPL-CCNC: 65 U/L (ref 55–135)
ALT SERPL W/O P-5'-P-CCNC: 24 U/L (ref 10–44)
ANION GAP SERPL CALC-SCNC: 6 MMOL/L (ref 8–16)
AST SERPL-CCNC: 22 U/L (ref 10–40)
BILIRUB SERPL-MCNC: 0.2 MG/DL (ref 0.1–1)
BUN SERPL-MCNC: 11 MG/DL (ref 8–23)
CALCIUM SERPL-MCNC: 9.5 MG/DL (ref 8.7–10.5)
CHLORIDE SERPL-SCNC: 107 MMOL/L (ref 95–110)
CHOLEST SERPL-MCNC: 172 MG/DL (ref 120–199)
CHOLEST/HDLC SERPL: 3.9 {RATIO} (ref 2–5)
CO2 SERPL-SCNC: 29 MMOL/L (ref 23–29)
CREAT SERPL-MCNC: 0.7 MG/DL (ref 0.5–1.4)
EST. GFR  (NO RACE VARIABLE): >60 ML/MIN/1.73 M^2
ESTIMATED AVG GLUCOSE: 131 MG/DL (ref 68–131)
GLUCOSE SERPL-MCNC: 129 MG/DL (ref 70–110)
HBA1C MFR BLD: 6.2 % (ref 4–5.6)
HDLC SERPL-MCNC: 44 MG/DL (ref 40–75)
HDLC SERPL: 25.6 % (ref 20–50)
LDLC SERPL CALC-MCNC: 111 MG/DL (ref 63–159)
NONHDLC SERPL-MCNC: 128 MG/DL
POTASSIUM SERPL-SCNC: 3.8 MMOL/L (ref 3.5–5.1)
PROT SERPL-MCNC: 7.1 G/DL (ref 6–8.4)
SODIUM SERPL-SCNC: 142 MMOL/L (ref 136–145)
TRIGL SERPL-MCNC: 85 MG/DL (ref 30–150)

## 2023-09-25 PROCEDURE — 83036 HEMOGLOBIN GLYCOSYLATED A1C: CPT | Performed by: INTERNAL MEDICINE

## 2023-09-25 PROCEDURE — 80061 LIPID PANEL: CPT | Performed by: INTERNAL MEDICINE

## 2023-09-25 PROCEDURE — 36415 COLL VENOUS BLD VENIPUNCTURE: CPT | Mod: PO | Performed by: INTERNAL MEDICINE

## 2023-09-25 PROCEDURE — 80053 COMPREHEN METABOLIC PANEL: CPT | Performed by: INTERNAL MEDICINE

## 2023-10-02 ENCOUNTER — TELEPHONE (OUTPATIENT)
Dept: INTERNAL MEDICINE | Facility: CLINIC | Age: 68
End: 2023-10-02
Payer: MEDICARE

## 2023-10-02 DIAGNOSIS — R53.83 FATIGUE, UNSPECIFIED TYPE: Primary | ICD-10-CM

## 2023-10-02 NOTE — TELEPHONE ENCOUNTER
Discussed labs with patient and patient said she was having swelling with the 10 mg Amlodipine she cut back to 5 mg and swelling has gone back down and patient is very fatigue

## 2023-10-06 ENCOUNTER — HOSPITAL ENCOUNTER (OUTPATIENT)
Dept: RADIOLOGY | Facility: HOSPITAL | Age: 68
Discharge: HOME | End: 2023-10-06
Attending: INTERNAL MEDICINE
Payer: MEDICARE

## 2023-10-06 DIAGNOSIS — Z12.31 ENCOUNTER FOR SCREENING MAMMOGRAM FOR MALIGNANT NEOPLASM OF BREAST: ICD-10-CM

## 2023-10-06 PROCEDURE — 77063 BREAST TOMOSYNTHESIS BI: CPT | Mod: 26,,, | Performed by: RADIOLOGY

## 2023-10-06 PROCEDURE — 77067 SCR MAMMO BI INCL CAD: CPT | Mod: 26,,, | Performed by: RADIOLOGY

## 2023-10-06 PROCEDURE — 77063 MAMMO DIGITAL SCREENING BILAT WITH TOMO: ICD-10-PCS | Mod: 26,,, | Performed by: RADIOLOGY

## 2023-10-06 PROCEDURE — 77067 MAMMO DIGITAL SCREENING BILAT WITH TOMO: ICD-10-PCS | Mod: 26,,, | Performed by: RADIOLOGY

## 2023-10-06 PROCEDURE — 77067 SCR MAMMO BI INCL CAD: CPT | Mod: TC

## 2023-10-30 ENCOUNTER — OFFICE VISIT (OUTPATIENT)
Dept: INTERNAL MEDICINE | Facility: CLINIC | Age: 68
End: 2023-10-30
Payer: MEDICARE

## 2023-10-30 ENCOUNTER — APPOINTMENT (OUTPATIENT)
Dept: RADIOLOGY | Facility: HOSPITAL | Age: 68
End: 2023-10-30
Attending: FAMILY MEDICINE
Payer: MEDICARE

## 2023-10-30 VITALS
TEMPERATURE: 97 F | DIASTOLIC BLOOD PRESSURE: 76 MMHG | RESPIRATION RATE: 18 BRPM | OXYGEN SATURATION: 99 % | HEART RATE: 66 BPM | WEIGHT: 186.63 LBS | BODY MASS INDEX: 29.99 KG/M2 | SYSTOLIC BLOOD PRESSURE: 116 MMHG | HEIGHT: 66 IN

## 2023-10-30 DIAGNOSIS — M25.461 PAIN AND SWELLING OF RIGHT KNEE: Primary | ICD-10-CM

## 2023-10-30 DIAGNOSIS — M25.461 PAIN AND SWELLING OF RIGHT KNEE: ICD-10-CM

## 2023-10-30 DIAGNOSIS — M62.838 NECK MUSCLE SPASM: ICD-10-CM

## 2023-10-30 DIAGNOSIS — M25.561 PAIN AND SWELLING OF RIGHT KNEE: Primary | ICD-10-CM

## 2023-10-30 DIAGNOSIS — M25.561 PAIN AND SWELLING OF RIGHT KNEE: ICD-10-CM

## 2023-10-30 PROCEDURE — 3074F SYST BP LT 130 MM HG: CPT | Mod: CPTII,S$GLB,, | Performed by: FAMILY MEDICINE

## 2023-10-30 PROCEDURE — 3288F PR FALLS RISK ASSESSMENT DOCUMENTED: ICD-10-PCS | Mod: CPTII,S$GLB,, | Performed by: FAMILY MEDICINE

## 2023-10-30 PROCEDURE — 1101F PR PT FALLS ASSESS DOC 0-1 FALLS W/OUT INJ PAST YR: ICD-10-PCS | Mod: CPTII,S$GLB,, | Performed by: FAMILY MEDICINE

## 2023-10-30 PROCEDURE — 1159F PR MEDICATION LIST DOCUMENTED IN MEDICAL RECORD: ICD-10-PCS | Mod: CPTII,S$GLB,, | Performed by: FAMILY MEDICINE

## 2023-10-30 PROCEDURE — 3078F PR MOST RECENT DIASTOLIC BLOOD PRESSURE < 80 MM HG: ICD-10-PCS | Mod: CPTII,S$GLB,, | Performed by: FAMILY MEDICINE

## 2023-10-30 PROCEDURE — 1159F MED LIST DOCD IN RCRD: CPT | Mod: CPTII,S$GLB,, | Performed by: FAMILY MEDICINE

## 2023-10-30 PROCEDURE — 3044F HG A1C LEVEL LT 7.0%: CPT | Mod: CPTII,S$GLB,, | Performed by: FAMILY MEDICINE

## 2023-10-30 PROCEDURE — 3078F DIAST BP <80 MM HG: CPT | Mod: CPTII,S$GLB,, | Performed by: FAMILY MEDICINE

## 2023-10-30 PROCEDURE — 73562 X-RAY EXAM OF KNEE 3: CPT | Mod: 26,50,, | Performed by: RADIOLOGY

## 2023-10-30 PROCEDURE — 73562 XR KNEE ORTHO BILAT: ICD-10-PCS | Mod: 26,50,, | Performed by: RADIOLOGY

## 2023-10-30 PROCEDURE — 3074F PR MOST RECENT SYSTOLIC BLOOD PRESSURE < 130 MM HG: ICD-10-PCS | Mod: CPTII,S$GLB,, | Performed by: FAMILY MEDICINE

## 2023-10-30 PROCEDURE — 3044F PR MOST RECENT HEMOGLOBIN A1C LEVEL <7.0%: ICD-10-PCS | Mod: CPTII,S$GLB,, | Performed by: FAMILY MEDICINE

## 2023-10-30 PROCEDURE — 99999 PR PBB SHADOW E&M-EST. PATIENT-LVL IV: ICD-10-PCS | Mod: PBBFAC,,, | Performed by: FAMILY MEDICINE

## 2023-10-30 PROCEDURE — 99214 PR OFFICE/OUTPT VISIT, EST, LEVL IV, 30-39 MIN: ICD-10-PCS | Mod: S$GLB,,, | Performed by: FAMILY MEDICINE

## 2023-10-30 PROCEDURE — 73562 X-RAY EXAM OF KNEE 3: CPT | Mod: TC,50,PN

## 2023-10-30 PROCEDURE — 1125F AMNT PAIN NOTED PAIN PRSNT: CPT | Mod: CPTII,S$GLB,, | Performed by: FAMILY MEDICINE

## 2023-10-30 PROCEDURE — 1101F PT FALLS ASSESS-DOCD LE1/YR: CPT | Mod: CPTII,S$GLB,, | Performed by: FAMILY MEDICINE

## 2023-10-30 PROCEDURE — 99999 PR PBB SHADOW E&M-EST. PATIENT-LVL IV: CPT | Mod: PBBFAC,,, | Performed by: FAMILY MEDICINE

## 2023-10-30 PROCEDURE — 99214 OFFICE O/P EST MOD 30 MIN: CPT | Mod: S$GLB,,, | Performed by: FAMILY MEDICINE

## 2023-10-30 PROCEDURE — 3008F BODY MASS INDEX DOCD: CPT | Mod: CPTII,S$GLB,, | Performed by: FAMILY MEDICINE

## 2023-10-30 PROCEDURE — 1125F PR PAIN SEVERITY QUANTIFIED, PAIN PRESENT: ICD-10-PCS | Mod: CPTII,S$GLB,, | Performed by: FAMILY MEDICINE

## 2023-10-30 PROCEDURE — 3008F PR BODY MASS INDEX (BMI) DOCUMENTED: ICD-10-PCS | Mod: CPTII,S$GLB,, | Performed by: FAMILY MEDICINE

## 2023-10-30 PROCEDURE — 3288F FALL RISK ASSESSMENT DOCD: CPT | Mod: CPTII,S$GLB,, | Performed by: FAMILY MEDICINE

## 2023-10-30 RX ORDER — MELOXICAM 7.5 MG/1
7.5 TABLET ORAL DAILY
Qty: 30 TABLET | Refills: 0 | Status: SHIPPED | OUTPATIENT
Start: 2023-10-30

## 2023-10-30 RX ORDER — CYCLOBENZAPRINE HCL 5 MG
5 TABLET ORAL 3 TIMES DAILY PRN
Qty: 30 TABLET | Refills: 0 | Status: SHIPPED | OUTPATIENT
Start: 2023-10-30 | End: 2023-11-09

## 2023-10-30 NOTE — PROGRESS NOTES
Subjective:       Patient ID: Conchita Rouse is a 68 y.o. female.    Chief Complaint: Knee Pain (Rt radiating down the leg x 1 wk with numbness/ tingling) and Neck Pain (Lt side)    Previous history of knee surgery, did at home IV antibiotic due to infection had I&D    Knee Pain   There was no injury mechanism. The pain is present in the right knee. The quality of the pain is described as aching. The pain is at a severity of 8/10. The pain is severe. The pain has been Constant since onset. Associated symptoms include a loss of motion. Pertinent negatives include no inability to bear weight. She reports no foreign bodies present. The symptoms are aggravated by movement and weight bearing. She has tried NSAIDs and non-weight bearing for the symptoms. The treatment provided mild relief.     Review of Systems    Objective:      Physical Exam  Constitutional:       Appearance: Normal appearance.   HENT:      Head: Normocephalic and atraumatic.   Eyes:      General: No scleral icterus.  Cardiovascular:      Rate and Rhythm: Normal rate.   Pulmonary:      Effort: Pulmonary effort is normal. No respiratory distress.   Musculoskeletal:         General: Swelling and tenderness present.   Neurological:      General: No focal deficit present.      Mental Status: She is alert.   Psychiatric:         Mood and Affect: Mood normal.         Behavior: Behavior normal.         Assessment:       1. Pain and swelling of right knee    2. Neck muscle spasm        Plan:   1. Pain and swelling of right knee  Acute on chronic right knee pain, patient has history of septic joint requiring in home IV abx and washout by ortho, pain likely OA but will obtain blood work and xray today along with new referral to ortho  -     X-ray Knee Ortho Bilateral; Future; Expected date: 10/30/2023  -     Ambulatory referral/consult to Orthopedics; Future; Expected date: 11/06/2023    2. Neck muscle spasm  -     cyclobenzaprine (FLEXERIL) 5 MG tablet; Take  1 tablet (5 mg total) by mouth 3 (three) times daily as needed for Muscle spasms.  Dispense: 30 tablet; Refill: 0      Follow up if symptoms worsen or fail to improve.    Franchesca Perez MD  Family Medicine

## 2023-10-31 ENCOUNTER — TELEPHONE (OUTPATIENT)
Dept: INTERNAL MEDICINE | Facility: CLINIC | Age: 68
End: 2023-10-31
Payer: MEDICARE

## 2023-10-31 NOTE — TELEPHONE ENCOUNTER
I returned a call back to the pt and she was trying to see if she can get a sooner appt for knee aspiration and I informed after looking at both [facility] dec 14 th was the next available at both. She was instructed by  to go to the ER for possible IV antibiotics . She verbalized understanding. nehai//tracy

## 2023-10-31 NOTE — TELEPHONE ENCOUNTER
----- Message from Katie Del Vallecoopermaria fernandacharisse sent at 10/31/2023  3:03 PM CDT -----  Contact: pt  Pt is calling in regard to needing the nurse call her back about her visit on yesterday.  Please call her back at [phone] justin/narayan

## 2023-10-31 NOTE — TELEPHONE ENCOUNTER
Called pt, notified no referral needed for ER treatment, pt states she will go to [facility], advised they will be able to view all notes from Dr. Perez.     ----- Message from Tosha Naik sent at 10/31/2023  4:07 PM CDT -----  Pt called to ask will she need a referral to go to the ER to get the fluid from around her knee.Please call back [phone] asap

## 2023-11-01 ENCOUNTER — TELEPHONE (OUTPATIENT)
Dept: INTERNAL MEDICINE | Facility: CLINIC | Age: 68
End: 2023-11-01
Payer: MEDICARE

## 2023-11-01 NOTE — TELEPHONE ENCOUNTER
----- Message from Deshawn Ag sent at 11/1/2023  9:25 AM CDT -----  Contact: Conchita Bronson is calling in regards to wanting to know if something can be called in for daniel. Please call back at [phone]                  [address] [address]  Phone: [phone] Fax: [phone]  Hours: Not open 24 hours                  Thanks  KT

## 2023-11-01 NOTE — TELEPHONE ENCOUNTER
I returned a call back to the pt is requesting something for pain. She was informed that the provider did prescribe an anti inflammatory as well as a muscle relaxer. She stated they are not helping and if she can send something she can tolerate tramadol. I informed that the provider is out of office today and I'm not sure of her circumstances as to whether she will be able to answer messages but, I will call and inform of her feedback once I receive it. She verbalized understanding. Please advise. //kah

## 2023-11-21 ENCOUNTER — OFFICE VISIT (OUTPATIENT)
Dept: INTERNAL MEDICINE | Facility: CLINIC | Age: 68
End: 2023-11-21
Payer: MEDICARE

## 2023-11-21 VITALS
HEART RATE: 69 BPM | RESPIRATION RATE: 18 BRPM | BODY MASS INDEX: 29.27 KG/M2 | WEIGHT: 182.13 LBS | TEMPERATURE: 98 F | OXYGEN SATURATION: 98 % | DIASTOLIC BLOOD PRESSURE: 84 MMHG | HEIGHT: 66 IN | SYSTOLIC BLOOD PRESSURE: 122 MMHG

## 2023-11-21 DIAGNOSIS — J10.1 INFLUENZA B: Primary | ICD-10-CM

## 2023-11-21 LAB
CTP QC/QA: YES
MOLECULAR STREP A: NEGATIVE
POC MOLECULAR INFLUENZA A AGN: NEGATIVE
POC MOLECULAR INFLUENZA B AGN: POSITIVE
SARS-COV-2 RDRP RESP QL NAA+PROBE: NEGATIVE

## 2023-11-21 PROCEDURE — 3044F HG A1C LEVEL LT 7.0%: CPT | Mod: CPTII,S$GLB,, | Performed by: FAMILY MEDICINE

## 2023-11-21 PROCEDURE — 87502 POCT INFLUENZA A/B MOLECULAR: ICD-10-PCS | Mod: QW,S$GLB,, | Performed by: FAMILY MEDICINE

## 2023-11-21 PROCEDURE — 1159F MED LIST DOCD IN RCRD: CPT | Mod: CPTII,S$GLB,, | Performed by: FAMILY MEDICINE

## 2023-11-21 PROCEDURE — 3288F PR FALLS RISK ASSESSMENT DOCUMENTED: ICD-10-PCS | Mod: CPTII,S$GLB,, | Performed by: FAMILY MEDICINE

## 2023-11-21 PROCEDURE — 87635: ICD-10-PCS | Mod: QW,S$GLB,, | Performed by: FAMILY MEDICINE

## 2023-11-21 PROCEDURE — 1101F PT FALLS ASSESS-DOCD LE1/YR: CPT | Mod: CPTII,S$GLB,, | Performed by: FAMILY MEDICINE

## 2023-11-21 PROCEDURE — 3044F PR MOST RECENT HEMOGLOBIN A1C LEVEL <7.0%: ICD-10-PCS | Mod: CPTII,S$GLB,, | Performed by: FAMILY MEDICINE

## 2023-11-21 PROCEDURE — 87651 STREP A DNA AMP PROBE: CPT | Mod: QW,S$GLB,, | Performed by: FAMILY MEDICINE

## 2023-11-21 PROCEDURE — 87502 INFLUENZA DNA AMP PROBE: CPT | Mod: QW,S$GLB,, | Performed by: FAMILY MEDICINE

## 2023-11-21 PROCEDURE — 3288F FALL RISK ASSESSMENT DOCD: CPT | Mod: CPTII,S$GLB,, | Performed by: FAMILY MEDICINE

## 2023-11-21 PROCEDURE — 1101F PR PT FALLS ASSESS DOC 0-1 FALLS W/OUT INJ PAST YR: ICD-10-PCS | Mod: CPTII,S$GLB,, | Performed by: FAMILY MEDICINE

## 2023-11-21 PROCEDURE — 87651 POCT STREP A MOLECULAR: ICD-10-PCS | Mod: QW,S$GLB,, | Performed by: FAMILY MEDICINE

## 2023-11-21 PROCEDURE — 99999 PR PBB SHADOW E&M-EST. PATIENT-LVL IV: CPT | Mod: PBBFAC,,, | Performed by: FAMILY MEDICINE

## 2023-11-21 PROCEDURE — 99213 PR OFFICE/OUTPT VISIT, EST, LEVL III, 20-29 MIN: ICD-10-PCS | Mod: S$GLB,,, | Performed by: FAMILY MEDICINE

## 2023-11-21 PROCEDURE — 3079F DIAST BP 80-89 MM HG: CPT | Mod: CPTII,S$GLB,, | Performed by: FAMILY MEDICINE

## 2023-11-21 PROCEDURE — 3008F PR BODY MASS INDEX (BMI) DOCUMENTED: ICD-10-PCS | Mod: CPTII,S$GLB,, | Performed by: FAMILY MEDICINE

## 2023-11-21 PROCEDURE — 3008F BODY MASS INDEX DOCD: CPT | Mod: CPTII,S$GLB,, | Performed by: FAMILY MEDICINE

## 2023-11-21 PROCEDURE — 3079F PR MOST RECENT DIASTOLIC BLOOD PRESSURE 80-89 MM HG: ICD-10-PCS | Mod: CPTII,S$GLB,, | Performed by: FAMILY MEDICINE

## 2023-11-21 PROCEDURE — 3074F SYST BP LT 130 MM HG: CPT | Mod: CPTII,S$GLB,, | Performed by: FAMILY MEDICINE

## 2023-11-21 PROCEDURE — 3074F PR MOST RECENT SYSTOLIC BLOOD PRESSURE < 130 MM HG: ICD-10-PCS | Mod: CPTII,S$GLB,, | Performed by: FAMILY MEDICINE

## 2023-11-21 PROCEDURE — 99213 OFFICE O/P EST LOW 20 MIN: CPT | Mod: S$GLB,,, | Performed by: FAMILY MEDICINE

## 2023-11-21 PROCEDURE — 1159F PR MEDICATION LIST DOCUMENTED IN MEDICAL RECORD: ICD-10-PCS | Mod: CPTII,S$GLB,, | Performed by: FAMILY MEDICINE

## 2023-11-21 PROCEDURE — 99999 PR PBB SHADOW E&M-EST. PATIENT-LVL IV: ICD-10-PCS | Mod: PBBFAC,,, | Performed by: FAMILY MEDICINE

## 2023-11-21 PROCEDURE — 87635 SARS-COV-2 COVID-19 AMP PRB: CPT | Mod: QW,S$GLB,, | Performed by: FAMILY MEDICINE

## 2023-11-21 RX ORDER — AMOXICILLIN 500 MG/1
500 TABLET, FILM COATED ORAL EVERY 12 HOURS
Qty: 20 TABLET | Refills: 0 | Status: SHIPPED | OUTPATIENT
Start: 2023-11-21 | End: 2023-11-21

## 2023-11-21 RX ORDER — INDOMETHACIN 25 MG/1
25 CAPSULE ORAL 2 TIMES DAILY
COMMUNITY
Start: 2023-11-01 | End: 2024-04-03

## 2023-11-21 RX ORDER — LORATADINE 10 MG/1
10 TABLET ORAL DAILY
COMMUNITY

## 2023-11-21 RX ORDER — TRIAMCINOLONE ACETONIDE 1 MG/G
OINTMENT TOPICAL
COMMUNITY
Start: 2023-11-17 | End: 2024-05-15

## 2023-11-21 RX ORDER — CETIRIZINE HYDROCHLORIDE 10 MG/1
1 TABLET ORAL EVERY MORNING
COMMUNITY
Start: 2023-11-17 | End: 2023-11-21

## 2023-11-21 NOTE — PATIENT INSTRUCTIONS
Coricidin HBP  DayQuil for High blood pressure  Mucinex DM    Body Aches   - Tylenol or ibuprofen

## 2023-11-21 NOTE — PROGRESS NOTES
Subjective:       Patient ID: Conchita Rouse is a 68 y.o. female.    Chief Complaint: Nasal Congestion (X 1 wk) and Chills    Conchita Rouse is a 68 y.o. female who presents with complaints of sinus pressure, nasal congestion, chest congestion, chills, no known sick contacts, no N/V, fever, sore throat, had similar symptoms every year, most recently six months, yellow purulent phlegm.       ROS  Per HPI  Objective:      Physical Exam  HENT:      Head: Normocephalic and atraumatic.      Nose: No congestion or rhinorrhea.   Eyes:      General: No scleral icterus.        Right eye: No discharge.         Left eye: No discharge.      Extraocular Movements: Extraocular movements intact.      Conjunctiva/sclera: Conjunctivae normal.      Pupils: Pupils are equal, round, and reactive to light.   Cardiovascular:      Rate and Rhythm: Normal rate and regular rhythm.      Heart sounds: No murmur heard.     No friction rub. No gallop.   Pulmonary:      Effort: Pulmonary effort is normal. No respiratory distress.      Breath sounds: Normal breath sounds. No stridor. No wheezing or rhonchi.   Abdominal:      General: Bowel sounds are normal.      Palpations: Abdomen is soft.   Musculoskeletal:      Right lower leg: No edema.      Left lower leg: No edema.   Skin:     General: Skin is warm.   Neurological:      General: No focal deficit present.      Mental Status: She is alert.   Psychiatric:         Mood and Affect: Mood normal.         Behavior: Behavior normal.         Assessment:       1. Influenza B        Plan:   1. Influenza B  Acute, outside of window for Tamiflu, recommend continue to symptomatic management with Tylenol or ibuprofen for fever, body aches   -     POCT Influenza A/B Molecular  -     POCT Strep A, Molecular  -     POCT COVID-19 Rapid Screening      No follow-ups on file.    Franchesca Perez MD  Family Medicine

## 2024-01-08 ENCOUNTER — TELEPHONE (OUTPATIENT)
Dept: INTERNAL MEDICINE | Facility: CLINIC | Age: 69
End: 2024-01-08
Payer: MEDICARE

## 2024-01-08 NOTE — TELEPHONE ENCOUNTER
Called pt, advised provider out, first available Wednesday. Pt declined appts at [facility], no appts available at [facility]. Pt scheduled for Wednesday January 10th, 1PM per pt request. Pt verbalized understanding.     ----- Message from Gabrielle Lawson sent at 1/8/2024  8:45 AM CST -----  Contact: [phone]  Type:  Same Day Appointment Request    Caller is requesting a same day appointment.  Caller declined first available appointment listed below.    Name of Caller:Conchita   When is the first available appointment?01/10/2024  Symptoms:knee pain   Best Call Back Number:[phone]   Additional Information: pt declines any other location/ wants to see pcp

## 2024-03-26 ENCOUNTER — LAB VISIT (OUTPATIENT)
Dept: LAB | Facility: HOSPITAL | Age: 69
End: 2024-03-26
Attending: INTERNAL MEDICINE
Payer: MEDICARE

## 2024-03-26 DIAGNOSIS — R73.03 PREDIABETES: ICD-10-CM

## 2024-03-26 DIAGNOSIS — Z86.73 HISTORY OF TIA (TRANSIENT ISCHEMIC ATTACK): ICD-10-CM

## 2024-03-26 DIAGNOSIS — I10 ESSENTIAL HYPERTENSION: ICD-10-CM

## 2024-03-26 LAB
ALBUMIN SERPL BCP-MCNC: 4 G/DL (ref 3.5–5.2)
ALP SERPL-CCNC: 79 U/L (ref 55–135)
ALT SERPL W/O P-5'-P-CCNC: 17 U/L (ref 10–44)
ANION GAP SERPL CALC-SCNC: 7 MMOL/L (ref 8–16)
AST SERPL-CCNC: 16 U/L (ref 10–40)
BILIRUB SERPL-MCNC: 0.4 MG/DL (ref 0.1–1)
BUN SERPL-MCNC: 12 MG/DL (ref 8–23)
CALCIUM SERPL-MCNC: 9.9 MG/DL (ref 8.7–10.5)
CHLORIDE SERPL-SCNC: 107 MMOL/L (ref 95–110)
CHOLEST SERPL-MCNC: 230 MG/DL (ref 120–199)
CHOLEST/HDLC SERPL: 5.1 {RATIO} (ref 2–5)
CO2 SERPL-SCNC: 29 MMOL/L (ref 23–29)
CREAT SERPL-MCNC: 0.9 MG/DL (ref 0.5–1.4)
EST. GFR  (NO RACE VARIABLE): >60 ML/MIN/1.73 M^2
ESTIMATED AVG GLUCOSE: 137 MG/DL (ref 68–131)
GLUCOSE SERPL-MCNC: 133 MG/DL (ref 70–110)
HBA1C MFR BLD: 6.4 % (ref 4–5.6)
HDLC SERPL-MCNC: 45 MG/DL (ref 40–75)
HDLC SERPL: 19.6 % (ref 20–50)
LDLC SERPL CALC-MCNC: 160.8 MG/DL (ref 63–159)
NONHDLC SERPL-MCNC: 185 MG/DL
POTASSIUM SERPL-SCNC: 3.8 MMOL/L (ref 3.5–5.1)
PROT SERPL-MCNC: 7.5 G/DL (ref 6–8.4)
SODIUM SERPL-SCNC: 143 MMOL/L (ref 136–145)
TRIGL SERPL-MCNC: 121 MG/DL (ref 30–150)

## 2024-03-26 PROCEDURE — 80061 LIPID PANEL: CPT | Performed by: INTERNAL MEDICINE

## 2024-03-26 PROCEDURE — 83036 HEMOGLOBIN GLYCOSYLATED A1C: CPT | Performed by: INTERNAL MEDICINE

## 2024-03-26 PROCEDURE — 36415 COLL VENOUS BLD VENIPUNCTURE: CPT | Mod: PN | Performed by: INTERNAL MEDICINE

## 2024-03-26 PROCEDURE — 80053 COMPREHEN METABOLIC PANEL: CPT | Performed by: INTERNAL MEDICINE

## 2024-04-03 ENCOUNTER — OFFICE VISIT (OUTPATIENT)
Dept: INTERNAL MEDICINE | Facility: CLINIC | Age: 69
End: 2024-04-03
Payer: MEDICARE

## 2024-04-03 VITALS
SYSTOLIC BLOOD PRESSURE: 126 MMHG | RESPIRATION RATE: 20 BRPM | BODY MASS INDEX: 29.68 KG/M2 | HEART RATE: 59 BPM | OXYGEN SATURATION: 98 % | TEMPERATURE: 96 F | WEIGHT: 183.88 LBS | DIASTOLIC BLOOD PRESSURE: 82 MMHG

## 2024-04-03 DIAGNOSIS — E78.5 HYPERLIPIDEMIA LDL GOAL <70: ICD-10-CM

## 2024-04-03 DIAGNOSIS — Z86.73 HISTORY OF TIA (TRANSIENT ISCHEMIC ATTACK): ICD-10-CM

## 2024-04-03 DIAGNOSIS — I10 ESSENTIAL HYPERTENSION: ICD-10-CM

## 2024-04-03 DIAGNOSIS — D61.9 APLASTIC ANEMIA: ICD-10-CM

## 2024-04-03 DIAGNOSIS — D69.6 THROMBOCYTOPENIA: ICD-10-CM

## 2024-04-03 DIAGNOSIS — R73.03 PREDIABETES: ICD-10-CM

## 2024-04-03 DIAGNOSIS — E78.5 DYSLIPIDEMIA (HIGH LDL; LOW HDL): ICD-10-CM

## 2024-04-03 DIAGNOSIS — Z00.00 ANNUAL PHYSICAL EXAM: Primary | ICD-10-CM

## 2024-04-03 PROCEDURE — 3288F FALL RISK ASSESSMENT DOCD: CPT | Mod: CPTII,S$GLB,, | Performed by: PHYSICIAN ASSISTANT

## 2024-04-03 PROCEDURE — 3044F HG A1C LEVEL LT 7.0%: CPT | Mod: CPTII,S$GLB,, | Performed by: PHYSICIAN ASSISTANT

## 2024-04-03 PROCEDURE — 3079F DIAST BP 80-89 MM HG: CPT | Mod: CPTII,S$GLB,, | Performed by: PHYSICIAN ASSISTANT

## 2024-04-03 PROCEDURE — 99214 OFFICE O/P EST MOD 30 MIN: CPT | Mod: S$GLB,,, | Performed by: PHYSICIAN ASSISTANT

## 2024-04-03 PROCEDURE — 1125F AMNT PAIN NOTED PAIN PRSNT: CPT | Mod: CPTII,S$GLB,, | Performed by: PHYSICIAN ASSISTANT

## 2024-04-03 PROCEDURE — 1101F PT FALLS ASSESS-DOCD LE1/YR: CPT | Mod: CPTII,S$GLB,, | Performed by: PHYSICIAN ASSISTANT

## 2024-04-03 PROCEDURE — 1160F RVW MEDS BY RX/DR IN RCRD: CPT | Mod: CPTII,S$GLB,, | Performed by: PHYSICIAN ASSISTANT

## 2024-04-03 PROCEDURE — 99999 PR PBB SHADOW E&M-EST. PATIENT-LVL III: CPT | Mod: PBBFAC,,, | Performed by: PHYSICIAN ASSISTANT

## 2024-04-03 PROCEDURE — 3008F BODY MASS INDEX DOCD: CPT | Mod: CPTII,S$GLB,, | Performed by: PHYSICIAN ASSISTANT

## 2024-04-03 PROCEDURE — 1159F MED LIST DOCD IN RCRD: CPT | Mod: CPTII,S$GLB,, | Performed by: PHYSICIAN ASSISTANT

## 2024-04-03 PROCEDURE — 3074F SYST BP LT 130 MM HG: CPT | Mod: CPTII,S$GLB,, | Performed by: PHYSICIAN ASSISTANT

## 2024-04-03 RX ORDER — AMLODIPINE BESYLATE 5 MG/1
5 TABLET ORAL DAILY
Qty: 90 TABLET | Refills: 1 | Status: SHIPPED | OUTPATIENT
Start: 2024-04-03

## 2024-04-03 RX ORDER — NAPROXEN 500 MG/1
500 TABLET ORAL 2 TIMES DAILY WITH MEALS
COMMUNITY
Start: 2024-01-11

## 2024-04-03 RX ORDER — ROSUVASTATIN CALCIUM 20 MG/1
20 TABLET, COATED ORAL DAILY
Qty: 90 TABLET | Refills: 1 | Status: SHIPPED | OUTPATIENT
Start: 2024-04-03

## 2024-04-03 RX ORDER — TRAMADOL HYDROCHLORIDE 50 MG/1
50 TABLET ORAL
COMMUNITY
Start: 2024-01-11

## 2024-04-03 NOTE — PROGRESS NOTES
Subjective:      Patient ID: Conchita Rouse is a 69 y.o. female.    Chief Complaint: Annual Exam (She is here for an annual exam. Stated that she has been having knee pain recently.)    Patient is known to me, being seen today for annual exam.     HLD- on statin therapy   Has not been taking statin, felt achey (unsure whether it was d/t medication)  HTN-amlodipine 5mg   Takes 10mg intermittently, maybe 3-4x week   Blood pressure 130/80s at home   Prediabetes- A1c 6.4%    Labs recently completed, A1c worsened, cholesterol elevated     Last visit Nov 2023 with Dr. Perez.       Review of Systems   Constitutional:  Positive for fatigue. Negative for chills, diaphoresis and fever.   HENT:  Negative for congestion, rhinorrhea and sore throat.    Respiratory:  Negative for cough, shortness of breath and wheezing.    Gastrointestinal:  Negative for abdominal pain, constipation, diarrhea, nausea and vomiting.   Skin:  Negative for rash.   Neurological:  Negative for dizziness, light-headedness and headaches.       Objective:   /82 (BP Location: Right arm, Patient Position: Sitting, BP Method: Medium (Manual))   Pulse (!) 59   Temp 96.2 °F (35.7 °C) (Tympanic)   Resp 20   Wt 83.4 kg (183 lb 13.8 oz)   SpO2 98%   BMI 29.68 kg/m²   Physical Exam  Constitutional:       General: She is not in acute distress.     Appearance: Normal appearance. She is well-developed. She is not ill-appearing.   HENT:      Head: Normocephalic and atraumatic.   Cardiovascular:      Rate and Rhythm: Normal rate and regular rhythm.      Heart sounds: Normal heart sounds. No murmur heard.  Pulmonary:      Effort: Pulmonary effort is normal. No respiratory distress.      Breath sounds: Normal breath sounds. No decreased breath sounds.   Musculoskeletal:      Right lower leg: No edema.      Left lower leg: No edema.   Skin:     General: Skin is warm and dry.      Findings: No rash.   Psychiatric:         Speech: Speech normal.          Behavior: Behavior normal.         Thought Content: Thought content normal.       Assessment:      1. Annual physical exam    2. Dyslipidemia (high LDL; low HDL)    3. Essential hypertension    4. Thrombocytopenia    5. Aplastic anemia    6. Prediabetes    7. Hyperlipidemia LDL goal <70    8. History of TIA (transient ischemic attack)       Plan:   Annual physical exam    Dyslipidemia (high LDL; low HDL)  -     Lipid Panel; Future; Expected date: 04/03/2024  -     Comprehensive Metabolic Panel; Future; Expected date: 04/03/2024    Essential hypertension  -     amLODIPine (NORVASC) 5 MG tablet; Take 1 tablet (5 mg total) by mouth once daily.  Dispense: 90 tablet; Refill: 1  -     CBC Auto Differential; Future; Expected date: 04/03/2024    Thrombocytopenia   Followed by Hem    Aplastic anemia   Followed by Hem    Prediabetes  -     Hemoglobin A1C; Future; Expected date: 04/03/2024    Hyperlipidemia LDL goal <70  -     rosuvastatin (CRESTOR) 20 MG tablet; Take 1 tablet (20 mg total) by mouth once daily.  Dispense: 90 tablet; Refill: 1    History of TIA (transient ischemic attack)  -     rosuvastatin (CRESTOR) 20 MG tablet; Take 1 tablet (20 mg total) by mouth once daily.  Dispense: 90 tablet; Refill: 1      Discussed diabetic and heart healthy diet/exercise      The 10-year ASCVD risk score (Lobito BROWN, et al., 2019) is: 12.4%    Values used to calculate the score:      Age: 69 years      Sex: Female      Is Non- : Yes      Diabetic: No      Tobacco smoker: No      Systolic Blood Pressure: 126 mmHg      Is BP treated: Yes      HDL Cholesterol: 45 mg/dL      Total Cholesterol: 230 mg/dL    3mth repeat labs     6mth f/u

## 2024-07-03 ENCOUNTER — LAB VISIT (OUTPATIENT)
Dept: LAB | Facility: HOSPITAL | Age: 69
End: 2024-07-03
Attending: PHYSICIAN ASSISTANT
Payer: MEDICARE

## 2024-07-03 DIAGNOSIS — I10 ESSENTIAL HYPERTENSION: ICD-10-CM

## 2024-07-03 DIAGNOSIS — R73.03 PREDIABETES: ICD-10-CM

## 2024-07-03 DIAGNOSIS — E78.5 DYSLIPIDEMIA (HIGH LDL; LOW HDL): ICD-10-CM

## 2024-07-03 LAB
ALBUMIN SERPL BCP-MCNC: 4 G/DL (ref 3.5–5.2)
ALP SERPL-CCNC: 77 U/L (ref 55–135)
ALT SERPL W/O P-5'-P-CCNC: 22 U/L (ref 10–44)
ANION GAP SERPL CALC-SCNC: 9 MMOL/L (ref 8–16)
AST SERPL-CCNC: 19 U/L (ref 10–40)
BASOPHILS # BLD AUTO: 0.01 K/UL (ref 0–0.2)
BASOPHILS NFR BLD: 0.2 % (ref 0–1.9)
BILIRUB SERPL-MCNC: 0.4 MG/DL (ref 0.1–1)
BUN SERPL-MCNC: 11 MG/DL (ref 8–23)
CALCIUM SERPL-MCNC: 9.8 MG/DL (ref 8.7–10.5)
CHLORIDE SERPL-SCNC: 105 MMOL/L (ref 95–110)
CHOLEST SERPL-MCNC: 120 MG/DL (ref 120–199)
CHOLEST/HDLC SERPL: 3 {RATIO} (ref 2–5)
CO2 SERPL-SCNC: 27 MMOL/L (ref 23–29)
CREAT SERPL-MCNC: 0.9 MG/DL (ref 0.5–1.4)
DIFFERENTIAL METHOD BLD: ABNORMAL
EOSINOPHIL # BLD AUTO: 0.1 K/UL (ref 0–0.5)
EOSINOPHIL NFR BLD: 1.1 % (ref 0–8)
ERYTHROCYTE [DISTWIDTH] IN BLOOD BY AUTOMATED COUNT: 13.6 % (ref 11.5–14.5)
EST. GFR  (NO RACE VARIABLE): >60 ML/MIN/1.73 M^2
ESTIMATED AVG GLUCOSE: 137 MG/DL (ref 68–131)
GLUCOSE SERPL-MCNC: 122 MG/DL (ref 70–110)
HBA1C MFR BLD: 6.4 % (ref 4–5.6)
HCT VFR BLD AUTO: 43.1 % (ref 37–48.5)
HDLC SERPL-MCNC: 40 MG/DL (ref 40–75)
HDLC SERPL: 33.3 % (ref 20–50)
HGB BLD-MCNC: 13.8 G/DL (ref 12–16)
IMM GRANULOCYTES # BLD AUTO: 0.01 K/UL (ref 0–0.04)
IMM GRANULOCYTES NFR BLD AUTO: 0.2 % (ref 0–0.5)
LDLC SERPL CALC-MCNC: 67.6 MG/DL (ref 63–159)
LYMPHOCYTES # BLD AUTO: 1.5 K/UL (ref 1–4.8)
LYMPHOCYTES NFR BLD: 27.8 % (ref 18–48)
MCH RBC QN AUTO: 32.2 PG (ref 27–31)
MCHC RBC AUTO-ENTMCNC: 32 G/DL (ref 32–36)
MCV RBC AUTO: 101 FL (ref 82–98)
MONOCYTES # BLD AUTO: 0.6 K/UL (ref 0.3–1)
MONOCYTES NFR BLD: 11.5 % (ref 4–15)
NEUTROPHILS # BLD AUTO: 3.1 K/UL (ref 1.8–7.7)
NEUTROPHILS NFR BLD: 59.2 % (ref 38–73)
NONHDLC SERPL-MCNC: 80 MG/DL
NRBC BLD-RTO: 0 /100 WBC
PLATELET # BLD AUTO: 128 K/UL (ref 150–450)
PMV BLD AUTO: 10.4 FL (ref 9.2–12.9)
POTASSIUM SERPL-SCNC: 4.2 MMOL/L (ref 3.5–5.1)
PROT SERPL-MCNC: 7.7 G/DL (ref 6–8.4)
RBC # BLD AUTO: 4.28 M/UL (ref 4–5.4)
SODIUM SERPL-SCNC: 141 MMOL/L (ref 136–145)
TRIGL SERPL-MCNC: 62 MG/DL (ref 30–150)
WBC # BLD AUTO: 5.22 K/UL (ref 3.9–12.7)

## 2024-07-03 PROCEDURE — 80061 LIPID PANEL: CPT | Performed by: PHYSICIAN ASSISTANT

## 2024-07-03 PROCEDURE — 83036 HEMOGLOBIN GLYCOSYLATED A1C: CPT | Performed by: PHYSICIAN ASSISTANT

## 2024-07-03 PROCEDURE — 80053 COMPREHEN METABOLIC PANEL: CPT | Performed by: PHYSICIAN ASSISTANT

## 2024-07-03 PROCEDURE — 85025 COMPLETE CBC W/AUTO DIFF WBC: CPT | Performed by: PHYSICIAN ASSISTANT

## 2024-07-03 PROCEDURE — 36415 COLL VENOUS BLD VENIPUNCTURE: CPT | Mod: PN | Performed by: PHYSICIAN ASSISTANT

## 2024-09-17 ENCOUNTER — OFFICE VISIT (OUTPATIENT)
Dept: INTERNAL MEDICINE | Facility: CLINIC | Age: 69
End: 2024-09-17
Payer: MEDICARE

## 2024-09-17 VITALS
HEIGHT: 66 IN | TEMPERATURE: 97 F | DIASTOLIC BLOOD PRESSURE: 60 MMHG | RESPIRATION RATE: 20 BRPM | SYSTOLIC BLOOD PRESSURE: 120 MMHG | HEART RATE: 74 BPM | OXYGEN SATURATION: 99 % | WEIGHT: 182.44 LBS | BODY MASS INDEX: 29.32 KG/M2

## 2024-09-17 DIAGNOSIS — J01.90 ACUTE BACTERIAL RHINOSINUSITIS: ICD-10-CM

## 2024-09-17 DIAGNOSIS — M25.461 PAIN AND SWELLING OF RIGHT KNEE: Primary | ICD-10-CM

## 2024-09-17 DIAGNOSIS — B96.89 ACUTE BACTERIAL RHINOSINUSITIS: ICD-10-CM

## 2024-09-17 DIAGNOSIS — H61.23 BILATERAL IMPACTED CERUMEN: ICD-10-CM

## 2024-09-17 DIAGNOSIS — M25.561 PAIN AND SWELLING OF RIGHT KNEE: Primary | ICD-10-CM

## 2024-09-17 DIAGNOSIS — G47.00 INSOMNIA, UNSPECIFIED TYPE: ICD-10-CM

## 2024-09-17 PROCEDURE — 3074F SYST BP LT 130 MM HG: CPT | Mod: CPTII,S$GLB,, | Performed by: FAMILY MEDICINE

## 2024-09-17 PROCEDURE — 99999 PR PBB SHADOW E&M-EST. PATIENT-LVL IV: CPT | Mod: PBBFAC,,, | Performed by: FAMILY MEDICINE

## 2024-09-17 PROCEDURE — 1101F PT FALLS ASSESS-DOCD LE1/YR: CPT | Mod: CPTII,S$GLB,, | Performed by: FAMILY MEDICINE

## 2024-09-17 PROCEDURE — 1126F AMNT PAIN NOTED NONE PRSNT: CPT | Mod: CPTII,S$GLB,, | Performed by: FAMILY MEDICINE

## 2024-09-17 PROCEDURE — 1159F MED LIST DOCD IN RCRD: CPT | Mod: CPTII,S$GLB,, | Performed by: FAMILY MEDICINE

## 2024-09-17 PROCEDURE — 99214 OFFICE O/P EST MOD 30 MIN: CPT | Mod: S$GLB,,, | Performed by: FAMILY MEDICINE

## 2024-09-17 PROCEDURE — 3078F DIAST BP <80 MM HG: CPT | Mod: CPTII,S$GLB,, | Performed by: FAMILY MEDICINE

## 2024-09-17 PROCEDURE — 3008F BODY MASS INDEX DOCD: CPT | Mod: CPTII,S$GLB,, | Performed by: FAMILY MEDICINE

## 2024-09-17 PROCEDURE — 3288F FALL RISK ASSESSMENT DOCD: CPT | Mod: CPTII,S$GLB,, | Performed by: FAMILY MEDICINE

## 2024-09-17 PROCEDURE — 3044F HG A1C LEVEL LT 7.0%: CPT | Mod: CPTII,S$GLB,, | Performed by: FAMILY MEDICINE

## 2024-09-17 RX ORDER — TRAZODONE HYDROCHLORIDE 50 MG/1
50 TABLET ORAL NIGHTLY PRN
Qty: 30 TABLET | Refills: 3 | Status: SHIPPED | OUTPATIENT
Start: 2024-09-17

## 2024-09-17 RX ORDER — AZITHROMYCIN 250 MG/1
TABLET, FILM COATED ORAL
Qty: 6 TABLET | Refills: 0 | Status: SHIPPED | OUTPATIENT
Start: 2024-09-17 | End: 2024-09-22

## 2024-09-17 RX ORDER — NAPROXEN 500 MG/1
500 TABLET ORAL 2 TIMES DAILY WITH MEALS
Qty: 30 TABLET | Refills: 1 | Status: SHIPPED | OUTPATIENT
Start: 2024-09-17

## 2024-09-17 NOTE — PROGRESS NOTES
Subjective:       Patient ID: Conchita Rouse is a 69 y.o. female.    Chief Complaint: No chief complaint on file.    2 weeks history of sinus congestion, headache, watery eyes, and eye burning. Has tried OTC medications without improvement. No fever.       Review of Systems   Constitutional:  Negative for chills and fever.   HENT:  Positive for sinus pressure, sinus pain and sneezing. Negative for congestion, rhinorrhea and sore throat.    Respiratory:  Positive for cough. Negative for shortness of breath and wheezing.    Cardiovascular:  Negative for chest pain, palpitations and leg swelling.   Gastrointestinal:  Negative for abdominal pain, constipation, diarrhea, nausea and vomiting.   Genitourinary:  Negative for dysuria, frequency and urgency.   Neurological:  Negative for headaches.   Psychiatric/Behavioral:  Negative for dysphoric mood.        Objective:      Physical Exam  HENT:      Head: Normocephalic and atraumatic.      Nose: No congestion or rhinorrhea.      Right Sinus: Maxillary sinus tenderness and frontal sinus tenderness present.      Left Sinus: Maxillary sinus tenderness and frontal sinus tenderness present.   Eyes:      General: No scleral icterus.        Right eye: No discharge.         Left eye: No discharge.      Extraocular Movements: Extraocular movements intact.      Conjunctiva/sclera: Conjunctivae normal.      Pupils: Pupils are equal, round, and reactive to light.   Cardiovascular:      Rate and Rhythm: Normal rate and regular rhythm.      Heart sounds: No murmur heard.     No friction rub. No gallop.   Pulmonary:      Effort: Pulmonary effort is normal. No respiratory distress.      Breath sounds: Normal breath sounds. No stridor. No wheezing or rhonchi.   Abdominal:      General: Bowel sounds are normal.      Palpations: Abdomen is soft.   Musculoskeletal:      Right lower leg: No edema.      Left lower leg: No edema.   Skin:     General: Skin is warm.   Neurological:      General:  No focal deficit present.      Mental Status: She is alert.   Psychiatric:         Mood and Affect: Mood normal.         Behavior: Behavior normal.         Assessment:       1. Pain and swelling of right knee    2. Insomnia, unspecified type    3. Acute bacterial rhinosinusitis    4. Bilateral impacted cerumen        Plan:   1. Pain and swelling of right knee  Comments:  Chronic, stable, continue current Rx med  Orders:  -     naproxen (NAPROSYN) 500 MG tablet; Take 1 tablet (500 mg total) by mouth 2 (two) times daily with meals.  Dispense: 30 tablet; Refill: 1    2. Insomnia, unspecified type  Comments:  Chronic, stable, request refill of medication  Orders:  -     traZODone (DESYREL) 50 MG tablet; Take 1 tablet (50 mg total) by mouth nightly as needed for Insomnia.  Dispense: 30 tablet; Refill: 3    3. Acute bacterial rhinosinusitis  -     azithromycin (Z-TRAVIS) 250 MG tablet; Take 2 tablets by mouth on day 1; Take 1 tablet by mouth on days 2-5  Dispense: 6 tablet; Refill: 0    4. Bilateral impacted cerumen  Comments:  Uncontrolled, ear wax removal completed in clear with partial relief, referral placed to ENT for further eval  Orders:  -     Ear wax removal  -     Ambulatory referral/consult to ENT; Future; Expected date: 09/24/2024       RTC as schedule with PCP    Future Appointments   Date Time Provider Department Center   10/4/2024  9:40 AM Casandra Rodriguez DO ON IM  [facility]   10/4/2024 10:40 AM lAejandra Vasquez PA-C ON ENT  [facility]   10/7/2024  9:40 AM [id number] [place]       Franchesca Perez MD  [facility] [address]  [place] [address]  [phone]

## 2024-10-04 ENCOUNTER — OFFICE VISIT (OUTPATIENT)
Dept: OTOLARYNGOLOGY | Facility: CLINIC | Age: 69
End: 2024-10-04
Payer: MEDICARE

## 2024-10-04 ENCOUNTER — OFFICE VISIT (OUTPATIENT)
Dept: INTERNAL MEDICINE | Facility: CLINIC | Age: 69
End: 2024-10-04
Payer: MEDICARE

## 2024-10-04 VITALS
BODY MASS INDEX: 29.58 KG/M2 | TEMPERATURE: 97 F | HEIGHT: 66 IN | OXYGEN SATURATION: 97 % | WEIGHT: 183.63 LBS | HEIGHT: 66 IN | SYSTOLIC BLOOD PRESSURE: 138 MMHG | RESPIRATION RATE: 20 BRPM | HEART RATE: 56 BPM | DIASTOLIC BLOOD PRESSURE: 82 MMHG | BODY MASS INDEX: 29.51 KG/M2 | WEIGHT: 184.06 LBS

## 2024-10-04 DIAGNOSIS — M25.50 ARTHRALGIA, UNSPECIFIED JOINT: ICD-10-CM

## 2024-10-04 DIAGNOSIS — E78.5 HYPERLIPIDEMIA LDL GOAL <130: ICD-10-CM

## 2024-10-04 DIAGNOSIS — H61.23 BILATERAL IMPACTED CERUMEN: ICD-10-CM

## 2024-10-04 DIAGNOSIS — Z78.0 ASYMPTOMATIC MENOPAUSE: ICD-10-CM

## 2024-10-04 DIAGNOSIS — R42 DIZZINESS: Primary | ICD-10-CM

## 2024-10-04 DIAGNOSIS — Z12.11 COLON CANCER SCREENING: ICD-10-CM

## 2024-10-04 DIAGNOSIS — M85.80 OSTEOPENIA, UNSPECIFIED LOCATION: ICD-10-CM

## 2024-10-04 DIAGNOSIS — E66.811 OBESITY (BMI 30.0-34.9): ICD-10-CM

## 2024-10-04 DIAGNOSIS — I10 ESSENTIAL HYPERTENSION: Primary | ICD-10-CM

## 2024-10-04 DIAGNOSIS — R73.03 PREDIABETES: ICD-10-CM

## 2024-10-04 PROCEDURE — 99999 PR PBB SHADOW E&M-EST. PATIENT-LVL V: CPT | Mod: PBBFAC,,, | Performed by: INTERNAL MEDICINE

## 2024-10-04 PROCEDURE — 99999 PR PBB SHADOW E&M-EST. PATIENT-LVL III: CPT | Mod: PBBFAC,,, | Performed by: PHYSICIAN ASSISTANT

## 2024-10-04 RX ORDER — ERGOCALCIFEROL 1.25 MG/1
50000 CAPSULE ORAL
Qty: 12 CAPSULE | Refills: 3 | Status: SHIPPED | OUTPATIENT
Start: 2024-10-04

## 2024-10-04 RX ORDER — SEMAGLUTIDE 0.25 MG/.5ML
0.25 INJECTION, SOLUTION SUBCUTANEOUS
Qty: 2 ML | Refills: 1 | Status: SHIPPED | OUTPATIENT
Start: 2024-10-04

## 2024-10-04 NOTE — PROGRESS NOTES
Subjective     Patient ID: Conchita Rouse is a 69 y.o. female.    Chief Complaint: Cerumen Impaction (Pt states she has left otalgia and feels off balance from time to time .with position changes x2 months)    Patient is a very pleasant 69 y.o. female here to see me today for the first time for evaluation of cerumen impaction and dizziness.  Denies hearing loss or tinnitus.  She has had occasional left ear pain but no drainage.   She denies a family history of hearing loss, and has not had any previous otologic surgery.  She denies any history of significant loud noise exposure. She has issues with dizziness.  Describes feeling unsteady when she first stands or changes position.  No dizziness with lying down or rolling in bed.  Not daily; intermittent over past 2-3 months.          Review of Systems   Constitutional:  Negative for fever.   HENT:  Positive for ear pain. Negative for ear discharge, hearing loss, rhinorrhea and tinnitus.    Neurological:  Positive for dizziness.          Objective     Physical Exam  Constitutional:       Appearance: Normal appearance.   HENT:      Head: Normocephalic and atraumatic.      Right Ear: External ear normal. No drainage. There is impacted cerumen.      Left Ear: External ear normal. No drainage. There is impacted cerumen (removal described below).      Nose: Nose normal. No congestion or rhinorrhea.      Mouth/Throat:      Mouth: Mucous membranes are moist.      Pharynx: Oropharynx is clear. No posterior oropharyngeal erythema.   Eyes:      Pupils: Pupils are equal, round, and reactive to light.      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   Neurological:      Mental Status: She is alert and oriented to person, place, and time.   Psychiatric:         Behavior: Behavior is cooperative.       Procedure Note    CHIEF COMPLAINT:  Cerumen Impaction    Description:  The patient was seated in an exam chair.  An ear  speculum was placed in the right EAC and was examined under the microscope.  Suction and/or loop curettes were used to remove a small sheet of cerumen.  The tympanic membrane was visualized and was normal in appearance.  The procedure was repeated on the left side in a similar fashion.  The TM was intact and normal on this side as well.  The patient tolerated the procedure well.        Grady-Hallpike:  Negative AU           Assessment and Plan     1. Dizziness    2. Bilateral impacted cerumen  Comments:  Uncontrolled, ear wax removal completed in clear with partial relief, referral placed to ENT for further eval  Orders:  -     Ambulatory referral/consult to ENT        I had a long discussion with the patient regarding their symptoms.  Dizziness, vertigo and disequilibrium are common symptoms reported by adults during visits to their doctors. They are all symptoms that can result from a peripheral vestibular disorder (a dysfunction of the balance organs of the inner ear) or central vestibular disorder (a dysfunction of one or more parts of the central nervous system that help process balance and spatial information).  There are also non-vestibular causes of dizziness, and dizziness can be linked to a wide array of problems such as blood-flow irregularities from cardiovascular problems and blood pressure fluctuations.  Her testing today is negative for BPPV.  Discussed that her dizziness does not sound like it's a vestibular issue.  If persists or worsens, could consider scheduling a vestibular screen.       Cerumen impaction:  Removed today without difficulty.  I would recommend the use of a wax softening drop, either over the counter Debrox or mineral oil, on a weekly basis.  I also instructed the patient to avoid Qtips.  RTC as needed for ear cleaning.             No follow-ups on file.

## 2024-10-04 NOTE — PROGRESS NOTES
Conchita Rouse  69 y.o. Black or  female  [id number]    Chief Complaint:  Chief Complaint   Patient presents with    Follow-up     6 months        History of Present Illness:  History of Present Illness    CHIEF COMPLAINT:  Ms. Rouse presents today for a six-month follow-up.    HYPERTENSION:   Elevated initially. She reports compliance with amlodipine. She is concerned about her weight.     CHOLESTEROL AND PREDIABETES:  She reports surprise at recent lab results showing normal cholesterol levels, as she previously had very low cholesterol. Her A1C level are elevated, indicating prediabetes. She was previously prescribed metformin but discontinued it. She believes her weight gain is related to her prediabetic condition.    WEIGHT MANAGEMENT:  She expresses interest in weight loss medication, specifically Wegovy, to address both prediabetes and weight gain. She is conscious of her diet but admits it's not always effective. She engages in regular exercise. She denies any family history of thyroid cancer.    PREVENTIVE CARE:  She is due for a colonoscopy, having undergone one previously. She denies any black bowel movements or blood in her stools. She is also due for a bone density test, with the last one in 2021 showing some bone loss. She has a history of osteopenia and previously took weekly vitamin D supplements, which she discontinued. She expresses willingness to resume if needed.    JOINT PAIN AND STIFFNESS:  She reports joint pain and stiffness, which may be related to arthritis. She has been using naproxen for pain management but finds it less effective recently. She mentions ankle swelling, particularly when traveling. She denies chest pain or trouble breathing.    MEDICATIONS AND SIDE EFFECTS:  She expresses concerns about potential stomach irritation from medications, particularly naproxen and Aleve. She is considering switching to Aleve due to decreased effectiveness of naproxen. She denies  any current stomach issues such as nausea, constipation, or diarrhea.    VACCINATIONS:  She declines the due pneumonia vaccine. She reports significant illness after a past flu vaccine. She has not had shingles or pneumonia previously. She expresses potential interest in the shingles vaccine in the future but declines other recommended vaccines including RSV and tetanus.    PHARMACY PREFERENCE:  She prefers to use [facility] in [place] for her prescriptions.         ROS    Laboratory:  Lab Results   Component Value Date    WBC 5.22 07/03/2024    HGB 13.8 07/03/2024    HCT 43.1 07/03/2024     (L) 07/03/2024    CHOL 120 07/03/2024    TRIG 62 07/03/2024    HDL 40 07/03/2024    ALT 22 07/03/2024    AST 19 07/03/2024     07/03/2024    K 4.2 07/03/2024     07/03/2024    CREATININE 0.9 07/03/2024    BUN 11 07/03/2024    CO2 27 07/03/2024    TSH 2.363 11/18/2022    INR 1.0 04/05/2017    HGBA1C 6.4 (H) 07/03/2024     Lab Results   Component Value Date    LDLCALC 67.6 07/03/2024       History:  Past Medical History:   Diagnosis Date    Aplastic anemia     Dr. Rogelio Norwood--hematology    Arthritis     Asthma     Chronic ITP (idiopathic thrombocytopenia)     Dr. Rogelio Norwood--hematology    Dyslipidemia (high LDL; low HDL)     10y CV event risk 14.7% (asuming no DM2), which could be reduced to 4.5% with RF optimization, for which I recommended atorvastatin 20 mg 1 po qd x2 weeks and then 40 mg 1 po qd    Encounter for blood transfusion     History of shingles 02/14/2018    Per note from Dr. Rogelio Norwood III on 2/14/18; left upper buttocks.    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 Rogelio Norwood III last visit 2/14/18 (tolerating CSA & promacta); also notes aplastic anemia (AA psot [facility] ATG - week 7/17/17) and ITP, for which ATG & promacta following decadron pulse for ITP & AA; plan for second opinion on BM bx; next flow in 3 mo;  "intent of rx: palliative    Prediabetes     Stroke     TIA (transient ischemic attack) 2007    She was at "[facility]", where she noted she was feeling slow, took a nap, woke up with a numb/tingling left jaw to arm, which persistned 20 minutes, took 3 baby aspirins, went to ER @ [facility], where facial droop was noted, but completely resolved & head CT revealed old minor abnormality.    Vitamin D deficiency        Medications:  Current Outpatient Medications on File Prior to Visit   Medication Sig Dispense Refill    amLODIPine (NORVASC) 5 MG tablet Take 1 tablet (5 mg total) by mouth once daily. 90 tablet 1    azelastine (ASTELIN) 137 mcg (0.1 %) nasal spray 1 spray (137 mcg total) by Nasal route 2 (two) times daily. 30 mL 1    eltrombopag (PROMACTA) 75 mg Tab Take 75 mg by mouth once daily.       loratadine (CLARITIN) 10 mg tablet Take 10 mg by mouth once daily.      naproxen (NAPROSYN) 500 MG tablet Take 1 tablet (500 mg total) by mouth 2 (two) times daily with meals. 30 tablet 1    rosuvastatin (CRESTOR) 20 MG tablet Take 1 tablet (20 mg total) by mouth once daily. 90 tablet 1    traMADoL (ULTRAM) 50 mg tablet Take 50 mg by mouth as needed for Pain.      traZODone (DESYREL) 50 MG tablet Take 1 tablet (50 mg total) by mouth nightly as needed for Insomnia. 30 tablet 3    [DISCONTINUED] meloxicam (MOBIC) 7.5 MG tablet Take 1 tablet (7.5 mg total) by mouth once daily. (Patient not taking: Reported on 9/17/2024) 30 tablet 0     No current facility-administered medications on file prior to visit.       Allergies:  Review of patient's allergies indicates:   Allergen Reactions    Bactrim [sulfamethoxazole-trimethoprim] Edema     Swollen lips    Codeine Other (See Comments)     Pt states codeine does not cause hives. Had tooth extraction and medication given caused her to be jittery, feel hot and have to lay down like she was weak. morhpine given on 5/16/2017 iv for pain. Pt martine well without any sign or sx of allergy or " reaction.       Exam:  Vitals:    10/04/24 1028   BP: 138/82   Pulse: (!) 56   Resp:    Temp:      Weight: 83.5 kg (184 lb 1.4 oz)   Body mass index is 30.17 kg/m².      Physical Exam    Vitals: Reviewed. Blood pressure is elevated.  Constitutional: No acute distress. Well-developed. Not ill-appearing.  Eyes: No scleral icterus.  Cardiovascular: Normal rate and regular rhythm. Normal heart sounds.  Pulmonary: Pulmonary effort is normal. No respiratory distress. Normal breath sounds.  Abdomen: Soft. Nontender. Nondistended. Normoactive bowel sounds.  Extremities: No edema.  Skin: Warm. Dry.  Neurological: Alert and oriented to person, place, and time.  Psychiatric: Behavior normal.         Assessment:  The primary encounter diagnosis was Essential hypertension. Diagnoses of Hyperlipidemia LDL goal <130, Prediabetes, Osteopenia, unspecified location, Asymptomatic menopause, Arthralgia, unspecified joint, Obesity (BMI 30.0-34.9), and Colon cancer screening were also pertinent to this visit.    Assessment & Plan    HYPERTENSION:   - Continued amlodipine  - Lifestyle modifications discussed    HYPERLIPIDEMIA:  - Assessed cholesterol levels, noting normal results from July.    PREDIABETES:  - Evaluated A1C, indicating prediabetic range close to diabetic threshold.  - Considered Wegovy for prediabetes and weight management.  - Reviewed patient's risk factors and medical history to determine appropriateness for Wegovy.  - Started Wegovy for weight management and potential prediabetes improvement; to be administered as a weekly subcutaneous injection.  - Explained the administration method for Wegovy as a weekly subcutaneous injection.  - Contact the office towards the end of each month to report on Wegovy tolerance and effectiveness, especially after the 3rd dose.    OSTEOPENIA:  - Assessed bone health, noting previous bone loss and need for vitamin D supplementation.  - Explained the difference between arthritis (affecting  joints) and osteopenia (loss of bone density).  - Discussed the importance of vitamin D for bone and muscle health.  - Informed about the risk of fractures associated with osteopenia.  - Started vitamin D supplement; to be taken once weekly.  - Ordered bone density test.    JOINT PAIN:  - Considered options for managing joint pain and stiffness.  - Educated on the anti-inflammatory properties of certain pain medications.  - Continued naproxen for joint pain; recommended taking with food.  - Recommend trying Aleve as an alternative to naproxen if needed.    WEIGHT MANAGEMENT:  - Ms. Rouse to continue to watch diet and exercise regularly.  - Started Wegovy    MEDICATIONS/SUPPLEMENTS:  - Ms. Rouse to take medications with food to reduce stomach irritation.    LABS:  - Ordered labs in 6 months to check cholesterol and A1C.    COLONOSCOPY REFERRAL:  - Ordered colonoscopy.    FOLLOW UP:  - Follow up in 6 months for labs.

## 2024-10-07 ENCOUNTER — PATIENT MESSAGE (OUTPATIENT)
Dept: PREADMISSION TESTING | Facility: HOSPITAL | Age: 69
End: 2024-10-07
Payer: MEDICARE

## 2024-10-07 ENCOUNTER — HOSPITAL ENCOUNTER (OUTPATIENT)
Dept: RADIOLOGY | Facility: HOSPITAL | Age: 69
Discharge: HOME | End: 2024-10-07
Attending: INTERNAL MEDICINE
Payer: MEDICARE

## 2024-10-07 ENCOUNTER — TELEPHONE (OUTPATIENT)
Dept: INTERNAL MEDICINE | Facility: CLINIC | Age: 69
End: 2024-10-07
Payer: MEDICARE

## 2024-10-07 VITALS — HEIGHT: 66 IN | BODY MASS INDEX: 29.51 KG/M2 | WEIGHT: 183.63 LBS

## 2024-10-07 DIAGNOSIS — Z12.31 ENCOUNTER FOR SCREENING MAMMOGRAM FOR BREAST CANCER: ICD-10-CM

## 2024-10-07 PROCEDURE — 77067 SCR MAMMO BI INCL CAD: CPT | Mod: 26,,, | Performed by: STUDENT IN AN ORGANIZED HEALTH CARE EDUCATION/TRAINING PROGRAM

## 2024-10-07 PROCEDURE — 77063 BREAST TOMOSYNTHESIS BI: CPT | Mod: 26,,, | Performed by: STUDENT IN AN ORGANIZED HEALTH CARE EDUCATION/TRAINING PROGRAM

## 2024-10-07 PROCEDURE — 77067 SCR MAMMO BI INCL CAD: CPT | Mod: TC

## 2024-10-11 ENCOUNTER — HOSPITAL ENCOUNTER (OUTPATIENT)
Dept: PREADMISSION TESTING | Facility: HOSPITAL | Age: 69
Discharge: HOME | End: 2024-10-11
Attending: INTERNAL MEDICINE
Payer: MEDICARE

## 2024-10-11 DIAGNOSIS — Z12.11 COLON CANCER SCREENING: Primary | ICD-10-CM

## 2024-10-11 RX ORDER — SOD SULF/POT CHLORIDE/MAG SULF 1.479 G
12 TABLET ORAL DAILY
Qty: 24 TABLET | Refills: 0 | Status: SHIPPED | OUTPATIENT
Start: 2024-10-11

## 2024-10-11 RX ORDER — SODIUM, POTASSIUM,MAG SULFATES 17.5-3.13G
1 SOLUTION, RECONSTITUTED, ORAL ORAL DAILY
Qty: 1 KIT | Refills: 0 | Status: CANCELLED | OUTPATIENT
Start: 2024-10-11 | End: 2024-10-13

## 2024-10-21 RX ORDER — SODIUM, POTASSIUM,MAG SULFATES 17.5-3.13G
1 SOLUTION, RECONSTITUTED, ORAL ORAL DAILY
Qty: 1 KIT | Refills: 0 | Status: SHIPPED | OUTPATIENT
Start: 2024-10-21 | End: 2024-10-23

## 2024-10-24 ENCOUNTER — ANESTHESIA EVENT (OUTPATIENT)
Dept: ENDOSCOPY | Facility: HOSPITAL | Age: 69
End: 2024-10-24
Payer: MEDICARE

## 2024-10-24 PROBLEM — Z86.0100 HISTORY OF COLON POLYPS: Status: ACTIVE | Noted: 2024-10-24

## 2024-10-27 ENCOUNTER — NURSE TRIAGE (OUTPATIENT)
Dept: ADMINISTRATIVE | Facility: CLINIC | Age: 69
End: 2024-10-27
Payer: MEDICARE

## 2024-10-28 ENCOUNTER — PATIENT MESSAGE (OUTPATIENT)
Dept: GASTROENTEROLOGY | Facility: HOSPITAL | Age: 69
End: 2024-10-28
Payer: MEDICARE

## 2024-10-28 ENCOUNTER — ANESTHESIA (OUTPATIENT)
Dept: ENDOSCOPY | Facility: HOSPITAL | Age: 69
End: 2024-10-28
Payer: MEDICARE

## 2024-10-28 ENCOUNTER — HOSPITAL ENCOUNTER (OUTPATIENT)
Facility: HOSPITAL | Age: 69
Discharge: HOME | End: 2024-10-28
Attending: INTERNAL MEDICINE | Admitting: INTERNAL MEDICINE
Payer: MEDICARE

## 2024-10-28 VITALS
HEIGHT: 65 IN | BODY MASS INDEX: 29.7 KG/M2 | HEART RATE: 68 BPM | WEIGHT: 178.25 LBS | TEMPERATURE: 97 F | SYSTOLIC BLOOD PRESSURE: 92 MMHG | OXYGEN SATURATION: 95 % | RESPIRATION RATE: 18 BRPM | DIASTOLIC BLOOD PRESSURE: 54 MMHG

## 2024-10-28 DIAGNOSIS — Z86.0100 HISTORY OF COLON POLYPS: Primary | ICD-10-CM

## 2024-10-28 PROCEDURE — 63600175 PHARM REV CODE 636 W HCPCS: Performed by: NURSE ANESTHETIST, CERTIFIED REGISTERED

## 2024-10-28 PROCEDURE — G0105 COLORECTAL SCRN; HI RISK IND: HCPCS | Mod: ,,, | Performed by: INTERNAL MEDICINE

## 2024-10-28 PROCEDURE — G0105 COLORECTAL SCRN; HI RISK IND: HCPCS | Performed by: INTERNAL MEDICINE

## 2024-10-28 PROCEDURE — 37000008 HC ANESTHESIA 1ST 15 MINUTES: Performed by: INTERNAL MEDICINE

## 2024-10-28 PROCEDURE — D9220A PRA ANESTHESIA: Mod: ,,, | Performed by: NURSE ANESTHETIST, CERTIFIED REGISTERED

## 2024-10-28 PROCEDURE — 37000009 HC ANESTHESIA EA ADD 15 MINS: Performed by: INTERNAL MEDICINE

## 2024-10-28 RX ORDER — PROPOFOL 10 MG/ML
INJECTION, EMULSION INTRAVENOUS
Status: DISCONTINUED | OUTPATIENT
Start: 2024-10-28 | End: 2024-10-28

## 2024-10-28 RX ORDER — LIDOCAINE HYDROCHLORIDE 20 MG/ML
INJECTION INTRAVENOUS
Status: DISCONTINUED | OUTPATIENT
Start: 2024-10-28 | End: 2024-10-28

## 2024-10-28 RX ORDER — SODIUM CHLORIDE, SODIUM LACTATE, POTASSIUM CHLORIDE, CALCIUM CHLORIDE 600; 310; 30; 20 MG/100ML; MG/100ML; MG/100ML; MG/100ML
INJECTION, SOLUTION INTRAVENOUS CONTINUOUS
Status: DISCONTINUED | OUTPATIENT
Start: 2024-10-28 | End: 2024-10-28 | Stop reason: HOSPADM

## 2024-10-28 RX ADMIN — PROPOFOL 50 MG: 10 INJECTION, EMULSION INTRAVENOUS at 12:10

## 2024-10-28 RX ADMIN — LIDOCAINE HYDROCHLORIDE 50 MG: 20 INJECTION INTRAVENOUS at 11:10

## 2024-10-28 RX ADMIN — PROPOFOL 120 MG: 10 INJECTION, EMULSION INTRAVENOUS at 11:10

## 2025-01-06 ENCOUNTER — TELEPHONE (OUTPATIENT)
Dept: INTERNAL MEDICINE | Facility: CLINIC | Age: 70
End: 2025-01-06
Payer: MEDICARE

## 2025-01-06 NOTE — TELEPHONE ENCOUNTER
Pt wanted an appointment for today, but there were no openings. I informed the pt that our next appointment wasn't until Jan. 8th. Pt stated that this would be too long of a wait and that she is in severe pain. I informed the pt that the provider will not send in any pain medication without an appointment and advised that if he symptoms were too much to bear she should visit an urgent care. Pt stated understanding.  .

## 2025-01-06 NOTE — TELEPHONE ENCOUNTER
----- Message from [employer] sent at 1/6/2025  8:24 AM CST -----  Contact: Conchita  Type:  Same Day Appointment Request    Caller is requesting a same day appointment.  Caller declined first available appointment listed below.    Name of Caller:Conchita  When is the first available appointment?1/8  Symptoms:severe back pain  Best Call Back Number:[phone]  Additional Information:

## 2025-01-07 ENCOUNTER — HOSPITAL ENCOUNTER (OUTPATIENT)
Dept: RADIOLOGY | Facility: HOSPITAL | Age: 70
Discharge: HOME | End: 2025-01-07
Attending: PHYSICIAN ASSISTANT
Payer: MEDICARE

## 2025-01-07 ENCOUNTER — OFFICE VISIT (OUTPATIENT)
Dept: INTERNAL MEDICINE | Facility: CLINIC | Age: 70
End: 2025-01-07
Payer: MEDICARE

## 2025-01-07 VITALS
DIASTOLIC BLOOD PRESSURE: 76 MMHG | TEMPERATURE: 98 F | SYSTOLIC BLOOD PRESSURE: 122 MMHG | BODY MASS INDEX: 30.78 KG/M2 | OXYGEN SATURATION: 98 % | WEIGHT: 184.94 LBS | HEART RATE: 60 BPM

## 2025-01-07 DIAGNOSIS — M54.50 ACUTE LEFT-SIDED LOW BACK PAIN WITHOUT SCIATICA: Primary | ICD-10-CM

## 2025-01-07 DIAGNOSIS — R22.1 LOCALIZED SWELLING, MASS AND LUMP, NECK: ICD-10-CM

## 2025-01-07 DIAGNOSIS — M46.1 SACROILIITIS: ICD-10-CM

## 2025-01-07 DIAGNOSIS — Z13.31 POSITIVE DEPRESSION SCREENING: ICD-10-CM

## 2025-01-07 PROCEDURE — 99999 PR PBB SHADOW E&M-EST. PATIENT-LVL III: CPT | Mod: PBBFAC,HCNC,, | Performed by: PHYSICIAN ASSISTANT

## 2025-01-07 PROCEDURE — 76536 US EXAM OF HEAD AND NECK: CPT | Mod: TC,HCNC

## 2025-01-07 RX ORDER — KETOROLAC TROMETHAMINE 30 MG/ML
30 INJECTION, SOLUTION INTRAMUSCULAR; INTRAVENOUS
Status: COMPLETED | OUTPATIENT
Start: 2025-01-07 | End: 2025-01-07

## 2025-01-07 RX ORDER — CYCLOBENZAPRINE HCL 10 MG
10 TABLET ORAL NIGHTLY PRN
Qty: 10 TABLET | Refills: 0 | Status: SHIPPED | OUTPATIENT
Start: 2025-01-07

## 2025-01-07 RX ORDER — IBUPROFEN 600 MG/1
600 TABLET ORAL EVERY 8 HOURS PRN
Qty: 20 TABLET | Refills: 0 | Status: SHIPPED | OUTPATIENT
Start: 2025-01-07

## 2025-01-07 RX ADMIN — KETOROLAC TROMETHAMINE 30 MG: 30 INJECTION, SOLUTION INTRAMUSCULAR; INTRAVENOUS at 12:01

## 2025-01-07 NOTE — PROGRESS NOTES
Subjective:      Patient ID: Conchita Rouse is a 69 y.o. female.    Chief Complaint: Back Pain (Goinfg on for about 4 days , lower back and left hip )    Patient is known to me, being seen today for back pain.   L low back/hip pain x3-4 days  Worse w movement   Pain does not radiate, denies numbness/tingling   No specific injury/trauma but had mopped the day prior  Treatment includes naproxen, robaxin, tramadol, heating pad, ice     Also reports R supraclavicular swelling x1mth, non-tender    Last visit Oct 2024 w PCP.      Review of Systems   Constitutional:  Negative for chills, diaphoresis and fever.   HENT:  Negative for congestion, rhinorrhea and sore throat.    Respiratory:  Negative for cough, shortness of breath and wheezing.    Gastrointestinal:  Negative for abdominal pain, constipation, diarrhea, nausea and vomiting.   Musculoskeletal:  Positive for arthralgias and back pain.   Skin:  Negative for rash.   Neurological:  Negative for dizziness, light-headedness and headaches.       Objective:   /76 (Patient Position: Sitting)   Pulse 60   Temp 97.5 °F (36.4 °C) (Tympanic)   Wt 83.9 kg (184 lb 15.5 oz)   SpO2 98%   BMI 30.78 kg/m²   Physical Exam  Constitutional:       General: She is not in acute distress.     Appearance: She is well-developed. She is not ill-appearing or diaphoretic.   HENT:      Head: Normocephalic and atraumatic.      Right Ear: External ear normal.      Left Ear: External ear normal.   Eyes:      General: Lids are normal.         Right eye: No discharge.         Left eye: No discharge.      Conjunctiva/sclera: Conjunctivae normal.      Right eye: Right conjunctiva is not injected.      Left eye: Left conjunctiva is not injected.   Neck:     Pulmonary:      Effort: Pulmonary effort is normal. No respiratory distress.   Musculoskeletal:      Lumbar back: Bony tenderness present.      Left hip: Bony tenderness (SI joint) present.   Skin:     General: Skin is warm and dry.       Findings: No rash.   Neurological:      Mental Status: She is alert and oriented to person, place, and time.      Sensory: No sensory deficit.      Motor: Motor function is intact.   Psychiatric:         Speech: Speech normal.         Behavior: Behavior normal.         Thought Content: Thought content normal.         Judgment: Judgment normal.       Assessment:      1. Acute left-sided low back pain without sciatica    2. Sacroiliitis    3. Positive depression screening    4. Localized swelling, mass and lump, neck       Plan:   Acute left-sided low back pain without sciatica  -     ketorolac injection 30 mg  -     ibuprofen (ADVIL,MOTRIN) 600 MG tablet; Take 1 tablet (600 mg total) by mouth every 8 (eight) hours as needed for Pain. Take with food  Dispense: 20 tablet; Refill: 0  -     cyclobenzaprine (FLEXERIL) 10 MG tablet; Take 1 tablet (10 mg total) by mouth nightly as needed for Muscle spasms (Low back pain).  Dispense: 10 tablet; Refill: 0    Sacroiliitis  -     ketorolac injection 30 mg  -     ibuprofen (ADVIL,MOTRIN) 600 MG tablet; Take 1 tablet (600 mg total) by mouth every 8 (eight) hours as needed for Pain. Take with food  Dispense: 20 tablet; Refill: 0  -     cyclobenzaprine (FLEXERIL) 10 MG tablet; Take 1 tablet (10 mg total) by mouth nightly as needed for Muscle spasms (Low back pain).  Dispense: 10 tablet; Refill: 0    Positive depression screening  Comments:  I have reviewed the positive depression score with the patient and found that no additional intervention is needed at this time.    Localized swelling, mass and lump, neck  -     US Soft Tissue Head Neck; Future; Expected date: 01/07/2025      Discussed RICE, NSAIDs (start PO tomorrow)  Caution with muscle relaxer as may cause drowsiness     Discussed worsening signs/symptoms and when to return to clinic or go to ED.   Patient expresses understanding and agrees with treatment plan.

## 2025-01-13 DIAGNOSIS — M54.50 ACUTE LEFT-SIDED LOW BACK PAIN WITHOUT SCIATICA: ICD-10-CM

## 2025-01-13 DIAGNOSIS — M46.1 SACROILIITIS: ICD-10-CM

## 2025-01-13 RX ORDER — CYCLOBENZAPRINE HCL 10 MG
10 TABLET ORAL NIGHTLY PRN
Qty: 20 TABLET | Refills: 0 | Status: SHIPPED | OUTPATIENT
Start: 2025-01-13

## 2025-01-27 ENCOUNTER — TELEPHONE (OUTPATIENT)
Dept: OPHTHALMOLOGY | Facility: CLINIC | Age: 70
End: 2025-01-27
Payer: MEDICARE

## 2025-01-27 NOTE — TELEPHONE ENCOUNTER
Left message for pt with a new appt time and said to let us know thru jamshidt if the time doesn't work for her

## 2025-01-27 NOTE — TELEPHONE ENCOUNTER
----- Message from Nancy sent at 1/27/2025  9:41 AM CST -----  [phone] pt sent message to portal having blurry vision would like to be worked in for sooner appt she now schedule in April please reply to portal message or call to advise

## 2025-02-17 ENCOUNTER — RESULTS FOLLOW-UP (OUTPATIENT)
Dept: INTERNAL MEDICINE | Facility: CLINIC | Age: 70
End: 2025-02-17

## 2025-02-22 DIAGNOSIS — Z00.00 ENCOUNTER FOR MEDICARE ANNUAL WELLNESS EXAM: ICD-10-CM

## 2025-03-07 DIAGNOSIS — I10 ESSENTIAL HYPERTENSION: ICD-10-CM

## 2025-03-07 NOTE — TELEPHONE ENCOUNTER
Care Due:                  Date            Visit Type   Department     Provider  --------------------------------------------------------------------------------                                EP -                              PRIMARY      [facility]  Last Visit: 10-      CARE ([facility])   MEDICINE       Casandra Rodriguez  Next Visit: None Scheduled  None         None Found                                                            Last  Test          Frequency    Reason                     Performed    Due Date  --------------------------------------------------------------------------------    HBA1C.......  6 months...  semaglutide,.............  07- 12-    Vitamin D...  12 months..  ergocalciferol...........  Not Found    Overdue    [employer] Embedded Care Due Messages. Reference number: [id number].   3/07/2025 4:45:22 PM CST

## 2025-03-10 RX ORDER — AMLODIPINE BESYLATE 5 MG/1
5 TABLET ORAL DAILY
Qty: 90 TABLET | Refills: 1 | Status: SHIPPED | OUTPATIENT
Start: 2025-03-10

## 2025-03-10 NOTE — TELEPHONE ENCOUNTER
Refill Routing Note   Medication(s) are not appropriate for processing by [facility] for the following reason(s):        No active prescription written by provider    [facility] action(s):  Defer             Appointments  past 12m or future 3m with PCP    Date Provider   Last Visit   10/4/2024 Casandra Rodriguez DO   Next Visit   Visit date not found Casandra Rodriguez DO   ED visits in past 90 days: 0        Note composed:10:33 AM 03/10/2025

## 2025-03-17 ENCOUNTER — APPOINTMENT (OUTPATIENT)
Dept: RADIOLOGY | Facility: HOSPITAL | Age: 70
End: 2025-03-17
Attending: NURSE PRACTITIONER
Payer: MEDICARE

## 2025-03-17 ENCOUNTER — OFFICE VISIT (OUTPATIENT)
Dept: INTERNAL MEDICINE | Facility: CLINIC | Age: 70
End: 2025-03-17
Payer: MEDICARE

## 2025-03-17 VITALS
DIASTOLIC BLOOD PRESSURE: 84 MMHG | WEIGHT: 185.44 LBS | SYSTOLIC BLOOD PRESSURE: 138 MMHG | HEIGHT: 65 IN | HEART RATE: 74 BPM | BODY MASS INDEX: 30.89 KG/M2 | TEMPERATURE: 97 F | OXYGEN SATURATION: 96 % | RESPIRATION RATE: 18 BRPM

## 2025-03-17 DIAGNOSIS — E78.5 HYPERLIPIDEMIA LDL GOAL <70: ICD-10-CM

## 2025-03-17 DIAGNOSIS — M54.50 ACUTE LEFT-SIDED LOW BACK PAIN WITHOUT SCIATICA: Primary | ICD-10-CM

## 2025-03-17 DIAGNOSIS — E78.5 DYSLIPIDEMIA (HIGH LDL; LOW HDL): ICD-10-CM

## 2025-03-17 DIAGNOSIS — M54.50 ACUTE LEFT-SIDED LOW BACK PAIN WITHOUT SCIATICA: ICD-10-CM

## 2025-03-17 DIAGNOSIS — J01.90 ACUTE SINUSITIS, RECURRENCE NOT SPECIFIED, UNSPECIFIED LOCATION: ICD-10-CM

## 2025-03-17 DIAGNOSIS — Z86.73 HISTORY OF TIA (TRANSIENT ISCHEMIC ATTACK): ICD-10-CM

## 2025-03-17 PROCEDURE — 99214 OFFICE O/P EST MOD 30 MIN: CPT | Mod: HCNC,S$GLB,, | Performed by: NURSE PRACTITIONER

## 2025-03-17 PROCEDURE — 1159F MED LIST DOCD IN RCRD: CPT | Mod: HCNC,CPTII,S$GLB, | Performed by: NURSE PRACTITIONER

## 2025-03-17 PROCEDURE — 99999 PR PBB SHADOW E&M-EST. PATIENT-LVL V: CPT | Mod: PBBFAC,HCNC,, | Performed by: NURSE PRACTITIONER

## 2025-03-17 PROCEDURE — 72100 X-RAY EXAM L-S SPINE 2/3 VWS: CPT | Mod: TC,HCNC,PN

## 2025-03-17 PROCEDURE — 1160F RVW MEDS BY RX/DR IN RCRD: CPT | Mod: HCNC,CPTII,S$GLB, | Performed by: NURSE PRACTITIONER

## 2025-03-17 PROCEDURE — 1126F AMNT PAIN NOTED NONE PRSNT: CPT | Mod: HCNC,CPTII,S$GLB, | Performed by: NURSE PRACTITIONER

## 2025-03-17 PROCEDURE — G2211 COMPLEX E/M VISIT ADD ON: HCPCS | Mod: HCNC,S$GLB,, | Performed by: NURSE PRACTITIONER

## 2025-03-17 PROCEDURE — 3079F DIAST BP 80-89 MM HG: CPT | Mod: HCNC,CPTII,S$GLB, | Performed by: NURSE PRACTITIONER

## 2025-03-17 PROCEDURE — 3075F SYST BP GE 130 - 139MM HG: CPT | Mod: HCNC,CPTII,S$GLB, | Performed by: NURSE PRACTITIONER

## 2025-03-17 PROCEDURE — 3008F BODY MASS INDEX DOCD: CPT | Mod: HCNC,CPTII,S$GLB, | Performed by: NURSE PRACTITIONER

## 2025-03-17 PROCEDURE — 72100 X-RAY EXAM L-S SPINE 2/3 VWS: CPT | Mod: 26,HCNC,, | Performed by: STUDENT IN AN ORGANIZED HEALTH CARE EDUCATION/TRAINING PROGRAM

## 2025-03-17 RX ORDER — NAPROXEN 500 MG/1
500 TABLET ORAL 2 TIMES DAILY WITH MEALS
Qty: 30 TABLET | Refills: 0 | Status: SHIPPED | OUTPATIENT
Start: 2025-03-17

## 2025-03-17 RX ORDER — CYCLOBENZAPRINE HCL 10 MG
10 TABLET ORAL NIGHTLY PRN
Qty: 20 TABLET | Refills: 0 | Status: SHIPPED | OUTPATIENT
Start: 2025-03-17

## 2025-03-17 RX ORDER — AZELASTINE 1 MG/ML
1 SPRAY, METERED NASAL 2 TIMES DAILY
Qty: 30 ML | Refills: 1 | Status: SHIPPED | OUTPATIENT
Start: 2025-03-17 | End: 2026-03-17

## 2025-03-17 RX ORDER — PREDNISONE 10 MG/1
10 TABLET ORAL 2 TIMES DAILY
Qty: 10 TABLET | Refills: 0 | Status: SHIPPED | OUTPATIENT
Start: 2025-03-17

## 2025-03-17 RX ORDER — ROSUVASTATIN CALCIUM 20 MG/1
20 TABLET, COATED ORAL DAILY
Qty: 90 TABLET | Refills: 0 | Status: SHIPPED | OUTPATIENT
Start: 2025-03-17

## 2025-03-17 NOTE — PROGRESS NOTES
Patient ID: Conchita Rouse is a 70 y.o. female.    Chief Complaint: Sinus Problem (1 mo)    History of Present Illness    CHIEF COMPLAINT:  Ms. Rouse presents today for follow up of lower back pain.    LOWER BACK PAIN:  She was evaluated by her PA two months ago for lower back pain and received treatment with Toradol injection, Flexeril, and ibuprofen 10mg. While symptoms initially improved, she continues to experience stiffness with certain activities.    ENT:  She reports chronic sinus problems with ongoing post nasal drainage and runny nose. She has been taking Mucinex without significant improvement.    CURRENT MEDICATIONS:  She is currently taking Crestor 20mg.      ROS:  Constitutional: negative diminished activity, negative appetite change, negative fatigue, negative fever  HENT: negative ear discharge, negative ear pain, negative mouth sores, negative nosebleeds, negative sore throat, negative tinnitus, POSITIVE POST NASAL DRIP, POSITIVE NASAL DISCHARGE, POSITIVE RUNNY NOSE  Eyes: negative discharge, negative eye redness, negative eye itchiness  Respiratory: negative apnea, negative cough, negative shortness of breath  Cardiovascular: negative chest pain, negative leg swelling  Gastrointestinal: negative abdominal distention, negative abdominal pain, negative blood in stool, negative constipation, negative diarrhea, negative nausea, negative vomiting  Endocrine: negative polydipsia, negative polyphagia, negative polyuria  Genitourinary: negative difficulty urinating, negative flank pain, negative frequency, negative hematuria  Musculoskeletal: POSITIVE BACK PAIN, negative abnormal gait, negative neck pain, negative neck stiffness, negative joint pain, negative muscle pain, POSITIVE MUSCLE STIFFNESS, POSITIVE JOINT STIFFNESS  Skin: negative rash, negative wound, negative abnormal moles  Allergic/Immunologic: negative seasonal allergies, negative food allergies  Neurological: negative dizziness, negative  seizures, negative numbness, negative tingling, negative headaches, negative balance issues  Psychiatric: negative agitation, negative confusion, negative hallucinations, negative sleep difficulty, negative suicidal ideation         Physical Exam    Vital Signs: Reviewed.  Constitutional: Well-appearing. Well-developed. No acute distress.  HENT: Normocephalic. Tympanic membranes normal bilaterally. Nares patent. Oropharynx clear. No erythema. No exudate. CERUMEN IN BILATERAL EAR CANALS.  Cardiovascular: Normal rate and regular rhythm. Normal heart sounds.  Pulmonary: Pulmonary effort is normal. Normal breath sounds.  Abdominal: Bowel sounds are normal. Abdomen is soft. No tenderness.  Musculoskeletal: No muscle tenderness. No joint tenderness. Normal range of motion.  Skin: Warm. Dry.  Neurological: No focal deficit is present. Alert and oriented to person, place, and time.  Psychiatric: Mood is normal. Behavior is normal. Behavior is cooperative.  Back: TENDERNESS TO THE LOWER LUMBAR SPINE ON PALPATION. TENDERNESS TO THE LEFT SIDE OF LOWER BACK ON PALPATION.         Assessment & Plan      LOW BACK PAIN:  - Assessed the patient's lower back pain, noting tenderness in lower lumbar spine and left side.  - Ordered lumbar spine AP and lateral x-ray due to ongoing pain and absence of previous imaging in the patient's chart.  - Prescribed prednisone 10 mg twice daily for 5 days to address lumbar issues.  - Referred the patient to physical therapy at [facility] for lower back pain management.  - Noted the patient's history of lower back pain, specifically on the low left side, with previous improvement after treatment but current stiffness with certain activities.    CHRONIC SINUSITIS:  - Evaluated the patient's sinus problems, finding no sinus tenderness on exam.  - Noted ongoing sinus problems with post-nasal drainage and rhinorrhea.  - Prescribed prednisone 10 mg twice daily for 5 days to address sinus  problems.  - Refilled acetylsalicylic acid nasal spray prescription.    IMPACTED CERUMEN:  - Observed cerumen in bilateral ear canals obstructing view of tympanic membranes.    HYPERLIPIDEMIA:  - Continued Crestor 20 mg treatment.  - Sent refill prescription to pharmacy.    FOLLOW-UP:  - Scheduled a follow-up visit with Dr. Perez in 2 weeks, or sooner if needed.  - None Follow up in 2 weeks for establishment of care with Dr. Perez.  - Return sooner if needed.              Follow up if symptoms worsen or fail to improve.    This note was generated with the assistance of ambient listening technology. Verbal consent was obtained by the patient and accompanying visitor(s) for the recording of patient appointment to facilitate this note. I attest to having reviewed and edited the generated note for accuracy, though some syntax or spelling errors may persist. Please contact the author of this note for any clarification.

## 2025-03-19 ENCOUNTER — RESULTS FOLLOW-UP (OUTPATIENT)
Dept: INTERNAL MEDICINE | Facility: CLINIC | Age: 70
End: 2025-03-19

## 2025-03-28 ENCOUNTER — LAB VISIT (OUTPATIENT)
Dept: LAB | Facility: HOSPITAL | Age: 70
End: 2025-03-28
Attending: INTERNAL MEDICINE
Payer: MEDICARE

## 2025-03-28 DIAGNOSIS — R73.03 PREDIABETES: ICD-10-CM

## 2025-03-28 DIAGNOSIS — E78.5 HYPERLIPIDEMIA LDL GOAL <130: ICD-10-CM

## 2025-03-28 DIAGNOSIS — I10 ESSENTIAL HYPERTENSION: ICD-10-CM

## 2025-03-28 LAB
ALBUMIN SERPL BCP-MCNC: 3.9 G/DL (ref 3.5–5.2)
ALP SERPL-CCNC: 73 UNIT/L (ref 40–150)
ALT SERPL W/O P-5'-P-CCNC: 27 UNIT/L (ref 10–44)
ANION GAP (OHS): 9 MMOL/L (ref 8–16)
AST SERPL-CCNC: 21 UNIT/L (ref 11–45)
BILIRUB SERPL-MCNC: 0.3 MG/DL (ref 0.1–1)
BUN SERPL-MCNC: 14 MG/DL (ref 8–23)
CALCIUM SERPL-MCNC: 9.4 MG/DL (ref 8.7–10.5)
CHLORIDE SERPL-SCNC: 105 MMOL/L (ref 95–110)
CHOLEST SERPL-MCNC: 177 MG/DL (ref 120–199)
CHOLEST/HDLC SERPL: 3.5 {RATIO} (ref 2–5)
CO2 SERPL-SCNC: 26 MMOL/L (ref 23–29)
CREAT SERPL-MCNC: 0.8 MG/DL (ref 0.5–1.4)
EAG (OHS): 157 MG/DL (ref 68–131)
GFR SERPLBLD CREATININE-BSD FMLA CKD-EPI: >60 ML/MIN/1.73/M2
GLUCOSE SERPL-MCNC: 168 MG/DL (ref 70–110)
HBA1C MFR BLD: 7.1 % (ref 4–5.6)
HDLC SERPL-MCNC: 51 MG/DL (ref 40–75)
HDLC SERPL: 28.8 % (ref 20–50)
LDLC SERPL CALC-MCNC: 114.8 MG/DL (ref 63–159)
NONHDLC SERPL-MCNC: 126 MG/DL
POTASSIUM SERPL-SCNC: 4.5 MMOL/L (ref 3.5–5.1)
PROT SERPL-MCNC: 7.8 GM/DL (ref 6–8.4)
SODIUM SERPL-SCNC: 140 MMOL/L (ref 136–145)
TRIGL SERPL-MCNC: 56 MG/DL (ref 30–150)

## 2025-03-28 PROCEDURE — 36415 COLL VENOUS BLD VENIPUNCTURE: CPT | Mod: HCNC,PN

## 2025-03-28 PROCEDURE — 80061 LIPID PANEL: CPT | Mod: HCNC

## 2025-03-28 PROCEDURE — 83036 HEMOGLOBIN GLYCOSYLATED A1C: CPT | Mod: HCNC

## 2025-03-28 PROCEDURE — 80053 COMPREHEN METABOLIC PANEL: CPT | Mod: HCNC

## 2025-04-01 ENCOUNTER — RESULTS FOLLOW-UP (OUTPATIENT)
Dept: INTERNAL MEDICINE | Facility: CLINIC | Age: 70
End: 2025-04-01

## 2025-04-07 ENCOUNTER — LAB VISIT (OUTPATIENT)
Dept: LAB | Facility: HOSPITAL | Age: 70
End: 2025-04-07
Attending: FAMILY MEDICINE
Payer: MEDICARE

## 2025-04-07 ENCOUNTER — OFFICE VISIT (OUTPATIENT)
Dept: INTERNAL MEDICINE | Facility: CLINIC | Age: 70
End: 2025-04-07
Payer: MEDICARE

## 2025-04-07 ENCOUNTER — TELEPHONE (OUTPATIENT)
Dept: INTERNAL MEDICINE | Facility: CLINIC | Age: 70
End: 2025-04-07
Payer: MEDICARE

## 2025-04-07 VITALS
HEART RATE: 85 BPM | WEIGHT: 186.06 LBS | BODY MASS INDEX: 31 KG/M2 | HEIGHT: 65 IN | TEMPERATURE: 97 F | RESPIRATION RATE: 18 BRPM | SYSTOLIC BLOOD PRESSURE: 110 MMHG | OXYGEN SATURATION: 95 % | DIASTOLIC BLOOD PRESSURE: 70 MMHG

## 2025-04-07 DIAGNOSIS — D75.89 MACROCYTOSIS WITHOUT ANEMIA: ICD-10-CM

## 2025-04-07 DIAGNOSIS — D69.6 THROMBOCYTOPENIA: ICD-10-CM

## 2025-04-07 DIAGNOSIS — E11.9 TYPE 2 DIABETES MELLITUS WITHOUT COMPLICATION, WITHOUT LONG-TERM CURRENT USE OF INSULIN: ICD-10-CM

## 2025-04-07 DIAGNOSIS — M85.89 OSTEOPENIA OF MULTIPLE SITES: ICD-10-CM

## 2025-04-07 DIAGNOSIS — I10 ESSENTIAL HYPERTENSION: ICD-10-CM

## 2025-04-07 DIAGNOSIS — M54.50 CHRONIC LEFT-SIDED LOW BACK PAIN WITHOUT SCIATICA: ICD-10-CM

## 2025-04-07 DIAGNOSIS — D53.9 MACROCYTIC ANEMIA: ICD-10-CM

## 2025-04-07 DIAGNOSIS — E11.9 TYPE 2 DIABETES MELLITUS WITHOUT COMPLICATION, WITHOUT LONG-TERM CURRENT USE OF INSULIN: Primary | ICD-10-CM

## 2025-04-07 DIAGNOSIS — G89.29 CHRONIC LEFT-SIDED LOW BACK PAIN WITHOUT SCIATICA: ICD-10-CM

## 2025-04-07 PROCEDURE — 3061F NEG MICROALBUMINURIA REV: CPT | Mod: CPTII,HCNC,S$GLB, | Performed by: FAMILY MEDICINE

## 2025-04-07 PROCEDURE — 1126F AMNT PAIN NOTED NONE PRSNT: CPT | Mod: CPTII,HCNC,S$GLB, | Performed by: FAMILY MEDICINE

## 2025-04-07 PROCEDURE — 3288F FALL RISK ASSESSMENT DOCD: CPT | Mod: CPTII,HCNC,S$GLB, | Performed by: FAMILY MEDICINE

## 2025-04-07 PROCEDURE — G2211 COMPLEX E/M VISIT ADD ON: HCPCS | Mod: HCNC,S$GLB,, | Performed by: FAMILY MEDICINE

## 2025-04-07 PROCEDURE — 3008F BODY MASS INDEX DOCD: CPT | Mod: CPTII,HCNC,S$GLB, | Performed by: FAMILY MEDICINE

## 2025-04-07 PROCEDURE — 1101F PT FALLS ASSESS-DOCD LE1/YR: CPT | Mod: CPTII,HCNC,S$GLB, | Performed by: FAMILY MEDICINE

## 2025-04-07 PROCEDURE — 99214 OFFICE O/P EST MOD 30 MIN: CPT | Mod: HCNC,S$GLB,, | Performed by: FAMILY MEDICINE

## 2025-04-07 PROCEDURE — 99999 PR PBB SHADOW E&M-EST. PATIENT-LVL IV: CPT | Mod: PBBFAC,HCNC,, | Performed by: FAMILY MEDICINE

## 2025-04-07 PROCEDURE — 3066F NEPHROPATHY DOC TX: CPT | Mod: CPTII,HCNC,S$GLB, | Performed by: FAMILY MEDICINE

## 2025-04-07 PROCEDURE — 3074F SYST BP LT 130 MM HG: CPT | Mod: CPTII,HCNC,S$GLB, | Performed by: FAMILY MEDICINE

## 2025-04-07 PROCEDURE — 3078F DIAST BP <80 MM HG: CPT | Mod: CPTII,HCNC,S$GLB, | Performed by: FAMILY MEDICINE

## 2025-04-07 PROCEDURE — 81001 URINALYSIS AUTO W/SCOPE: CPT | Mod: HCNC

## 2025-04-07 PROCEDURE — 82043 UR ALBUMIN QUANTITATIVE: CPT | Mod: HCNC

## 2025-04-07 PROCEDURE — 3051F HG A1C>EQUAL 7.0%<8.0%: CPT | Mod: CPTII,HCNC,S$GLB, | Performed by: FAMILY MEDICINE

## 2025-04-07 RX ORDER — INSULIN PUMP SYRINGE, 3 ML
EACH MISCELLANEOUS
Qty: 1 EACH | Refills: 0 | Status: SHIPPED | OUTPATIENT
Start: 2025-04-07 | End: 2026-04-07

## 2025-04-07 RX ORDER — ORAL SEMAGLUTIDE 3 MG/1
3 TABLET ORAL DAILY
Qty: 30 TABLET | Refills: 0 | Status: SHIPPED | OUTPATIENT
Start: 2025-04-07

## 2025-04-07 RX ORDER — GABAPENTIN 300 MG/1
300 CAPSULE ORAL NIGHTLY
Qty: 90 CAPSULE | Refills: 1 | Status: SHIPPED | OUTPATIENT
Start: 2025-04-07

## 2025-04-07 RX ORDER — LANCETS
EACH MISCELLANEOUS
Qty: 100 EACH | Refills: 3 | Status: SHIPPED | OUTPATIENT
Start: 2025-04-07

## 2025-04-07 RX ORDER — ONDANSETRON 4 MG/1
4 TABLET, ORALLY DISINTEGRATING ORAL EVERY 12 HOURS PRN
Qty: 30 TABLET | Refills: 0 | Status: SHIPPED | OUTPATIENT
Start: 2025-04-07

## 2025-04-07 NOTE — PROGRESS NOTES
Subjective:       Patient ID: Conchita Rouse is a 70 y.o. female.    Chief Complaint: [facility]    Conchita Rouse is a 70 y.o. female who presents to clinic for [facility]. She has history of prediabetes. Most recent A1c greater than 6.5%, unable to tolerate metformin in the past and would like to try GLP-1 but does not want to inject herself. Reports adherence with blood pressure medications. History of pancytopenia previously worked up by hematology in 2017.       Review of Systems   Constitutional:  Negative for chills and fever.   HENT:  Negative for congestion, rhinorrhea and sore throat.    Respiratory:  Negative for cough, shortness of breath and wheezing.    Cardiovascular:  Negative for chest pain, palpitations and leg swelling.   Gastrointestinal:  Negative for abdominal pain, constipation, diarrhea, nausea and vomiting.   Genitourinary:  Negative for dysuria, frequency and urgency.   Musculoskeletal:  Positive for back pain.   Neurological:  Negative for headaches.   Psychiatric/Behavioral:  Negative for dysphoric mood.        Objective:      Physical Exam  Vitals reviewed.   HENT:      Head: Normocephalic and atraumatic.      Nose: No congestion or rhinorrhea.   Eyes:      General: No scleral icterus.        Right eye: No discharge.         Left eye: No discharge.      Extraocular Movements: Extraocular movements intact.      Conjunctiva/sclera: Conjunctivae normal.      Pupils: Pupils are equal, round, and reactive to light.   Cardiovascular:      Rate and Rhythm: Normal rate and regular rhythm.      Heart sounds: No murmur heard.     No friction rub. No gallop.   Pulmonary:      Effort: Pulmonary effort is normal. No respiratory distress.      Breath sounds: Normal breath sounds. No stridor. No wheezing or rhonchi.   Abdominal:      General: Bowel sounds are normal.      Palpations: Abdomen is soft.   Musculoskeletal:      Right lower leg: No edema.      Left lower leg: No edema.   Skin:      General: Skin is warm.   Neurological:      General: No focal deficit present.      Mental Status: She is alert.   Psychiatric:         Mood and Affect: Mood normal.         Behavior: Behavior normal.         Assessment:       1. Type 2 diabetes mellitus without complication, without long-term current use of insulin    2. Osteopenia of multiple sites    3. Chronic left-sided low back pain without sciatica    4. Essential hypertension    5. Thrombocytopenia    6. Macrocytosis without anemia    7. Macrocytic anemia        Plan:   1. Type 2 diabetes mellitus without complication, without long-term current use of insulin  Assessment & Plan:  Chronic, newly diagnosed, history of prediabetes, will refer to diabetes education, and start Rybelsus due to history of poor tolerance of metformin.     Orders:  -     semaglutide (RYBELSUS) 3 mg tablet; Take 1 tablet (3 mg total) by mouth once daily.  Dispense: 30 tablet; Refill: 0  -     ondansetron (ZOFRAN-ODT) 4 MG TbDL; Take 1 tablet (4 mg total) by mouth every 12 (twelve) hours as needed (nausea/vomiting).  Dispense: 30 tablet; Refill: 0  -     Ambulatory referral/consult to Diabetes Education; Future; Expected date: 04/14/2025  -     Ambulatory referral/consult to Ophthalmology; Future; Expected date: 04/14/2025  -     Cancel: Microalbumin/creatinine urine ratio  -     blood-glucose meter kit; To check BG daily as needed, to use with insurance preferred meter  Dispense: 1 each; Refill: 0  -     lancets Misc; To check BG daily as needed, to use with insurance preferred meter  Dispense: 100 each; Refill: 3  -     blood sugar diagnostic Strp; To check BG daily as needed, to use with insurance preferred meter  Dispense: 100 strip; Refill: 3  -     Urinalysis Microscopic; Future; Expected date: 04/07/2025  -     Microalbumin/creatinine urine ratio; Future; Expected date: 04/07/2025    2. Osteopenia of multiple sites  Overview:  Noted on 10/2024, DEXA scan, noted of right femoral  neck and total hip bone, vitamin D insufficiency noted. Recommend vitamin D and calcium supplementation along with weight bearing exercise    Orders:  -     Vitamin D; Future; Expected date: 04/07/2025    3. Chronic left-sided low back pain without sciatica  -     gabapentin (NEURONTIN) 300 MG capsule; Take 1 capsule (300 mg total) by mouth every evening.  Dispense: 90 capsule; Refill: 1    4. Essential hypertension  Overview:  Chronic, stable continue amlodipine     Orders:  -     TSH; Future; Expected date: 04/07/2025    5. 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 De Leon in [place] hematology with cyclosporine & eltrombopag.     Orders:  -     CBC Auto Differential; Future; Expected date: 04/07/2025    6. Macrocytosis without anemia  Chronic, will assess vitamin B12 and folate  -     Vitamin B12; Future; Expected date: 04/29/2025  -     Folate; Future; Expected date: 04/29/2025        RTC 3 months- FU diabetes    Future Appointments   Date Time Provider Department Center   5/19/2025 11:30 AM Cyndy Oh RD, [facility]  [place]   7/7/2025  2:20 PM Franchesca Perez MD [facility]   7/17/2025  2:30 PM Martha Sheehan MD [facility] [place] [facility] [place]       Franchesca Perez MD  [facility] [place] [address] [place] [address]  [phone]

## 2025-04-07 NOTE — TELEPHONE ENCOUNTER
Patient would like to come in sooner that 2:20pm today. Told patient if we have a cx I would give her a call      ----- Message from Saul sent at 4/7/2025  8:09 AM CDT -----  Contact: self  Type:  Sooner Appointment RequestCaller is requesting a sooner appointment.  Caller declined first available appointment listed below.  Caller will not accept being placed on the waitlist and is requesting a message be sent to doctor.Name of Caller:Conchita Bernal is the first available appointment?04/07Would the patient rather a call back or a response via [facility]? Call [facility] Call Back Number:[phone] Information: pt requesting a call back regarding rescheduling her appt on today for an earlier time. States she is not able to make her 2:20 appt time

## 2025-04-08 LAB
ALBUMIN/CREAT UR: 8.4 UG/MG
CREAT UR-MCNC: 155 MG/DL (ref 15–325)
MICROALBUMIN UR-MCNC: 13 UG/ML (ref ?–5000)
MICROSCOPIC COMMENT: NORMAL
RBC #/AREA URNS AUTO: 4 /HPF (ref 0–4)
SQUAMOUS #/AREA URNS AUTO: 6 /HPF
WBC #/AREA URNS AUTO: 1 /HPF (ref 0–5)

## 2025-04-09 ENCOUNTER — RESULTS FOLLOW-UP (OUTPATIENT)
Dept: INTERNAL MEDICINE | Facility: CLINIC | Age: 70
End: 2025-04-09

## 2025-04-22 ENCOUNTER — CLINICAL SUPPORT (OUTPATIENT)
Dept: DIABETES | Facility: CLINIC | Age: 70
End: 2025-04-22
Payer: MEDICARE

## 2025-04-22 VITALS — BODY MASS INDEX: 30.85 KG/M2 | WEIGHT: 185.44 LBS

## 2025-04-22 DIAGNOSIS — E11.9 TYPE 2 DIABETES MELLITUS WITHOUT COMPLICATION, WITHOUT LONG-TERM CURRENT USE OF INSULIN: ICD-10-CM

## 2025-04-22 PROCEDURE — 99999 PR PBB SHADOW E&M-EST. PATIENT-LVL III: CPT | Mod: PBBFAC,HCNC,, | Performed by: DIETITIAN, REGISTERED

## 2025-04-22 PROCEDURE — G0108 DIAB MANAGE TRN  PER INDIV: HCPCS | Mod: HCNC,S$GLB,, | Performed by: DIETITIAN, REGISTERED

## 2025-04-22 NOTE — PROGRESS NOTES
Diabetes Care Specialist Progress Note  Author: Cyndy Oh RD, CDCES  Date: 4/22/2025    Intake    Program Intake  Reason for Diabetes Program Visit:: Initial Diabetes Assessment (DM referral 4/7/25 Franchesca Perez MD)  Type of Intervention:: Individual  Education: Self-Management Skill Review  Current diabetes risk level:: moderate  In the last month, have you used the ER or been admitted to the hospital: No    Current Diabetes Treatment: Oral Medications (Rybelsus 3mg 1tab daily.)    Continuous Glucose Monitoring  Patient has CGM: No    Lab Results   Component Value Date    HGBA1C 7.1 (H) 03/28/2025     Weight: 84.1 kg (185 lb 6.5 oz)       Body mass index is 30.85 kg/m².    Lifestyle Coping Support & Clinical    Lifestyle/Coping/Support  Compared to other people your age, how would you rate your health?: Fair  Does anyone in your family have diabetes or does anyone in your family support you in your diabetes care?: DM: mom, MGM, siblings. Pt states friends, sisters are support. , 2 grandchildren live with her.  List anything about Diabetes that causes you stress?: none  How do you deal with stress/distress?: na  Learning Barriers:: None  Culture or Holiness beliefs that may impact ability to access healthcare: No  Psychosocial/Coping Skills Assessment Completed: : Yes  Assessment indicates:: Adequate understanding  Area of need?: No    Problem Review  Active Comorbidities:  (HTN, HLD, VitD defn, Hx TIA & stroke, BMI 30.96)    Diabetes Self-Management Skills Assessment    Medication Skills Assessment  Patient is able to identify current diabetes medications, dosages, and appropriate timing of medications.: yes  Patient reports problems or concerns with current medication regimen.: no  Medication Skills Assessment Completed:: Yes  Assessment indicates:: Adequate understanding  Area of need?: No    Diabetes Disease Process/Treatment Options  Diabetes Type?:  (Pt unable to state.)  When were you  diagnosed?: 3/25  If previous diabetes education, when/where:: none  What are your goals for this education session?: Meal planning.  Is patient aware of what causes diabetes?: No  Does patient understand the pathophysiology of diabetes?: No  Diabetes Disease Process/Treatment Options: Skills Assessment Completed: Yes  Assessment indicates:: Knowledge deficit, Instruction Needed  Area of need?: Yes    Nutrition/Healthy Eating  Meal Plan 24 Hour Recall - Breakfast: biscuit OR none - coffee/sugar & cream  Meal Plan 24 Hour Recall - Lunch: lasagne w/ cabbage and corn 1/2c  Meal Plan 24 Hour Recall - Dinner: chili, rice, cornbread  Meal Plan 24 Hour Recall - Snack: cashews, fruit; pt states limiting cookies, occs cake  Meal Plan 24 Hour Recall - Beverage: water, lemonade (reg), soda (reg)  Who shops/cooks?: self  Patient can identify foods that impact blood sugar.: yes (rice, potatoes, sugar)  Challenges to healthy eating::  (Irregular meal patterns, sugary korin, inadequate non-starchy vegetables.)  Nutrition/Healthy Eating Skills Assessment Completed:: Yes  Assessment indicates:: Knowledge deficit, Instruction Needed  Area of need?: Yes    Physical Activity/Exercise  Patient's daily activity level::  (Pt states gym bike riding ~30min 3d/wk and plans to obtain treadmill for home.)  Patient can identify forms of physical activity.: yes  Physical Activity/Exercise Skills Assessment Completed: : Yes  Assessment indicates:: Instruction Needed  Area of need?: Yes    Home Blood Glucose Monitoring  Patient states that blood sugar is checked at home daily.: no (Pt has glucometer but not yet testing. Pt states unable to use lancing device. Meter unavail today.)  Home Blood Glucose Monitoring Skills Assessment Completed: : Yes  Assessment indicates:: Knowledge deficit, Instruction Needed  Area of need?: Yes    Acute Complications  Acute Complications Skills Assessment Completed: : No  Deffered due to:: Time  Area of need?:  Deferred    Chronic Complications  Chronic Complications Skills Assessment Completed: : No  Deferred due to:: Time  Area of need?: Deferred    Assessment Summary and Plan  Based on today's diabetes care assessment, the following areas of need were identified:      Identified Areas of Need      Medication/Current Diabetes Treatment: No   Lifestyle Coping/Support: No   Diabetes Disease Process/Treatment Options: Yes  Reviewed diabetes pathophysiology, different types of diabetes, signs and symptoms, risk factors and treatment guidelines.    Nutrition/Healthy Eating: Yes - see care plan   Physical Activity/Exercise: Yes - see care plan   Home Blood Glucose Monitoring: Yes - see care plan   Acute Complications: Deferred    Chronic Complications: Deferred     Today's interventions were provided through individual discussion, instruction, and written materials were provided.    Patient verbalized understanding of instruction and written materials.  Pt was able to return back demonstration of instructions today. Patient understood key points, needs reinforcement and further instruction.     Diabetes Self-Management Care Plan:  Today's Diabetes Self-Management Care Plan was developed with Conchita's input. Conchita has agreed to work toward the following goal(s) to improve his/her overall diabetes control.      Care Plan: Diabetes Management   Updates made since 4/22/2024 12:00 AM        Problem: Healthy Eating         Goal: Eat 3 meals daily - manage carb 30-45grams/meal, 5-15grams/snack.    Start Date: 4/22/2025   Expected End Date: 4/7/2026   Priority: High   Barriers: No Barriers Identified   Note:    Discussed role meal spacing, carb/pro balance on BG control, wt mgmt and overall health. Instructed on benefits, application MR shake with brands to try and when to use.       Task: Reviewed the sources and role of Carbohydrate, Protein, and Fat and how each nutrient impacts blood sugar. Completed 4/22/2025        Task:  Provided visual examples using dry measuring cups, food models, and other familiar objects such as computer mouse, deck or cards, tennis ball etc. to help with visualization of portions. Completed 4/22/2025        Task: Recommended replacing beverages containing high sugar content with noncaloric/sugar free options and/or water. Completed 4/22/2025        Task: Review the importance of balancing carbohydrates with each meal using portion control techniques to count servings of carbohydrate and label reading to identify serving size and amount of total carbs per serving. Completed 4/22/2025        Task: Provided Sample plate method and reviewed the use of the plate to estimate amounts of carbohydrate per meal. Completed 4/22/2025        Problem: Physical Activity and Exercise         Goal: Patient agrees to increase physical activity to a goal of 150min/wk (gym, home treadmill).    Start Date: 4/22/2025   Expected End Date: 4/7/2026   Priority: Low   Barriers: No Barriers Identified        Task: Discussed role of physical activity on reducing insulin resistance and improvement in overall glycemic control. Completed 4/22/2025        Task: Discussed role of physical activity as it relates to weight loss Completed 4/22/2025        Task: Offered suggestions on how patient could increase their regular physical activity Completed 4/22/2025        Problem: Blood Glucose Self-Monitoring         Goal: Patient agrees to check and record blood sugars 1-2 times per day (fst, 2hr pp).    Start Date: 4/22/2025   Expected End Date: 4/7/2026   Priority: Medium   Barriers: Other (comments)   Note:    Discussed options for glucometer training. Pt agrees to allow pharmacy to assist with education.        Task: Reviewed the importance of self-monitoring blood glucose and keeping logs. Completed 4/22/2025        Task: Reviewed appropriate timing and frequency to SMBG, education on parameters on when to notify provider and advised patient  to bring logs to all appts with PCP/Endocrinologist/Diabetes Care Specialist. Completed 4/22/2025          Follow Up Plan  Follow up in about 4 weeks (around 5/20/2025).  -review BG logs  -eval goals    Today's care plan and follow up schedule was discussed with patient.  Conchita verbalized understanding of the care plan, goals, and agrees to follow up plan.        The patient was encouraged to communicate with his/her health care provider/physician and care team regarding his/her condition(s) and treatment.  I provided the patient with my contact information today and encouraged to contact me via phone or [facility] Patient Portal as needed.     Length of Visit   Total Time: 60 Minutes

## 2025-04-28 PROBLEM — E11.9 TYPE 2 DIABETES MELLITUS WITHOUT COMPLICATION, WITHOUT LONG-TERM CURRENT USE OF INSULIN: Status: ACTIVE | Noted: 2025-04-28

## 2025-04-28 PROBLEM — M85.89 OSTEOPENIA OF MULTIPLE SITES: Status: ACTIVE | Noted: 2025-04-28

## 2025-04-28 PROBLEM — D75.89 MACROCYTOSIS WITHOUT ANEMIA: Status: ACTIVE | Noted: 2025-04-28

## 2025-04-29 NOTE — ASSESSMENT & PLAN NOTE
Chronic, newly diagnosed, history of prediabetes, will refer to diabetes education, and start Rybelsus due to history of poor tolerance of metformin.

## 2025-05-21 NOTE — TELEPHONE ENCOUNTER
Pt states her blood sugar went up to 298.  Pt is stating she won't take her Vanco anymore until she knows what you want her to do.    
none

## 2025-05-27 ENCOUNTER — OFFICE VISIT (OUTPATIENT)
Dept: INTERNAL MEDICINE | Facility: CLINIC | Age: 70
End: 2025-05-27
Payer: MEDICARE

## 2025-05-27 VITALS
SYSTOLIC BLOOD PRESSURE: 122 MMHG | OXYGEN SATURATION: 97 % | WEIGHT: 185.31 LBS | BODY MASS INDEX: 30.88 KG/M2 | HEART RATE: 85 BPM | DIASTOLIC BLOOD PRESSURE: 68 MMHG | RESPIRATION RATE: 18 BRPM | HEIGHT: 65 IN | TEMPERATURE: 97 F

## 2025-05-27 DIAGNOSIS — E11.9 TYPE 2 DIABETES MELLITUS WITHOUT COMPLICATION, WITHOUT LONG-TERM CURRENT USE OF INSULIN: ICD-10-CM

## 2025-05-27 DIAGNOSIS — J01.90 ACUTE RHINOSINUSITIS: Primary | ICD-10-CM

## 2025-05-27 LAB
CTP QC/QA: YES
SARS-COV-2 RDRP RESP QL NAA+PROBE: NEGATIVE

## 2025-05-27 PROCEDURE — 1126F AMNT PAIN NOTED NONE PRSNT: CPT | Mod: CPTII,HCNC,S$GLB, | Performed by: FAMILY MEDICINE

## 2025-05-27 PROCEDURE — 3078F DIAST BP <80 MM HG: CPT | Mod: CPTII,HCNC,S$GLB, | Performed by: FAMILY MEDICINE

## 2025-05-27 PROCEDURE — 3066F NEPHROPATHY DOC TX: CPT | Mod: CPTII,HCNC,S$GLB, | Performed by: FAMILY MEDICINE

## 2025-05-27 PROCEDURE — 3008F BODY MASS INDEX DOCD: CPT | Mod: CPTII,HCNC,S$GLB, | Performed by: FAMILY MEDICINE

## 2025-05-27 PROCEDURE — 3051F HG A1C>EQUAL 7.0%<8.0%: CPT | Mod: CPTII,HCNC,S$GLB, | Performed by: FAMILY MEDICINE

## 2025-05-27 PROCEDURE — 1159F MED LIST DOCD IN RCRD: CPT | Mod: CPTII,HCNC,S$GLB, | Performed by: FAMILY MEDICINE

## 2025-05-27 PROCEDURE — 3288F FALL RISK ASSESSMENT DOCD: CPT | Mod: CPTII,HCNC,S$GLB, | Performed by: FAMILY MEDICINE

## 2025-05-27 PROCEDURE — 3074F SYST BP LT 130 MM HG: CPT | Mod: CPTII,HCNC,S$GLB, | Performed by: FAMILY MEDICINE

## 2025-05-27 PROCEDURE — 99214 OFFICE O/P EST MOD 30 MIN: CPT | Mod: HCNC,S$GLB,, | Performed by: FAMILY MEDICINE

## 2025-05-27 PROCEDURE — G2211 COMPLEX E/M VISIT ADD ON: HCPCS | Mod: HCNC,S$GLB,, | Performed by: FAMILY MEDICINE

## 2025-05-27 PROCEDURE — 1101F PT FALLS ASSESS-DOCD LE1/YR: CPT | Mod: CPTII,HCNC,S$GLB, | Performed by: FAMILY MEDICINE

## 2025-05-27 PROCEDURE — 3061F NEG MICROALBUMINURIA REV: CPT | Mod: CPTII,HCNC,S$GLB, | Performed by: FAMILY MEDICINE

## 2025-05-27 PROCEDURE — 87635 SARS-COV-2 COVID-19 AMP PRB: CPT | Mod: QW,HCNC,S$GLB, | Performed by: FAMILY MEDICINE

## 2025-05-27 PROCEDURE — 99999 PR PBB SHADOW E&M-EST. PATIENT-LVL IV: CPT | Mod: PBBFAC,HCNC,, | Performed by: FAMILY MEDICINE

## 2025-05-27 RX ORDER — METHYLPREDNISOLONE 4 MG/1
TABLET ORAL
Qty: 21 TABLET | Refills: 0 | Status: SHIPPED | OUTPATIENT
Start: 2025-05-27

## 2025-05-27 RX ORDER — ORAL SEMAGLUTIDE 7 MG/1
7 TABLET ORAL DAILY
Qty: 60 TABLET | Refills: 0 | Status: SHIPPED | OUTPATIENT
Start: 2025-05-27

## 2025-05-27 RX ORDER — IPRATROPIUM BROMIDE 21 UG/1
2 SPRAY, METERED NASAL 2 TIMES DAILY
Qty: 30 ML | Refills: 0 | Status: SHIPPED | OUTPATIENT
Start: 2025-05-27

## 2025-05-27 NOTE — PROGRESS NOTES
"  Subjective:       Patient ID: Conchita Rouse is a 70 y.o. female.    Reason for Visit: Sinus Problem (Head and chest congestion, headaches, sore throat, burning eyes)    Presents with 1 week history of head, chest congestion, headache, sore throat, burning eyes    Review of Systems   Constitutional:  Negative for chills and fever.   HENT:  Negative for congestion, rhinorrhea and sore throat.    Respiratory:  Negative for cough, shortness of breath and wheezing.    Cardiovascular:  Negative for chest pain, palpitations and leg swelling.   Gastrointestinal:  Negative for abdominal pain, constipation, diarrhea, nausea and vomiting.   Genitourinary:  Negative for dysuria, frequency and urgency.   Neurological:  Negative for headaches.   Psychiatric/Behavioral:  Negative for dysphoric mood.          Medications Ordered Prior to Encounter[1]     Past Medical History:   Diagnosis Date    Aplastic anemia     Dr. Rogelio Norwood--hematology    Arthritis     Asthma     Chronic ITP (idiopathic thrombocytopenia)     Dr. Rogelio Norwood--hematology    Dyslipidemia (high LDL; low HDL)     10y CV event risk 14.7% (asuming no DM2), which could be reduced to 4.5% with RF optimization, for which I recommended atorvastatin 20 mg 1 po qd x2 weeks and then 40 mg 1 po qd    Encounter for blood transfusion     History of shingles 02/14/2018    Per note from Dr. Rogelio Norwood III on 2/14/18; left upper buttocks.    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 Rogelio Norwood III last visit 2/14/18 (tolerating CSA & promacta); also notes aplastic anemia (AA psot [facility] ATG - week 7/17/17) and ITP, for which ATG & promacta following decadron pulse for ITP & AA; plan for second opinion on BM bx; next flow in 3 mo; intent of rx: palliative    Prediabetes     Stroke     TIA (transient ischemic attack) 2007    She was at "[employer]", where she noted she was feeling slow, took a nap, " woke up with a numb/tingling left jaw to arm, which persistned 20 minutes, took 3 baby aspirins, went to ER @ [facility], where facial droop was noted, but completely resolved & head CT revealed old minor abnormality.    Vitamin D deficiency         Past Surgical History:   Procedure Laterality Date    ARTHROSCOPY OF KNEE Right 11/4/2020    Procedure: ARTHROSCOPY, KNEE;  Surgeon: Sal Pompa MD;  Location: [facility] OR;  Service: Orthopedics;  Laterality: Right;    COLONOSCOPY      COLONOSCOPY N/A 6/23/2021    Procedure: COLONOSCOPY;  Surgeon: Mao Pepe MD;  Location: [facility] ENDO;  Service: Endoscopy;  Laterality: N/A;    COLONOSCOPY N/A 12/1/2021    Procedure: COLONOSCOPY post EMR check;  Surgeon: Mao Pepe MD;  Location: [facility] ENDO;  Service: Endoscopy;  Laterality: N/A;    COLONOSCOPY, SCREENING, LOW RISK PATIENT N/A 10/28/2024    Procedure: COLONOSCOPY, SCREENING, LOW RISK PATIENT;  Surgeon: Pedro Luis Ford MD;  Location: [facility] ENDO;  Service: Endoscopy;  Laterality: N/A;    INCISION AND DRAINAGE OF KNEE Right 11/4/2020    Procedure: INCISION AND DRAINAGE, KNEE;  Surgeon: Sal Pompa MD;  Location: [facility] OR;  Service: Orthopedics;  Laterality: Right;    left eye surgery  1955    Due to left eye lid drooping      Objective:      Physical Exam  Vitals reviewed.   Constitutional:       Appearance: Normal appearance.   HENT:      Head: Normocephalic and atraumatic.   Eyes:      General: No scleral icterus.  Cardiovascular:      Rate and Rhythm: Normal rate.   Pulmonary:      Effort: Pulmonary effort is normal. No respiratory distress.   Neurological:      General: No focal deficit present.      Mental Status: She is alert.   Psychiatric:         Mood and Affect: Mood normal.         Behavior: Behavior normal.         Assessment:       1. Acute rhinosinusitis    2. Type 2 diabetes mellitus without complication, without long-term current use of insulin        Plan:   1. Acute  rhinosinusitis  Acute, uncontrolled   -     POCT COVID-19 Rapid Screening  -     methylPREDNISolone (MEDROL DOSEPACK) 4 mg tablet; follow package directions  Dispense: 21 tablet; Refill: 0  -     ipratropium (ATROVENT) 21 mcg (0.03 %) nasal spray; 2 sprays by Each Nostril route 2 (two) times daily.  Dispense: 30 mL; Refill: 0    2. Type 2 diabetes mellitus without complication, without long-term current use of insulin  Chronic, controlled  Lab Results   Component Value Date    HGBA1C 7.1 (H) 03/28/2025     -     semaglutide (RYBELSUS) 7 mg tablet; Take 1 tablet (7 mg total) by mouth once daily.  Dispense: 60 tablet; Refill: 0         Future Appointments   Date Time Provider Department Center   7/7/2025  2:20 PM Franchesca Perez MD [facility]   7/17/2025  2:30 PM Martha Sheehan MD [facility]        Franchesca Perez MD  [facility] [place] [address] [place] [address] [place] [address]  [phone]         [1]   Current Outpatient Medications on File Prior to Visit   Medication Sig Dispense Refill    amLODIPine (NORVASC) 5 MG tablet Take 1 tablet (5 mg total) by mouth once daily. 90 tablet 1    azelastine (ASTELIN) 137 mcg (0.1 %) nasal spray 1 spray (137 mcg total) by Nasal route 2 (two) times daily. 30 mL 1    blood sugar diagnostic Strp To check BG daily as needed, to use with insurance preferred meter 100 strip 3    blood-glucose meter kit To check BG daily as needed, to use with insurance preferred meter 1 each 0    cyclobenzaprine (FLEXERIL) 10 MG tablet Take 1 tablet (10 mg total) by mouth nightly as needed for Muscle spasms (Low back pain). 20 tablet 0    eltrombopag (PROMACTA) 75 mg Tab Take 75 mg by mouth once daily.       ergocalciferol (ERGOCALCIFEROL) 50,000 unit Cap Take 1 capsule (50,000 Units total) by mouth every 7 days. 12 capsule 3    gabapentin (NEURONTIN) 300 MG capsule Take 1 capsule (300 mg total) by mouth every evening. 90 capsule 1    ibuprofen  (ADVIL,MOTRIN) 600 MG tablet Take 1 tablet (600 mg total) by mouth every 8 (eight) hours as needed for Pain. Take with food 20 tablet 0    lancets Misc To check BG daily as needed, to use with insurance preferred meter 100 each 3    loratadine (CLARITIN) 10 mg tablet Take 10 mg by mouth once daily.      naproxen (NAPROSYN) 500 MG tablet Take 1 tablet (500 mg total) by mouth 2 (two) times daily with meals. 30 tablet 0    ondansetron (ZOFRAN-ODT) 4 MG TbDL Take 1 tablet (4 mg total) by mouth every 12 (twelve) hours as needed (nausea/vomiting). 30 tablet 0    predniSONE (DELTASONE) 10 MG tablet Take 1 tablet (10 mg total) by mouth 2 (two) times daily. 10 tablet 0    rosuvastatin (CRESTOR) 20 MG tablet Take 1 tablet (20 mg total) by mouth once daily. 90 tablet 0    traZODone (DESYREL) 50 MG tablet Take 1 tablet (50 mg total) by mouth nightly as needed for Insomnia. 30 tablet 3    [DISCONTINUED] semaglutide (RYBELSUS) 3 mg tablet Take 1 tablet (3 mg total) by mouth once daily. 30 tablet 0     No current facility-administered medications on file prior to visit.

## 2025-06-25 ENCOUNTER — OFFICE VISIT (OUTPATIENT)
Dept: INTERNAL MEDICINE | Facility: CLINIC | Age: 70
End: 2025-06-25
Payer: MEDICARE

## 2025-06-25 DIAGNOSIS — M54.50 ACUTE LEFT-SIDED LOW BACK PAIN WITHOUT SCIATICA: ICD-10-CM

## 2025-06-25 PROCEDURE — 3066F NEPHROPATHY DOC TX: CPT | Mod: CPTII,HCNC,95, | Performed by: FAMILY MEDICINE

## 2025-06-25 PROCEDURE — 1159F MED LIST DOCD IN RCRD: CPT | Mod: CPTII,HCNC,95, | Performed by: FAMILY MEDICINE

## 2025-06-25 PROCEDURE — 3061F NEG MICROALBUMINURIA REV: CPT | Mod: CPTII,HCNC,95, | Performed by: FAMILY MEDICINE

## 2025-06-25 PROCEDURE — 1160F RVW MEDS BY RX/DR IN RCRD: CPT | Mod: CPTII,HCNC,95, | Performed by: FAMILY MEDICINE

## 2025-06-25 PROCEDURE — 98005 SYNCH AUDIO-VIDEO EST LOW 20: CPT | Mod: HCNC,95,, | Performed by: FAMILY MEDICINE

## 2025-06-25 PROCEDURE — 3051F HG A1C>EQUAL 7.0%<8.0%: CPT | Mod: CPTII,HCNC,95, | Performed by: FAMILY MEDICINE

## 2025-06-25 RX ORDER — CYCLOBENZAPRINE HCL 10 MG
10 TABLET ORAL NIGHTLY PRN
Qty: 20 TABLET | Refills: 0 | Status: SHIPPED | OUTPATIENT
Start: 2025-06-25

## 2025-07-06 NOTE — PROGRESS NOTES
The patient location is: [place]  The chief complaint leading to consultation is: congestion    Visit type: audiovisual    Face to Face time with patient: 10 minutes  12 minutes of total time spent on the encounter, which includes face to face time and non-face to face time preparing to see the patient (eg, review of tests), Obtaining and/or reviewing separately obtained history, Documenting clinical information in the electronic or other health record, Independently interpreting results (not separately reported) and communicating results to the patient/family/caregiver, or Care coordination (not separately reported).         Each patient to whom he or she provides medical services by telemedicine is:  (1) informed of the relationship between the physician and patient and the respective role of any other health care provider with respect to management of the patient; and (2) notified that he or she may decline to receive medical services by telemedicine and may withdraw from such care at any time.    Notes:           Subjective:       Patient ID: Conchita Rouse is a 70 y.o. female.    Chief Complaint: No chief complaint on file.    History of Present Illness    CHIEF COMPLAINT:  Ms. Rouse presents with back pain that has been ongoing for about a week, seeking pain relief.    HPI:  Ms. Rouse reports back pain for approximately 1 week. The pain is exacerbated by movement, particularly when moving her right leg, and is more noticeable with daytime activity and nocturnal movements. She suspects the pain might be related to the sciatic nerve, based on previous similar experiences. She has been taking naproxen, which was recently filled, to manage the pain. She recalls a similar episode of back pain in March of this year, for which she had x-rays taken that showed normal results. At that time, she was prescribed naproxen and referred to physical therapy, which she is scheduled to start in a few days. She also mentions  having a prescription for Meloxicam for muscle spasms, and a previous prescription for Flexeril (cyclobenzaprine) which she no longer has.    MEDICAL HISTORY:  Ms. Rouse has a history of diabetes. She also experiences back pain, with the last episode occurring in March of this year.    MEDICATIONS:  Ms. Rouse recently filled a prescription for Naproxen for pain management. She is also on Meloxicam (Mobic) for muscle spasms. Ms. Rouse reports discontinuing Flexeril (cyclobenzaprine) as she no longer has any.    IMAGING:  Ms. Rouse underwent X-rays in March 2023, which showed normal results.        Review of Systems   Constitutional:  Negative for fever.   Cardiovascular:  Negative for chest pain.   Gastrointestinal:  Negative for abdominal pain.   Genitourinary:  Negative for dysuria, hematuria and pelvic pain.   Musculoskeletal:  Positive for back pain.   Neurological:  Negative for weakness, numbness and headaches.       Objective:      Physical Exam  Constitutional:       General: She is not in acute distress.  HENT:      Head: Normocephalic and atraumatic.   Eyes:      General: No scleral icterus.        Right eye: No discharge.         Left eye: No discharge.   Pulmonary:      Effort: Pulmonary effort is normal.   Neurological:      Mental Status: She is alert and oriented to person, place, and time.   Psychiatric:         Mood and Affect: Mood normal.         Behavior: Behavior normal.         Assessment/Plan:       1. Acute left-sided low back pain without sciatica      Assessment & Plan        ACUTE LEFT SIDED LOW BACK PAIN WITHOUT SCIATICA  - Ms. Rouse reports back pain for one week, worsening with movement, especially when moving in bed at night.  - Has history of back pain, last noted in March of this year with normal x-rays.  - Previously recommended steroids, naproxen, and physical therapy.  - Will prescribe Flexeril (muscle relaxant) for pain management.  RIGHT-SIDED SCIATICA:  - Ms. Rouse experiences  pain when moving the right leg, particularly at night.    TYPE 2 DIABETES:  - Considered patient's diabetes history in treatment plan.  - Advised against steroid use due to potential elevation of glucose levels.    NSAID USE:  - Ms. Rouse has been taking naproxen and recently filled prescription.  - Advised against concurrent use of Meloxicam and naproxen due to increased risk of bleeding, ulcers, and potential renal effects.  - Recommend to choose either Meloxicam or naproxen, not both.              Follow up in about 2 weeks (around 7/9/2025), or if symptoms worsen or fail to improve, for Virtual Visit, Follow Up.   Future Appointments   Date Time Provider Department Center   7/7/2025  2:20 PM Franchesca Perez MD [facility]   7/17/2025  2:30 PM Martha Sheehan MD [facility]       Franchesca Perez MD  [facility] [place] [address] [place] [address] [place] [address]  [phone]      This note was generated with the assistance of ambient listening technology. Verbal consent was obtained by the patient and accompanying visitor(s) for the recording of patient appointment to facilitate this note. I attest to having reviewed and edited the generated note for accuracy, though some syntax or spelling errors may persist. Please contact the author of this note for any clarification.

## 2025-07-07 ENCOUNTER — OFFICE VISIT (OUTPATIENT)
Dept: INTERNAL MEDICINE | Facility: CLINIC | Age: 70
End: 2025-07-07
Payer: MEDICARE

## 2025-07-07 DIAGNOSIS — M54.50 CHRONIC LEFT-SIDED LOW BACK PAIN WITHOUT SCIATICA: ICD-10-CM

## 2025-07-07 DIAGNOSIS — E11.9 TYPE 2 DIABETES MELLITUS WITHOUT COMPLICATION, WITHOUT LONG-TERM CURRENT USE OF INSULIN: Primary | ICD-10-CM

## 2025-07-07 DIAGNOSIS — T50.905A ADVERSE EFFECT OF DRUG, INITIAL ENCOUNTER: ICD-10-CM

## 2025-07-07 DIAGNOSIS — G89.29 CHRONIC LEFT-SIDED LOW BACK PAIN WITHOUT SCIATICA: ICD-10-CM

## 2025-07-07 PROCEDURE — 98006 SYNCH AUDIO-VIDEO EST MOD 30: CPT | Mod: HCNC,95,, | Performed by: FAMILY MEDICINE

## 2025-07-07 PROCEDURE — 3066F NEPHROPATHY DOC TX: CPT | Mod: CPTII,HCNC,95, | Performed by: FAMILY MEDICINE

## 2025-07-07 PROCEDURE — 3051F HG A1C>EQUAL 7.0%<8.0%: CPT | Mod: CPTII,HCNC,95, | Performed by: FAMILY MEDICINE

## 2025-07-07 PROCEDURE — 3061F NEG MICROALBUMINURIA REV: CPT | Mod: CPTII,HCNC,95, | Performed by: FAMILY MEDICINE

## 2025-07-07 NOTE — PROGRESS NOTES
The patient location is: [place]  The chief complaint leading to consultation is: Diabetes    Visit type: audiovisual    Face to Face time with patient: 5 minutes  7 minutes of total time spent on the encounter, which includes face to face time and non-face to face time preparing to see the patient (eg, review of tests), Obtaining and/or reviewing separately obtained history, Documenting clinical information in the electronic or other health record, Independently interpreting results (not separately reported) and communicating results to the patient/family/caregiver, or Care coordination (not separately reported).       Each patient to whom he or she provides medical services by telemedicine is:  (1) informed of the relationship between the physician and patient and the respective role of any other health care provider with respect to management of the patient; and (2) notified that he or she may decline to receive medical services by telemedicine and may withdraw from such care at any time.    Notes:             Subjective:       Patient ID: Conchita Rouse is a 70 y.o. female.    Chief Complaint: No chief complaint on file.    History of Present Illness    CHIEF COMPLAINT:  Ms. Rouse presents for follow-up to discuss her back pain and A1C level, as well as concerns about medication side effects.    HPI:  Ms. Rouse reports ongoing back pain, which she describes as persistent but improved compared to previous visits. She has stiffness after sitting for 30-40 minutes and then standing up. She is on her feet for 6 hours a day and moves around frequently.    Regarding her diabetes management, she was previously taking 3-4 mg of Rybelsus and was recently increased to 7 mg. She reports side effects with the higher dose, including stomach discomfort, dizziness, and nausea. These symptoms occur even when she eats 30 minutes after taking the medication, which differs from her experience with the lower dose. Due to these  side effects, she has discontinued the medication.    She expresses concern about her A1C level and desires to follow up on her labs results.    MEDICAL HISTORY:  Ms. Rouse has a history of diabetes. She is currently on Rybelsus for diabetes management.    MEDICATIONS:  Ms. Rouse is on Rybelsus 7 mg daily, taken orally for diabetes management, likely for A1C control. She is experiencing side effects including nausea, dizziness, and stomach discomfort. Her Rybelsus dosage has been increased from 3-4 mg to 7 mg.    TEST RESULTS:  Ms. Rouse's A1C was previously elevated, though the exact value is not specified. She was initially taking 3-4 mg of Rybelsus, which was subsequently increased to 7 mg.    SOCIAL HISTORY:  Occupation: On feet 6 hours a day      ROS:  ROS as indicated in HPI.        Review of Systems   Constitutional:  Negative for fever.   Cardiovascular:  Negative for chest pain.   Gastrointestinal:  Negative for abdominal pain.   Genitourinary:  Negative for dysuria, hematuria and pelvic pain.   Musculoskeletal:  Positive for back pain.   Neurological:  Negative for weakness, numbness and headaches.       Objective:      Physical Exam  Constitutional:       General: She is not in acute distress.  HENT:      Head: Normocephalic and atraumatic.   Eyes:      General: No scleral icterus.        Right eye: No discharge.         Left eye: No discharge.   Pulmonary:      Effort: Pulmonary effort is normal.   Neurological:      Mental Status: She is alert and oriented to person, place, and time.   Psychiatric:         Mood and Affect: Mood normal.         Behavior: Behavior normal.         Assessment/Plan:       1. Type 2 diabetes mellitus without complication, without long-term current use of insulin    2. Chronic left-sided low back pain without sciatica    3. Adverse effect of drug, initial encounter      Assessment & Plan    E11.8 Type 2 diabetes mellitus with unspecified complications  M54.50 Low back pain,  unspecified  T38.895A Adverse effect of other hormones and synthetic substitutes, initial encounter    TYPE 2 DIABETES MELLITUS:  - Monitored the patient who is currently on 7 mg Rybelsus for diabetes management.  - Ordered A1C test to evaluate diabetes management and medication effectiveness before making decisions about Rybelsus dosage.  - Will review lab results and comment via patient portal ([employer]).  - Scheduled follow up for lab work as early as 8:00 AM.  - Recommend follow up after A1C results are available for further discussion about treatment options.    ADVERSE EFFECTS OF RYBELSUS:  - Noted the patient experiences dizziness and nausea with 7 mg Rybelsus, possibly due to delayed stomach emptying.  - Advised the patient to stop taking 7 mg Rybelsus due to these adverse effects.  - Explained that GI side effects can occur with Rybelsus as it delays stomach emptying, though these side effects may improve over time.    LOW BACK PAIN:  - Noted the patient experiences stiffness after sitting for 30-40 minutes.  - Ms. Rouse reports back pain still bothers her but is improved.              No follow-ups on file.   Future Appointments   Date Time Provider Department Center   7/17/2025  2:30 PM Martha Sheehan MD [facility]       Franchesca Perez MD  [facility] [address]  [place] [id number] [phone]      This note was generated with the assistance of ambient listening technology. Verbal consent was obtained by the patient and accompanying visitor(s) for the recording of patient appointment to facilitate this note. I attest to having reviewed and edited the generated note for accuracy, though some syntax or spelling errors may persist. Please contact the author of this note for any clarification.

## 2025-07-29 ENCOUNTER — LAB VISIT (OUTPATIENT)
Dept: LAB | Facility: HOSPITAL | Age: 70
End: 2025-07-29
Attending: FAMILY MEDICINE
Payer: MEDICARE

## 2025-07-29 DIAGNOSIS — D75.89 MACROCYTOSIS WITHOUT ANEMIA: ICD-10-CM

## 2025-07-29 DIAGNOSIS — E11.9 TYPE 2 DIABETES MELLITUS WITHOUT COMPLICATION, WITHOUT LONG-TERM CURRENT USE OF INSULIN: ICD-10-CM

## 2025-07-29 DIAGNOSIS — D53.9 MACROCYTIC ANEMIA: ICD-10-CM

## 2025-07-29 LAB
ALBUMIN SERPL BCP-MCNC: 4 G/DL (ref 3.5–5.2)
ALP SERPL-CCNC: 67 UNIT/L (ref 40–150)
ALT SERPL W/O P-5'-P-CCNC: 22 UNIT/L (ref 0–55)
ANION GAP (OHS): 8 MMOL/L (ref 8–16)
AST SERPL-CCNC: 25 UNIT/L (ref 0–50)
BILIRUB SERPL-MCNC: 0.4 MG/DL (ref 0.1–1)
BUN SERPL-MCNC: 9 MG/DL (ref 8–23)
CALCIUM SERPL-MCNC: 9.2 MG/DL (ref 8.7–10.5)
CHLORIDE SERPL-SCNC: 105 MMOL/L (ref 95–110)
CHOLEST SERPL-MCNC: 208 MG/DL (ref 120–199)
CHOLEST/HDLC SERPL: 4.6 {RATIO} (ref 2–5)
CO2 SERPL-SCNC: 29 MMOL/L (ref 23–29)
CREAT SERPL-MCNC: 0.8 MG/DL (ref 0.5–1.4)
EAG (OHS): 137 MG/DL (ref 68–131)
FOLATE SERPL-MCNC: 10.3 NG/ML (ref 4–24)
GFR SERPLBLD CREATININE-BSD FMLA CKD-EPI: >60 ML/MIN/1.73/M2
GLUCOSE SERPL-MCNC: 110 MG/DL (ref 70–110)
HBA1C MFR BLD: 6.4 % (ref 4–5.6)
HDLC SERPL-MCNC: 45 MG/DL (ref 40–75)
HDLC SERPL: 21.6 % (ref 20–50)
LDLC SERPL CALC-MCNC: 144.4 MG/DL (ref 63–159)
NONHDLC SERPL-MCNC: 163 MG/DL
POTASSIUM SERPL-SCNC: 3.7 MMOL/L (ref 3.5–5.1)
PROT SERPL-MCNC: 7.4 GM/DL (ref 6–8.4)
SODIUM SERPL-SCNC: 142 MMOL/L (ref 136–145)
TRIGL SERPL-MCNC: 93 MG/DL (ref 30–150)
VIT B12 SERPL-MCNC: 1266 PG/ML (ref 210–950)

## 2025-07-29 PROCEDURE — 36415 COLL VENOUS BLD VENIPUNCTURE: CPT | Mod: HCNC,PN

## 2025-07-29 PROCEDURE — 80053 COMPREHEN METABOLIC PANEL: CPT | Mod: HCNC

## 2025-07-29 PROCEDURE — 83036 HEMOGLOBIN GLYCOSYLATED A1C: CPT | Mod: HCNC

## 2025-07-29 PROCEDURE — 82607 VITAMIN B-12: CPT | Mod: HCNC

## 2025-07-29 PROCEDURE — 82746 ASSAY OF FOLIC ACID SERUM: CPT | Mod: HCNC

## 2025-07-29 PROCEDURE — 80061 LIPID PANEL: CPT | Mod: HCNC

## 2025-08-04 DIAGNOSIS — E78.5 DYSLIPIDEMIA (HIGH LDL; LOW HDL): ICD-10-CM

## 2025-08-04 DIAGNOSIS — Z86.73 HISTORY OF TIA (TRANSIENT ISCHEMIC ATTACK): ICD-10-CM

## 2025-08-04 NOTE — TELEPHONE ENCOUNTER
LOV 7/7/25 Dr. Franchesca Perez  NOV not found    Refill request received via fax from pharmacy.

## 2025-08-05 NOTE — TELEPHONE ENCOUNTER
Refill Routing Note   Medication(s) are not appropriate for processing by [facility] for the following reason(s):        Non-participating provider    [facility] action(s):  Route             Appointments  past 12m or future 3m with PCP    Date Provider   Last Visit   7/7/2025 Franchesca Perez MD   Next Visit   Visit date not found Franchesca Perez MD   ED visits in past 90 days: 0        Note composed:11:31 PM 08/04/2025

## 2025-08-06 RX ORDER — ROSUVASTATIN CALCIUM 20 MG/1
20 TABLET, COATED ORAL DAILY
Qty: 90 TABLET | Refills: 0 | Status: SHIPPED | OUTPATIENT
Start: 2025-08-06

## 2025-08-11 ENCOUNTER — PATIENT MESSAGE (OUTPATIENT)
Dept: ADMINISTRATIVE | Facility: CLINIC | Age: 70
End: 2025-08-11
Payer: MEDICARE

## 2025-08-19 ENCOUNTER — PATIENT MESSAGE (OUTPATIENT)
Dept: ADMINISTRATIVE | Facility: HOSPITAL | Age: 70
End: 2025-08-19
Payer: MEDICARE

## (undated) DEVICE — SEE MEDLINE ITEM 157216

## (undated) DEVICE — DRAPE PLASTIC U 60X72

## (undated) DEVICE — APPLICATOR CHLORAPREP ORN 26ML

## (undated) DEVICE — SUT 4.0 ETHILON

## (undated) DEVICE — SEE MEDLINE ITEM 152523

## (undated) DEVICE — SEE MEDLINE ITEM 152739

## (undated) DEVICE — GOWN SMARTGOWN LVL4 X-LONG XL

## (undated) DEVICE — ALCOHOL 70% ISOP RUBBING 4OZ

## (undated) DEVICE — SEE MEDLINE ITEM 152622

## (undated) DEVICE — GLOVE SURG PLYSPHRN ORTH SZ7.5

## (undated) DEVICE — TOURNIQUET SB QC DP 44X4IN

## (undated) DEVICE — GAUZE SPONGE 4X4 12PLY

## (undated) DEVICE — SEE MEDLINE ITEM 146231

## (undated) DEVICE — PAD CAST SPECIALIST STRL 6

## (undated) DEVICE — SEE MEDLINE ITEM 152529

## (undated) DEVICE — SYR 30CC LUER LOCK

## (undated) DEVICE — NDL SPINAL 18GX3.5 SPINOCAN

## (undated) DEVICE — TUBING ARTHRO IRR 4-LEAD

## (undated) DEVICE — RESECTOR 5.5MM

## (undated) DEVICE — SHAVER TOMCAT 4.0

## (undated) DEVICE — GLOVE 8 PROTEXIS PI BLUE

## (undated) DEVICE — SOL IRR NACL .9% 3000ML

## (undated) DEVICE — MANIFOLD 4 PORT

## (undated) DEVICE — SEE MEDLINE ITEM 146292

## (undated) DEVICE — DRAPE EMERALD 87X114.75X113

## (undated) DEVICE — ELECTRODE LOOP 20MMX12MM

## (undated) DEVICE — SEE MEDLINE ITEM 157027

## (undated) DEVICE — DRESSING XEROFORM FOIL PK 1X8

## (undated) DEVICE — SEE MEDLINE ITEM 157117